# Patient Record
Sex: FEMALE | Race: OTHER | HISPANIC OR LATINO | Employment: FULL TIME | URBAN - METROPOLITAN AREA
[De-identification: names, ages, dates, MRNs, and addresses within clinical notes are randomized per-mention and may not be internally consistent; named-entity substitution may affect disease eponyms.]

---

## 2018-03-13 ENCOUNTER — APPOINTMENT (EMERGENCY)
Dept: CT IMAGING | Facility: HOSPITAL | Age: 38
End: 2018-03-13
Payer: COMMERCIAL

## 2018-03-13 ENCOUNTER — HOSPITAL ENCOUNTER (EMERGENCY)
Facility: HOSPITAL | Age: 38
Discharge: HOME | End: 2018-03-13
Attending: EMERGENCY MEDICINE | Admitting: EMERGENCY MEDICINE
Payer: COMMERCIAL

## 2018-03-13 VITALS
DIASTOLIC BLOOD PRESSURE: 82 MMHG | RESPIRATION RATE: 18 BRPM | HEART RATE: 79 BPM | TEMPERATURE: 97.7 F | WEIGHT: 238 LBS | BODY MASS INDEX: 37.84 KG/M2 | OXYGEN SATURATION: 100 % | SYSTOLIC BLOOD PRESSURE: 164 MMHG

## 2018-03-13 DIAGNOSIS — R10.9 LEFT FLANK PAIN: Primary | ICD-10-CM

## 2018-03-13 LAB
ALBUMIN SERPL BCP-MCNC: 4 G/DL (ref 3.5–5)
ALP SERPL-CCNC: 88 U/L (ref 46–116)
ALT SERPL W P-5'-P-CCNC: 26 U/L (ref 12–78)
ANION GAP SERPL CALCULATED.3IONS-SCNC: 9 MMOL/L (ref 4–13)
AST SERPL W P-5'-P-CCNC: 17 U/L (ref 5–45)
BACTERIA UR QL AUTO: ABNORMAL /HPF
BASOPHILS # BLD AUTO: 0.03 THOUSANDS/ΜL (ref 0–0.1)
BASOPHILS NFR BLD AUTO: 0 % (ref 0–1)
BILIRUB SERPL-MCNC: 0.21 MG/DL (ref 0.2–1)
BILIRUB UR QL STRIP: NEGATIVE
BUN SERPL-MCNC: 12 MG/DL (ref 5–25)
CALCIUM SERPL-MCNC: 9.4 MG/DL (ref 8.3–10.1)
CHLORIDE SERPL-SCNC: 100 MMOL/L (ref 100–108)
CLARITY UR: CLEAR
CO2 SERPL-SCNC: 29 MMOL/L (ref 21–32)
COLOR UR: YELLOW
CREAT SERPL-MCNC: 0.69 MG/DL (ref 0.6–1.3)
EOSINOPHIL # BLD AUTO: 0.11 THOUSAND/ΜL (ref 0–0.61)
EOSINOPHIL NFR BLD AUTO: 1 % (ref 0–6)
ERYTHROCYTE [DISTWIDTH] IN BLOOD BY AUTOMATED COUNT: 13.4 % (ref 11.6–15.1)
EXT PREG TEST URINE: NEGATIVE
GFR SERPL CREATININE-BSD FRML MDRD: 109 ML/MIN/1.73SQ M
GLUCOSE SERPL-MCNC: 98 MG/DL (ref 65–140)
GLUCOSE UR STRIP-MCNC: NEGATIVE MG/DL
HCT VFR BLD AUTO: 33 % (ref 34.8–46.1)
HGB BLD-MCNC: 10.8 G/DL (ref 11.5–15.4)
HGB UR QL STRIP.AUTO: NEGATIVE
KETONES UR STRIP-MCNC: NEGATIVE MG/DL
LEUKOCYTE ESTERASE UR QL STRIP: ABNORMAL
LYMPHOCYTES # BLD AUTO: 2.28 THOUSANDS/ΜL (ref 0.6–4.47)
LYMPHOCYTES NFR BLD AUTO: 28 % (ref 14–44)
MCH RBC QN AUTO: 27.9 PG (ref 26.8–34.3)
MCHC RBC AUTO-ENTMCNC: 32.7 G/DL (ref 31.4–37.4)
MCV RBC AUTO: 85 FL (ref 82–98)
MONOCYTES # BLD AUTO: 0.58 THOUSAND/ΜL (ref 0.17–1.22)
MONOCYTES NFR BLD AUTO: 7 % (ref 4–12)
NEUTROPHILS # BLD AUTO: 5.03 THOUSANDS/ΜL (ref 1.85–7.62)
NEUTS SEG NFR BLD AUTO: 64 % (ref 43–75)
NITRITE UR QL STRIP: NEGATIVE
NON-SQ EPI CELLS URNS QL MICRO: ABNORMAL /HPF
NRBC BLD AUTO-RTO: 0 /100 WBCS
PH UR STRIP.AUTO: 7 [PH] (ref 4.5–8)
PLATELET # BLD AUTO: 368 THOUSANDS/UL (ref 149–390)
PMV BLD AUTO: 9.3 FL (ref 8.9–12.7)
POTASSIUM SERPL-SCNC: 3.5 MMOL/L (ref 3.5–5.3)
PROT SERPL-MCNC: 8.3 G/DL (ref 6.4–8.2)
PROT UR STRIP-MCNC: NEGATIVE MG/DL
RBC # BLD AUTO: 3.87 MILLION/UL (ref 3.81–5.12)
RBC #/AREA URNS AUTO: ABNORMAL /HPF
SODIUM SERPL-SCNC: 138 MMOL/L (ref 136–145)
SP GR UR STRIP.AUTO: 1.01 (ref 1–1.03)
UROBILINOGEN UR QL STRIP.AUTO: 0.2 E.U./DL
WBC # BLD AUTO: 8.03 THOUSAND/UL (ref 4.31–10.16)
WBC #/AREA URNS AUTO: ABNORMAL /HPF

## 2018-03-13 PROCEDURE — 99284 EMERGENCY DEPT VISIT MOD MDM: CPT

## 2018-03-13 PROCEDURE — 36415 COLL VENOUS BLD VENIPUNCTURE: CPT | Performed by: EMERGENCY MEDICINE

## 2018-03-13 PROCEDURE — 96361 HYDRATE IV INFUSION ADD-ON: CPT

## 2018-03-13 PROCEDURE — 96374 THER/PROPH/DIAG INJ IV PUSH: CPT

## 2018-03-13 PROCEDURE — 85025 COMPLETE CBC W/AUTO DIFF WBC: CPT | Performed by: EMERGENCY MEDICINE

## 2018-03-13 PROCEDURE — 74176 CT ABD & PELVIS W/O CONTRAST: CPT

## 2018-03-13 PROCEDURE — 81002 URINALYSIS NONAUTO W/O SCOPE: CPT | Performed by: EMERGENCY MEDICINE

## 2018-03-13 PROCEDURE — 81025 URINE PREGNANCY TEST: CPT | Performed by: EMERGENCY MEDICINE

## 2018-03-13 PROCEDURE — 81001 URINALYSIS AUTO W/SCOPE: CPT

## 2018-03-13 PROCEDURE — 80053 COMPREHEN METABOLIC PANEL: CPT | Performed by: EMERGENCY MEDICINE

## 2018-03-13 RX ORDER — KETOROLAC TROMETHAMINE 30 MG/ML
30 INJECTION, SOLUTION INTRAMUSCULAR; INTRAVENOUS ONCE
Status: COMPLETED | OUTPATIENT
Start: 2018-03-13 | End: 2018-03-13

## 2018-03-13 RX ADMIN — KETOROLAC TROMETHAMINE 30 MG: 30 INJECTION, SOLUTION INTRAMUSCULAR at 19:04

## 2018-03-13 RX ADMIN — SODIUM CHLORIDE 1000 ML: 0.9 INJECTION, SOLUTION INTRAVENOUS at 19:04

## 2018-03-13 NOTE — ED PROVIDER NOTES
History  Chief Complaint   Patient presents with    Flank Pain     L flank  LLQ abdominal pain for 1 week, urinary frequency, nausea  Denies fever, vomiting, diarrhea, constipation  History provided by:  Patient   used: No    Flank Pain   Pain location:  L flank  Pain quality: aching    Pain radiates to:  LLQ  Pain severity:  Moderate  Onset quality:  Gradual  Duration:  1 week  Timing:  Intermittent  Progression:  Waxing and waning  Chronicity:  New  Context: not recent illness    Relieved by:  Nothing  Worsened by:  Nothing  Ineffective treatments:  None tried  Associated symptoms: no chest pain, no chills, no constipation, no cough, no diarrhea, no dysuria, no fever, no nausea, no shortness of breath, no sore throat and no vomiting    Risk factors: has not had multiple surgeries        None       Past Medical History:   Diagnosis Date    Fibromyalgia     Gastritis        Past Surgical History:   Procedure Laterality Date    APPENDECTOMY       SECTION      CHOLECYSTECTOMY      OVARIAN CYST REMOVAL Right        History reviewed  No pertinent family history  I have reviewed and agree with the history as documented  Social History   Substance Use Topics    Smoking status: Never Smoker    Smokeless tobacco: Never Used    Alcohol use No        Review of Systems   Constitutional: Negative for activity change, chills and fever  HENT: Negative for facial swelling, sore throat and trouble swallowing  Eyes: Negative for pain and visual disturbance  Respiratory: Negative for cough, chest tightness and shortness of breath  Cardiovascular: Negative for chest pain and leg swelling  Gastrointestinal: Negative for abdominal pain, blood in stool, constipation, diarrhea, nausea and vomiting  Genitourinary: Positive for flank pain  Negative for dysuria  Musculoskeletal: Negative for back pain, neck pain and neck stiffness  Skin: Negative for pallor and rash  Allergic/Immunologic: Negative for environmental allergies and immunocompromised state  Neurological: Negative for dizziness and headaches  Hematological: Negative for adenopathy  Does not bruise/bleed easily  Psychiatric/Behavioral: Negative for agitation and behavioral problems  All other systems reviewed and are negative  Physical Exam  ED Triage Vitals [03/13/18 1809]   Temperature Pulse Respirations Blood Pressure SpO2   97 7 °F (36 5 °C) 93 18 141/71 100 %      Temp Source Heart Rate Source Patient Position - Orthostatic VS BP Location FiO2 (%)   Temporal Monitor Sitting Right arm --      Pain Score       Worst Possible Pain           Orthostatic Vital Signs  Vitals:    03/13/18 1809 03/13/18 1903 03/13/18 2011   BP: 141/71 139/84 164/82   Pulse: 93 83 79   Patient Position - Orthostatic VS: Sitting Sitting Sitting       Physical Exam   Constitutional: She is oriented to person, place, and time  She appears well-developed and well-nourished  No distress  HENT:   Head: Normocephalic and atraumatic  Eyes: EOM are normal    Neck: Normal range of motion  Neck supple  Cardiovascular: Normal rate, regular rhythm, normal heart sounds and intact distal pulses  Pulmonary/Chest: Effort normal and breath sounds normal    Abdominal: Soft  Bowel sounds are normal  There is no tenderness  There is no rebound and no guarding  Musculoskeletal: Normal range of motion  Neurological: She is alert and oriented to person, place, and time  Skin: Skin is warm and dry  Psychiatric: She has a normal mood and affect  Nursing note and vitals reviewed        ED Medications  Medications   sodium chloride 0 9 % bolus 1,000 mL (0 mL Intravenous Stopped 3/13/18 2011)   ketorolac (TORADOL) injection 30 mg (30 mg Intravenous Given 3/13/18 1904)       Diagnostic Studies  Results Reviewed     Procedure Component Value Units Date/Time    Comprehensive metabolic panel [[id number]]  (Abnormal) Collected:  03/13/18 1903    Lab Status:  Final result Specimen:  Blood from Arm, Right Updated:  03/13/18 1929     Sodium 138 mmol/L      Potassium 3 5 mmol/L      Chloride 100 mmol/L      CO2 29 mmol/L      Anion Gap 9 mmol/L      BUN 12 mg/dL      Creatinine 0 69 mg/dL      Glucose 98 mg/dL      Calcium 9 4 mg/dL      AST 17 U/L      ALT 26 U/L      Alkaline Phosphatase 88 U/L      Total Protein 8 3 (H) g/dL      Albumin 4 0 g/dL      Total Bilirubin 0 21 mg/dL      eGFR 109 ml/min/1 73sq m     Narrative:         National Kidney Disease Education Program recommendations are as follows:  GFR calculation is accurate only with a steady state creatinine  Chronic Kidney disease less than 60 ml/min/1 73 sq  meters  Kidney failure less than 15 ml/min/1 73 sq  meters      CBC and differential [[id number]]  (Abnormal) Collected:  03/13/18 1903    Lab Status:  Final result Specimen:  Blood from Arm, Right Updated:  03/13/18 1915     WBC 8 03 Thousand/uL      RBC 3 87 Million/uL      Hemoglobin 10 8 (L) g/dL      Hematocrit 33 0 (L) %      MCV 85 fL      MCH 27 9 pg      MCHC 32 7 g/dL      RDW 13 4 %      MPV 9 3 fL      Platelets 330 Thousands/uL      nRBC 0 /100 WBCs      Neutrophils Relative 64 %      Lymphocytes Relative 28 %      Monocytes Relative 7 %      Eosinophils Relative 1 %      Basophils Relative 0 %      Neutrophils Absolute 5 03 Thousands/µL      Lymphocytes Absolute 2 28 Thousands/µL      Monocytes Absolute 0 58 Thousand/µL      Eosinophils Absolute 0 11 Thousand/µL      Basophils Absolute 0 03 Thousands/µL     Urine Microscopic [[id number]]  (Abnormal) Collected:  03/13/18 1838    Lab Status:  Final result Specimen:  Urine from Urine, Clean Catch Updated:  03/13/18 1901     RBC, UA 0-1 (A) /hpf      WBC, UA 2-4 (A) /hpf      Epithelial Cells Moderate (A) /hpf      Bacteria, UA Occasional /hpf     POCT pregnancy, urine [[id number]]  (Normal) Resulted:  03/13/18 1846    Lab Status:  Final result Updated:  03/13/18 1846     EXT PREG TEST UR (Ref: Negative) negative    POCT urinalysis dipstick [[id number]]  (Abnormal) Resulted:  03/13/18 1846    Lab Status:  Final result Specimen:  Urine from Urine, Other Updated:  03/13/18 1846    ED Urine Macroscopic [[id number]]  (Abnormal) Collected:  03/13/18 1838    Lab Status:  Final result Specimen:  Urine Updated:  03/13/18 1840     Color, UA Yellow     Clarity, UA Clear     pH, UA 7 0     Leukocytes, UA Trace (A)     Nitrite, UA Negative     Protein, UA Negative mg/dl      Glucose, UA Negative mg/dl      Ketones, UA Negative mg/dl      Urobilinogen, UA 0 2 E U /dl      Bilirubin, UA Negative     Blood, UA Negative     Specific Gravity, UA 1 015    Narrative:       [facility] RESULT                 CT renal stone study abdomen pelvis without contrast   Final Result by Hazel Ramires DO (03/13 2024)      No nephrolithiasis or hydronephrosis  Incidentally noted right adrenal mass consistent with adenoma  Workstation performed: [id number]                    Procedures  Procedures       Phone Contacts  ED Phone Contact    ED Course  ED Course as of Mar 13 2352   Tue Mar 13, 2018   2102 Labs, urine, CT results reviewed, no acute abnormality; patient does report at this point that she has 2 herniated discs, possible acute on chronic back pain  MDM  Number of Diagnoses or Management Options  Left flank pain: new and requires workup  Diagnosis management comments: D/D: Left renal colic, Pyelonephritis, UTI, Back pain  We will check  Labs, Urine,CT A/P stone study         Amount and/or Complexity of Data Reviewed  Clinical lab tests: reviewed and ordered  Tests in the radiology section of CPT®: ordered and reviewed  Tests in the medicine section of CPT®: ordered and reviewed  Independent visualization of images, tracings, or specimens: yes      CritCare Time    Disposition  Final diagnoses:   Left flank pain     Time reflects when diagnosis was documented in both MDM as applicable and the Disposition within this note     Time User Action Codes Description Comment    3/13/2018  9:14 PM Laverlai December Add [R10 9] Left flank pain       ED Disposition     ED Disposition Condition Comment    Discharge  [facility] discharge to home/self care  Condition at discharge: Stable        Follow-up Information     Follow up With Specialties Details Why [address]    [address]  [address]          There are no discharge medications for this patient  No discharge procedures on file      ED Provider  Electronically Signed by           Jennifer Chinchilla MD  03/13/18 0406

## 2018-03-14 NOTE — DISCHARGE INSTRUCTIONS
Flank Pain   WHAT YOU NEED TO KNOW:   Flank pain is felt in the area below your ribcage and above your hip bones, often in the lower back  Your pain may be dull or so severe that you cannot get comfortable  The pain may stay in one area or radiate to another area  It may worsen and lighten in waves  Flank pain is often a sign of problems with your urinary tract, such as a kidney stone or infection  DISCHARGE INSTRUCTIONS:   Return to the emergency department if:   · You have a fever  · Your heart is fluttering or jumping  · You see blood in your urine  · Your pain radiates into your lower abdomen and genital area  · You have intense pain in your low back next to your spine  · You are much more tired than usual and have no desire to eat  · You have a headache and your muscles jerk  Contact your healthcare provider if:   · You have an upset stomach and are vomiting  · You have to urinate more often, and with urgency  · Your pain worsens or does not improve, and you cannot get comfortable  · You pass a stone when you urinate  · You have questions or concerns about your condition or care  Medicines: The following medicines may be ordered for you:  · Pain medicine  may help decrease or relieve your pain  Do not wait until the pain is severe before you take your medicine  · Antibiotics  may help treat a urinary tract infection caused by bacteria  · Take your medicine as directed  Contact your healthcare provider if you think your medicine is not helping or if you have side effects  Tell him of her if you are allergic to any medicine  Keep a list of the medicines, vitamins, and herbs you take  Include the amounts, and when and why you take them  Bring the list or the pill bottles to follow-up visits  Carry your medicine list with you in case of an emergency    Follow up with your healthcare provider in 1 to 2 days or as directed:  Write down your questions so you remember to ask them during your visits  © 2017 [address] Information is for End User's use only and may not be sold, redistributed or otherwise used for commercial purposes  All illustrations and images included in CareNotes® are the copyrighted property of A D A M [employer] Inc  or [employer]  The above information is an  only  It is not intended as medical advice for individual conditions or treatments  Talk to your doctor, nurse or pharmacist before following any medical regimen to see if it is safe and effective for you

## 2018-04-19 ENCOUNTER — ANNUAL EXAM (OUTPATIENT)
Dept: OBGYN CLINIC | Facility: CLINIC | Age: 38
End: 2018-04-19
Payer: COMMERCIAL

## 2018-04-19 VITALS
WEIGHT: 243 LBS | BODY MASS INDEX: 39.05 KG/M2 | DIASTOLIC BLOOD PRESSURE: 72 MMHG | OXYGEN SATURATION: 98 % | SYSTOLIC BLOOD PRESSURE: 128 MMHG | HEIGHT: 66 IN | HEART RATE: 103 BPM

## 2018-04-19 DIAGNOSIS — E58 DIETARY CALCIUM DEFICIENCY: ICD-10-CM

## 2018-04-19 DIAGNOSIS — Z01.419 ENCOUNTER FOR ANNUAL ROUTINE GYNECOLOGICAL EXAMINATION: Primary | ICD-10-CM

## 2018-04-19 DIAGNOSIS — Z12.31 VISIT FOR SCREENING MAMMOGRAM: ICD-10-CM

## 2018-04-19 DIAGNOSIS — Z72.3 INADEQUATE EXERCISE: ICD-10-CM

## 2018-04-19 PROCEDURE — S0610 ANNUAL GYNECOLOGICAL EXAMINA: HCPCS | Performed by: OBSTETRICS & GYNECOLOGY

## 2018-04-19 RX ORDER — VALACYCLOVIR HYDROCHLORIDE 1 G/1
TABLET, FILM COATED ORAL
Refills: 0 | COMMUNITY
Start: 2018-03-29 | End: 2019-12-09 | Stop reason: SDUPTHER

## 2018-04-19 RX ORDER — MILNACIPRAN HYDROCHLORIDE 50 MG/1
TABLET, FILM COATED ORAL 2 TIMES DAILY
Refills: 2 | COMMUNITY
Start: 2018-03-26 | End: 2019-02-01 | Stop reason: SDUPTHER

## 2018-04-19 RX ORDER — DEXLANSOPRAZOLE 60 MG/1
CAPSULE, DELAYED RELEASE ORAL EVERY 12 HOURS
COMMUNITY
Start: 2015-07-21 | End: 2018-09-24 | Stop reason: SDUPTHER

## 2018-04-19 NOTE — PROGRESS NOTES
Pt is a 34 y o  [id number] with Patient's last menstrual period was 2018 (exact date)  using VL for [facility] presents for preventive care  She notes the same partner since her last STI evaluation  In her lifetime she has been involved with 1 partner   Safe sexual practices (monogomy, condoms) are followed consistently  · She does  feel safe in the relationship  She does feel safe in her home  · Her calcium intake encompasses milk (cow, goat, almond, cashew, soy, etc), cheese and yogurt for a total of 1-2 servings daily on average  She does not take additional Vitamin D (MVI or supplement)  · She exercises minimal secondary to fibromyalgia times per week  · Her menses occur every 22  Days, last 6-7 days and require maxipad every 3 pads day 1-3, then 1-2 pads/day hours  Menstrual History:  OB History      Para Term  AB Living    4 4 3 1   4    SAB TAB Ectopic Multiple Live Births                     Obstetric Comments    1  FT   2  FT C/S--face presentation  3  PT   4  FT     Menarche: 4         Menarche age: 4  Patient's last menstrual period was 2018 (exact date)  ·      · She has never recieved an HPV vaccine    · tobacco use : does not use tobacco              · Colonoscopy: --wnl, repeat 5 years  · Last pap 2017 per patient--will obtain records  · Advise screening mammogram    Past Medical History:   Diagnosis Date    Duodenitis without bleeding     Dysphagia     Fibromyalgia     Gastritis     Hiatal hernia     Hx of colonoscopy     Insomnia     Memory loss     Oral herpes        Past Surgical History:   Procedure Laterality Date    APPENDECTOMY       SECTION      CHOLECYSTECTOMY      COLONOSCOPY      2012    DILATION AND CURETTAGE OF UTERUS      Resolved:2009    ESOPHAGOGASTRODUODENOSCOPY      Diagnostic ; 2012    HYSTEROSCOPY      Resolved:2009    OVARIAN CYST REMOVAL Right     WISDOM TOOTH EXTRACTION OB History    Para Term  AB Living   4 4 3 1   4   SAB TAB Ectopic Multiple Live Births                  # Outcome Date GA Lbr Joe/2nd Weight Sex Delivery Anes PTL Lv   4             3 Term            2 Term            1 Term               Obstetric Comments   1  FT    2  FT C/S--face presentation   3  PT    4   FT       Menarche: 4       Gyn HX:  menorrhagia       Current Outpatient Prescriptions:     dexlansoprazole (DEXILANT) 60 MG capsule, every 12 (twelve) hours, Disp: , Rfl:     SAVELLA 50 MG TABS, Take by mouth 2 (two) times a day, Disp: , Rfl: 2    valACYclovir (VALTREX) 1,000 mg tablet, TAKE 2 TABLET BY ORAL ROUTE EVERY 12 HOURS, Disp: , Rfl: 0    Allergies   Allergen Reactions    Morphine Chest Pain    Percocet [Oxycodone-Acetaminophen] Hives       Social History     Social History    Marital status: /Civil Union     Spouse name: N/A    Number of children: 4    Years of education: high school     Occupational History    cash operatorr      Social History Main Topics    Smoking status: Never Smoker    Smokeless tobacco: Never Used    Alcohol use No    Drug use: No    Sexual activity: Yes     Partners: Male     Birth control/ protection: Male Sterilization      Comment: life time partners:1     Other Topics Concern    None     Social History Narrative    Taoism Voodoo    Accepts blood products       Family History   Problem Relation Age of Onset    Other Mother      uterine leiomyoma    Colon cancer Father 54     stage IV    Diabetes Father     Hypertension Father     Anxiety disorder Brother     Depression Brother     Multiple sclerosis Sister     Hypertension Maternal Grandmother     Diabetes Maternal Grandmother     Other Maternal Grandmother      vulvar cancer    Schizophrenia Maternal Grandfather     Hypertension Maternal Grandfather     Thyroid cancer Paternal Grandmother 80    Diabetes Paternal Grandfather 43      due to complications of DM       Blood pressure 128/72, pulse 103, height 5' 6 14" (1 68 m), weight 110 kg (243 lb), last menstrual period 04/01/2018, SpO2 98 %, not currently breastfeeding  and Body mass index is 39 05 kg/m²  Physical Exam   Constitutional: She is oriented to person, place, and time  She appears well-developed and well-nourished  HENT:   Head: Normocephalic and atraumatic  Eyes: Conjunctivae and EOM are normal    Neck: Normal range of motion  Neck supple  No tracheal deviation present  No thyromegaly present  Cardiovascular: Regular rhythm and normal heart sounds  Pulmonary/Chest: Effort normal and breath sounds normal  No stridor  No respiratory distress  She has no wheezes  She has no rales  Abdominal: Soft  Bowel sounds are normal  She exhibits no distension and no mass  There is no tenderness  There is no guarding  Musculoskeletal: Normal range of motion  She exhibits no edema or tenderness  Lymphadenopathy:     She has no cervical adenopathy  Neurological: She is alert and oriented to person, place, and time  Skin: Skin is warm  No rash noted  No erythema  Psychiatric: She has a normal mood and affect  Her behavior is normal  Judgment and thought content normal      Breasts: breasts appear normal, no suspicious masses, no skin or nipple changes or axillary nodes, right breast normal without mass, skin or nipple changes or axillary nodes, left breast normal without mass, skin or nipple changes or axillary nodes         vulva: normal external genitalia for age and no lesions, masses, epithelial changes, or exudate  vagina: color pink, rugae  well formed rugae, discharge  white and bleeding  without any bleeding  cervix: parous and no lesions   uterus: NSSC, AF, NT, mobile  adnexa: no masses or tenderness      A/P:  Pt is a 34 y o  [id number] with      Diagnoses and all orders for this visit:    Encounter for annual routine gynecological examination    Visit for screening mammogram  -     Mammo screening bilateral w cad; Future    Dietary calcium deficiency    Inadequate exercise    BMI 39 0-39 9,adult    Patient advised recommendation of daily dietary calcium of  1000 mg calcium  Patient advised recommendation of exercise 5 times per week for 30 minutes  Patient advised recommendation of BMI to be between 19-25

## 2018-04-25 ENCOUNTER — HOSPITAL ENCOUNTER (OUTPATIENT)
Dept: MAMMOGRAPHY | Facility: HOSPITAL | Age: 38
Discharge: HOME | End: 2018-04-25
Attending: OBSTETRICS & GYNECOLOGY
Payer: COMMERCIAL

## 2018-04-25 DIAGNOSIS — Z12.31 VISIT FOR SCREENING MAMMOGRAM: ICD-10-CM

## 2018-04-25 PROCEDURE — 77067 SCR MAMMO BI INCL CAD: CPT

## 2018-05-14 ENCOUNTER — TELEPHONE (OUTPATIENT)
Dept: OBGYN CLINIC | Facility: CLINIC | Age: 38
End: 2018-05-14

## 2018-05-14 NOTE — TELEPHONE ENCOUNTER
Pt called back stating breast health services did reach out to her last week and she is scheduled for a right breast follow up this coming Wednesday     watch file for results

## 2018-05-16 ENCOUNTER — HOSPITAL ENCOUNTER (OUTPATIENT)
Dept: ULTRASOUND IMAGING | Facility: CLINIC | Age: 38
Discharge: HOME | End: 2018-05-16
Payer: COMMERCIAL

## 2018-05-16 ENCOUNTER — HOSPITAL ENCOUNTER (OUTPATIENT)
Dept: MAMMOGRAPHY | Facility: CLINIC | Age: 38
Discharge: HOME | End: 2018-05-16
Payer: COMMERCIAL

## 2018-05-16 DIAGNOSIS — R92.8 ABNORMAL MAMMOGRAM: ICD-10-CM

## 2018-05-16 PROCEDURE — G0279 TOMOSYNTHESIS, MAMMO: HCPCS

## 2018-05-16 PROCEDURE — 77065 DX MAMMO INCL CAD UNI: CPT

## 2018-05-29 ENCOUNTER — TRANSCRIBE ORDERS (OUTPATIENT)
Dept: ADMINISTRATIVE | Facility: HOSPITAL | Age: 38
End: 2018-05-29

## 2018-05-29 DIAGNOSIS — R22.1 NECK MASS: Primary | ICD-10-CM

## 2018-05-30 ENCOUNTER — HOSPITAL ENCOUNTER (OUTPATIENT)
Dept: ULTRASOUND IMAGING | Facility: HOSPITAL | Age: 38
Discharge: HOME | End: 2018-05-30
Payer: COMMERCIAL

## 2018-05-30 DIAGNOSIS — R22.1 NECK MASS: ICD-10-CM

## 2018-05-30 PROCEDURE — 76536 US EXAM OF HEAD AND NECK: CPT

## 2018-06-02 ENCOUNTER — APPOINTMENT (OUTPATIENT)
Dept: LAB | Facility: HOSPITAL | Age: 38
End: 2018-06-02
Payer: COMMERCIAL

## 2018-06-02 ENCOUNTER — TRANSCRIBE ORDERS (OUTPATIENT)
Dept: ADMINISTRATIVE | Facility: HOSPITAL | Age: 38
End: 2018-06-02

## 2018-06-02 DIAGNOSIS — E55.9 AVITAMINOSIS D: ICD-10-CM

## 2018-06-02 DIAGNOSIS — E66.01 MORBID OBESITY (HCC): ICD-10-CM

## 2018-06-02 DIAGNOSIS — E78.49 FAMILIAL COMBINED HYPERLIPIDEMIA: ICD-10-CM

## 2018-06-02 DIAGNOSIS — D35.00 BENIGN NEOPLASM OF ADRENAL GLAND, UNSPECIFIED LATERALITY: ICD-10-CM

## 2018-06-02 DIAGNOSIS — H81.13 BENIGN PAROXYSMAL VERTIGO OF BOTH EARS: ICD-10-CM

## 2018-06-02 DIAGNOSIS — D50.8 IRON DEFICIENCY ANEMIA SECONDARY TO INADEQUATE DIETARY IRON INTAKE: ICD-10-CM

## 2018-06-02 DIAGNOSIS — K21.9 GASTROESOPHAGEAL REFLUX DISEASE WITHOUT ESOPHAGITIS: ICD-10-CM

## 2018-06-02 DIAGNOSIS — B00.9 DISEASE DUE TO ALPHAHERPESVIRINAE: Primary | ICD-10-CM

## 2018-06-02 DIAGNOSIS — B00.9 DISEASE DUE TO ALPHAHERPESVIRINAE: ICD-10-CM

## 2018-06-02 LAB
ALBUMIN SERPL BCP-MCNC: 3.5 G/DL (ref 3.5–5)
ALP SERPL-CCNC: 75 U/L (ref 46–116)
ALT SERPL W P-5'-P-CCNC: 23 U/L (ref 12–78)
ANION GAP SERPL CALCULATED.3IONS-SCNC: 6 MMOL/L (ref 4–13)
AST SERPL W P-5'-P-CCNC: 15 U/L (ref 5–45)
BASOPHILS # BLD AUTO: 0.03 THOUSANDS/ΜL (ref 0–0.1)
BASOPHILS NFR BLD AUTO: 1 % (ref 0–1)
BILIRUB SERPL-MCNC: 0.31 MG/DL (ref 0.2–1)
BUN SERPL-MCNC: 11 MG/DL (ref 5–25)
CALCIUM SERPL-MCNC: 9.1 MG/DL (ref 8.3–10.1)
CHLORIDE SERPL-SCNC: 104 MMOL/L (ref 100–108)
CHOLEST SERPL-MCNC: 211 MG/DL (ref 50–200)
CO2 SERPL-SCNC: 28 MMOL/L (ref 21–32)
CREAT SERPL-MCNC: 0.7 MG/DL (ref 0.6–1.3)
EOSINOPHIL # BLD AUTO: 0.17 THOUSAND/ΜL (ref 0–0.61)
EOSINOPHIL NFR BLD AUTO: 3 % (ref 0–6)
ERYTHROCYTE [DISTWIDTH] IN BLOOD BY AUTOMATED COUNT: 14.2 % (ref 11.6–15.1)
GFR SERPL CREATININE-BSD FRML MDRD: 109 ML/MIN/1.73SQ M
GLUCOSE P FAST SERPL-MCNC: 100 MG/DL (ref 65–99)
HCT VFR BLD AUTO: 32 % (ref 34.8–46.1)
HDLC SERPL-MCNC: 48 MG/DL (ref 40–60)
HGB BLD-MCNC: 10.1 G/DL (ref 11.5–15.4)
LDLC SERPL CALC-MCNC: 138 MG/DL (ref 0–100)
LYMPHOCYTES # BLD AUTO: 1.99 THOUSANDS/ΜL (ref 0.6–4.47)
LYMPHOCYTES NFR BLD AUTO: 33 % (ref 14–44)
MCH RBC QN AUTO: 26.6 PG (ref 26.8–34.3)
MCHC RBC AUTO-ENTMCNC: 31.6 G/DL (ref 31.4–37.4)
MCV RBC AUTO: 84 FL (ref 82–98)
MONOCYTES # BLD AUTO: 0.46 THOUSAND/ΜL (ref 0.17–1.22)
MONOCYTES NFR BLD AUTO: 8 % (ref 4–12)
NEUTROPHILS # BLD AUTO: 3.38 THOUSANDS/ΜL (ref 1.85–7.62)
NEUTS SEG NFR BLD AUTO: 56 % (ref 43–75)
NONHDLC SERPL-MCNC: 163 MG/DL
NRBC BLD AUTO-RTO: 0 /100 WBCS
PLATELET # BLD AUTO: 392 THOUSANDS/UL (ref 149–390)
PMV BLD AUTO: 9.2 FL (ref 8.9–12.7)
POTASSIUM SERPL-SCNC: 4.1 MMOL/L (ref 3.5–5.3)
PROT SERPL-MCNC: 8 G/DL (ref 6.4–8.2)
RBC # BLD AUTO: 3.79 MILLION/UL (ref 3.81–5.12)
SODIUM SERPL-SCNC: 138 MMOL/L (ref 136–145)
TRIGL SERPL-MCNC: 123 MG/DL
TSH SERPL DL<=0.05 MIU/L-ACNC: 1.58 UIU/ML (ref 0.36–3.74)
WBC # BLD AUTO: 6.03 THOUSAND/UL (ref 4.31–10.16)

## 2018-06-02 PROCEDURE — 80053 COMPREHEN METABOLIC PANEL: CPT

## 2018-06-02 PROCEDURE — 80061 LIPID PANEL: CPT

## 2018-06-02 PROCEDURE — 85025 COMPLETE CBC W/AUTO DIFF WBC: CPT

## 2018-06-02 PROCEDURE — 36415 COLL VENOUS BLD VENIPUNCTURE: CPT

## 2018-06-02 PROCEDURE — 84443 ASSAY THYROID STIM HORMONE: CPT

## 2018-06-25 ENCOUNTER — TELEPHONE (OUTPATIENT)
Dept: FAMILY MEDICINE CLINIC | Facility: CLINIC | Age: 38
End: 2018-06-25

## 2018-06-25 DIAGNOSIS — D64.9 ANEMIA, UNSPECIFIED TYPE: ICD-10-CM

## 2018-07-03 ENCOUNTER — APPOINTMENT (OUTPATIENT)
Dept: LAB | Facility: HOSPITAL | Age: 38
End: 2018-07-03
Payer: COMMERCIAL

## 2018-07-03 DIAGNOSIS — D64.9 ANEMIA, UNSPECIFIED TYPE: ICD-10-CM

## 2018-07-03 LAB
BASOPHILS # BLD AUTO: 0.02 THOUSANDS/ΜL (ref 0–0.1)
BASOPHILS NFR BLD AUTO: 0 % (ref 0–1)
EOSINOPHIL # BLD AUTO: 0.2 THOUSAND/ΜL (ref 0–0.61)
EOSINOPHIL NFR BLD AUTO: 3 % (ref 0–6)
ERYTHROCYTE [DISTWIDTH] IN BLOOD BY AUTOMATED COUNT: 14.4 % (ref 11.6–15.1)
FERRITIN SERPL-MCNC: 5 NG/ML (ref 8–388)
FOLATE SERPL-MCNC: 13.8 NG/ML (ref 3.1–17.5)
HCT VFR BLD AUTO: 31.8 % (ref 34.8–46.1)
HGB BLD-MCNC: 10.2 G/DL (ref 11.5–15.4)
IRON SERPL-MCNC: 42 UG/DL (ref 50–170)
LYMPHOCYTES # BLD AUTO: 2.23 THOUSANDS/ΜL (ref 0.6–4.47)
LYMPHOCYTES NFR BLD AUTO: 28 % (ref 14–44)
MCH RBC QN AUTO: 26.9 PG (ref 26.8–34.3)
MCHC RBC AUTO-ENTMCNC: 32.1 G/DL (ref 31.4–37.4)
MCV RBC AUTO: 84 FL (ref 82–98)
MONOCYTES # BLD AUTO: 0.54 THOUSAND/ΜL (ref 0.17–1.22)
MONOCYTES NFR BLD AUTO: 7 % (ref 4–12)
NEUTROPHILS # BLD AUTO: 5.03 THOUSANDS/ΜL (ref 1.85–7.62)
NEUTS SEG NFR BLD AUTO: 63 % (ref 43–75)
NRBC BLD AUTO-RTO: 0 /100 WBCS
PLATELET # BLD AUTO: 425 THOUSANDS/UL (ref 149–390)
PMV BLD AUTO: 9.9 FL (ref 8.9–12.7)
RBC # BLD AUTO: 3.79 MILLION/UL (ref 3.81–5.12)
RETICS # AUTO: NORMAL 10*3/UL (ref 14097–95744)
RETICS # CALC: 1.14 % (ref 0.37–1.87)
TIBC SERPL-MCNC: 444 UG/DL (ref 250–450)
VIT B12 SERPL-MCNC: 351 PG/ML (ref 100–900)
WBC # BLD AUTO: 8.02 THOUSAND/UL (ref 4.31–10.16)

## 2018-07-03 PROCEDURE — 82728 ASSAY OF FERRITIN: CPT

## 2018-07-03 PROCEDURE — 85025 COMPLETE CBC W/AUTO DIFF WBC: CPT

## 2018-07-03 PROCEDURE — 36415 COLL VENOUS BLD VENIPUNCTURE: CPT

## 2018-07-03 PROCEDURE — 82607 VITAMIN B-12: CPT

## 2018-07-03 PROCEDURE — 83540 ASSAY OF IRON: CPT

## 2018-07-03 PROCEDURE — 83550 IRON BINDING TEST: CPT

## 2018-07-03 PROCEDURE — 85045 AUTOMATED RETICULOCYTE COUNT: CPT

## 2018-07-03 PROCEDURE — 82746 ASSAY OF FOLIC ACID SERUM: CPT

## 2018-07-11 PROBLEM — H81.10 BENIGN PAROXYSMAL POSITIONAL VERTIGO: Status: ACTIVE | Noted: 2017-03-08

## 2018-07-16 ENCOUNTER — OFFICE VISIT (OUTPATIENT)
Dept: FAMILY MEDICINE CLINIC | Facility: CLINIC | Age: 38
End: 2018-07-16
Payer: COMMERCIAL

## 2018-07-16 VITALS
RESPIRATION RATE: 16 BRPM | HEIGHT: 65 IN | DIASTOLIC BLOOD PRESSURE: 74 MMHG | WEIGHT: 245 LBS | HEART RATE: 87 BPM | OXYGEN SATURATION: 98 % | BODY MASS INDEX: 40.82 KG/M2 | SYSTOLIC BLOOD PRESSURE: 128 MMHG

## 2018-07-16 DIAGNOSIS — E78.2 MIXED HYPERLIPIDEMIA: ICD-10-CM

## 2018-07-16 DIAGNOSIS — R10.10 PAIN LOCALIZED TO UPPER ABDOMEN: Primary | ICD-10-CM

## 2018-07-16 DIAGNOSIS — K21.9 GASTROESOPHAGEAL REFLUX DISEASE WITHOUT ESOPHAGITIS: ICD-10-CM

## 2018-07-16 DIAGNOSIS — D50.8 OTHER IRON DEFICIENCY ANEMIA: ICD-10-CM

## 2018-07-16 DIAGNOSIS — K59.09 OTHER CONSTIPATION: ICD-10-CM

## 2018-07-16 DIAGNOSIS — M79.7 PRIMARY FIBROMYALGIA SYNDROME: ICD-10-CM

## 2018-07-16 DIAGNOSIS — R73.01 IMPAIRED FASTING GLUCOSE: ICD-10-CM

## 2018-07-16 PROCEDURE — 99214 OFFICE O/P EST MOD 30 MIN: CPT | Performed by: FAMILY MEDICINE

## 2018-07-16 RX ORDER — CLINDAMYCIN PHOSPHATE 10 MG/G
GEL TOPICAL EVERY 12 HOURS
COMMUNITY
Start: 2016-08-16 | End: 2019-01-09 | Stop reason: SDUPTHER

## 2018-07-16 NOTE — PROGRESS NOTES
Assessment/Plan:    Hyperlipidemia  Chronic ,fair control on current medication  Advised to maintain a low-fat low-cholesterol diet consult regarding potential comorbidity including cardiovascular disease consult regarding important weight loss         Diagnoses and all orders for this visit:    Pain localized to upper abdomen  Comments:  Symptomatic patient did have history of adhesion more than 5 years ago,plan to refer to Surgeon,in case worse to go to ER  Orders:  -     Ambulatory referral to General Surgery; Future    Other iron deficiency anemia  Comments:  Symptomatic iron is low patient she had history of intolerance to iron and cause constipation will refer for iron infusion  Orders:  -     Ambulatory referral to Hematology / Oncology; Future    Gastroesophageal reflux disease without esophagitis    Impaired fasting glucose    Primary fibromyalgia syndrome    Other constipation    Mixed hyperlipidemia    Other orders  -     clindamycin (CLINDAGEL) 1 % gel; Every 12 hours          Subjective:   Chief Complaint   Patient presents with    Follow-up     chronic conditions         Patient ID: Shemar Marcelino is a 34 y o  female  Abdomen pain in LUQ ,5/10 ,radiates to back ,no constipation ,no diarrhea ,no weight lose no fever,no nausea or vomiting ,pt did have Ctscan of abdomen insignificant ,pt had HX of adhesion more than 5 y ago when she has similar kind of pain,no urinary problem,no increase frequency no blood in urine ,pt did have colonoscopy x2 y ago was neg   _ pt blood work review with pt iron dif anemia ,,pt has intolerance to Ferrous sulfate cause constipation         The following portions of the patient's history were reviewed and updated as appropriate: allergies, current medications, past family history, past medical history, past social history, past surgical history and problem list     Review of Systems   Constitutional: Negative for fatigue and fever     HENT: Negative for ear pain, sinus pain, sinus pressure and sore throat  Eyes: Negative for pain and redness  Respiratory: Negative for cough, chest tightness and shortness of breath  Cardiovascular: Negative for chest pain, palpitations and leg swelling  Gastrointestinal: Positive for abdominal pain  Negative for blood in stool, constipation, diarrhea and nausea  Genitourinary: Negative for flank pain, frequency and hematuria  Musculoskeletal: Negative for back pain and joint swelling  Skin: Negative for rash  Neurological: Negative for dizziness, numbness and headaches  Hematological: Does not bruise/bleed easily  Objective:  Vitals:    07/16/18 1352   BP: 128/74   BP Location: Left arm   Patient Position: Sitting   Cuff Size: Large   Pulse: 87   Resp: 16   SpO2: 98%   Weight: 111 kg (245 lb)   Height: 5' 5" (1 651 m)      Physical Exam   Constitutional: She is oriented to person, place, and time  She appears well-developed and well-nourished  HENT:   Head: Normocephalic  Right Ear: External ear normal    Left Ear: External ear normal    Eyes: Right eye exhibits no discharge  Left eye exhibits no discharge  Neck: No JVD present  Cardiovascular: Normal rate, regular rhythm and normal heart sounds  Exam reveals no gallop  No murmur heard  Pulmonary/Chest: Effort normal  No respiratory distress  She has no wheezes  She has no rales  She exhibits no tenderness  Abdominal: She exhibits no mass  There is tenderness  There is no rebound  Tenderness in LUQ    Musculoskeletal: She exhibits no edema or tenderness  Neurological: She is alert and oriented to person, place, and time

## 2018-07-19 ENCOUNTER — OFFICE VISIT (OUTPATIENT)
Dept: HEMATOLOGY ONCOLOGY | Facility: CLINIC | Age: 38
End: 2018-07-19
Payer: COMMERCIAL

## 2018-07-19 VITALS
HEIGHT: 66 IN | RESPIRATION RATE: 18 BRPM | SYSTOLIC BLOOD PRESSURE: 118 MMHG | TEMPERATURE: 99.4 F | WEIGHT: 244.8 LBS | OXYGEN SATURATION: 99 % | BODY MASS INDEX: 39.34 KG/M2 | DIASTOLIC BLOOD PRESSURE: 86 MMHG | HEART RATE: 77 BPM

## 2018-07-19 DIAGNOSIS — D50.9 IRON DEFICIENCY ANEMIA, UNSPECIFIED IRON DEFICIENCY ANEMIA TYPE: Primary | ICD-10-CM

## 2018-07-19 PROCEDURE — 99214 OFFICE O/P EST MOD 30 MIN: CPT | Performed by: INTERNAL MEDICINE

## 2018-07-19 NOTE — PROGRESS NOTES
Hematology/Oncology Outpatient Follow-up  Neto Garcia 34 y o  female 1980 [id number]    Date:  7/19/2018      Assessment and Plan:  1  Iron deficiency anemia, unspecified iron deficiency anemia type  The patient will be treated with 2 doses of injected for 1 week apart she also received 2 vitamin B12 injections at the same time  I did advise her to take vitamin B12 sublingual on a daily basis  We will see her again about 3 months from now for close follow-up  - CBC and differential; Future  - Comprehensive metabolic panel; Future  - Iron Panel; Future  - Vitamin B12; Future      HPI:  The patient came in after 2 years of her previous office visit with the same medical problem  She has history of chronic normocytic anemia history of iron deficiency, history of gastritis and gastric paresis  She was treated with injectafer x2 doses around April 2016 which corrected her iron deficiency in her anemia  Extensive workup was done at that time to rule out other causes of her anemia  She did have a borderline low vitamin B12 level without any hint of monoclonal gammopathy or hemolysis  Most recent blood work continues to show iron deficiency with ferritin of 5 and borderline low vitamin B12 level  She continues to have heavy menstrual cycle and gastric paresis which is the most likely etiology of her iron deficiency and vitamin B12 deficiency  No history exists  Interval history:    ROS: Review of Systems   Constitutional: Positive for fatigue  Negative for activity change, appetite change, chills, diaphoresis, fever and unexpected weight change  HENT: Positive for trouble swallowing  Negative for congestion, dental problem, ear discharge, ear pain, facial swelling, hearing loss, mouth sores, nosebleeds, postnasal drip, sore throat and tinnitus  Eyes: Negative for discharge, redness, itching and visual disturbance  Respiratory: Positive for shortness of breath   Negative for cough, chest tightness and wheezing  Cardiovascular: Negative for chest pain, palpitations and leg swelling  Gastrointestinal: Positive for nausea  Negative for abdominal distention, abdominal pain, anal bleeding, blood in stool, constipation, diarrhea and vomiting  Genitourinary: Negative for difficulty urinating, dysuria, flank pain, frequency, hematuria and urgency  Musculoskeletal: Negative for arthralgias, back pain, gait problem, joint swelling, myalgias and neck pain  Skin: Negative for color change, pallor, rash and wound  Neurological: Positive for dizziness and numbness  Negative for syncope, speech difficulty, weakness, light-headedness and headaches  Hematological: Negative for adenopathy  Does not bruise/bleed easily  Psychiatric/Behavioral: Positive for sleep disturbance  Negative for agitation, behavioral problems and confusion         Past Medical History:   Diagnosis Date    Anemia     Duodenitis without bleeding     Dysphagia     Fibromyalgia     Gastritis     Hiatal hernia     Hx of colonoscopy     Insomnia     Memory loss     Oral herpes        Past Surgical History:   Procedure Laterality Date    APPENDECTOMY      BREAST SURGERY      lump removed from right breast      SECTION      CHOLECYSTECTOMY      COLONOSCOPY      2012    DILATION AND CURETTAGE OF UTERUS      Resolved:2009    ESOPHAGOGASTRODUODENOSCOPY      Diagnostic ; 2012    HYSTEROSCOPY      Resolved:2009    OVARIAN CYST REMOVAL Right     WISDOM TOOTH EXTRACTION         Social History     Social History    Marital status: /Civil Union     Spouse name: N/A    Number of children: 4    Years of education: high school     Occupational History    cash operatorr      Social History Main Topics    Smoking status: Never Smoker    Smokeless tobacco: Never Used    Alcohol use No    Drug use: No    Sexual activity: Yes     Partners: Male     Birth control/ protection: Male Sterilization      Comment: life time partners:1     Other Topics Concern    None     Social History Narrative    [employer]    Accepts blood products       Family History   Problem Relation Age of Onset    Other Mother         uterine leiomyoma    Colon cancer Father 54        stage IV    Diabetes Father     Hypertension Father     Anxiety disorder Brother     Depression Brother     Multiple sclerosis Sister     Hypertension Maternal Grandmother     Diabetes Maternal Grandmother     Other Maternal Grandmother         vulvar cancer    Schizophrenia Maternal Grandfather     Hypertension Maternal Grandfather     Thyroid cancer Paternal Grandmother 80    Diabetes Paternal Grandfather 43         due to complications of DM       Allergies   Allergen Reactions    Morphine Chest Pain    Percocet [Oxycodone-Acetaminophen] Hives    Domperidone          Current Outpatient Prescriptions:     clindamycin (CLINDAGEL) 1 % gel, Every 12 hours, Disp: , Rfl:     dexlansoprazole (DEXILANT) 60 MG capsule, every 12 (twelve) hours, Disp: , Rfl:     meclizine (ANTIVERT) 32 MG tablet, Take 32 mg by mouth 3 (three) times a day as needed for dizziness, Disp: , Rfl:     SAVELLA 50 MG TABS, Take by mouth 2 (two) times a day, Disp: , Rfl: 2    valACYclovir (VALTREX) 1,000 mg tablet, TAKE 2 TABLET BY ORAL ROUTE EVERY 12 HOURS, Disp: , Rfl: 0      Physical Exam:  /86 (BP Location: Left arm, Patient Position: Sitting, Cuff Size: Large)   Pulse 77   Temp 99 4 °F (37 4 °C) (Tympanic)   Resp 18   Ht 5' 6" (1 676 m)   Wt 111 kg (244 lb 12 8 oz)   SpO2 99%   BMI 39 51 kg/m²     Physical Exam   Constitutional: She is oriented to person, place, and time  She appears well-developed and well-nourished  No distress  HENT:   Head: Normocephalic and atraumatic     Nose: Nose normal    Mouth/Throat: Oropharynx is clear and moist    Eyes: Conjunctivae and EOM are normal  Pupils are equal, round, and reactive to light  Right eye exhibits no discharge  Left eye exhibits no discharge  No scleral icterus  Neck: Normal range of motion  Neck supple  No JVD present  No tracheal deviation present  No thyromegaly present  Cardiovascular: Normal rate, regular rhythm and normal heart sounds  Exam reveals no friction rub  No murmur heard  Pulmonary/Chest: Effort normal and breath sounds normal  No stridor  No respiratory distress  She has no wheezes  She has no rales  She exhibits no tenderness  Abdominal: Soft  Bowel sounds are normal  She exhibits no distension and no mass  There is no hepatosplenomegaly, splenomegaly or hepatomegaly  There is no tenderness  There is no rebound and no guarding  Musculoskeletal: Normal range of motion  She exhibits no edema, tenderness or deformity  Lymphadenopathy:     She has no cervical adenopathy  Neurological: She is alert and oriented to person, place, and time  She has normal reflexes  No cranial nerve deficit  Coordination normal    Skin: Skin is warm and dry  No rash noted  She is not diaphoretic  No erythema  No pallor  Psychiatric: She has a normal mood and affect   Her behavior is normal  Judgment and thought content normal          Labs:  Lab Results   Component Value Date    WBC 8 02 07/03/2018    HGB 10 2 (L) 07/03/2018    HCT 31 8 (L) 07/03/2018    MCV 84 07/03/2018     (H) 07/03/2018     Lab Results   Component Value Date     06/02/2018    K 4 1 06/02/2018     06/02/2018    CO2 28 06/02/2018    ANIONGAP 6 06/02/2018    BUN 11 06/02/2018    CREATININE 0 70 06/02/2018    GLUCOSE 98 03/13/2018    GLUF 100 (H) 06/02/2018    CALCIUM 9 1 06/02/2018    AST 15 06/02/2018    ALT 23 06/02/2018    ALKPHOS 75 06/02/2018    PROT 8 0 06/02/2018    BILITOT 0 31 06/02/2018    EGFR 109 06/02/2018     Lab Results   Component Value Date    IRON 42 (L) 07/03/2018    TIBC 444 07/03/2018    FERRITIN 5 (L) 07/03/2018       Patient voiced understanding and agreement in the above discussion  Aware to contact our office with questions/symptoms in the interim

## 2018-07-25 RX ORDER — SODIUM CHLORIDE 9 MG/ML
20 INJECTION, SOLUTION INTRAVENOUS ONCE
Status: COMPLETED | OUTPATIENT
Start: 2018-07-26 | End: 2018-07-26

## 2018-07-25 RX ORDER — CYANOCOBALAMIN 1000 UG/ML
1000 INJECTION INTRAMUSCULAR; SUBCUTANEOUS ONCE
Status: COMPLETED | OUTPATIENT
Start: 2018-07-26 | End: 2018-07-26

## 2018-07-26 ENCOUNTER — HOSPITAL ENCOUNTER (OUTPATIENT)
Dept: INFUSION CENTER | Facility: HOSPITAL | Age: 38
Discharge: HOME | End: 2018-07-26
Payer: COMMERCIAL

## 2018-07-26 VITALS
DIASTOLIC BLOOD PRESSURE: 65 MMHG | TEMPERATURE: 97.3 F | RESPIRATION RATE: 16 BRPM | HEART RATE: 76 BPM | SYSTOLIC BLOOD PRESSURE: 113 MMHG

## 2018-07-26 PROCEDURE — 96372 THER/PROPH/DIAG INJ SC/IM: CPT

## 2018-07-26 PROCEDURE — 96365 THER/PROPH/DIAG IV INF INIT: CPT

## 2018-07-26 RX ORDER — CYANOCOBALAMIN 1000 UG/ML
INJECTION INTRAMUSCULAR; SUBCUTANEOUS
Status: COMPLETED
Start: 2018-07-26 | End: 2018-07-26

## 2018-07-26 RX ADMIN — CYANOCOBALAMIN 1000 MCG: 1000 INJECTION INTRAMUSCULAR; SUBCUTANEOUS at 14:11

## 2018-07-26 RX ADMIN — FERRIC CARBOXYMALTOSE INJECTION 750 MG: 50 INJECTION, SOLUTION INTRAVENOUS at 14:27

## 2018-07-26 RX ADMIN — SODIUM CHLORIDE 20 ML/HR: 0.9 INJECTION, SOLUTION INTRAVENOUS at 14:09

## 2018-07-26 NOTE — PROGRESS NOTES
Pt admitted to infusion center for vitamin b 12 injection and injectofer  Has had meds in the past without adverse reaction  Reports that her energy level has been extremely low lately and she "can fall asleep in the drop of a hat"  July labs reviewed  IV inserted without any difficulty   present

## 2018-08-01 RX ORDER — CYANOCOBALAMIN 1000 UG/ML
1000 INJECTION INTRAMUSCULAR; SUBCUTANEOUS ONCE
Status: COMPLETED | OUTPATIENT
Start: 2018-08-02 | End: 2018-08-02

## 2018-08-01 RX ORDER — SODIUM CHLORIDE 9 MG/ML
20 INJECTION, SOLUTION INTRAVENOUS ONCE
Status: COMPLETED | OUTPATIENT
Start: 2018-08-02 | End: 2018-08-02

## 2018-08-02 ENCOUNTER — HOSPITAL ENCOUNTER (OUTPATIENT)
Dept: INFUSION CENTER | Facility: HOSPITAL | Age: 38
Discharge: HOME | End: 2018-08-02
Payer: COMMERCIAL

## 2018-08-02 VITALS
TEMPERATURE: 97.3 F | SYSTOLIC BLOOD PRESSURE: 130 MMHG | RESPIRATION RATE: 16 BRPM | DIASTOLIC BLOOD PRESSURE: 80 MMHG | HEART RATE: 66 BPM | OXYGEN SATURATION: 99 %

## 2018-08-02 PROCEDURE — 96372 THER/PROPH/DIAG INJ SC/IM: CPT

## 2018-08-02 PROCEDURE — 96365 THER/PROPH/DIAG IV INF INIT: CPT

## 2018-08-02 RX ORDER — CYANOCOBALAMIN 1000 UG/ML
INJECTION INTRAMUSCULAR; SUBCUTANEOUS
Status: COMPLETED
Start: 2018-08-02 | End: 2018-08-02

## 2018-08-02 RX ADMIN — FERRIC CARBOXYMALTOSE INJECTION 750 MG: 50 INJECTION, SOLUTION INTRAVENOUS at 14:27

## 2018-08-02 RX ADMIN — SODIUM CHLORIDE 20 ML/HR: 0.9 INJECTION, SOLUTION INTRAVENOUS at 14:05

## 2018-08-02 RX ADMIN — CYANOCOBALAMIN 1000 MCG: 1000 INJECTION INTRAMUSCULAR; SUBCUTANEOUS at 14:12

## 2018-08-23 ENCOUNTER — OFFICE VISIT (OUTPATIENT)
Dept: FAMILY MEDICINE CLINIC | Facility: CLINIC | Age: 38
End: 2018-08-23
Payer: COMMERCIAL

## 2018-08-23 VITALS
DIASTOLIC BLOOD PRESSURE: 80 MMHG | OXYGEN SATURATION: 99 % | TEMPERATURE: 97.8 F | HEART RATE: 73 BPM | WEIGHT: 243 LBS | HEIGHT: 66 IN | BODY MASS INDEX: 39.05 KG/M2 | SYSTOLIC BLOOD PRESSURE: 110 MMHG

## 2018-08-23 DIAGNOSIS — N92.0 MENORRHAGIA WITH REGULAR CYCLE: Primary | ICD-10-CM

## 2018-08-23 DIAGNOSIS — K21.9 GASTROESOPHAGEAL REFLUX DISEASE WITHOUT ESOPHAGITIS: ICD-10-CM

## 2018-08-23 PROCEDURE — 99214 OFFICE O/P EST MOD 30 MIN: CPT | Performed by: FAMILY MEDICINE

## 2018-08-25 NOTE — ASSESSMENT & PLAN NOTE
Chronic , the uncontrolled asymptomatic patient on Dexilant the will continue will refer the patient to see GI   Discuss with pt important lose weight   Multiple small meal ,avoid eat and lying down ,avoid spicy food and avoid provoked food

## 2018-08-25 NOTE — PROGRESS NOTES
Assessment/Plan:    Gastroesophageal reflux disease without esophagitis  Chronic , the uncontrolled asymptomatic patient on Dexilant the will continue will refer the patient to see GI   Discuss with pt important lose weight   Multiple small meal ,avoid eat and lying down ,avoid spicy food and avoid provoked food        Menorrhagia with regular cycle  Asymptomatic will order a transvaginal ultrasound to rule out the ovarian cyst 0 or endometrial hyperplasia of versus fibroid the also we did order blood work including a TSH checked SH and H check prolactin level and serum pregnancy test       Diagnoses and all orders for this visit:    Menorrhagia with regular cycle  -     TSH, 3rd generation with Free T4 reflex; Future  -     CBC and differential; Future  -     Luteinizing hormone; Future  -     Follicle stimulating hormone; Future  -     Prolactin; Future  -     hCG, quantitative; Future  -     US TRANSVAGINAL NON OB COMPLETE (DOES NOT INC PELVIS); Future    Gastroesophageal reflux disease without esophagitis  -     Ambulatory referral to Gastroenterology; Future          Subjective:   Chief Complaint   Patient presents with    Follow-up    Other     discuss her anemia        Patient ID: Gutierrez Mena is a 34 y o  female  Patient and office with her  and she concerned about the and heavy menstrual per yet it has been going on for the last couple months and per patient she is bleeding heavily and the    Lost for a longer time 7 days sometimes 10 days and she noticed some clot , and she get a a get abdomen pain and cramp on the time of her per period the patient did have history of for ovarian cyst,   Deny any vaginal discharge and no headache no blurred vision no milk discharge from the nipple no excessive hair growth no skin discoloration no rash patient does fatigue all the time and she does has history of iron-deficiency anemia for what been seen the by Hematology and she had iron infusion  Also patient was history of for GERD on the Dexilant but patient still symptomatic she had epigastric pain she had a heart burn and the metal test on her tongue and no weight change in no nausea no vomiting no diarrhea the but per patient she does watch her for her diet no spicy food and she eat multiple small meal does not eat and lie down        The following portions of the patient's history were reviewed and updated as appropriate: allergies, current medications, past family history, past medical history, past social history, past surgical history and problem list     Review of Systems   Constitutional: Negative for fatigue and fever  HENT: Negative for ear pain, sinus pain, sinus pressure and sore throat  Eyes: Negative for pain and redness  Respiratory: Negative for cough, chest tightness and shortness of breath  Cardiovascular: Negative for chest pain, palpitations and leg swelling  Gastrointestinal: Negative for abdominal pain, blood in stool, constipation, diarrhea and nausea  Genitourinary: Positive for menstrual problem  Negative for flank pain, frequency and hematuria  Musculoskeletal: Negative for back pain and joint swelling  Skin: Negative for rash  Neurological: Negative for dizziness, numbness and headaches  Hematological: Does not bruise/bleed easily  Objective:  Vitals:    08/23/18 1449   BP: 110/80   Pulse: 73   Temp: 97 8 °F (36 6 °C)   TempSrc: Oral   SpO2: 99%   Weight: 110 kg (243 lb)   Height: 5' 5 5" (1 664 m)      Physical Exam   Constitutional: She is oriented to person, place, and time  She appears well-developed and well-nourished  HENT:   Head: Normocephalic  Right Ear: External ear normal    Left Ear: External ear normal    Eyes: Conjunctivae and EOM are normal  Right eye exhibits no discharge  Left eye exhibits no discharge  Neck: No JVD present  Cardiovascular: Normal rate, regular rhythm and normal heart sounds  Exam reveals no gallop      No murmur heard   Pulmonary/Chest: Effort normal  No respiratory distress  She has no wheezes  She has no rales  She exhibits no tenderness  Abdominal: She exhibits no mass  There is no tenderness  There is no rebound  Musculoskeletal: She exhibits no edema or tenderness  Neurological: She is alert and oriented to person, place, and time  Skin: No rash noted  No erythema

## 2018-08-25 NOTE — ASSESSMENT & PLAN NOTE
Asymptomatic will order a transvaginal ultrasound to rule out the ovarian cyst 0 or endometrial hyperplasia of versus fibroid the also we did order blood work including a TSH checked SH and H check prolactin level and serum pregnancy test

## 2018-08-27 ENCOUNTER — APPOINTMENT (OUTPATIENT)
Dept: LAB | Facility: HOSPITAL | Age: 38
End: 2018-08-27
Payer: COMMERCIAL

## 2018-08-27 ENCOUNTER — HOSPITAL ENCOUNTER (OUTPATIENT)
Dept: ULTRASOUND IMAGING | Facility: HOSPITAL | Age: 38
Discharge: HOME | End: 2018-08-27
Payer: COMMERCIAL

## 2018-08-27 DIAGNOSIS — N92.0 MENORRHAGIA WITH REGULAR CYCLE: ICD-10-CM

## 2018-08-27 LAB
B-HCG SERPL-ACNC: <2 MIU/ML
BASOPHILS # BLD AUTO: 0.05 THOUSANDS/ΜL (ref 0–0.1)
BASOPHILS NFR BLD AUTO: 1 % (ref 0–1)
EOSINOPHIL # BLD AUTO: 0.13 THOUSAND/ΜL (ref 0–0.61)
EOSINOPHIL NFR BLD AUTO: 2 % (ref 0–6)
ERYTHROCYTE [DISTWIDTH] IN BLOOD BY AUTOMATED COUNT: 18.7 % (ref 11.6–15.1)
FSH SERPL-ACNC: 3.2 MIU/ML
HCT VFR BLD AUTO: 36.8 % (ref 34.8–46.1)
HGB BLD-MCNC: 11.7 G/DL (ref 11.5–15.4)
LH SERPL-ACNC: 7.3 MIU/ML
LYMPHOCYTES # BLD AUTO: 2.14 THOUSANDS/ΜL (ref 0.6–4.47)
LYMPHOCYTES NFR BLD AUTO: 32 % (ref 14–44)
MCH RBC QN AUTO: 28.3 PG (ref 26.8–34.3)
MCHC RBC AUTO-ENTMCNC: 31.8 G/DL (ref 31.4–37.4)
MCV RBC AUTO: 89 FL (ref 82–98)
MONOCYTES # BLD AUTO: 0.53 THOUSAND/ΜL (ref 0.17–1.22)
MONOCYTES NFR BLD AUTO: 8 % (ref 4–12)
NEUTROPHILS # BLD AUTO: 3.83 THOUSANDS/ΜL (ref 1.85–7.62)
NEUTS SEG NFR BLD AUTO: 58 % (ref 43–75)
NRBC BLD AUTO-RTO: 0 /100 WBCS
PLATELET # BLD AUTO: 320 THOUSANDS/UL (ref 149–390)
PMV BLD AUTO: 10.1 FL (ref 8.9–12.7)
PROLACTIN SERPL-MCNC: 23 NG/ML
RBC # BLD AUTO: 4.13 MILLION/UL (ref 3.81–5.12)
TSH SERPL DL<=0.05 MIU/L-ACNC: 1.79 UIU/ML (ref 0.36–3.74)
WBC # BLD AUTO: 6.68 THOUSAND/UL (ref 4.31–10.16)

## 2018-08-27 PROCEDURE — 85025 COMPLETE CBC W/AUTO DIFF WBC: CPT

## 2018-08-27 PROCEDURE — 84702 CHORIONIC GONADOTROPIN TEST: CPT

## 2018-08-27 PROCEDURE — 83002 ASSAY OF GONADOTROPIN (LH): CPT

## 2018-08-27 PROCEDURE — 76856 US EXAM PELVIC COMPLETE: CPT

## 2018-08-27 PROCEDURE — 83001 ASSAY OF GONADOTROPIN (FSH): CPT

## 2018-08-27 PROCEDURE — 84443 ASSAY THYROID STIM HORMONE: CPT

## 2018-08-27 PROCEDURE — 76830 TRANSVAGINAL US NON-OB: CPT

## 2018-08-27 PROCEDURE — 84146 ASSAY OF PROLACTIN: CPT

## 2018-08-27 PROCEDURE — 36415 COLL VENOUS BLD VENIPUNCTURE: CPT

## 2018-08-29 ENCOUNTER — TELEPHONE (OUTPATIENT)
Dept: FAMILY MEDICINE CLINIC | Facility: CLINIC | Age: 38
End: 2018-08-29

## 2018-08-29 NOTE — TELEPHONE ENCOUNTER
Patient called had to leave work was feeling very fatigue unable to stay awake wanted to know her lab results please advise

## 2018-08-30 ENCOUNTER — OFFICE VISIT (OUTPATIENT)
Dept: FAMILY MEDICINE CLINIC | Facility: CLINIC | Age: 38
End: 2018-08-30
Payer: COMMERCIAL

## 2018-08-30 VITALS
DIASTOLIC BLOOD PRESSURE: 80 MMHG | TEMPERATURE: 97.8 F | HEIGHT: 66 IN | HEART RATE: 76 BPM | BODY MASS INDEX: 39.21 KG/M2 | WEIGHT: 244 LBS | OXYGEN SATURATION: 98 % | SYSTOLIC BLOOD PRESSURE: 120 MMHG

## 2018-08-30 DIAGNOSIS — N83.201 CYST OF RIGHT OVARY: ICD-10-CM

## 2018-08-30 DIAGNOSIS — M54.5 LOW BACK PAIN, UNSPECIFIED BACK PAIN LATERALITY, UNSPECIFIED CHRONICITY, WITH SCIATICA PRESENCE UNSPECIFIED: ICD-10-CM

## 2018-08-30 DIAGNOSIS — N89.8 VAGINAL DISCHARGE: Primary | ICD-10-CM

## 2018-08-30 DIAGNOSIS — D25.9 UTERINE LEIOMYOMA, UNSPECIFIED LOCATION: ICD-10-CM

## 2018-08-30 LAB
SL AMB  POCT GLUCOSE, UA: NORMAL
SL AMB LEUKOCYTE ESTERASE,UA: NORMAL
SL AMB POCT BILIRUBIN,UA: NORMAL
SL AMB POCT BLOOD,UA: NORMAL
SL AMB POCT CLARITY,UA: CLEAR
SL AMB POCT COLOR,UA: YELLOW
SL AMB POCT KETONES,UA: NORMAL
SL AMB POCT NITRITE,UA: NORMAL
SL AMB POCT PH,UA: 6
SL AMB POCT SPECIFIC GRAVITY,UA: 1.01
SL AMB POCT URINE PROTEIN: NORMAL
SL AMB POCT UROBILINOGEN: 0.2

## 2018-08-30 PROCEDURE — 87591 N.GONORRHOEAE DNA AMP PROB: CPT | Performed by: FAMILY MEDICINE

## 2018-08-30 PROCEDURE — 81002 URINALYSIS NONAUTO W/O SCOPE: CPT | Performed by: FAMILY MEDICINE

## 2018-08-30 PROCEDURE — 99214 OFFICE O/P EST MOD 30 MIN: CPT | Performed by: FAMILY MEDICINE

## 2018-08-30 PROCEDURE — 87491 CHLMYD TRACH DNA AMP PROBE: CPT | Performed by: FAMILY MEDICINE

## 2018-08-30 NOTE — TELEPHONE ENCOUNTER
Some of blood work come back her HGB is better ,pt need to see her Rheumatology for her Fibromylagia and chronic fatigue syndrome

## 2018-08-30 NOTE — LETTER
August 30, 2018     Patient: Laina Cárdenas   YOB: 1980   Date of Visit: 8/30/2018       To Whom it May Concern:    Laina Cárdenas is under my professional care  She was seen in my office on 8/30/2018  She may return to work on 08/31/2018  If you have any questions or concerns, please don't hesitate to call           Sincerely,          Nhan Meredith MD        CC: No Recipients

## 2018-08-30 NOTE — LETTER
August 30, 2018     Patient: Derek Moseley   YOB: 1980   Date of Visit: 8/30/2018       To Whom it May Concern:    Derek Moseley is under my professional care  She was seen in my office on 8/30/2018  Please excuse Derek Moseley for 08/29/2018 and 08/30/2018  She may return to work on 08/31/2018  If you have any questions or concerns, please don't hesitate to call           Sincerely,          Long Panchal MD        CC: No Recipients

## 2018-08-31 LAB
CHLAMYDIA DNA CVX QL NAA+PROBE: NORMAL
N GONORRHOEA DNA GENITAL QL NAA+PROBE: NORMAL

## 2018-09-01 PROBLEM — N89.8 VAGINAL DISCHARGE: Status: ACTIVE | Noted: 2018-09-01

## 2018-09-01 LAB — SL AMB POCT WET MOUNT: NORMAL

## 2018-09-01 PROCEDURE — 87210 SMEAR WET MOUNT SALINE/INK: CPT | Performed by: FAMILY MEDICINE

## 2018-09-01 NOTE — ASSESSMENT & PLAN NOTE
With mild is negative and GC chlamydia has been ordered most probably normal vaginal discharge discussed with the patient

## 2018-09-01 NOTE — PROGRESS NOTES
Assessment/Plan:    Fibroid uterus  Small fibroid on transvaginal ultrasound 2018 will refer the patient to gyn    Ovarian cyst  Small cyst dermoid cyst in the right ovary refer patient to gyn    Vaginal discharge  With mild is negative and GC chlamydia has been ordered most probably normal vaginal discharge discussed with the patient       Diagnoses and all orders for this visit:    Vaginal discharge  -     Chlamydia/GC amplified DNA by PCR  -     POCT wet mount    Cyst of right ovary  -     Ambulatory referral to Gynecology; Future    Uterine leiomyoma, unspecified location  -     Ambulatory referral to Gynecology; Future    Low back pain, unspecified back pain laterality, unspecified chronicity, with sciatica presence unspecified  -     POCT urine dip          Subjective:   Chief Complaint   Patient presents with    Other     vaginal discharge        Patient ID: Cuong Osborn is a 34 y o  female  Vaginal Discharge   The patient's primary symptoms include vaginal discharge  This is a new problem  The current episode started yesterday  The problem has been unchanged  The patient is experiencing no pain  She is not pregnant  Pertinent negatives include no abdominal pain, constipation, diarrhea, fever, flank pain, frequency, headaches, hematuria, nausea, rash or sore throat  The vaginal discharge was clear  There has been no bleeding  She has tried nothing for the symptoms  No, her partner does not have an STD  She uses nothing for contraception  Her menstrual history has been regular  Her past medical history is significant for an abdominal surgery, metrorrhagia and ovarian cysts  There is no history of a  section, an ectopic pregnancy or endometriosis     transvaginal US disucss with pt     The following portions of the patient's history were reviewed and updated as appropriate: allergies, current medications, past family history, past medical history, past social history, past surgical history and problem list     Review of Systems   Constitutional: Negative for fatigue and fever  HENT: Negative for ear pain, sinus pain, sinus pressure and sore throat  Eyes: Negative for pain and redness  Respiratory: Negative for cough, chest tightness and shortness of breath  Cardiovascular: Negative for chest pain, palpitations and leg swelling  Gastrointestinal: Negative for abdominal pain, blood in stool, constipation, diarrhea and nausea  Genitourinary: Positive for vaginal discharge  Negative for flank pain, frequency and hematuria  Skin: Negative for rash  Neurological: Negative for dizziness, numbness and headaches  Hematological: Does not bruise/bleed easily  Objective:  Vitals:    08/30/18 0934   BP: 120/80   Pulse: 76   Temp: 97 8 °F (36 6 °C)   TempSrc: Oral   SpO2: 98%   Weight: 111 kg (244 lb)   Height: 5' 6" (1 676 m)      Physical Exam   Constitutional: She is oriented to person, place, and time  She appears well-developed and well-nourished  HENT:   Head: Normocephalic  Right Ear: External ear normal    Left Ear: External ear normal    Eyes: Conjunctivae and EOM are normal  Right eye exhibits no discharge  Left eye exhibits no discharge  Neck: No JVD present  Cardiovascular: Normal rate, regular rhythm and normal heart sounds  Exam reveals no gallop  No murmur heard  Pulmonary/Chest: Effort normal  No respiratory distress  She has no wheezes  She has no rales  She exhibits no tenderness  Abdominal: She exhibits no mass  There is no tenderness  There is no rebound  Genitourinary: No tenderness or bleeding in the vagina  Vaginal discharge found  Musculoskeletal: She exhibits no edema or tenderness  Neurological: She is alert and oriented to person, place, and time  Skin: No rash noted  No erythema

## 2018-09-17 ENCOUNTER — OFFICE VISIT (OUTPATIENT)
Dept: OBGYN CLINIC | Facility: CLINIC | Age: 38
End: 2018-09-17
Payer: COMMERCIAL

## 2018-09-17 VITALS
DIASTOLIC BLOOD PRESSURE: 64 MMHG | WEIGHT: 241 LBS | BODY MASS INDEX: 38.73 KG/M2 | HEIGHT: 66 IN | SYSTOLIC BLOOD PRESSURE: 112 MMHG

## 2018-09-17 DIAGNOSIS — N92.0 MENORRHAGIA WITH REGULAR CYCLE: Primary | ICD-10-CM

## 2018-09-17 DIAGNOSIS — N83.201 CYST OF RIGHT OVARY: ICD-10-CM

## 2018-09-17 DIAGNOSIS — D25.1 INTRAMURAL LEIOMYOMA OF UTERUS: ICD-10-CM

## 2018-09-17 PROCEDURE — 99214 OFFICE O/P EST MOD 30 MIN: CPT | Performed by: OBSTETRICS & GYNECOLOGY

## 2018-09-17 NOTE — PROGRESS NOTES
Patient is a 34 y o  [id number] with Patient's last menstrual period was 09/13/2018 (exact date)  who presents requesting evaluation of menorrhagia and to follow up u/s from PCP regarding the same  We reviewed her most recent u/s shows a very small fibroid ~1 3 cm  We also reviewed that she has a small dermoid cyst on her right ovary  Pt is asymptomatic and would like to observe at this time  Pt reports she has had a cyst removed on that ovary before and thinks it is was for the same thing  Pt reports her menses used to be moderate and 7 days long  She reports that for the last 2 months they have been lasting 8-10 days and they are significantly heavier  She reports she now uses a heavy maxi pad and she saturates them 3 hours and she was leaking out of them  She reports she was diagnosed with anemia in June as a result of her heavy menses and she has been getting IV iron  Her hgb was 10 1 in June and is 11 7 at her most recent check  She reports she has never used hormonal contraception and her  has a vasectomy  Pt had a TSH, FSH, LH and PRL checked on 8/27/18 and they were all normal    We reviewed options for menorrhagia including OCPs, Mirena, Depo Provera, Lysteda and Endometrial ablation  Pt reports she is most interested in [facility]  We reviewed the risks including irregular bleeding, cramping, infection, uterine perforation, failure, ectopic pregnancy for Mirena  We reviewed the most common side effect with Lysteda is headache  Pt would like to review pamphlets and discuss with  and will call with her decision       Past Medical History:   Diagnosis Date    Anemia     Duodenitis without bleeding     Dysphagia     Fibromyalgia     Gastritis     GERD (gastroesophageal reflux disease)     Hiatal hernia     Hx of colonoscopy     Insomnia     Memory loss     Obesity     Oral herpes        Past Surgical History:   Procedure Laterality Date    APPENDECTOMY      BREAST SURGERY Right 2015    lump removed from breast    [address] [place] [address] [address]      COLONOSCOPY      2012    DILATION AND CURETTAGE OF UTERUS      Resolved:2009    ESOPHAGOGASTRODUODENOSCOPY      Diagnostic ; 2012    HYSTEROSCOPY      Resolved:2009    OVARIAN CYST REMOVAL Right 2005    WISDOM TOOTH EXTRACTION         OB History    Para Term  AB Living   4 4 3 1   4   SAB TAB Ectopic Multiple Live Births                  # Outcome Date GA Lbr Joe/2nd Weight Sex Delivery Anes PTL Lv   4             3 Term            2 Term            1 Term               Obstetric Comments   1  FT    2  FT C/S--face presentation   3  PT    4   FT       Menarche: 6           Current Outpatient Prescriptions:     clindamycin (CLINDAGEL) 1 % gel, Every 12 hours, Disp: , Rfl:     dexlansoprazole (DEXILANT) 60 MG capsule, every 12 (twelve) hours, Disp: , Rfl:     meclizine (ANTIVERT) 32 MG tablet, Take 32 mg by mouth 3 (three) times a day as needed for dizziness, Disp: , Rfl:     SAVELLA 50 MG TABS, Take by mouth 2 (two) times a day, Disp: , Rfl: 2    valACYclovir (VALTREX) 1,000 mg tablet, TAKE 2 TABLET BY ORAL ROUTE EVERY 12 HOURS, Disp: , Rfl: 0    Allergies   Allergen Reactions    Morphine Chest Pain    Percocet [Oxycodone-Acetaminophen] Hives    Domperidone        Social History     Social History    Marital status: /Civil Union     Spouse name: N/A    Number of children: 4    Years of education: high school     Occupational History    cash operatorr      Social History Main Topics    Smoking status: Never Smoker    Smokeless tobacco: Never Used      Comment: No passive smoke exposure    Alcohol use No    Drug use: No    Sexual activity: Yes     Partners: Male     Birth control/ protection: Male Sterilization      Comment: life time partners:1     Other Topics Concern    None     Social History Narrative    Mor[employer] Accepts blood products       Family History   Problem Relation Age of Onset    Other Mother         uterine leiomyoma    Diabetes type II Mother         Mellitus    Hypothyroidism Mother     Colon cancer Father 54        Stage IV    Diabetes Father         Type II Mellitus    Hypertension Father     Anxiety disorder Brother     Depression Brother     Diabetes Brother         Mellitus    Asthma Brother     Hypertension Brother     Multiple sclerosis Sister     Asthma Sister     Hypothyroidism Sister     Hypertension Maternal Grandmother     Diabetes Maternal Grandmother     Other Maternal Grandmother         Vulvar cancer    Schizophrenia Maternal Grandfather     Hypertension Maternal Grandfather     Thyroid cancer Paternal Grandmother 80    Diabetes Paternal Grandfather 43         due to complications of DM       Review of Systems   Constitutional: Positive for fatigue  Negative for chills, fever and unexpected weight change  HENT: Negative for congestion, mouth sores and sore throat  Respiratory: Negative for cough, chest tightness, shortness of breath and wheezing  Cardiovascular: Negative for chest pain and palpitations  Gastrointestinal: Positive for abdominal distention (bloating during menses)  Negative for abdominal pain, constipation, diarrhea, nausea and vomiting  Endocrine: Negative for cold intolerance and heat intolerance  Genitourinary: Positive for menstrual problem  Negative for dyspareunia, dysuria, genital sores, pelvic pain, vaginal bleeding, vaginal discharge and vaginal pain  Musculoskeletal: Negative for arthralgias  Skin: Negative for color change and rash  Neurological: Negative for dizziness, light-headedness and headaches  Hematological: Negative for adenopathy  Blood pressure 112/64, height 5' 5 75" (1 67 m), weight 109 kg (241 lb), last menstrual period 2018, not currently breastfeeding     and Body mass index is 39 2 kg/m²  Physical Exam   Constitutional: She is oriented to person, place, and time  She appears well-developed and well-nourished  HENT:   Head: Normocephalic and atraumatic  Eyes: Conjunctivae and EOM are normal    Neck: Normal range of motion  No tracheal deviation present  No thyromegaly present  Pulmonary/Chest: Effort normal    Musculoskeletal: Normal range of motion  She exhibits no edema or tenderness  Neurological: She is alert and oriented to person, place, and time  Skin: Skin is warm  No rash noted  No erythema  Psychiatric: She has a normal mood and affect  Her behavior is normal  Judgment and thought content normal        Pt declines pelvic examination today      A/P:  Pt is a 34 y o  [id number] with      Diagnoses and all orders for this visit:    Menorrhagia with regular cycle    Intramural leiomyoma of uterus    Cyst of right ovary      Will observe dermoid cyst at this time  Fibroid is small and unlikely the source of her menorrhagia    Pt given pamphlets on Mirena and Lysteda  Pt will call back with final decision

## 2018-09-24 DIAGNOSIS — K21.9 GASTROESOPHAGEAL REFLUX DISEASE WITHOUT ESOPHAGITIS: Primary | ICD-10-CM

## 2018-09-24 RX ORDER — DEXLANSOPRAZOLE 60 MG/1
60 CAPSULE, DELAYED RELEASE ORAL EVERY 12 HOURS
Qty: 180 CAPSULE | Refills: 2 | Status: SHIPPED | OUTPATIENT
Start: 2018-09-24 | End: 2019-07-08 | Stop reason: SDUPTHER

## 2018-10-16 ENCOUNTER — APPOINTMENT (OUTPATIENT)
Dept: LAB | Facility: HOSPITAL | Age: 38
End: 2018-10-16
Payer: COMMERCIAL

## 2018-10-16 DIAGNOSIS — D50.9 IRON DEFICIENCY ANEMIA, UNSPECIFIED IRON DEFICIENCY ANEMIA TYPE: ICD-10-CM

## 2018-10-16 LAB
ALBUMIN SERPL BCP-MCNC: 3.8 G/DL (ref 3.5–5)
ALP SERPL-CCNC: 90 U/L (ref 46–116)
ALT SERPL W P-5'-P-CCNC: 31 U/L (ref 12–78)
ANION GAP SERPL CALCULATED.3IONS-SCNC: 4 MMOL/L (ref 4–13)
AST SERPL W P-5'-P-CCNC: 18 U/L (ref 5–45)
BASOPHILS # BLD AUTO: 0.04 THOUSANDS/ΜL (ref 0–0.1)
BASOPHILS NFR BLD AUTO: 1 % (ref 0–1)
BILIRUB SERPL-MCNC: 0.1 MG/DL (ref 0.2–1)
BUN SERPL-MCNC: 7 MG/DL (ref 5–25)
CALCIUM SERPL-MCNC: 9.1 MG/DL (ref 8.3–10.1)
CHLORIDE SERPL-SCNC: 104 MMOL/L (ref 100–108)
CO2 SERPL-SCNC: 31 MMOL/L (ref 21–32)
CREAT SERPL-MCNC: 0.77 MG/DL (ref 0.6–1.3)
EOSINOPHIL # BLD AUTO: 0.31 THOUSAND/ΜL (ref 0–0.61)
EOSINOPHIL NFR BLD AUTO: 4 % (ref 0–6)
ERYTHROCYTE [DISTWIDTH] IN BLOOD BY AUTOMATED COUNT: 14.8 % (ref 11.6–15.1)
FERRITIN SERPL-MCNC: 98 NG/ML (ref 8–388)
GFR SERPL CREATININE-BSD FRML MDRD: 97 ML/MIN/1.73SQ M
GLUCOSE SERPL-MCNC: 97 MG/DL (ref 65–140)
HCT VFR BLD AUTO: 35.4 % (ref 34.8–46.1)
HGB BLD-MCNC: 11.5 G/DL (ref 11.5–15.4)
IMM GRANULOCYTES # BLD AUTO: 0.04 THOUSAND/UL (ref 0–0.2)
IMM GRANULOCYTES NFR BLD AUTO: 1 % (ref 0–2)
IRON SATN MFR SERPL: 20 %
IRON SERPL-MCNC: 66 UG/DL (ref 50–170)
LYMPHOCYTES # BLD AUTO: 2.29 THOUSANDS/ΜL (ref 0.6–4.47)
LYMPHOCYTES NFR BLD AUTO: 31 % (ref 14–44)
MCH RBC QN AUTO: 29.6 PG (ref 26.8–34.3)
MCHC RBC AUTO-ENTMCNC: 32.5 G/DL (ref 31.4–37.4)
MCV RBC AUTO: 91 FL (ref 82–98)
MONOCYTES # BLD AUTO: 0.7 THOUSAND/ΜL (ref 0.17–1.22)
MONOCYTES NFR BLD AUTO: 9 % (ref 4–12)
NEUTROPHILS # BLD AUTO: 4.03 THOUSANDS/ΜL (ref 1.85–7.62)
NEUTS SEG NFR BLD AUTO: 54 % (ref 43–75)
NRBC BLD AUTO-RTO: 0 /100 WBCS
PLATELET # BLD AUTO: 348 THOUSANDS/UL (ref 149–390)
PMV BLD AUTO: 9.3 FL (ref 8.9–12.7)
POTASSIUM SERPL-SCNC: 3.8 MMOL/L (ref 3.5–5.3)
PROT SERPL-MCNC: 7.9 G/DL (ref 6.4–8.2)
RBC # BLD AUTO: 3.88 MILLION/UL (ref 3.81–5.12)
SODIUM SERPL-SCNC: 139 MMOL/L (ref 136–145)
TIBC SERPL-MCNC: 327 UG/DL (ref 250–450)
VIT B12 SERPL-MCNC: 580 PG/ML (ref 100–900)
WBC # BLD AUTO: 7.41 THOUSAND/UL (ref 4.31–10.16)

## 2018-10-16 PROCEDURE — 83550 IRON BINDING TEST: CPT

## 2018-10-16 PROCEDURE — 80053 COMPREHEN METABOLIC PANEL: CPT

## 2018-10-16 PROCEDURE — 36415 COLL VENOUS BLD VENIPUNCTURE: CPT

## 2018-10-16 PROCEDURE — 83540 ASSAY OF IRON: CPT

## 2018-10-16 PROCEDURE — 82607 VITAMIN B-12: CPT

## 2018-10-16 PROCEDURE — 85025 COMPLETE CBC W/AUTO DIFF WBC: CPT

## 2018-10-16 PROCEDURE — 82728 ASSAY OF FERRITIN: CPT

## 2018-10-19 ENCOUNTER — OFFICE VISIT (OUTPATIENT)
Dept: HEMATOLOGY ONCOLOGY | Facility: CLINIC | Age: 38
End: 2018-10-19
Payer: COMMERCIAL

## 2018-10-19 VITALS
HEART RATE: 74 BPM | HEIGHT: 68 IN | WEIGHT: 241 LBS | BODY MASS INDEX: 36.53 KG/M2 | OXYGEN SATURATION: 98 % | DIASTOLIC BLOOD PRESSURE: 70 MMHG | RESPIRATION RATE: 16 BRPM | TEMPERATURE: 98.8 F | SYSTOLIC BLOOD PRESSURE: 110 MMHG

## 2018-10-19 DIAGNOSIS — D50.0 IRON DEFICIENCY ANEMIA DUE TO CHRONIC BLOOD LOSS: Primary | ICD-10-CM

## 2018-10-19 PROCEDURE — 99213 OFFICE O/P EST LOW 20 MIN: CPT | Performed by: INTERNAL MEDICINE

## 2018-10-19 RX ORDER — MULTIVITAMIN
1 TABLET ORAL DAILY
COMMUNITY
End: 2020-08-06

## 2018-10-19 NOTE — PROGRESS NOTES
Hematology/Oncology Outpatient Follow-up  Nghia Alfredo 34 y o  female 1980 [id number]    Date:  10/19/2018    Assessment and Plan:  1  Iron deficiency anemia due to chronic blood loss  She is not iron deficient anymore at least at this point in time  Her hemoglobin level is in the low-normal range  We will continue to monitor her twice a year since she continues to have menorrhagia  - CBC and differential; Future  - Comprehensive metabolic panel; Future  - Iron Panel; Future  - Vitamin B12; Future  - Sedimentation rate, automated; Future  - C-reactive protein; Future      HPI:  Patient came in today for a follow-up visit  She has a history of gastritis with possible gastric paresis, chronic anemia due to iron deficiency which was treated with iron IV at least on 2 different occasions in April of 2016 and again in July of 2018  The patient stated that her menorrhagia seems to be better  Her most recent blood work showed a correction of her iron deficiency with ferritin level up from 5 to 98  Her hemoglobin level is in the low-normal range of 11 5 with normal white cells and platelets      Interval history:    ROS: Review of Systems    Past Medical History:   Diagnosis Date    Anemia     Duodenitis without bleeding     Dysphagia     Fibromyalgia     Gastritis     GERD (gastroesophageal reflux disease)     Hiatal hernia     Hx of colonoscopy     Insomnia     Memory loss     Obesity     Oral herpes        Past Surgical History:   Procedure Laterality Date    APPENDECTOMY      BREAST SURGERY Right 01/08/2015    lump removed from breast    [address] 61 COLONOSCOPY      9/26/2012    DILATION AND CURETTAGE OF UTERUS      Resolved:1/29/2009    ESOPHAGOGASTRODUODENOSCOPY      Diagnostic ; 9/26/2012    HYSTEROSCOPY      Resolved:1/30/2009    OVARIAN CYST REMOVAL Right 2005    WISDOM TOOTH EXTRACTION         Social History     Social History    Marital status: /Civil [employer]     Spouse name: N/A    Number of children: 4    Years of education: high school     Occupational History    cash operatorr      Social History Main Topics    Smoking status: Never Smoker    Smokeless tobacco: Never Used      Comment: No passive smoke exposure    Alcohol use No    Drug use: No    Sexual activity: Yes     Partners: Male     Birth control/ protection: Male Sterilization      Comment: life time partners:1     Other Topics Concern    None     Social History Narrative    Gnosticism Oriental orthodox    Accepts blood products       Family History   Problem Relation Age of Onset    Other Mother         uterine leiomyoma    Diabetes type II Mother         Mellitus    Hypothyroidism Mother     Colon cancer Father 54        Stage IV    Diabetes Father         Type II Mellitus    Hypertension Father     Anxiety disorder Brother     Depression Brother     Diabetes Brother         Mellitus    Asthma Brother     Hypertension Brother     Multiple sclerosis Sister     Asthma Sister     Hypothyroidism Sister     Hypertension Maternal Grandmother     Diabetes Maternal Grandmother     Other Maternal Grandmother         Vulvar cancer    Schizophrenia Maternal Grandfather     Hypertension Maternal Grandfather     Thyroid cancer Paternal Grandmother 80    Diabetes Paternal Grandfather 43         due to complications of DM       Allergies   Allergen Reactions    Morphine Chest Pain    Percocet [Oxycodone-Acetaminophen] Hives    Domperidone          Current Outpatient Prescriptions:     clindamycin (CLINDAGEL) 1 % gel, Every 12 hours, Disp: , Rfl:     dexlansoprazole (DEXILANT) 60 MG capsule, Take 1 capsule (60 mg total) by mouth every 12 (twelve) hours, Disp: 180 capsule, Rfl: 2    meclizine (ANTIVERT) 32 MG tablet, Take 32 mg by mouth 3 (three) times a day as needed for dizziness, Disp: , Rfl:     Multiple Vitamin (MULTIVITAMIN) tablet, Take 1 tablet by mouth daily, Disp: , Rfl:     SAVELLA 50 MG TABS, Take by mouth 2 (two) times a day, Disp: , Rfl: 2    valACYclovir (VALTREX) 1,000 mg tablet, TAKE 2 TABLET BY ORAL ROUTE EVERY 12 HOURS, Disp: , Rfl: 0      Physical Exam:  /70 (BP Location: Left arm, Patient Position: Sitting, Cuff Size: Large)   Pulse 74   Temp 98 8 °F (37 1 °C) (Tympanic)   Resp 16   Ht 5' 7 5" (1 715 m)   Wt 109 kg (241 lb)   SpO2 98%   BMI 37 19 kg/m²     Physical Exam      Labs:  Lab Results   Component Value Date    WBC 7 41 10/16/2018    HGB 11 5 10/16/2018    HCT 35 4 10/16/2018    MCV 91 10/16/2018     10/16/2018     Lab Results   Component Value Date     10/16/2018    K 3 8 10/16/2018     10/16/2018    CO2 31 10/16/2018    BUN 7 10/16/2018    CREATININE 0 77 10/16/2018    GLUF 100 (H) 06/02/2018    CALCIUM 9 1 10/16/2018    AST 18 10/16/2018    ALT 31 10/16/2018    ALKPHOS 90 10/16/2018    EGFR 97 10/16/2018       Patient voiced understanding and agreement in the above discussion  Aware to contact our office with questions/symptoms in the interim

## 2018-12-10 ENCOUNTER — TELEPHONE (OUTPATIENT)
Dept: FAMILY MEDICINE CLINIC | Facility: CLINIC | Age: 38
End: 2018-12-10

## 2018-12-12 ENCOUNTER — OFFICE VISIT (OUTPATIENT)
Dept: FAMILY MEDICINE CLINIC | Facility: CLINIC | Age: 38
End: 2018-12-12
Payer: COMMERCIAL

## 2018-12-12 VITALS
BODY MASS INDEX: 36.68 KG/M2 | DIASTOLIC BLOOD PRESSURE: 70 MMHG | HEART RATE: 90 BPM | OXYGEN SATURATION: 98 % | HEIGHT: 68 IN | SYSTOLIC BLOOD PRESSURE: 126 MMHG | WEIGHT: 242 LBS | RESPIRATION RATE: 16 BRPM

## 2018-12-12 DIAGNOSIS — K21.9 GASTROESOPHAGEAL REFLUX DISEASE WITHOUT ESOPHAGITIS: ICD-10-CM

## 2018-12-12 DIAGNOSIS — Z23 NEED FOR INFLUENZA VACCINATION: ICD-10-CM

## 2018-12-12 DIAGNOSIS — R07.89 OTHER CHEST PAIN: Primary | ICD-10-CM

## 2018-12-12 DIAGNOSIS — E78.2 MIXED HYPERLIPIDEMIA: ICD-10-CM

## 2018-12-12 DIAGNOSIS — M25.552 PAIN OF LEFT HIP JOINT: ICD-10-CM

## 2018-12-12 DIAGNOSIS — M79.7 PRIMARY FIBROMYALGIA SYNDROME: ICD-10-CM

## 2018-12-12 DIAGNOSIS — E55.9 VITAMIN D DEFICIENCY: ICD-10-CM

## 2018-12-12 DIAGNOSIS — D50.0 IRON DEFICIENCY ANEMIA DUE TO CHRONIC BLOOD LOSS: ICD-10-CM

## 2018-12-12 PROCEDURE — 93000 ELECTROCARDIOGRAM COMPLETE: CPT | Performed by: FAMILY MEDICINE

## 2018-12-12 PROCEDURE — 99214 OFFICE O/P EST MOD 30 MIN: CPT | Performed by: FAMILY MEDICINE

## 2018-12-12 PROCEDURE — 3008F BODY MASS INDEX DOCD: CPT | Performed by: FAMILY MEDICINE

## 2018-12-12 PROCEDURE — 1036F TOBACCO NON-USER: CPT | Performed by: FAMILY MEDICINE

## 2018-12-12 NOTE — ASSESSMENT & PLAN NOTE
Symptomatic, the Tylenol for the pain we discussed with the patient important lose weight we discussed the patient refer to physical therapy patient will put it on hold for now

## 2018-12-12 NOTE — PROGRESS NOTES
Subjective:   Chief Complaint   Patient presents with    Hip Pain     x3 months     Chest Pain     x1 month         Patient ID: Diann Norman is a 34 y o  female  Patient and office had a concerned about chest pain it has been going on for 1 months patient feel the pain shooting to her neck and to her back it can happen at rest and activity she describing like somebody cry been on her neck and not associated with any short of breath no cough no wheezing and no edema and no hematosis no fever no chills no upper respiratory infection no history of traveling and not related to anxiety or stress the by reviewing her risk factor patient does have family history of coronary artery disease and does have obesity negative for hyperlipidemia negative for hypertension and no smoking  Also patient concerned about the hip pain on left side she graded at 5/10 achy worse when she stand on it 1st thing in the morning no fall no trauma no swelling no redness no limited range of the motion         The following portions of the patient's history were reviewed and updated as appropriate: allergies, current medications, past family history, past medical history, past social history, past surgical history and problem list     Review of Systems   Constitutional: Negative for fatigue and fever  HENT: Negative for ear pain, sinus pain, sinus pressure and sore throat  Eyes: Negative for pain and redness  Respiratory: Negative for cough, chest tightness and shortness of breath  Cardiovascular: Positive for chest pain  Negative for palpitations and leg swelling  Gastrointestinal: Negative for abdominal pain, blood in stool, constipation, diarrhea and nausea  Genitourinary: Negative for flank pain, frequency and hematuria  Musculoskeletal: Positive for arthralgias  Negative for back pain and joint swelling  Skin: Negative for rash  Neurological: Negative for dizziness, numbness and headaches     Hematological: Does not bruise/bleed easily  Objective:  Vitals:    12/12/18 1500   BP: 126/70   BP Location: Left arm   Patient Position: Sitting   Cuff Size: Large   Pulse: 90   Resp: 16   SpO2: 98%   Weight: 110 kg (242 lb)   Height: 5' 7 5" (1 715 m)      Physical Exam   Constitutional: She is oriented to person, place, and time  She appears well-developed and well-nourished  HENT:   Head: Normocephalic  Right Ear: External ear normal    Left Ear: External ear normal    Eyes: Conjunctivae and EOM are normal  Right eye exhibits no discharge  Left eye exhibits no discharge  Neck: No JVD present  Cardiovascular: Normal rate, regular rhythm and normal heart sounds  Exam reveals no gallop  No murmur heard  Pulmonary/Chest: Effort normal  No respiratory distress  She has no wheezes  She has no rales  She exhibits no tenderness  Abdominal: She exhibits no mass  There is no tenderness  There is no rebound  Musculoskeletal: She exhibits tenderness  She exhibits no edema  Neurological: She is alert and oriented to person, place, and time  Skin: No rash noted  No erythema           Assessment/Plan:    Pain of left hip joint  Symptomatic, the Tylenol for the pain we discussed with the patient important lose weight we discussed the patient refer to physical therapy patient will put it on hold for now    Gastroesophageal reflux disease without esophagitis  Chronic , symptomatic patient on Dexilant , patient did have the endoscopy previously was in significant sent does her symptom fluctuated on sometime not related to diet plan to refer the patient to see GI  Discuss with pt important lose weight   Multiple small meal ,avoid eat and lying down ,avoid spicy food and avoid provoked food        Other chest pain  Asymptomatic EKG is negative if the we discussed with the patient is a truck cardiac versus secondary to her GERD versus musculoskeletal secondary to fibroma nausea the patient had a history of for obesity and no smoking and no hyperlipidemia no hypertension and she had a positive family history of coronary artery disease her risk factor coronary artery disease is and slim the but we plan to do exercise stress test to rule out the atherosclerosis , discussed with the patient       Diagnoses and all orders for this visit:    Other chest pain  -     Echo stress test w contrast if indicated; Future  -     POCT ECG    Gastroesophageal reflux disease without esophagitis  -     Ambulatory referral to Gastroenterology; Future  -     CBC and differential; Future  -     Comprehensive metabolic panel; Future  -     Lipid panel; Future  -     TSH, 3rd generation with Free T4 reflex; Future    Pain of left hip joint    Need for influenza vaccination  -     PREFERRED: influenza vaccine, 7377-1756, quadrivalent, recombinant, PF, 0 5 mL (FLUBLOK)    Mixed hyperlipidemia  -     CBC and differential; Future  -     Comprehensive metabolic panel; Future  -     Lipid panel; Future  -     TSH, 3rd generation with Free T4 reflex; Future    Primary fibromyalgia syndrome  -     CBC and differential; Future  -     Comprehensive metabolic panel; Future  -     Lipid panel; Future  -     TSH, 3rd generation with Free T4 reflex; Future    Iron deficiency anemia due to chronic blood loss  -     CBC and differential; Future  -     Comprehensive metabolic panel; Future  -     Lipid panel; Future  -     TSH, 3rd generation with Free T4 reflex; Future    Vitamin D deficiency  -     CBC and differential; Future  -     Comprehensive metabolic panel; Future  -     Lipid panel; Future  -     TSH, 3rd generation with Free T4 reflex;  Future

## 2018-12-13 NOTE — ASSESSMENT & PLAN NOTE
Asymptomatic EKG is negative if the we discussed with the patient is a truck cardiac versus secondary to her GERD versus musculoskeletal secondary to fibroma nausea the patient had a history of for obesity and no smoking and no hyperlipidemia no hypertension and she had a positive family history of coronary artery disease her risk factor coronary artery disease is and slim the but we plan to do exercise stress test to rule out the atherosclerosis , discussed with the patient

## 2018-12-13 NOTE — ASSESSMENT & PLAN NOTE
Chronic , symptomatic patient on Dexilant , patient did have the endoscopy previously was in significant sent does her symptom fluctuated on sometime not related to diet plan to refer the patient to see GI  Discuss with pt important lose weight   Multiple small meal ,avoid eat and lying down ,avoid spicy food and avoid provoked food

## 2018-12-18 ENCOUNTER — APPOINTMENT (OUTPATIENT)
Dept: LAB | Facility: HOSPITAL | Age: 38
End: 2018-12-18
Payer: COMMERCIAL

## 2018-12-18 DIAGNOSIS — M79.7 PRIMARY FIBROMYALGIA SYNDROME: ICD-10-CM

## 2018-12-18 DIAGNOSIS — K21.9 GASTROESOPHAGEAL REFLUX DISEASE WITHOUT ESOPHAGITIS: ICD-10-CM

## 2018-12-18 DIAGNOSIS — D50.0 IRON DEFICIENCY ANEMIA DUE TO CHRONIC BLOOD LOSS: ICD-10-CM

## 2018-12-18 DIAGNOSIS — E78.2 MIXED HYPERLIPIDEMIA: ICD-10-CM

## 2018-12-18 DIAGNOSIS — E55.9 VITAMIN D DEFICIENCY: ICD-10-CM

## 2018-12-18 LAB
ALBUMIN SERPL BCP-MCNC: 3.7 G/DL (ref 3.5–5)
ALP SERPL-CCNC: 75 U/L (ref 46–116)
ALT SERPL W P-5'-P-CCNC: 28 U/L (ref 12–78)
ANION GAP SERPL CALCULATED.3IONS-SCNC: 10 MMOL/L (ref 4–13)
AST SERPL W P-5'-P-CCNC: 15 U/L (ref 5–45)
BASOPHILS # BLD AUTO: 0.04 THOUSANDS/ΜL (ref 0–0.1)
BASOPHILS NFR BLD AUTO: 1 % (ref 0–1)
BILIRUB SERPL-MCNC: 0.33 MG/DL (ref 0.2–1)
BUN SERPL-MCNC: 14 MG/DL (ref 5–25)
CALCIUM SERPL-MCNC: 9.2 MG/DL (ref 8.3–10.1)
CHLORIDE SERPL-SCNC: 103 MMOL/L (ref 100–108)
CHOLEST SERPL-MCNC: 202 MG/DL (ref 50–200)
CO2 SERPL-SCNC: 26 MMOL/L (ref 21–32)
CREAT SERPL-MCNC: 0.73 MG/DL (ref 0.6–1.3)
EOSINOPHIL # BLD AUTO: 0.24 THOUSAND/ΜL (ref 0–0.61)
EOSINOPHIL NFR BLD AUTO: 3 % (ref 0–6)
ERYTHROCYTE [DISTWIDTH] IN BLOOD BY AUTOMATED COUNT: 12.5 % (ref 11.6–15.1)
GFR SERPL CREATININE-BSD FRML MDRD: 103 ML/MIN/1.73SQ M
GLUCOSE P FAST SERPL-MCNC: 98 MG/DL (ref 65–99)
HCT VFR BLD AUTO: 36.5 % (ref 34.8–46.1)
HDLC SERPL-MCNC: 48 MG/DL (ref 40–60)
HGB BLD-MCNC: 11.8 G/DL (ref 11.5–15.4)
IMM GRANULOCYTES # BLD AUTO: 0.04 THOUSAND/UL (ref 0–0.2)
IMM GRANULOCYTES NFR BLD AUTO: 1 % (ref 0–2)
LDLC SERPL CALC-MCNC: 131 MG/DL (ref 0–100)
LYMPHOCYTES # BLD AUTO: 2.25 THOUSANDS/ΜL (ref 0.6–4.47)
LYMPHOCYTES NFR BLD AUTO: 27 % (ref 14–44)
MCH RBC QN AUTO: 30.5 PG (ref 26.8–34.3)
MCHC RBC AUTO-ENTMCNC: 32.3 G/DL (ref 31.4–37.4)
MCV RBC AUTO: 94 FL (ref 82–98)
MONOCYTES # BLD AUTO: 0.82 THOUSAND/ΜL (ref 0.17–1.22)
MONOCYTES NFR BLD AUTO: 10 % (ref 4–12)
NEUTROPHILS # BLD AUTO: 4.83 THOUSANDS/ΜL (ref 1.85–7.62)
NEUTS SEG NFR BLD AUTO: 58 % (ref 43–75)
NONHDLC SERPL-MCNC: 154 MG/DL
NRBC BLD AUTO-RTO: 0 /100 WBCS
PLATELET # BLD AUTO: 376 THOUSANDS/UL (ref 149–390)
PMV BLD AUTO: 9.6 FL (ref 8.9–12.7)
POTASSIUM SERPL-SCNC: 4.7 MMOL/L (ref 3.5–5.3)
PROT SERPL-MCNC: 7.8 G/DL (ref 6.4–8.2)
RBC # BLD AUTO: 3.87 MILLION/UL (ref 3.81–5.12)
SODIUM SERPL-SCNC: 139 MMOL/L (ref 136–145)
TRIGL SERPL-MCNC: 114 MG/DL
TSH SERPL DL<=0.05 MIU/L-ACNC: 3.07 UIU/ML (ref 0.36–3.74)
WBC # BLD AUTO: 8.22 THOUSAND/UL (ref 4.31–10.16)

## 2018-12-18 PROCEDURE — 84443 ASSAY THYROID STIM HORMONE: CPT

## 2018-12-18 PROCEDURE — 36415 COLL VENOUS BLD VENIPUNCTURE: CPT

## 2018-12-18 PROCEDURE — 85025 COMPLETE CBC W/AUTO DIFF WBC: CPT

## 2018-12-18 PROCEDURE — 80061 LIPID PANEL: CPT

## 2018-12-18 PROCEDURE — 80053 COMPREHEN METABOLIC PANEL: CPT

## 2018-12-19 RX ORDER — MILNACIPRAN HYDROCHLORIDE 50 MG/1
50 TABLET, FILM COATED ORAL 2 TIMES DAILY
Qty: 60 TABLET | Refills: 3 | OUTPATIENT
Start: 2018-12-19

## 2019-01-09 DIAGNOSIS — L70.9 ACNE, UNSPECIFIED ACNE TYPE: Primary | ICD-10-CM

## 2019-01-09 RX ORDER — CLINDAMYCIN PHOSPHATE 10 MG/G
GEL TOPICAL 2 TIMES DAILY
Qty: 30 G | Refills: 2 | Status: SHIPPED | OUTPATIENT
Start: 2019-01-09 | End: 2020-06-04 | Stop reason: SDUPTHER

## 2019-02-01 DIAGNOSIS — M79.7 PRIMARY FIBROMYALGIA SYNDROME: Primary | ICD-10-CM

## 2019-02-01 RX ORDER — MILNACIPRAN HYDROCHLORIDE 50 MG/1
50 TABLET, FILM COATED ORAL 2 TIMES DAILY
Qty: 180 TABLET | Refills: 1 | Status: SHIPPED | OUTPATIENT
Start: 2019-02-01 | End: 2019-07-08 | Stop reason: SDUPTHER

## 2019-02-05 ENCOUNTER — TRANSCRIBE ORDERS (OUTPATIENT)
Dept: ADMINISTRATIVE | Facility: HOSPITAL | Age: 39
End: 2019-02-05

## 2019-02-05 DIAGNOSIS — R09.89 ABNORMAL CHEST SOUNDS: Primary | ICD-10-CM

## 2019-02-12 ENCOUNTER — HOSPITAL ENCOUNTER (OUTPATIENT)
Dept: ULTRASOUND IMAGING | Facility: HOSPITAL | Age: 39
Discharge: HOME | End: 2019-02-12
Payer: COMMERCIAL

## 2019-02-12 DIAGNOSIS — R09.89 ABNORMAL CHEST SOUNDS: ICD-10-CM

## 2019-02-12 PROCEDURE — 76536 US EXAM OF HEAD AND NECK: CPT

## 2019-03-19 ENCOUNTER — HOSPITAL ENCOUNTER (OUTPATIENT)
Dept: RADIOLOGY | Facility: HOSPITAL | Age: 39
Discharge: HOME | End: 2019-03-19
Payer: COMMERCIAL

## 2019-03-19 ENCOUNTER — OFFICE VISIT (OUTPATIENT)
Dept: FAMILY MEDICINE CLINIC | Facility: CLINIC | Age: 39
End: 2019-03-19
Payer: COMMERCIAL

## 2019-03-19 VITALS
HEIGHT: 68 IN | BODY MASS INDEX: 36.22 KG/M2 | DIASTOLIC BLOOD PRESSURE: 80 MMHG | HEART RATE: 80 BPM | OXYGEN SATURATION: 99 % | RESPIRATION RATE: 16 BRPM | WEIGHT: 239 LBS | SYSTOLIC BLOOD PRESSURE: 116 MMHG

## 2019-03-19 DIAGNOSIS — M54.2 NECK PAIN: Primary | ICD-10-CM

## 2019-03-19 DIAGNOSIS — M54.50 CHRONIC MIDLINE LOW BACK PAIN WITHOUT SCIATICA: ICD-10-CM

## 2019-03-19 DIAGNOSIS — M54.2 NECK PAIN: ICD-10-CM

## 2019-03-19 DIAGNOSIS — M54.6 CHRONIC MIDLINE THORACIC BACK PAIN: ICD-10-CM

## 2019-03-19 DIAGNOSIS — G89.29 CHRONIC MIDLINE LOW BACK PAIN WITHOUT SCIATICA: ICD-10-CM

## 2019-03-19 DIAGNOSIS — E66.9 OBESITY (BMI 35.0-39.9 WITHOUT COMORBIDITY): ICD-10-CM

## 2019-03-19 DIAGNOSIS — G89.29 CHRONIC MIDLINE THORACIC BACK PAIN: ICD-10-CM

## 2019-03-19 DIAGNOSIS — L81.9 DISCOLORATION OF SKIN OF HAND: ICD-10-CM

## 2019-03-19 DIAGNOSIS — E78.2 MIXED HYPERLIPIDEMIA: ICD-10-CM

## 2019-03-19 PROCEDURE — 72110 X-RAY EXAM L-2 SPINE 4/>VWS: CPT

## 2019-03-19 PROCEDURE — 3008F BODY MASS INDEX DOCD: CPT | Performed by: FAMILY MEDICINE

## 2019-03-19 PROCEDURE — 72050 X-RAY EXAM NECK SPINE 4/5VWS: CPT

## 2019-03-19 PROCEDURE — 72072 X-RAY EXAM THORAC SPINE 3VWS: CPT

## 2019-03-19 PROCEDURE — 99214 OFFICE O/P EST MOD 30 MIN: CPT | Performed by: FAMILY MEDICINE

## 2019-03-19 NOTE — PROGRESS NOTES
Subjective:   Chief Complaint   Patient presents with    Back Pain     chronic         Patient ID: Raul Crawford is a 37 y o  female  Patient who has history of fibromylagia in office with  with skin he pain in her neck and the pain in her thoracic spine pain no lumbar spine patient describes her pain as achy 5 6/10 localized in the area and was radiated to the upper extremity with radiation to her lower extremity near she feel or her body ache fatigue she does the not the complain any numbness or will muscle weakness and no rash the turning her head and symptom direction aggravate her neck pain also [place] in certain position aggravate her back pain no recent fall no recent trauma  The patient is taking Savella by the Rheumatology per patient has been son left the home recently and he is and living with somebody and the not agree on and that aggravated the mom a make her upset and the affect her mood she is crying most of the time and not sleeping well on deny any suicidal attempt or idea  Also patient concerned about the discoloration on her hand the she say she notice couple times will her hand turned blue and no numbness no muscle weakness and it and happen any time at rest or with activity no cold or hot the warm at home no stiffness  Recent blood work discussed with the patient show abnormal lipid profile patient deny any chest pain short of breath no palpitation no headache no TIA symptoms      The following portions of the patient's history were reviewed and updated as appropriate: allergies, current medications, past family history, past medical history, past social history, past surgical history and problem list     Review of Systems   Constitutional: Negative for fatigue and fever  HENT: Negative for ear pain, sinus pressure, sinus pain and sore throat  Eyes: Negative for pain and redness  Respiratory: Negative for cough, chest tightness and shortness of breath      Cardiovascular: Negative for chest pain, palpitations and leg swelling  Gastrointestinal: Negative for abdominal pain, blood in stool, constipation, diarrhea and nausea  Genitourinary: Negative for flank pain, frequency and hematuria  Musculoskeletal: Positive for back pain and neck pain  Negative for joint swelling  Skin: Negative for rash  Neurological: Negative for dizziness, numbness and headaches  Hematological: Does not bruise/bleed easily  Psychiatric/Behavioral: Positive for decreased concentration  Negative for self-injury and suicidal ideas  Depress mood         Objective:  Vitals:    03/19/19 1449   BP: 116/80   BP Location: Left arm   Patient Position: Sitting   Cuff Size: Large   Pulse: 80   Resp: 16   SpO2: 99%   Weight: 108 kg (239 lb)   Height: 5' 7 5" (1 715 m)      Physical Exam   Constitutional: She is oriented to person, place, and time  She appears well-developed and well-nourished  HENT:   Head: Normocephalic  Right Ear: External ear normal    Left Ear: External ear normal    Eyes: Conjunctivae and EOM are normal  Right eye exhibits no discharge  Left eye exhibits no discharge  Neck: No JVD present  Cardiovascular: Normal rate, regular rhythm and normal heart sounds  Exam reveals no gallop  No murmur heard  Pulmonary/Chest: Effort normal  No respiratory distress  She has no wheezes  She has no rales  She exhibits no tenderness  Abdominal: She exhibits no mass  There is no tenderness  There is no rebound  Musculoskeletal: She exhibits no edema or tenderness  Neurological: She is alert and oriented to person, place, and time  Skin: No rash noted  No erythema           Assessment/Plan:    Chronic midline low back pain without sciatica  Chronic symptomatic recommend Tylenol for the pain plan for x-ray of the lumbar spine we discussed with the patient important lose weight    Neck pain  Symptomatic room were Tylenol for the pain discussed with the patient proper postural    Hyperlipidemia  Chronic asymptomatic ACVD not the and required to be on statin we discussed the patient important to low-fat diet important lose weight    Obesity (BMI 35 0-39 9 without comorbidity)  The BMI is above average  BMI counseling and education was provided to the patient  Nutrition recommendations include reducing portion sizes, decreasing overall calorie intake, 3-5 servings of fruits/vegetables daily, reducing fast food intake, consuming healthier snacks, decreasing soda and/or juice intake, moderation in carbohydrate intake and reducing intake of saturated fat and trans fat  Exercise recommendations include moderate aerobic physical activity for 150 minutes/week, exercising 3-5 times per week and joining a gym  Discoloration of skin of hand  The per patient discoloration of the by Hand a pulse is normal bilateral plan for arterial Doppler to rule out any the no vascular stenosis discussed with the patient and her     Chronic midline thoracic back pain  Chronic symptomatic Tylenol for the pain and patient proper postural discussed with the patient also discussed the patient important lose weight       Diagnoses and all orders for this visit:    Neck pain  -     XR spine cervical complete 4 or 5 vw non injury; Future    Chronic midline thoracic back pain  -     XR spine thoracic 3 vw; Future    Chronic midline low back pain without sciatica  -     XR spine lumbar minimum 4 views non injury; Future    Discoloration of skin of hand  -     Cancel: VAS upper limb venous duplex scan, complete, bilateral; Future  -     VAS upper limb arterial duplex, complete bilateral, graft; Future    Obesity (BMI 35 0-39 9 without comorbidity)    Mixed hyperlipidemia    Other orders  -     Cancel: Ambulatory referral to Gynecology; Future  -     Cancel: Ambulatory referral to Psychiatry; Future          BMI Counseling: Body mass index is 36 88 kg/m²  Discussed the patient's BMI with her   The BMI is above average  BMI counseling and education was provided to the patient  Nutrition recommendations include reducing portion sizes, decreasing overall calorie intake, 3-5 servings of fruits/vegetables daily, reducing fast food intake, consuming healthier snacks, decreasing soda and/or juice intake, moderation in carbohydrate intake and reducing intake of cholesterol  Exercise recommendations include moderate aerobic physical activity for 150 minutes/week

## 2019-03-19 NOTE — LETTER
March 19, 2019     Patient: Linda Rodas   YOB: 1980   Date of Visit: 3/19/2019       To Whom it May Concern:    Linda Rodas is under my professional care  She was seen in my office on 3/19/2019  She may return to work on 3/25/2019  Please excuse her 3/18/19-3/22/19    If you have any questions or concerns, please don't hesitate to call           Sincerely,          Sheree Rebollar MD        CC: No Recipients

## 2019-03-20 PROBLEM — L81.9 DISCOLORATION OF SKIN OF HAND: Status: ACTIVE | Noted: 2019-03-20

## 2019-03-20 PROBLEM — E66.9 OBESITY (BMI 35.0-39.9 WITHOUT COMORBIDITY): Status: ACTIVE | Noted: 2018-04-19

## 2019-03-20 PROBLEM — M54.6 CHRONIC MIDLINE THORACIC BACK PAIN: Status: ACTIVE | Noted: 2019-03-20

## 2019-03-20 PROBLEM — G89.29 CHRONIC MIDLINE LOW BACK PAIN WITHOUT SCIATICA: Status: ACTIVE | Noted: 2019-03-20

## 2019-03-20 PROBLEM — M54.2 NECK PAIN: Status: ACTIVE | Noted: 2019-03-20

## 2019-03-20 PROBLEM — G89.29 CHRONIC MIDLINE THORACIC BACK PAIN: Status: ACTIVE | Noted: 2019-03-20

## 2019-03-20 PROBLEM — M54.50 CHRONIC MIDLINE LOW BACK PAIN WITHOUT SCIATICA: Status: ACTIVE | Noted: 2019-03-20

## 2019-03-20 NOTE — ASSESSMENT & PLAN NOTE
The per patient discoloration of the by Hand a pulse is normal bilateral plan for arterial Doppler to rule out any the no vascular stenosis discussed with the patient and her

## 2019-03-20 NOTE — ASSESSMENT & PLAN NOTE
Chronic symptomatic recommend Tylenol for the pain plan for x-ray of the lumbar spine we discussed with the patient important lose weight

## 2019-03-20 NOTE — ASSESSMENT & PLAN NOTE
Chronic asymptomatic ACVD not the and required to be on statin we discussed the patient important to low-fat diet important lose weight

## 2019-03-20 NOTE — ASSESSMENT & PLAN NOTE
Chronic symptomatic Tylenol for the pain and patient proper postural discussed with the patient also discussed the patient important lose weight

## 2019-03-20 NOTE — PATIENT INSTRUCTIONS

## 2019-03-22 ENCOUNTER — TELEPHONE (OUTPATIENT)
Dept: FAMILY MEDICINE CLINIC | Facility: CLINIC | Age: 39
End: 2019-03-22

## 2019-03-22 ENCOUNTER — HOSPITAL ENCOUNTER (OUTPATIENT)
Dept: NON INVASIVE DIAGNOSTICS | Facility: CLINIC | Age: 39
Discharge: HOME | End: 2019-03-22
Payer: COMMERCIAL

## 2019-03-22 DIAGNOSIS — L81.9 DISCOLORATION OF SKIN OF HAND: ICD-10-CM

## 2019-03-22 PROCEDURE — 93930 UPPER EXTREMITY STUDY: CPT | Performed by: SURGERY

## 2019-03-22 PROCEDURE — 93930 UPPER EXTREMITY STUDY: CPT

## 2019-03-22 NOTE — TELEPHONE ENCOUNTER
----- Message from Rene Stein MD sent at 3/22/2019  1:06 PM EDT -----  Negative arterial of B/L upper extremity

## 2019-03-26 ENCOUNTER — TELEPHONE (OUTPATIENT)
Dept: FAMILY MEDICINE CLINIC | Facility: CLINIC | Age: 39
End: 2019-03-26

## 2019-03-26 DIAGNOSIS — M54.2 NECK PAIN: Primary | ICD-10-CM

## 2019-03-26 DIAGNOSIS — G89.29 CHRONIC MIDLINE THORACIC BACK PAIN: ICD-10-CM

## 2019-03-26 DIAGNOSIS — M54.6 CHRONIC MIDLINE THORACIC BACK PAIN: ICD-10-CM

## 2019-03-26 NOTE — TELEPHONE ENCOUNTER
----- Message from Jaime Coffey MD sent at 3/25/2019  3:21 PM EDT -----  Mild degenerative changes in thoracic spine   recommend MRI of thoracic spine and physical therapy for thoracic and cervical spine

## 2019-03-27 ENCOUNTER — TELEPHONE (OUTPATIENT)
Dept: FAMILY MEDICINE CLINIC | Facility: CLINIC | Age: 39
End: 2019-03-27

## 2019-03-27 NOTE — TELEPHONE ENCOUNTER
Mri of thoracic and cervical spine denied left message for patient order for PT is in the system she can call to set that up

## 2019-04-04 ENCOUNTER — EVALUATION (OUTPATIENT)
Dept: PHYSICAL THERAPY | Facility: MEDICAL CENTER | Age: 39
End: 2019-04-04
Payer: COMMERCIAL

## 2019-04-04 DIAGNOSIS — M54.2 NECK PAIN: ICD-10-CM

## 2019-04-04 DIAGNOSIS — G89.29 CHRONIC MIDLINE THORACIC BACK PAIN: Primary | ICD-10-CM

## 2019-04-04 DIAGNOSIS — M54.6 CHRONIC MIDLINE THORACIC BACK PAIN: Primary | ICD-10-CM

## 2019-04-04 PROCEDURE — 97161 PT EVAL LOW COMPLEX 20 MIN: CPT | Performed by: PHYSICAL THERAPIST

## 2019-04-04 PROCEDURE — 97110 THERAPEUTIC EXERCISES: CPT | Performed by: PHYSICAL THERAPIST

## 2019-04-09 ENCOUNTER — OFFICE VISIT (OUTPATIENT)
Dept: PHYSICAL THERAPY | Facility: MEDICAL CENTER | Age: 39
End: 2019-04-09
Payer: COMMERCIAL

## 2019-04-09 DIAGNOSIS — M54.6 CHRONIC MIDLINE THORACIC BACK PAIN: Primary | ICD-10-CM

## 2019-04-09 DIAGNOSIS — G89.29 CHRONIC MIDLINE THORACIC BACK PAIN: Primary | ICD-10-CM

## 2019-04-09 DIAGNOSIS — M54.2 NECK PAIN: ICD-10-CM

## 2019-04-09 PROCEDURE — 97112 NEUROMUSCULAR REEDUCATION: CPT | Performed by: PHYSICAL THERAPIST

## 2019-04-09 PROCEDURE — 97110 THERAPEUTIC EXERCISES: CPT | Performed by: PHYSICAL THERAPIST

## 2019-04-11 ENCOUNTER — OFFICE VISIT (OUTPATIENT)
Dept: PHYSICAL THERAPY | Facility: MEDICAL CENTER | Age: 39
End: 2019-04-11
Payer: COMMERCIAL

## 2019-04-11 DIAGNOSIS — M54.6 CHRONIC MIDLINE THORACIC BACK PAIN: Primary | ICD-10-CM

## 2019-04-11 DIAGNOSIS — G89.29 CHRONIC MIDLINE THORACIC BACK PAIN: Primary | ICD-10-CM

## 2019-04-11 DIAGNOSIS — M54.2 NECK PAIN: ICD-10-CM

## 2019-04-11 PROCEDURE — 97110 THERAPEUTIC EXERCISES: CPT | Performed by: PHYSICAL THERAPIST

## 2019-04-11 PROCEDURE — 97112 NEUROMUSCULAR REEDUCATION: CPT | Performed by: PHYSICAL THERAPIST

## 2019-04-12 ENCOUNTER — TRANSCRIBE ORDERS (OUTPATIENT)
Dept: ADMINISTRATIVE | Facility: HOSPITAL | Age: 39
End: 2019-04-12

## 2019-04-12 ENCOUNTER — APPOINTMENT (OUTPATIENT)
Dept: LAB | Facility: HOSPITAL | Age: 39
End: 2019-04-12
Attending: INTERNAL MEDICINE
Payer: COMMERCIAL

## 2019-04-12 DIAGNOSIS — D50.0 IRON DEFICIENCY ANEMIA DUE TO CHRONIC BLOOD LOSS: ICD-10-CM

## 2019-04-12 LAB
ALBUMIN SERPL BCP-MCNC: 4.4 G/DL (ref 3–5.2)
ALP SERPL-CCNC: 70 U/L (ref 43–122)
ALT SERPL W P-5'-P-CCNC: 19 U/L (ref 9–52)
ANION GAP SERPL CALCULATED.3IONS-SCNC: 10 MMOL/L (ref 5–14)
AST SERPL W P-5'-P-CCNC: 22 U/L (ref 14–36)
BASOPHILS # BLD AUTO: 0 THOUSANDS/ΜL (ref 0–0.1)
BASOPHILS NFR BLD AUTO: 1 % (ref 0–1)
BILIRUB SERPL-MCNC: 0.5 MG/DL
BUN SERPL-MCNC: 7 MG/DL (ref 5–25)
CALCIUM SERPL-MCNC: 9.7 MG/DL (ref 8.4–10.2)
CHLORIDE SERPL-SCNC: 101 MMOL/L (ref 97–108)
CO2 SERPL-SCNC: 30 MMOL/L (ref 22–30)
CREAT SERPL-MCNC: 0.61 MG/DL (ref 0.6–1.2)
CRP SERPL QL: <3 MG/L
EOSINOPHIL # BLD AUTO: 0.1 THOUSAND/ΜL (ref 0–0.4)
EOSINOPHIL NFR BLD AUTO: 2 % (ref 0–6)
ERYTHROCYTE [DISTWIDTH] IN BLOOD BY AUTOMATED COUNT: 12.7 %
ERYTHROCYTE [SEDIMENTATION RATE] IN BLOOD: 16 MM/HOUR (ref 1–20)
FERRITIN SERPL-MCNC: 43 NG/ML (ref 8–388)
GFR SERPL CREATININE-BSD FRML MDRD: 113 ML/MIN/1.73SQ M
GLUCOSE P FAST SERPL-MCNC: 92 MG/DL (ref 70–99)
HCT VFR BLD AUTO: 34.5 % (ref 36–46)
HGB BLD-MCNC: 11.8 G/DL (ref 12–16)
IRON SATN MFR SERPL: 37 %
IRON SERPL-MCNC: 135 UG/DL (ref 50–170)
LYMPHOCYTES # BLD AUTO: 1.9 THOUSANDS/ΜL (ref 0.5–4)
LYMPHOCYTES NFR BLD AUTO: 31 % (ref 25–45)
MCH RBC QN AUTO: 30.9 PG (ref 26–34)
MCHC RBC AUTO-ENTMCNC: 34.1 G/DL (ref 31–36)
MCV RBC AUTO: 91 FL (ref 80–100)
MONOCYTES # BLD AUTO: 0.5 THOUSAND/ΜL (ref 0.2–0.9)
MONOCYTES NFR BLD AUTO: 9 % (ref 1–10)
NEUTROPHILS # BLD AUTO: 3.5 THOUSANDS/ΜL (ref 1.8–7.8)
NEUTS SEG NFR BLD AUTO: 57 % (ref 45–65)
PLATELET # BLD AUTO: 367 THOUSANDS/UL (ref 150–450)
PMV BLD AUTO: 8.2 FL (ref 8.9–12.7)
POTASSIUM SERPL-SCNC: 3.5 MMOL/L (ref 3.6–5)
PROT SERPL-MCNC: 7.7 G/DL (ref 5.9–8.4)
RBC # BLD AUTO: 3.81 MILLION/UL (ref 4–5.2)
SODIUM SERPL-SCNC: 141 MMOL/L (ref 137–147)
TIBC SERPL-MCNC: 363 UG/DL (ref 250–450)
VIT B12 SERPL-MCNC: 565 PG/ML (ref 100–900)
WBC # BLD AUTO: 6.1 THOUSAND/UL (ref 4.5–11)

## 2019-04-12 PROCEDURE — 85025 COMPLETE CBC W/AUTO DIFF WBC: CPT

## 2019-04-12 PROCEDURE — 36415 COLL VENOUS BLD VENIPUNCTURE: CPT

## 2019-04-12 PROCEDURE — 86140 C-REACTIVE PROTEIN: CPT

## 2019-04-12 PROCEDURE — 83550 IRON BINDING TEST: CPT

## 2019-04-12 PROCEDURE — 82728 ASSAY OF FERRITIN: CPT

## 2019-04-12 PROCEDURE — 80053 COMPREHEN METABOLIC PANEL: CPT

## 2019-04-12 PROCEDURE — 82607 VITAMIN B-12: CPT

## 2019-04-12 PROCEDURE — 83540 ASSAY OF IRON: CPT

## 2019-04-12 PROCEDURE — 85652 RBC SED RATE AUTOMATED: CPT

## 2019-04-16 ENCOUNTER — OFFICE VISIT (OUTPATIENT)
Dept: PHYSICAL THERAPY | Facility: MEDICAL CENTER | Age: 39
End: 2019-04-16
Payer: COMMERCIAL

## 2019-04-16 DIAGNOSIS — G89.29 CHRONIC MIDLINE THORACIC BACK PAIN: Primary | ICD-10-CM

## 2019-04-16 DIAGNOSIS — M54.6 CHRONIC MIDLINE THORACIC BACK PAIN: Primary | ICD-10-CM

## 2019-04-16 DIAGNOSIS — M54.2 NECK PAIN: ICD-10-CM

## 2019-04-16 PROCEDURE — 97112 NEUROMUSCULAR REEDUCATION: CPT | Performed by: PHYSICAL THERAPIST

## 2019-04-16 PROCEDURE — 97110 THERAPEUTIC EXERCISES: CPT | Performed by: PHYSICAL THERAPIST

## 2019-04-16 PROCEDURE — 97140 MANUAL THERAPY 1/> REGIONS: CPT | Performed by: PHYSICAL THERAPIST

## 2019-04-18 ENCOUNTER — OFFICE VISIT (OUTPATIENT)
Dept: PHYSICAL THERAPY | Facility: MEDICAL CENTER | Age: 39
End: 2019-04-18
Payer: COMMERCIAL

## 2019-04-18 DIAGNOSIS — M54.2 NECK PAIN: ICD-10-CM

## 2019-04-18 DIAGNOSIS — M54.6 CHRONIC MIDLINE THORACIC BACK PAIN: Primary | ICD-10-CM

## 2019-04-18 DIAGNOSIS — G89.29 CHRONIC MIDLINE THORACIC BACK PAIN: Primary | ICD-10-CM

## 2019-04-18 PROCEDURE — 97110 THERAPEUTIC EXERCISES: CPT | Performed by: PHYSICAL THERAPIST

## 2019-04-18 PROCEDURE — 97112 NEUROMUSCULAR REEDUCATION: CPT | Performed by: PHYSICAL THERAPIST

## 2019-04-19 ENCOUNTER — OFFICE VISIT (OUTPATIENT)
Dept: HEMATOLOGY ONCOLOGY | Facility: CLINIC | Age: 39
End: 2019-04-19
Payer: COMMERCIAL

## 2019-04-19 VITALS
HEIGHT: 68 IN | TEMPERATURE: 99.1 F | RESPIRATION RATE: 16 BRPM | DIASTOLIC BLOOD PRESSURE: 70 MMHG | OXYGEN SATURATION: 98 % | BODY MASS INDEX: 36.62 KG/M2 | SYSTOLIC BLOOD PRESSURE: 118 MMHG | WEIGHT: 241.6 LBS | HEART RATE: 71 BPM

## 2019-04-19 DIAGNOSIS — D50.0 IRON DEFICIENCY ANEMIA DUE TO CHRONIC BLOOD LOSS: Primary | ICD-10-CM

## 2019-04-19 PROCEDURE — 99213 OFFICE O/P EST LOW 20 MIN: CPT | Performed by: INTERNAL MEDICINE

## 2019-04-23 ENCOUNTER — APPOINTMENT (OUTPATIENT)
Dept: PHYSICAL THERAPY | Facility: MEDICAL CENTER | Age: 39
End: 2019-04-23
Payer: COMMERCIAL

## 2019-04-25 ENCOUNTER — OFFICE VISIT (OUTPATIENT)
Dept: FAMILY MEDICINE CLINIC | Facility: CLINIC | Age: 39
End: 2019-04-25
Payer: COMMERCIAL

## 2019-04-25 ENCOUNTER — OFFICE VISIT (OUTPATIENT)
Dept: PHYSICAL THERAPY | Facility: MEDICAL CENTER | Age: 39
End: 2019-04-25
Payer: COMMERCIAL

## 2019-04-25 VITALS
TEMPERATURE: 98.6 F | DIASTOLIC BLOOD PRESSURE: 80 MMHG | BODY MASS INDEX: 39.05 KG/M2 | HEART RATE: 76 BPM | SYSTOLIC BLOOD PRESSURE: 110 MMHG | HEIGHT: 66 IN | WEIGHT: 243 LBS | OXYGEN SATURATION: 99 %

## 2019-04-25 DIAGNOSIS — H91.93 DECREASED HEARING OF BOTH EARS: ICD-10-CM

## 2019-04-25 DIAGNOSIS — G89.29 CHRONIC MIDLINE THORACIC BACK PAIN: Primary | ICD-10-CM

## 2019-04-25 DIAGNOSIS — Z11.1 SCREENING-PULMONARY TB: ICD-10-CM

## 2019-04-25 DIAGNOSIS — B37.3 CANDIDA VAGINITIS: ICD-10-CM

## 2019-04-25 DIAGNOSIS — M54.2 NECK PAIN: ICD-10-CM

## 2019-04-25 DIAGNOSIS — Z00.01 ENCOUNTER FOR WELL ADULT EXAM WITH ABNORMAL FINDINGS: Primary | ICD-10-CM

## 2019-04-25 DIAGNOSIS — Z12.31 SCREENING MAMMOGRAM, ENCOUNTER FOR: ICD-10-CM

## 2019-04-25 DIAGNOSIS — M54.6 CHRONIC MIDLINE THORACIC BACK PAIN: Primary | ICD-10-CM

## 2019-04-25 PROBLEM — B37.31 CANDIDA VAGINITIS: Status: ACTIVE | Noted: 2019-04-25

## 2019-04-25 LAB — SL AMB POCT WET MOUNT: ABNORMAL

## 2019-04-25 PROCEDURE — 97112 NEUROMUSCULAR REEDUCATION: CPT | Performed by: PHYSICAL THERAPIST

## 2019-04-25 PROCEDURE — 99214 OFFICE O/P EST MOD 30 MIN: CPT | Performed by: FAMILY MEDICINE

## 2019-04-25 PROCEDURE — 97110 THERAPEUTIC EXERCISES: CPT | Performed by: PHYSICAL THERAPIST

## 2019-04-25 PROCEDURE — 87210 SMEAR WET MOUNT SALINE/INK: CPT | Performed by: FAMILY MEDICINE

## 2019-04-25 PROCEDURE — 99396 PREV VISIT EST AGE 40-64: CPT | Performed by: FAMILY MEDICINE

## 2019-04-25 RX ORDER — FLUCONAZOLE 150 MG/1
150 TABLET ORAL ONCE
Qty: 2 TABLET | Refills: 0 | Status: SHIPPED | OUTPATIENT
Start: 2019-04-25 | End: 2019-04-25

## 2019-04-30 ENCOUNTER — TELEPHONE (OUTPATIENT)
Dept: FAMILY MEDICINE CLINIC | Facility: CLINIC | Age: 39
End: 2019-04-30

## 2019-04-30 ENCOUNTER — APPOINTMENT (OUTPATIENT)
Dept: PHYSICAL THERAPY | Facility: MEDICAL CENTER | Age: 39
End: 2019-04-30
Payer: COMMERCIAL

## 2019-04-30 DIAGNOSIS — B37.3 CANDIDA VAGINITIS: Primary | ICD-10-CM

## 2019-05-02 ENCOUNTER — OFFICE VISIT (OUTPATIENT)
Dept: PHYSICAL THERAPY | Facility: MEDICAL CENTER | Age: 39
End: 2019-05-02
Payer: COMMERCIAL

## 2019-05-02 DIAGNOSIS — G89.29 CHRONIC MIDLINE THORACIC BACK PAIN: Primary | ICD-10-CM

## 2019-05-02 DIAGNOSIS — M54.2 NECK PAIN: ICD-10-CM

## 2019-05-02 DIAGNOSIS — M54.6 CHRONIC MIDLINE THORACIC BACK PAIN: Primary | ICD-10-CM

## 2019-05-02 PROCEDURE — 97110 THERAPEUTIC EXERCISES: CPT | Performed by: PHYSICAL THERAPIST

## 2019-05-02 PROCEDURE — 97112 NEUROMUSCULAR REEDUCATION: CPT | Performed by: PHYSICAL THERAPIST

## 2019-05-03 ENCOUNTER — OFFICE VISIT (OUTPATIENT)
Dept: PHYSICAL THERAPY | Facility: MEDICAL CENTER | Age: 39
End: 2019-05-03
Payer: COMMERCIAL

## 2019-05-03 DIAGNOSIS — M54.2 NECK PAIN: ICD-10-CM

## 2019-05-03 DIAGNOSIS — G89.29 CHRONIC MIDLINE THORACIC BACK PAIN: Primary | ICD-10-CM

## 2019-05-03 DIAGNOSIS — M54.6 CHRONIC MIDLINE THORACIC BACK PAIN: Primary | ICD-10-CM

## 2019-05-03 PROCEDURE — 97112 NEUROMUSCULAR REEDUCATION: CPT | Performed by: PHYSICAL THERAPIST

## 2019-05-03 PROCEDURE — 97110 THERAPEUTIC EXERCISES: CPT | Performed by: PHYSICAL THERAPIST

## 2019-05-07 ENCOUNTER — APPOINTMENT (OUTPATIENT)
Dept: PHYSICAL THERAPY | Facility: MEDICAL CENTER | Age: 39
End: 2019-05-07
Payer: COMMERCIAL

## 2019-05-09 ENCOUNTER — OFFICE VISIT (OUTPATIENT)
Dept: PHYSICAL THERAPY | Facility: MEDICAL CENTER | Age: 39
End: 2019-05-09
Payer: COMMERCIAL

## 2019-05-09 DIAGNOSIS — M54.2 NECK PAIN: ICD-10-CM

## 2019-05-09 DIAGNOSIS — M54.6 CHRONIC MIDLINE THORACIC BACK PAIN: Primary | ICD-10-CM

## 2019-05-09 DIAGNOSIS — G89.29 CHRONIC MIDLINE THORACIC BACK PAIN: Primary | ICD-10-CM

## 2019-05-09 PROCEDURE — 97112 NEUROMUSCULAR REEDUCATION: CPT | Performed by: PHYSICAL THERAPIST

## 2019-05-09 PROCEDURE — 97110 THERAPEUTIC EXERCISES: CPT | Performed by: PHYSICAL THERAPIST

## 2019-05-14 ENCOUNTER — OFFICE VISIT (OUTPATIENT)
Dept: PHYSICAL THERAPY | Facility: MEDICAL CENTER | Age: 39
End: 2019-05-14
Payer: COMMERCIAL

## 2019-05-14 DIAGNOSIS — M54.2 NECK PAIN: ICD-10-CM

## 2019-05-14 DIAGNOSIS — M54.6 CHRONIC MIDLINE THORACIC BACK PAIN: Primary | ICD-10-CM

## 2019-05-14 DIAGNOSIS — G89.29 CHRONIC MIDLINE THORACIC BACK PAIN: Primary | ICD-10-CM

## 2019-05-14 PROCEDURE — 97110 THERAPEUTIC EXERCISES: CPT | Performed by: PHYSICAL THERAPIST

## 2019-05-14 PROCEDURE — 97140 MANUAL THERAPY 1/> REGIONS: CPT | Performed by: PHYSICAL THERAPIST

## 2019-05-16 ENCOUNTER — APPOINTMENT (OUTPATIENT)
Dept: PHYSICAL THERAPY | Facility: MEDICAL CENTER | Age: 39
End: 2019-05-16
Payer: COMMERCIAL

## 2019-05-20 ENCOUNTER — TELEPHONE (OUTPATIENT)
Dept: FAMILY MEDICINE CLINIC | Facility: CLINIC | Age: 39
End: 2019-05-20

## 2019-05-20 DIAGNOSIS — M54.2 NECK PAIN: ICD-10-CM

## 2019-05-21 ENCOUNTER — HOSPITAL ENCOUNTER (OUTPATIENT)
Dept: MRI IMAGING | Facility: HOSPITAL | Age: 39
Discharge: HOME | End: 2019-05-21
Payer: COMMERCIAL

## 2019-05-21 ENCOUNTER — APPOINTMENT (OUTPATIENT)
Dept: PHYSICAL THERAPY | Facility: MEDICAL CENTER | Age: 39
End: 2019-05-21
Payer: COMMERCIAL

## 2019-05-21 PROCEDURE — 72141 MRI NECK SPINE W/O DYE: CPT

## 2019-05-24 ENCOUNTER — TELEPHONE (OUTPATIENT)
Dept: FAMILY MEDICINE CLINIC | Facility: CLINIC | Age: 39
End: 2019-05-24

## 2019-05-24 DIAGNOSIS — M47.892 OTHER OSTEOARTHRITIS OF SPINE, CERVICAL REGION: Primary | ICD-10-CM

## 2019-05-28 ENCOUNTER — APPOINTMENT (OUTPATIENT)
Dept: PHYSICAL THERAPY | Facility: MEDICAL CENTER | Age: 39
End: 2019-05-28
Payer: COMMERCIAL

## 2019-05-28 ENCOUNTER — TELEPHONE (OUTPATIENT)
Dept: PAIN MEDICINE | Facility: MEDICAL CENTER | Age: 39
End: 2019-05-28

## 2019-05-30 ENCOUNTER — APPOINTMENT (OUTPATIENT)
Dept: PHYSICAL THERAPY | Facility: MEDICAL CENTER | Age: 39
End: 2019-05-30
Payer: COMMERCIAL

## 2019-06-04 ENCOUNTER — CONSULT (OUTPATIENT)
Dept: PAIN MEDICINE | Facility: MEDICAL CENTER | Age: 39
End: 2019-06-04
Payer: COMMERCIAL

## 2019-06-04 VITALS
BODY MASS INDEX: 38.73 KG/M2 | DIASTOLIC BLOOD PRESSURE: 70 MMHG | HEIGHT: 66 IN | RESPIRATION RATE: 16 BRPM | WEIGHT: 241 LBS | HEART RATE: 76 BPM | SYSTOLIC BLOOD PRESSURE: 106 MMHG

## 2019-06-04 DIAGNOSIS — M47.814 SPONDYLOSIS OF THORACIC REGION WITHOUT MYELOPATHY OR RADICULOPATHY: ICD-10-CM

## 2019-06-04 PROCEDURE — 99244 OFF/OP CNSLTJ NEW/EST MOD 40: CPT | Performed by: PHYSICAL MEDICINE & REHABILITATION

## 2019-06-06 DIAGNOSIS — D50.0 IRON DEFICIENCY ANEMIA DUE TO CHRONIC BLOOD LOSS: ICD-10-CM

## 2019-06-06 DIAGNOSIS — K76.0 NONALCOHOLIC FATTY LIVER DISEASE: ICD-10-CM

## 2019-06-06 DIAGNOSIS — E78.2 MIXED HYPERLIPIDEMIA: Primary | ICD-10-CM

## 2019-06-19 ENCOUNTER — HOSPITAL ENCOUNTER (OUTPATIENT)
Dept: RADIOLOGY | Facility: MEDICAL CENTER | Age: 39
Discharge: HOME | End: 2019-06-19
Attending: PHYSICAL MEDICINE & REHABILITATION | Admitting: PHYSICAL MEDICINE & REHABILITATION
Payer: COMMERCIAL

## 2019-06-19 VITALS
TEMPERATURE: 98.4 F | DIASTOLIC BLOOD PRESSURE: 79 MMHG | OXYGEN SATURATION: 100 % | SYSTOLIC BLOOD PRESSURE: 127 MMHG | RESPIRATION RATE: 18 BRPM | HEART RATE: 67 BPM

## 2019-06-19 DIAGNOSIS — M47.814 SPONDYLOSIS OF THORACIC REGION WITHOUT MYELOPATHY OR RADICULOPATHY: ICD-10-CM

## 2019-06-19 PROCEDURE — 64491 INJ PARAVERT F JNT C/T 2 LEV: CPT | Performed by: PHYSICAL MEDICINE & REHABILITATION

## 2019-06-19 PROCEDURE — 64490 INJ PARAVERT F JNT C/T 1 LEV: CPT | Performed by: PHYSICAL MEDICINE & REHABILITATION

## 2019-06-19 RX ADMIN — Medication 1.5 ML: at 11:09

## 2019-07-01 ENCOUNTER — TELEPHONE (OUTPATIENT)
Dept: RADIOLOGY | Facility: MEDICAL CENTER | Age: 39
End: 2019-07-01

## 2019-07-01 ENCOUNTER — OFFICE VISIT (OUTPATIENT)
Dept: FAMILY MEDICINE CLINIC | Facility: CLINIC | Age: 39
End: 2019-07-01
Payer: COMMERCIAL

## 2019-07-01 ENCOUNTER — APPOINTMENT (OUTPATIENT)
Dept: LAB | Facility: HOSPITAL | Age: 39
End: 2019-07-01
Payer: COMMERCIAL

## 2019-07-01 VITALS
HEIGHT: 66 IN | TEMPERATURE: 97.9 F | DIASTOLIC BLOOD PRESSURE: 84 MMHG | OXYGEN SATURATION: 100 % | HEART RATE: 82 BPM | SYSTOLIC BLOOD PRESSURE: 128 MMHG | RESPIRATION RATE: 16 BRPM | BODY MASS INDEX: 39.05 KG/M2 | WEIGHT: 243 LBS

## 2019-07-01 DIAGNOSIS — R10.12 LUQ ABDOMINAL PAIN: ICD-10-CM

## 2019-07-01 DIAGNOSIS — K76.0 NONALCOHOLIC FATTY LIVER DISEASE: ICD-10-CM

## 2019-07-01 DIAGNOSIS — R10.12 LUQ ABDOMINAL PAIN: Primary | ICD-10-CM

## 2019-07-01 LAB
ALP SERPL-CCNC: 81 U/L (ref 46–116)
ALT SERPL W P-5'-P-CCNC: 26 U/L (ref 12–78)
AMYLASE SERPL-CCNC: 42 IU/L (ref 25–115)
AST SERPL W P-5'-P-CCNC: 18 U/L (ref 5–45)
BACTERIA UR QL AUTO: ABNORMAL /HPF
BILIRUB UR QL STRIP: NEGATIVE
CLARITY UR: CLEAR
COLOR UR: YELLOW
GLUCOSE UR STRIP-MCNC: NEGATIVE MG/DL
HGB UR QL STRIP.AUTO: ABNORMAL
KETONES UR STRIP-MCNC: NEGATIVE MG/DL
LEUKOCYTE ESTERASE UR QL STRIP: NEGATIVE
LIPASE SERPL-CCNC: 143 U/L (ref 73–393)
NITRITE UR QL STRIP: NEGATIVE
NON-SQ EPI CELLS URNS QL MICRO: ABNORMAL /HPF
PH UR STRIP.AUTO: 6 [PH]
PROT UR STRIP-MCNC: NEGATIVE MG/DL
RBC #/AREA URNS AUTO: ABNORMAL /HPF
SP GR UR STRIP.AUTO: 1.01 (ref 1–1.03)
UROBILINOGEN UR QL STRIP.AUTO: 0.2 E.U./DL
WBC #/AREA URNS AUTO: ABNORMAL /HPF

## 2019-07-01 PROCEDURE — 83690 ASSAY OF LIPASE: CPT

## 2019-07-01 PROCEDURE — 99214 OFFICE O/P EST MOD 30 MIN: CPT | Performed by: FAMILY MEDICINE

## 2019-07-01 PROCEDURE — 84460 ALANINE AMINO (ALT) (SGPT): CPT

## 2019-07-01 PROCEDURE — 84075 ASSAY ALKALINE PHOSPHATASE: CPT

## 2019-07-01 PROCEDURE — 1036F TOBACCO NON-USER: CPT | Performed by: FAMILY MEDICINE

## 2019-07-01 PROCEDURE — 3008F BODY MASS INDEX DOCD: CPT | Performed by: FAMILY MEDICINE

## 2019-07-01 PROCEDURE — 87147 CULTURE TYPE IMMUNOLOGIC: CPT | Performed by: FAMILY MEDICINE

## 2019-07-01 PROCEDURE — 87086 URINE CULTURE/COLONY COUNT: CPT | Performed by: FAMILY MEDICINE

## 2019-07-01 PROCEDURE — 36415 COLL VENOUS BLD VENIPUNCTURE: CPT

## 2019-07-01 PROCEDURE — 84450 TRANSFERASE (AST) (SGOT): CPT

## 2019-07-01 PROCEDURE — 82150 ASSAY OF AMYLASE: CPT

## 2019-07-01 PROCEDURE — 81001 URINALYSIS AUTO W/SCOPE: CPT | Performed by: FAMILY MEDICINE

## 2019-07-01 RX ORDER — FAMOTIDINE 20 MG/1
20 TABLET, FILM COATED ORAL 2 TIMES DAILY
Qty: 60 TABLET | Refills: 0 | Status: SHIPPED | OUTPATIENT
Start: 2019-07-01 | End: 2019-07-08 | Stop reason: SDUPTHER

## 2019-07-01 NOTE — TELEPHONE ENCOUNTER
Pain diary reviewed by Dr Elysia Chairez; poor response to left thoracic MBB, T9-T11 levels  Schedule f/u with NP to review further       Appt line: review thoracic MBB response

## 2019-07-02 PROBLEM — R10.12 LUQ ABDOMINAL PAIN: Status: ACTIVE | Noted: 2019-07-02

## 2019-07-02 LAB — BACTERIA UR CULT: ABNORMAL

## 2019-07-02 NOTE — PROGRESS NOTES
Subjective:   Chief Complaint   Patient presents with    Other     Left Upper Quadrant pain ongoing all day         Patient ID: Eleni Valenzuela is a 37 y o  female  Patient here concerned about abdomen pain in her left upper quadrant her it has been going on for 1 week and is getting worse and she graded as 7/10 localized in the area no radiation not related to any diet not related to postural and not associated with any vomiting no nausea no diarrhea no constipation no weight loss no abdomen distension no fever no recent travel no sick contact and no new medication the patient does have history of for GERD the patient does have history of gastroparesis and history of fatty liver      The following portions of the patient's history were reviewed and updated as appropriate: allergies, current medications, past family history, past medical history, past social history, past surgical history and problem list     Review of Systems   Constitutional: Negative for fatigue and fever  HENT: Negative for ear pain, sinus pressure, sinus pain and sore throat  Eyes: Negative for pain and redness  Respiratory: Negative for cough, chest tightness and shortness of breath  Cardiovascular: Negative for chest pain, palpitations and leg swelling  Gastrointestinal: Positive for abdominal pain  Negative for blood in stool, constipation, diarrhea and nausea  Genitourinary: Negative for flank pain, frequency and hematuria  Musculoskeletal: Negative for back pain and joint swelling  Skin: Negative for rash  Neurological: Negative for dizziness, numbness and headaches  Hematological: Does not bruise/bleed easily           Objective:  Vitals:    07/01/19 1501   BP: 128/84   BP Location: Left arm   Patient Position: Sitting   Cuff Size: Large   Pulse: 82   Resp: 16   Temp: 97 9 °F (36 6 °C)   TempSrc: Tympanic   SpO2: 100%   Weight: 110 kg (243 lb)   Height: 5' 6" (1 676 m)      Physical Exam   Constitutional: She is oriented to person, place, and time  She appears well-developed and well-nourished  HENT:   Head: Normocephalic  Right Ear: External ear normal    Left Ear: External ear normal    Eyes: Conjunctivae and EOM are normal  Right eye exhibits no discharge  Left eye exhibits no discharge  Neck: No JVD present  Cardiovascular: Normal rate, regular rhythm and normal heart sounds  Exam reveals no gallop  No murmur heard  Pulmonary/Chest: Effort normal  No respiratory distress  She has no wheezes  She has no rales  She exhibits no tenderness  Abdominal: She exhibits no mass  There is tenderness  There is no rebound  Positive tenderness in the left upper quadrant her no rebound no rigidity   Musculoskeletal: She exhibits no edema or tenderness  Neurological: She is alert and oriented to person, place, and time  Skin: No rash noted  No erythema  Assessment/Plan:    LUQ abdominal pain  Acute sometimes symptomatic due diagnosis the patient does have history of GERD patient does have history of the fatty liver and plan to do any days lipase and CT scan of the abdomen pelvic without contrast will continue with the Dexilant we will add the famotidine 20 mg 1 tablet twice a day proper use of medication possible side effect discussed with the patient       Diagnoses and all orders for this visit:    LUQ abdominal pain  -     Urinalysis with microscopic  -     Urine culture  -     famotidine (PEPCID) 20 mg tablet; Take 1 tablet (20 mg total) by mouth 2 (two) times a day  -     Amylase; Future  -     Lipase; Future  -     Alkaline phosphatase; Future  -     ALT; Future  -     AST; Future  -     CT abdomen pelvis w wo contrast; Future    Nonalcoholic fatty liver disease  -     Alkaline phosphatase; Future  -     ALT; Future  -     AST;  Future

## 2019-07-02 NOTE — ASSESSMENT & PLAN NOTE
Acute sometimes symptomatic due diagnosis the patient does have history of GERD patient does have history of the fatty liver and plan to do any days lipase and CT scan of the abdomen pelvic without contrast will continue with the Dexilant we will add the famotidine 20 mg 1 tablet twice a day proper use of medication possible side effect discussed with the patient

## 2019-07-05 ENCOUNTER — HOSPITAL ENCOUNTER (OUTPATIENT)
Dept: CT IMAGING | Facility: HOSPITAL | Age: 39
Discharge: HOME | End: 2019-07-05
Payer: COMMERCIAL

## 2019-07-05 DIAGNOSIS — R10.12 LUQ ABDOMINAL PAIN: ICD-10-CM

## 2019-07-05 PROCEDURE — 74177 CT ABD & PELVIS W/CONTRAST: CPT

## 2019-07-05 RX ADMIN — IOHEXOL 100 ML: 350 INJECTION, SOLUTION INTRAVENOUS at 14:03

## 2019-07-08 DIAGNOSIS — M79.7 PRIMARY FIBROMYALGIA SYNDROME: ICD-10-CM

## 2019-07-08 DIAGNOSIS — K21.9 GASTROESOPHAGEAL REFLUX DISEASE WITHOUT ESOPHAGITIS: ICD-10-CM

## 2019-07-08 DIAGNOSIS — R10.12 LUQ ABDOMINAL PAIN: ICD-10-CM

## 2019-07-08 RX ORDER — MILNACIPRAN HYDROCHLORIDE 50 MG/1
50 TABLET, FILM COATED ORAL 2 TIMES DAILY
Qty: 180 TABLET | Refills: 1 | Status: SHIPPED | OUTPATIENT
Start: 2019-07-08 | End: 2019-09-13 | Stop reason: SDUPTHER

## 2019-07-08 RX ORDER — DEXLANSOPRAZOLE 60 MG/1
60 CAPSULE, DELAYED RELEASE ORAL EVERY 12 HOURS
Qty: 180 CAPSULE | Refills: 2 | Status: SHIPPED | OUTPATIENT
Start: 2019-07-08 | End: 2020-03-27 | Stop reason: SDUPTHER

## 2019-07-08 RX ORDER — FAMOTIDINE 20 MG/1
20 TABLET, FILM COATED ORAL 2 TIMES DAILY
Qty: 60 TABLET | Refills: 0 | Status: SHIPPED | OUTPATIENT
Start: 2019-07-08 | End: 2020-01-27 | Stop reason: ALTCHOICE

## 2019-07-09 ENCOUNTER — TELEPHONE (OUTPATIENT)
Dept: FAMILY MEDICINE CLINIC | Facility: CLINIC | Age: 39
End: 2019-07-09

## 2019-07-09 DIAGNOSIS — K63.9 COLONIC THICKENING: ICD-10-CM

## 2019-07-09 DIAGNOSIS — D35.01 ADRENAL ADENOMA, RIGHT: ICD-10-CM

## 2019-07-09 DIAGNOSIS — K63.9 MURAL THICKENING OF COLON: Primary | ICD-10-CM

## 2019-07-09 DIAGNOSIS — R93.89 ABNORMAL CAT SCAN: ICD-10-CM

## 2019-07-09 NOTE — TELEPHONE ENCOUNTER
----- Message from Viky Carreon MD sent at 7/9/2019 12:07 PM EDT -----  At site of pain thickening of colon  I recommend to see GI ,to refer her to [address]   Patient does have adrenal adenoma on right side stable no change in size ,but recommend to be seen by Endocrine for it

## 2019-07-10 ENCOUNTER — OFFICE VISIT (OUTPATIENT)
Dept: PAIN MEDICINE | Facility: MEDICAL CENTER | Age: 39
End: 2019-07-10
Payer: COMMERCIAL

## 2019-07-10 VITALS
RESPIRATION RATE: 14 BRPM | WEIGHT: 240 LBS | BODY MASS INDEX: 38.57 KG/M2 | HEIGHT: 66 IN | SYSTOLIC BLOOD PRESSURE: 146 MMHG | HEART RATE: 76 BPM | DIASTOLIC BLOOD PRESSURE: 88 MMHG

## 2019-07-10 DIAGNOSIS — M54.41 CHRONIC BILATERAL LOW BACK PAIN WITH RIGHT-SIDED SCIATICA: Primary | ICD-10-CM

## 2019-07-10 DIAGNOSIS — G89.29 CHRONIC BILATERAL LOW BACK PAIN WITH RIGHT-SIDED SCIATICA: Primary | ICD-10-CM

## 2019-07-10 DIAGNOSIS — M54.41 ACUTE BILATERAL LOW BACK PAIN WITH RIGHT-SIDED SCIATICA: ICD-10-CM

## 2019-07-10 PROCEDURE — 99213 OFFICE O/P EST LOW 20 MIN: CPT | Performed by: NURSE PRACTITIONER

## 2019-07-10 RX ORDER — NORTRIPTYLINE HYDROCHLORIDE 25 MG/1
25 CAPSULE ORAL
Qty: 30 CAPSULE | Refills: 0 | Status: SHIPPED | OUTPATIENT
Start: 2019-07-10 | End: 2019-08-23 | Stop reason: SINTOL

## 2019-07-15 ENCOUNTER — HOSPITAL ENCOUNTER (OUTPATIENT)
Dept: MRI IMAGING | Facility: HOSPITAL | Age: 39
Discharge: HOME | End: 2019-07-15
Payer: COMMERCIAL

## 2019-07-15 DIAGNOSIS — M54.41 ACUTE BILATERAL LOW BACK PAIN WITH RIGHT-SIDED SCIATICA: ICD-10-CM

## 2019-07-15 PROCEDURE — 72148 MRI LUMBAR SPINE W/O DYE: CPT

## 2019-07-18 ENCOUNTER — TELEPHONE (OUTPATIENT)
Dept: RADIOLOGY | Facility: MEDICAL CENTER | Age: 39
End: 2019-07-18

## 2019-07-18 NOTE — TELEPHONE ENCOUNTER
Attempted to reach pt and her  answered and stated the pt was at work, asked him to have her C/B, C/B # provided

## 2019-07-18 NOTE — TELEPHONE ENCOUNTER
----- Message from Tesha Espino, [address] sent at 7/17/2019 11:54 AM EDT -----  Impression    Moderate degenerative disc disease, no stenosis T11-12    Multilevel degenerative lumbar facet arthrosis    No evidence of disc herniation, central canal or foraminal stenosis    Trace pelvic free fluid, likely physiologic    Please review with patient and offer B/L L3-5 MBB

## 2019-07-18 NOTE — TELEPHONE ENCOUNTER
S/W pt  Advised pt of the same  Pt stated on 6/9/19 she had left T 9-11 MBB #1 and that didn't work  Pt asking it she would have the same response with this MBB then? Please advise  Pt aware AO will return to the office on Monday  Pt verbalized understanding  Please advise

## 2019-07-22 NOTE — TELEPHONE ENCOUNTER
Hopefully not  When I saw her in follow up the lumbar pain was most bothersome in comparison to the thoracic back pain  We won't know her results until we do the MBB   Please explain that this is just a test we don't expect long term relief until the RFA

## 2019-07-23 ENCOUNTER — OFFICE VISIT (OUTPATIENT)
Dept: GASTROENTEROLOGY | Facility: MEDICAL CENTER | Age: 39
End: 2019-07-23
Payer: COMMERCIAL

## 2019-07-23 VITALS
SYSTOLIC BLOOD PRESSURE: 130 MMHG | BODY MASS INDEX: 38.57 KG/M2 | TEMPERATURE: 98.5 F | DIASTOLIC BLOOD PRESSURE: 70 MMHG | HEIGHT: 66 IN | HEART RATE: 74 BPM | WEIGHT: 240 LBS

## 2019-07-23 DIAGNOSIS — K31.84 GASTROPARESIS: ICD-10-CM

## 2019-07-23 DIAGNOSIS — K76.0 FATTY LIVER: ICD-10-CM

## 2019-07-23 DIAGNOSIS — K59.01 SLOW TRANSIT CONSTIPATION: Primary | ICD-10-CM

## 2019-07-23 DIAGNOSIS — K21.9 GASTROESOPHAGEAL REFLUX DISEASE WITHOUT ESOPHAGITIS: ICD-10-CM

## 2019-07-23 DIAGNOSIS — K63.9 COLONIC THICKENING: ICD-10-CM

## 2019-07-23 DIAGNOSIS — R93.89 ABNORMAL CAT SCAN: ICD-10-CM

## 2019-07-23 DIAGNOSIS — R10.12 LUQ ABDOMINAL PAIN: ICD-10-CM

## 2019-07-23 DIAGNOSIS — Z80.0 FAMILY HISTORY OF COLON CANCER: ICD-10-CM

## 2019-07-23 PROCEDURE — 99244 OFF/OP CNSLTJ NEW/EST MOD 40: CPT | Performed by: INTERNAL MEDICINE

## 2019-07-23 NOTE — LETTER
[facility] [address] [id number]  Dept: [phone]    July 23, 2019     Patient: Derek Moseley   YOB: 1980   Date of Visit: 7/23/2019       To Whom it May Concern:    Derek Moseley is under my professional care  She was seen in the hospital from 7/23/2019   to 07/23/19  If you have any questions or concerns, please don't hesitate to call  Sincerely,          Dr Elma Rodriguez [facility], [place]

## 2019-07-23 NOTE — PATIENT INSTRUCTIONS
Colonoscopy and EGD scheduled on 8/20/2019 with Dr [facility] [address] [facility] instructions given to patient

## 2019-07-23 NOTE — PROGRESS NOTES
[facility] - Outpatient Consultation  [facility] 37 y o  female MRN: [id number]  Encounter: [id number]          ASSESSMENT AND PLAN:    They are planning on traveling to  [facility] trip in   Abnormal CAT scan  - Colonoscopy; Future    2  Colonic thickening  - Colonoscopy; Future  - Na Sulfate-K Sulfate-Mg Sulf (SUPREP BOWEL PREP KIT) 17 5-3 13-1 6 GM/177ML SOLN; Take 177 mL by mouth once for 1 dose  Dispense: 2 Bottle; Refill: 0    3  Slow transit constipation  - linaCLOtide 145 MCG CAPS; Take 1 capsule (145 mcg total) by mouth daily for 30 days  Dispense: 30 capsule; Refill: 5    4  Fatty liver  Recommend weight loss on a yearly basis of at least 8-10%  Avoidance of fatty foods and adopting healthy lifestyle  Recent LFTs within normal limits    5  Gastroesophageal reflux disease without esophagitis  - EGD; Future    6  Gastroparesis  - NM gastric emptying; Future    She presents here for diffuse abdominal pain which is worse with position and lying down  I suspect this is secondary to referred back pain  She did have abnormal CT scan concerning for segmental/focal colitis of the left side of the colon  Due to this I will plan for colonoscopy evaluation  Of note her last colonoscopy was in 2017  I suspect colitis seen on CT scan was likely an over read versus acute colitis from infectious etiology  She does have history of constipation this is due to multiple abdominal surgeries and likely scar tissue secondary to this  She has had previous lysis of adhesions  Abdominal surgeries have included , cholecystectomy,  related surgery, and appendix rupture surgery  She has previously responded well to 145 mcg of Linzess  We discuss restarting that  She is worried about dependency or long-term complications associated Linzess  We discuss no significant side effects have been seen since using clinical practice over the last 6-7 years      She has had previous colonoscopy endoscopy evaluation  She does have family history of colorectal cancer the father being diagnosed with stage IV colon cancer  As a result I will plan for colonoscopy evaluation especially since setting of colitis  I suspect to be low yield since her last colonoscopy was in 2017  She does have history of reflux disease has been on PPI therapy for the last 5 years or more  Previously had history of hiatal hernia and mild gastritis  She denies any NSAID use  Due to ongoing epigastric pain will plan for evaluation to rule out esophagitis  We discussed possible association of PPI is with dimension chronic kidney disease and tried to use the least amount of medication possible  She will also try weight loss as this has helped to improve GERD in the past     She does have history of fatty liver disease weight loss beneficial for her  No concerns for [place] at this time  We discuss FODMAP diet for dyspepsia and starting probiotics  CT scan reviewed  ______________________________________________________________________    HPI:      She is a 78-year-old female presents with multiple complaints currently major complaints regarding abdominal pain which is diffuse but at times more focal to the left side of the colon, chronic constipation, history of adhesions and multiple abdominal surgeries, history of gastroparesis, history of fatty liver disease, and history of abnormal CT scan  She has abdominal pain which is sharp at times focal to the left side of the colon but can be diffuse  She also has pain which is in the epigastric region  No clear triggers such as bowel movements or oral intake of food or identified to be associated with either 1 of the pains  She has had LFTs, lipase, and CT scan of the abdomen for evaluation  LFTs and lipase were within normal limits but LFT showed concerns for possible focal colitis on the left side of the colon    Her last colonoscopy as listed above was in 2017  Her pain is debilitating preventing her from sleeping  Pain seems to be back pain radiating to the abdomen but due to severity of discomfort and abnormal CT scan she was referred here for colonoscopy evaluation  She also has constipation likely secondary to previous history of episiotomy and multiple abdominal surgeries  She has previously tried Linzess 145 mcg but stop using in concerns for being dependent  She does have history of chronic reflux on chronic PPI therapy  She was previously diagnosed with gastroparesis over 10 years ago has not had recent imaging study to confirm this  She does have family history of colorectal cancer with her father being diagnosed with stage IV cancer  Fortunately he is in remission doing well  REVIEW OF SYSTEMS:    CONSTITUTIONAL: Denies any fever, chills, rigors, and weight loss  HEENT: No earache or tinnitus  Denies hearing loss or visual disturbances  CARDIOVASCULAR: No chest pain or palpitations  RESPIRATORY: Denies any cough, hemoptysis, shortness of breath or dyspnea on exertion  GASTROINTESTINAL: As noted in the History of Present Illness  GENITOURINARY: No problems with urination  Denies any hematuria or dysuria  NEUROLOGIC: No dizziness or vertigo, denies headaches  MUSCULOSKELETAL: Denies any muscle or joint pain  SKIN: Denies skin rashes or itching  ENDOCRINE: Denies excessive thirst  Denies intolerance to heat or cold  PSYCHOSOCIAL: Denies depression or anxiety  Denies any recent memory loss         Historical Information   Past Medical History:   Diagnosis Date    Anemia     Duodenitis without bleeding     Dysphagia     Fibromyalgia     Gastritis     GERD (gastroesophageal reflux disease)     Hiatal hernia     Hx of colonoscopy     Insomnia     Memory loss     Obesity     Oral herpes     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 myelopathy or radiculopathy      Past Surgical History:   Procedure Laterality Date    APPENDECTOMY      BREAST SURGERY Right 2015    lump removed from breast    [address] [place] [address] [address]      COLONOSCOPY      2012    DILATION AND CURETTAGE OF UTERUS      Resolved:2009    ESOPHAGOGASTRODUODENOSCOPY      Diagnostic ; 2012    HYSTEROSCOPY      Resolved:2009    OVARIAN CYST REMOVAL Right 2005    WISDOM TOOTH EXTRACTION       Social History   Social History     Substance and Sexual Activity   Alcohol Use No     Social History     Substance and Sexual Activity   Drug Use No     Social History     Tobacco Use   Smoking Status Never Smoker   Smokeless Tobacco Never Used   Tobacco Comment    No passive smoke exposure     Family History   Problem Relation Age of Onset    Other Mother         uterine leiomyoma    Diabetes type II Mother         Mellitus    Hypothyroidism Mother     Colon cancer Father 54        Stage IV    Diabetes Father         Type II Mellitus    Hypertension Father     Anxiety disorder Brother     Depression Brother     Diabetes Brother         Mellitus    Asthma Brother     Hypertension Brother     Multiple sclerosis Sister     Asthma Sister     Hypothyroidism Sister     Hypertension Maternal Grandmother     Diabetes Maternal Grandmother     Other Maternal Grandmother         Vulvar cancer    Schizophrenia Maternal Grandfather     Hypertension Maternal Grandfather     Thyroid cancer Paternal Grandmother 80    Diabetes Paternal Grandfather 43         due to complications of DM       Meds/Allergies       Current Outpatient Medications:     clindamycin (CLINDAGEL) 1 % gel    dexlansoprazole (DEXILANT) 60 MG capsule    famotidine (PEPCID) 20 mg tablet    linaCLOtide 145 MCG CAPS    meclizine (ANTIVERT) 32 MG tablet    Multiple Vitamin (MULTIVITAMIN) tablet    Na Sulfate-K Sulfate-Mg Sulf (SUPREP BOWEL PREP KIT) 17 5-3 13-1 6 GM/177ML SOLN    nortriptyline (PAMELOR) 25 mg capsule    SAVELLA 50 MG TABS    valACYclovir (VALTREX) 1,000 mg tablet    Allergies   Allergen Reactions    Morphine Chest Pain    Percocet [Oxycodone-Acetaminophen] Hives    Domperidone            Objective     Blood pressure 130/70, pulse 74, temperature 98 5 °F (36 9 °C), temperature source Tympanic, height 5' 6" (1 676 m), weight 109 kg (240 lb), last menstrual period 06/26/2019, not currently breastfeeding  Body mass index is 38 74 kg/m²  PHYSICAL EXAM:      General Appearance:   Alert, cooperative, no distress   HEENT:   Normocephalic, atraumatic, anicteric      Neck:  Supple, symmetrical, trachea midline   Lungs:   Clear to auscultation bilaterally; no rales, rhonchi or wheezing; respirations unlabored    Heart[de-identified]   Regular rate and rhythm; no murmur, rub, or gallop  Abdomen:   Soft, non-tender, non-distended; normal bowel sounds; no masses, no organomegaly    Genitalia:   Deferred    Rectal:   Deferred    Extremities:  No cyanosis, clubbing or edema    Pulses:  2+ and symmetric    Skin:  No jaundice, rashes, or lesions    Lymph nodes:  No palpable cervical lymphadenopathy        Lab Results:   No visits with results within 1 Day(s) from this visit  Latest known visit with results is:   Appointment on 07/01/2019   Component Date Value    Amylase 07/01/2019 42     Lipase 07/01/2019 143     Alkaline Phosphatase 07/01/2019 81     ALT 07/01/2019 26     AST 07/01/2019 18          Radiology Results:   Mri Lumbar Spine Without Contrast    Result Date: 7/16/2019  Narrative: MRI LUMBAR SPINE WITHOUT CONTRAST INDICATION: :  66-year-old female, right-sided back pain COMPARISON:  3/19/2019 x-rays TECHNIQUE:  Sagittal T1, sagittal T2, sagittal inversion recovery, axial T1 and axial T2, coronal T2   IMAGE QUALITY:  Diagnostic FINDINGS: VERTEBRAL BODIES:  Normal alignment of the lumbar spine  No spondylolysis or spondylolisthesis  No scoliosis  No compression fracture      Normal marrow signal is identified within the visualized bony structures  No discrete marrow lesion  SACRUM:  Normal signal within the sacrum  No evidence of insufficiency or stress fracture  DISTAL CORD AND CONUS:  Normal size and signal within the distal cord and conus  PARASPINAL SOFT TISSUES:  Trace free fluid in the pelvic cul-de-sac, likely physiologic  LOWER THORACIC DISC SPACES: Moderate degenerative disc disease T11-12, no stenosis  LUMBAR DISC SPACES: L1-L2:  Normal  L2-L3:  Normal  L3-L4:  Normal disc, mild degenerative facet arthrosis, no stenosis L4-L5:  Normal disc, mild degenerative facet arthrosis, no stenosis L5-S1:  Normal disc, mild degenerative facet arthrosis, no stenosis     Impression: Moderate degenerative disc disease, no stenosis T11-12 Multilevel degenerative lumbar facet arthrosis No evidence of disc herniation, central canal or foraminal stenosis Trace pelvic free fluid, likely physiologic Workstation performed: [id number]     Ct Abdomen Pelvis W Contrast    Result Date: 7/9/2019  Narrative: CT ABDOMEN AND PELVIS WITH IV CONTRAST INDICATION:   R10 12: Left upper quadrant pain  COMPARISON:  CT abdomen and pelvis 3/30/2018 and pelvic sonogram 8/27/2018 TECHNIQUE:  CT examination of the abdomen and pelvis was performed  Axial, sagittal, and coronal 2D reformatted images were created from the source data and submitted for interpretation  Radiation dose length product (DLP) for this visit:  1216 mGy-cm   This examination, like all CT scans performed in the [facility], was performed utilizing techniques to minimize radiation dose exposure, including the use of iterative reconstruction and automated exposure control  IV Contrast:  100 mL of iohexol (OMNIPAQUE)  350 Enteric Contrast:  Enteric contrast was administered  FINDINGS: ABDOMEN LOWER CHEST:  No clinically significant abnormality identified in the visualized lower chest  LIVER/BILIARY TREE:  Unremarkable  GALLBLADDER:  Gallbladder is surgically absent   SPLEEN: Unremarkable  PANCREAS:  Unremarkable  ADRENAL GLANDS:  Stable 3 5 cm right adrenal adenoma  Unremarkable left adrenal gland  KIDNEYS/URETERS:  Unremarkable  No hydronephrosis  STOMACH AND BOWEL:  Thickening of the left upper quadrant splenic flexure through the rectum may simply represent nondistention  No adjacent fat stranding  Correlate with clinical findings to exclude sequela of chronic segmental colitis  Otherwise unremarkable  APPENDIX:  Appendectomy per history in Epic  ABDOMINOPELVIC CAVITY:  No ascites or free intraperitoneal air  No lymphadenopathy  VESSELS:  Unremarkable for patient's age  PELVIS REPRODUCTIVE ORGANS:  1 x 0 5 cm fat density nodule in the right ovary compatible with known dermoid  No significant interval change  Small node uterine fibroid not clearly visualized  URINARY BLADDER:  Unremarkable  ABDOMINAL WALL/INGUINAL REGIONS:  Subcentimeter ventral umbilical abdominal wall diastases containing fat  No bowel herniation  No inguinal hernia or mass  OSSEOUS STRUCTURES:  No acute fracture or osseous destructive lesion identified  Degenerative changes of the spine, pubic symphysis, and multiple joints  Impression: No acute intra-abdominal or intrapelvic process  Thickening of the splenic flexure of the colon through the rectum is most likely related to under distention  Correlate with clinical findings to exclude sequela of chronic segmental colitis  Stable 3 5 cm right adrenal adenoma  Stable 1 cm right ovarian dermoid   Workstation performed: [id number]   Answers for HPI/ROS submitted by the patient on 7/23/2019   Abdominal pain  Chronicity: chronic  Onset: more than 1 year ago  Onset quality: undetermined  Frequency: constantly  Episode duration: 24 hours  Progression since onset: rapidly worsening  Pain location: LLQ, LUQ, RLQ, RUQ, epigastric region, generalized abdominal region, periumbilical region, suprapubic region  Pain - numeric: 10/10  Pain quality: aching, burning, cramping, dull, a sensation of fullness  Radiates to: LLQ, LUQ, RLQ, RUQ, epigastric region, periumbilical region, chest, back, left flank, right flank, pelvis  anorexia: Yes  arthralgias: Yes  belching: No  constipation: Yes  diarrhea: Yes  dysuria: No  fever: No  flatus: No  frequency: No  headaches: Yes  hematochezia: No  hematuria: No  melena: No  myalgias: No  nausea: Yes  weight loss: Yes  vomiting: No  Aggravated by: being still, bowel movement, certain positions, coughing, deep breathing, movement  Relieved by: nothing  Diagnostic workup: CT scan

## 2019-07-25 NOTE — TELEPHONE ENCOUNTER
S/w pt and advised of AO's notation  RN explained that MBB is a diagnostic study/test to see if this is the area where her pain is coming from, long-term relief is not expected  Pt verbalized understanding and would like to proceed w/ scheduling B/L L3-5 MBB  Pt was advised that RN will send this information to our scheduling team and someone will be in touch w/ her to schedule   Pt appreciative     -Please assist pt w/ scheduling B/L L3-5 MBB -

## 2019-08-01 DIAGNOSIS — G89.29 CHRONIC BILATERAL LOW BACK PAIN WITH RIGHT-SIDED SCIATICA: ICD-10-CM

## 2019-08-01 DIAGNOSIS — M54.41 CHRONIC BILATERAL LOW BACK PAIN WITH RIGHT-SIDED SCIATICA: ICD-10-CM

## 2019-08-05 RX ORDER — NORTRIPTYLINE HYDROCHLORIDE 25 MG/1
25 CAPSULE ORAL
Qty: 30 CAPSULE | Refills: 0 | OUTPATIENT
Start: 2019-08-05

## 2019-08-10 ENCOUNTER — APPOINTMENT (OUTPATIENT)
Dept: LAB | Facility: HOSPITAL | Age: 39
End: 2019-08-10
Payer: COMMERCIAL

## 2019-08-10 DIAGNOSIS — E78.2 MIXED HYPERLIPIDEMIA: ICD-10-CM

## 2019-08-10 DIAGNOSIS — Z11.1 SCREENING-PULMONARY TB: ICD-10-CM

## 2019-08-10 DIAGNOSIS — D50.0 IRON DEFICIENCY ANEMIA DUE TO CHRONIC BLOOD LOSS: ICD-10-CM

## 2019-08-10 DIAGNOSIS — K76.0 NONALCOHOLIC FATTY LIVER DISEASE: ICD-10-CM

## 2019-08-10 LAB
ALBUMIN SERPL BCP-MCNC: 3.8 G/DL (ref 3.5–5)
ALP SERPL-CCNC: 86 U/L (ref 46–116)
ALT SERPL W P-5'-P-CCNC: 24 U/L (ref 12–78)
ANION GAP SERPL CALCULATED.3IONS-SCNC: 7 MMOL/L (ref 4–13)
AST SERPL W P-5'-P-CCNC: 15 U/L (ref 5–45)
BASOPHILS # BLD AUTO: 0.05 THOUSANDS/ΜL (ref 0–0.1)
BASOPHILS NFR BLD AUTO: 1 % (ref 0–1)
BILIRUB SERPL-MCNC: 0.3 MG/DL (ref 0.2–1)
BUN SERPL-MCNC: 9 MG/DL (ref 5–25)
CALCIUM SERPL-MCNC: 9.7 MG/DL (ref 8.3–10.1)
CHLORIDE SERPL-SCNC: 102 MMOL/L (ref 100–108)
CHOLEST SERPL-MCNC: 208 MG/DL (ref 50–200)
CO2 SERPL-SCNC: 28 MMOL/L (ref 21–32)
CREAT SERPL-MCNC: 0.74 MG/DL (ref 0.6–1.3)
EOSINOPHIL # BLD AUTO: 0.2 THOUSAND/ΜL (ref 0–0.61)
EOSINOPHIL NFR BLD AUTO: 3 % (ref 0–6)
ERYTHROCYTE [DISTWIDTH] IN BLOOD BY AUTOMATED COUNT: 12.3 % (ref 11.6–15.1)
GFR SERPL CREATININE-BSD FRML MDRD: 101 ML/MIN/1.73SQ M
GLUCOSE P FAST SERPL-MCNC: 98 MG/DL (ref 65–99)
HCT VFR BLD AUTO: 37.8 % (ref 34.8–46.1)
HDLC SERPL-MCNC: 58 MG/DL (ref 40–60)
HGB BLD-MCNC: 12 G/DL (ref 11.5–15.4)
IMM GRANULOCYTES # BLD AUTO: 0.04 THOUSAND/UL (ref 0–0.2)
IMM GRANULOCYTES NFR BLD AUTO: 1 % (ref 0–2)
LDLC SERPL CALC-MCNC: 138 MG/DL (ref 0–100)
LYMPHOCYTES # BLD AUTO: 1.95 THOUSANDS/ΜL (ref 0.6–4.47)
LYMPHOCYTES NFR BLD AUTO: 26 % (ref 14–44)
MCH RBC QN AUTO: 28.9 PG (ref 26.8–34.3)
MCHC RBC AUTO-ENTMCNC: 31.7 G/DL (ref 31.4–37.4)
MCV RBC AUTO: 91 FL (ref 82–98)
MONOCYTES # BLD AUTO: 0.67 THOUSAND/ΜL (ref 0.17–1.22)
MONOCYTES NFR BLD AUTO: 9 % (ref 4–12)
NEUTROPHILS # BLD AUTO: 4.61 THOUSANDS/ΜL (ref 1.85–7.62)
NEUTS SEG NFR BLD AUTO: 60 % (ref 43–75)
NONHDLC SERPL-MCNC: 150 MG/DL
NRBC BLD AUTO-RTO: 0 /100 WBCS
PLATELET # BLD AUTO: 371 THOUSANDS/UL (ref 149–390)
PMV BLD AUTO: 9.6 FL (ref 8.9–12.7)
POTASSIUM SERPL-SCNC: 4.7 MMOL/L (ref 3.5–5.3)
PROT SERPL-MCNC: 8.3 G/DL (ref 6.4–8.2)
RBC # BLD AUTO: 4.15 MILLION/UL (ref 3.81–5.12)
SODIUM SERPL-SCNC: 137 MMOL/L (ref 136–145)
TRIGL SERPL-MCNC: 61 MG/DL
TSH SERPL DL<=0.05 MIU/L-ACNC: 1.51 UIU/ML (ref 0.36–3.74)
WBC # BLD AUTO: 7.52 THOUSAND/UL (ref 4.31–10.16)

## 2019-08-10 PROCEDURE — 84443 ASSAY THYROID STIM HORMONE: CPT

## 2019-08-10 PROCEDURE — 85025 COMPLETE CBC W/AUTO DIFF WBC: CPT

## 2019-08-10 PROCEDURE — 86480 TB TEST CELL IMMUN MEASURE: CPT

## 2019-08-10 PROCEDURE — 80053 COMPREHEN METABOLIC PANEL: CPT

## 2019-08-10 PROCEDURE — 36415 COLL VENOUS BLD VENIPUNCTURE: CPT

## 2019-08-10 PROCEDURE — 80061 LIPID PANEL: CPT

## 2019-08-13 LAB
GAMMA INTERFERON BACKGROUND BLD IA-ACNC: 0.06 IU/ML
M TB IFN-G BLD-IMP: NEGATIVE
M TB IFN-G CD4+ BCKGRND COR BLD-ACNC: -0.01 IU/ML
M TB IFN-G CD4+ BCKGRND COR BLD-ACNC: -0.01 IU/ML
MITOGEN IGNF BCKGRD COR BLD-ACNC: 5.36 IU/ML

## 2019-08-19 ENCOUNTER — ANESTHESIA EVENT (OUTPATIENT)
Dept: GASTROENTEROLOGY | Facility: MEDICAL CENTER | Age: 39
End: 2019-08-19

## 2019-08-20 ENCOUNTER — HOSPITAL ENCOUNTER (OUTPATIENT)
Dept: GASTROENTEROLOGY | Facility: MEDICAL CENTER | Age: 39
Setting detail: OUTPATIENT SURGERY
Discharge: HOME | End: 2019-08-20
Attending: INTERNAL MEDICINE | Admitting: INTERNAL MEDICINE
Payer: COMMERCIAL

## 2019-08-20 ENCOUNTER — ANESTHESIA (OUTPATIENT)
Dept: GASTROENTEROLOGY | Facility: MEDICAL CENTER | Age: 39
End: 2019-08-20

## 2019-08-20 VITALS
SYSTOLIC BLOOD PRESSURE: 128 MMHG | HEART RATE: 69 BPM | DIASTOLIC BLOOD PRESSURE: 70 MMHG | HEIGHT: 66 IN | BODY MASS INDEX: 38.57 KG/M2 | OXYGEN SATURATION: 100 % | RESPIRATION RATE: 16 BRPM | WEIGHT: 240 LBS | TEMPERATURE: 98.5 F

## 2019-08-20 DIAGNOSIS — K21.9 GASTROESOPHAGEAL REFLUX DISEASE WITHOUT ESOPHAGITIS: ICD-10-CM

## 2019-08-20 DIAGNOSIS — R93.89 ABNORMAL CAT SCAN: ICD-10-CM

## 2019-08-20 DIAGNOSIS — K63.9 COLONIC THICKENING: ICD-10-CM

## 2019-08-20 LAB
EXT PREGNANCY TEST URINE: NEGATIVE
EXT. CONTROL: NORMAL

## 2019-08-20 PROCEDURE — 88305 TISSUE EXAM BY PATHOLOGIST: CPT | Performed by: PATHOLOGY

## 2019-08-20 PROCEDURE — 45385 COLONOSCOPY W/LESION REMOVAL: CPT | Performed by: INTERNAL MEDICINE

## 2019-08-20 PROCEDURE — 43239 EGD BIOPSY SINGLE/MULTIPLE: CPT | Performed by: INTERNAL MEDICINE

## 2019-08-20 PROCEDURE — 81025 URINE PREGNANCY TEST: CPT | Performed by: ANESTHESIOLOGY

## 2019-08-20 PROCEDURE — NC001 PR NO CHARGE: Performed by: INTERNAL MEDICINE

## 2019-08-20 RX ORDER — SODIUM CHLORIDE 9 MG/ML
125 INJECTION, SOLUTION INTRAVENOUS CONTINUOUS
Status: DISCONTINUED | OUTPATIENT
Start: 2019-08-20 | End: 2019-08-24 | Stop reason: HOSPADM

## 2019-08-20 RX ORDER — PROPOFOL 10 MG/ML
INJECTION, EMULSION INTRAVENOUS AS NEEDED
Status: DISCONTINUED | OUTPATIENT
Start: 2019-08-20 | End: 2019-08-20 | Stop reason: SURG

## 2019-08-20 RX ADMIN — PROPOFOL 50 MG: 10 INJECTION, EMULSION INTRAVENOUS at 12:27

## 2019-08-20 RX ADMIN — PROPOFOL 50 MG: 10 INJECTION, EMULSION INTRAVENOUS at 12:32

## 2019-08-20 RX ADMIN — PROPOFOL 200 MG: 10 INJECTION, EMULSION INTRAVENOUS at 12:16

## 2019-08-20 RX ADMIN — PROPOFOL 50 MG: 10 INJECTION, EMULSION INTRAVENOUS at 12:35

## 2019-08-20 RX ADMIN — PROPOFOL 50 MG: 10 INJECTION, EMULSION INTRAVENOUS at 12:24

## 2019-08-20 RX ADMIN — SODIUM CHLORIDE 125 ML/HR: 0.9 INJECTION, SOLUTION INTRAVENOUS at 12:12

## 2019-08-20 NOTE — TELEPHONE ENCOUNTER
Rec'd message from patient calling me back to explain why she hasn't called me back since my initial call on 7/26  Left message for patient to call me back

## 2019-08-20 NOTE — ANESTHESIA PREPROCEDURE EVALUATION
Review of Systems/Medical History          Cardiovascular  Hyperlipidemia,    Pulmonary  Negative pulmonary ROS        GI/Hepatic    GERD ,  Hiatal hernia,             Endo/Other    Obesity    GYN       Hematology  Anemia ,     Musculoskeletal    Arthritis     Neurology  Negative neurology ROS      Psychology   Negative psychology ROS              Physical Exam    Airway    Mallampati score: I  TM Distance: >3 FB  Neck ROM: full     Dental   No notable dental hx     Cardiovascular  Rhythm: regular, Rate: normal, Cardiovascular exam normal    Pulmonary  Pulmonary exam normal     Other Findings        Anesthesia Plan  ASA Score- 2     Anesthesia Type- IV sedation with anesthesia with ASA Monitors  Additional Monitors:   Airway Plan:         Plan Factors-    Induction- intravenous  Postoperative Plan-     Informed Consent- Anesthetic plan and risks discussed with patient

## 2019-08-20 NOTE — DISCHARGE INSTRUCTIONS
Upper Endoscopy   WHAT YOU NEED TO KNOW:   An upper endoscopy is also called an upper gastrointestinal (GI) endoscopy, or an esophagogastroduodenoscopy (EGD)  You may feel bloated, gassy, or have some abdominal discomfort after your procedure  Your throat may be sore for 24 to 36 hours  You may burp or pass gas from air that is still inside your body  DISCHARGE INSTRUCTIONS:   Call 911 if:   · You have sudden chest pain or trouble breathing  Seek care immediately if:   · You feel dizzy or faint  · You have trouble swallowing  · You have severe throat pain  · Your bowel movements are very dark or black  · Your abdomen is hard and firm and you have severe pain  · You vomit blood  Contact your healthcare provider if:   · You feel full or bloated and cannot burp or pass gas  · You have not had a bowel movement for 3 days after your procedure  · You have neck pain  · You have a fever or chills  · You have nausea or are vomiting  · You have a rash or hives  · You have questions or concerns about your endoscopy  Relieve a sore throat:  Suck on throat lozenges or crushed ice  Gargle with a small amount of warm salt water  Mix 1 teaspoon of salt and 1 cup of warm water to make salt water  Relieve gas and discomfort from bloating:  Lie on your right side with a heating pad on your abdomen  Take short walks to help pass gas  Eat small meals until bloating is relieved  Rest after your procedure:  Do not drive or make important decisions until the day after your procedure  Return to your normal activity as directed  You can usually return to work the day after your procedure  Follow up with your healthcare provider as directed:  Write down your questions so you remember to ask them during your visits  © 2017 [employer]  Information is for End User's use only and may not be sold, redistributed or otherwise used for commercial purposes   All illustrations and images included in [facility] are the copyrighted property of A D A M , Inc  or Tony Martell  The above information is an  only  It is not intended as medical advice for individual conditions or treatments  Talk to your doctor, nurse or pharmacist before following any medical regimen to see if it is safe and effective for you  Colonoscopy   WHAT YOU NEED TO KNOW:   A colonoscopy is a procedure to examine the inside of your colon (intestine) with a scope  Polyps or tissue growths may have been removed during your colonoscopy  It is normal to feel bloated and to have some abdominal discomfort  You should be passing gas  If you have hemorrhoids or you had polyps removed, you may have a small amount of bleeding  DISCHARGE INSTRUCTIONS:   Seek care immediately if:   · You have a large amount of bright red blood in your bowel movements  · Your abdomen is hard and firm and you have severe pain  · You have sudden trouble breathing  Contact your healthcare provider if:   · You develop a rash or hives  · You have a fever within 24 hours of your procedure  · You have not had a bowel movement for 3 days after your procedure  · You have questions or concerns about your condition or care  Activity:   · Do not lift, strain, or run  for 3 days after your procedure  · Rest after your procedure  You have been given medicine to relax you  Do not  drive or make important decisions until the day after your procedure  Return to your normal activity as directed  · Relieve gas and discomfort from bloating  by lying on your right side with a heating pad on your abdomen  You may need to take short walks to help the gas move out  Eat small meals until bloating is relieved  If you had polyps removed: For 7 days after your procedure:  · Do not  take aspirin  · Do not  go on long car rides  Help prevent constipation:   · Eat a variety of healthy foods    Healthy foods include fruit, vegetables, whole-grain breads, low-fat dairy products, beans, lean meat, and fish  Ask if you need to be on a special diet  Your healthcare provider may recommend that you eat high-fiber foods such as cooked beans  Fiber helps you have regular bowel movements  · Drink liquids as directed  Adults should drink between 9 and 13 eight-ounce cups of liquid every day  Ask what amount is best for you  For most people, good liquids to drink are water, juice, and milk  · Exercise as directed  Talk to your healthcare provider about the best exercise plan for you  Exercise can help prevent constipation, decrease your blood pressure and improve your health  Follow up with your healthcare provider as directed:  Write down your questions so you remember to ask them during your visits  © 2017 [facility] Information is for End User's use only and may not be sold, redistributed or otherwise used for commercial purposes  All illustrations and images included in CareNotes® are the copyrighted property of A D A M , Inc  or Tony Martell  The above information is an  only  It is not intended as medical advice for individual conditions or treatments  Talk to your doctor, nurse or pharmacist before following any medical regimen to see if it is safe and effective for you  Colorectal Polyps   WHAT YOU NEED TO KNOW:   Colorectal polyps are small growths of tissue in the lining of the colon and rectum  Most polyps are hyperplastic polyps and are usually benign (noncancerous)  Certain types of polyps, called adenomatous polyps, may turn into cancer  DISCHARGE INSTRUCTIONS:   Follow up with your healthcare provider or gastroenterologist as directed: You may need to return for more tests, such as another colonoscopy  Write down your questions so you remember to ask them during your visits    Reduce your risk for colorectal polyps:   · Eat a variety of healthy foods: Healthy foods include fruit, vegetables, whole-grain breads, low-fat dairy products, beans, lean meat, and fish  Ask if you need to be on a special diet  · Maintain a healthy weight:  Ask your healthcare provider if you need to lose weight and how much you need to lose  Ask for help with a weight loss program     · Exercise:  Begin to exercise slowly and do more as you get stronger  Talk with your healthcare provider before you start an exercise program      · Limit alcohol:  Your risk for polyps increases the more you drink  · Do not smoke: If you smoke, it is never too late to quit  Ask for information about how to stop  For support and more information:   · Oswaldo Chacon ([facility])  [address], [place] [address]  Phone: 1- 611 - [phone]  Web Address: www digestive  niddk nih gov  Contact your healthcare provider or gastroenterologist if:   · You have a fever  · You have chills, a cough, or feel weak and achy  · You have abdominal pain that does not go away or gets worse after you take medicine  · Your abdomen is swollen  · You are losing weight without trying  · You have questions or concerns about your condition or care  Seek care immediately or call 911 if:   · You have sudden shortness of breath  · You have a fast heart rate, fast breathing, or are too dizzy to stand up  · You have severe abdominal pain  · You see blood in your bowel movement  © 2017 [address] [employer] Information is for End User's use only and may not be sold, redistributed or otherwise used for commercial purposes  All illustrations and images included in CareNotes® are the copyrighted property of [employer]  or Tony Martell  The above information is an  only  It is not intended as medical advice for individual conditions or treatments   Talk to your doctor, nurse or pharmacist before following any medical regimen to see if it is safe and effective for you  Hemorrhoids   WHAT YOU NEED TO KNOW:   What are hemorrhoids? Hemorrhoids are swollen blood vessels inside your rectum (internal hemorrhoids) or on your anus (external hemorrhoids)  Sometimes a hemorrhoid may prolapse  This means it extends out of your anus  What increases my risk for hemorrhoids? · Pregnancy or obesity    · Straining or sitting for a long time during bowel movements    · Liver disease    · Weak muscles around the anus caused by older age, rectal surgery, or anal intercourse    · A lack of physical activity    · Chronic diarrhea or constipation    · A low-fiber diet  What are the signs and symptoms of hemorrhoids? · Pain or itching around your anus or inside your rectum    · Swelling or bumps around your anus    · Bright red blood in your bowel movement, on the toilet paper, or in the toilet bowl    · Tissue bulging out of your anus (prolapsed hemorrhoids)    · Incontinence (poor control over urine or bowel movements)  How are hemorrhoids diagnosed? Your healthcare provider will ask about your symptoms, the foods you eat, and your bowel movements  He will examine your anus for external hemorrhoids  You may need the following:  · A digital rectal exam  is a test to check for hemorrhoids  Your healthcare provider will put a gloved finger inside your anus to feel for the hemorrhoids  · An anoscopy  is a test that uses a scope (small tube with a light and camera on the end) to look at your hemorrhoids  How are hemorrhoids treated? Treatment will depend on your symptoms  You may need any of the following:  · Medicines  can help decrease pain and swelling, and soften your bowel movement  The medicine may be a pill, pad, cream, or ointment  · Procedures  may be used to shrink or remove your hemorrhoid  Examples include rubber-band ligation, sclerotherapy, and photocoagulation   These procedures may be done in your healthcare provider's office  Ask your healthcare provider for more information about these procedures  · Surgery  may be needed to shrink or remove your hemorrhoids  How can I manage my symptoms? · Apply ice on your anus for 15 to 20 minutes every hour or as directed  Use an ice pack, or put crushed ice in a plastic bag  Cover it with a towel before you apply it to your anus  Ice helps prevent tissue damage and decreases swelling and pain  · Take a sitz bath  Fill a bathtub with 4 to 6 inches of warm water  You may also use a sitz bath pan that fits inside a toilet bowl  Sit in the sitz bath for 15 minutes  Do this 3 times a day, and after each bowel movement  The warm water can help decrease pain and swelling  · Keep your anal area clean  Gently wash the area with warm water daily  Soap may irritate the area  After a bowel movement, wipe with moist towelettes or wet toilet paper  Dry toilet paper can irritate the area  How can I help prevent hemorrhoids? · Do not strain to have a bowel movement  Do not sit on the toilet too long  These actions can increase pressure on the tissues in your rectum and anus  · Drink plenty of liquids  Liquids can help prevent constipation  Ask how much liquid to drink each day and which liquids are best for you  · Eat a variety of high-fiber foods  Examples include fruits, vegetables, and whole grains  Ask your healthcare provider how much fiber you need each day  You may need to take a fiber supplement  · Exercise as directed  Exercise, such as walking, may make it easier to have a bowel movement  Ask your healthcare provider to help you create an exercise plan  · Do not have anal sex  Anal sex can weaken the skin around your rectum and anus  · Avoid heavy lifting  This can cause straining and increase your risk for another hemorrhoid  When should I seek immediate care? · You have severe pain in your rectum or around your anus      · You have severe pain in your abdomen and you are vomiting  · You have bleeding from your anus that soaks through your underwear  When should I contact my healthcare provider? · You have frequent and painful bowel movements  · Your hemorrhoid looks or feels more swollen than usual      · You do not have a bowel movement for 2 days or more  · You see or feel tissue coming through your anus  · You have questions or concerns about your condition or care  CARE AGREEMENT:   You have the right to help plan your care  Learn about your health condition and how it may be treated  Discuss treatment options with your caregivers to decide what care you want to receive  You always have the right to refuse treatment  The above information is an  only  It is not intended as medical advice for individual conditions or treatments  Talk to your doctor, nurse or pharmacist before following any medical regimen to see if it is safe and effective for you  © 2017 [address] [employer] Information is for End User's use only and may not be sold, redistributed or otherwise used for commercial purposes  All illustrations and images included in CareNotes® are the copyrighted property of A D A M , Inc  or Tony Martell

## 2019-08-20 NOTE — TELEPHONE ENCOUNTER
Patient called me back  She has a few other more immediately needed procedures lately and wanted to scheduled and have these first   Understandable  Scheduled:     Bilateral L3-5 MBB #1 on 9/9    Reviewed instructions: , NPO 1 hour prior, loose-fitting/comfortable clothes, if ill/fever/infx/abx to call and reschedule  Also pain level at leat 5/10 and refrain from PRN, as-needed pain meds 6h prior  Patient stated verbal understanding

## 2019-08-20 NOTE — H&P
History and Physical - [facility]  Jesus Camp 37 y o  female MRN: [id number]                  HPI: Jesus Camp is a 37y o  year old female who presents for CT scan with colitis of the left-sided of the colon, change in bowel habits, and history of reflux disease  REVIEW OF SYSTEMS: Per the HPI, and otherwise unremarkable      Historical Information   Past Medical History:   Diagnosis Date    Anemia     Duodenitis without bleeding     Dysphagia     Fibromyalgia     Gastritis     GERD (gastroesophageal reflux disease)     Hiatal hernia     Hx of colonoscopy     Insomnia     Memory loss     Obesity     Oral herpes     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 myelopathy or radiculopathy      Past Surgical History:   Procedure Laterality Date    APPENDECTOMY      BREAST SURGERY Right 2015    lump removed from breast      SECTION  1998    CHOLECYSTECTOMY      COLONOSCOPY      2012    DILATION AND CURETTAGE OF UTERUS      Resolved:2009    ESOPHAGOGASTRODUODENOSCOPY      Diagnostic ; 2012    HYSTEROSCOPY      Resolved:2009    OVARIAN CYST REMOVAL Right 2005    WISDOM TOOTH EXTRACTION       Social History   Social History     Substance and Sexual Activity   Alcohol Use No     Social History     Substance and Sexual Activity   Drug Use No     Social History     Tobacco Use   Smoking Status Never Smoker   Smokeless Tobacco Never Used   Tobacco Comment    No passive smoke exposure     Family History   Problem Relation Age of Onset    Other Mother         uterine leiomyoma    Diabetes type II Mother         Mellitus    Hypothyroidism Mother     Colon cancer Father 54        Stage IV    Diabetes Father         Type II Mellitus    Hypertension Father     Anxiety disorder Brother     Depression Brother     Diabetes Brother         Mellitus    Asthma Brother     Hypertension Brother     Multiple sclerosis Sister     Asthma Sister    [employer] Hypothyroidism Sister     Hypertension Maternal Grandmother     Diabetes Maternal Grandmother     Other Maternal Grandmother         Vulvar cancer    Schizophrenia Maternal Grandfather     Hypertension Maternal Grandfather     Thyroid cancer Paternal Grandmother 80    Diabetes Paternal Grandfather 43         due to complications of DM       Meds/Allergies       (Not in a hospital admission)    Allergies   Allergen Reactions    Morphine Chest Pain    Percocet [Oxycodone-Acetaminophen] Hives    Domperidone        Objective     There were no vitals taken for this visit  PHYSICAL EXAM    Gen: NAD  CV: RRR  CHEST: Clear  ABD: soft, NT/ND  EXT: no edema      ASSESSMENT/PLAN:  This is a 37y o  year old female here for EGD and colonoscopy and she is stable and optimized for her procedure

## 2019-08-23 ENCOUNTER — OFFICE VISIT (OUTPATIENT)
Dept: FAMILY MEDICINE CLINIC | Facility: CLINIC | Age: 39
End: 2019-08-23
Payer: COMMERCIAL

## 2019-08-23 VITALS
BODY MASS INDEX: 38.25 KG/M2 | DIASTOLIC BLOOD PRESSURE: 80 MMHG | HEIGHT: 66 IN | SYSTOLIC BLOOD PRESSURE: 110 MMHG | HEART RATE: 74 BPM | OXYGEN SATURATION: 99 % | WEIGHT: 238 LBS | TEMPERATURE: 97.4 F

## 2019-08-23 DIAGNOSIS — Z13.31 POSITIVE DEPRESSION SCREENING: ICD-10-CM

## 2019-08-23 DIAGNOSIS — E55.9 VITAMIN D DEFICIENCY: ICD-10-CM

## 2019-08-23 DIAGNOSIS — G44.89 OTHER HEADACHE SYNDROME: Primary | ICD-10-CM

## 2019-08-23 DIAGNOSIS — R73.01 IMPAIRED FASTING GLUCOSE: ICD-10-CM

## 2019-08-23 DIAGNOSIS — Z82.0 FAMILY HISTORY OF MULTIPLE SCLEROSIS: ICD-10-CM

## 2019-08-23 PROCEDURE — 3008F BODY MASS INDEX DOCD: CPT | Performed by: FAMILY MEDICINE

## 2019-08-23 PROCEDURE — 99214 OFFICE O/P EST MOD 30 MIN: CPT | Performed by: FAMILY MEDICINE

## 2019-08-23 PROCEDURE — 1036F TOBACCO NON-USER: CPT | Performed by: FAMILY MEDICINE

## 2019-08-23 NOTE — PROGRESS NOTES
Subjective:   Chief Complaint   Patient presents with    Headache        Patient ID: Wyman Cranker is a 37 y o  female  Headache    This is a chronic problem  The current episode started more than 1 month ago  The problem occurs intermittently  The problem has been unchanged  Pain location: all over  The pain quality is similar to prior headaches  The quality of the pain is described as dull  The pain is at a severity of 6/10  The pain is moderate  Associated symptoms include blurred vision, dizziness, neck pain, tinnitus and a visual change  Pertinent negatives include no abdominal pain, abnormal behavior, back pain, coughing, drainage, ear pain, eye pain, eye redness, eye watering, fever, hearing loss, loss of balance, nausea, numbness, phonophobia, photophobia, seizures, sinus pressure, sore throat, swollen glands, tingling or vomiting  Nothing aggravates the symptoms  She has tried acetaminophen and NSAIDs for the symptoms  The treatment provided no relief  Her past medical history is significant for obesity  There is no history of cancer, hypertension, immunosuppression, recent head traumas or TMJ  (Positive family history of multiple sclerosis)    the also patient had history of fibroma nausea and she had a multiple complain including back pain joint pain and headache and patient had multiple risk for depression depression screening was done and it was positive    The following portions of the patient's history were reviewed and updated as appropriate: allergies, current medications, past family history, past medical history, past social history, past surgical history and problem list     Review of Systems   Constitutional: Negative for fatigue and fever  HENT: Positive for tinnitus  Negative for ear pain, hearing loss, sinus pressure, sinus pain and sore throat  Eyes: Positive for blurred vision  Negative for photophobia, pain and redness     Respiratory: Negative for cough, chest tightness and shortness of breath  Cardiovascular: Negative for chest pain, palpitations and leg swelling  Gastrointestinal: Negative for abdominal pain, blood in stool, constipation, diarrhea, nausea and vomiting  Genitourinary: Negative for flank pain, frequency and hematuria  Musculoskeletal: Positive for neck pain  Negative for back pain and joint swelling  Skin: Negative for rash  Neurological: Positive for dizziness and headaches  Negative for tingling, tremors, seizures, syncope, numbness and loss of balance  Hematological: Does not bruise/bleed easily  Psychiatric/Behavioral: Negative for agitation  Objective:  Vitals:    08/23/19 1456   BP: 110/80   Pulse: 74   Temp: (!) 97 4 °F (36 3 °C)   TempSrc: Tympanic   SpO2: 99%   Weight: 108 kg (238 lb)   Height: 5' 6" (1 676 m)      Physical Exam   Constitutional: She is oriented to person, place, and time  She appears well-developed and well-nourished  HENT:   Head: Normocephalic  Right Ear: External ear normal    Left Ear: External ear normal    Eyes: Conjunctivae and EOM are normal  Right eye exhibits no discharge  Left eye exhibits no discharge  Neck: No JVD present  Cardiovascular: Normal rate, regular rhythm and normal heart sounds  Exam reveals no gallop  No murmur heard  Pulmonary/Chest: Effort normal  No respiratory distress  She has no wheezes  She has no rales  She exhibits no tenderness  Abdominal: She exhibits no mass  There is no tenderness  There is no rebound  Musculoskeletal: She exhibits no edema or tenderness  Neurological: She is alert and oriented to person, place, and time  She displays normal reflexes  No cranial nerve deficit or sensory deficit  She exhibits normal muscle tone  Coordination normal    Skin: No rash noted  No erythema           Assessment/Plan:    Other headache syndrome  A new diagnosis symptomatic physical exam is normal the patient does have family history of multiple sclerosis discussed the patient a questionable if it is secondary to her depression or part of the fibroma nausea the plan to do MRI of the brain InCase get worse to go to the emergency room    Positive depression screening  A new diagnosis positive depression screening will refer the patient to see psychiatric       Diagnoses and all orders for this visit:    Other headache syndrome  -     MRI brain w wo contrast; Future    Family history of multiple sclerosis    Positive depression screening  -     Ambulatory referral to Psychiatry;  Future    Impaired fasting glucose    Vitamin D deficiency

## 2019-08-24 PROBLEM — G44.89 OTHER HEADACHE SYNDROME: Status: ACTIVE | Noted: 2019-08-23

## 2019-08-24 PROBLEM — Z82.0 FAMILY HISTORY OF MULTIPLE SCLEROSIS: Status: ACTIVE | Noted: 2019-08-24

## 2019-08-24 PROBLEM — G44.89 OTHER HEADACHE SYNDROME: Status: ACTIVE | Noted: 2019-08-24

## 2019-08-24 PROBLEM — Z13.31 POSITIVE DEPRESSION SCREENING: Status: ACTIVE | Noted: 2019-08-24

## 2019-08-24 PROBLEM — Z13.31 POSITIVE DEPRESSION SCREENING: Status: ACTIVE | Noted: 2019-08-23

## 2019-08-24 NOTE — ASSESSMENT & PLAN NOTE
A new diagnosis symptomatic physical exam is normal the patient does have family history of multiple sclerosis discussed the patient a questionable if it is secondary to her depression or part of the fibroma nausea the plan to do MRI of the brain InCase get worse to go to the emergency room

## 2019-08-26 ENCOUNTER — TELEPHONE (OUTPATIENT)
Dept: GASTROENTEROLOGY | Facility: MEDICAL CENTER | Age: 39
End: 2019-08-26

## 2019-08-26 NOTE — RESULT ENCOUNTER NOTE
Colonic polyp that was removed was adenoma repeat colonoscopy in 5 years  Biopsies Stomach and duodenum were normal

## 2019-08-26 NOTE — TELEPHONE ENCOUNTER
----- Message from Ruddy Quezada MD sent at 8/26/2019 11:42 AM EDT -----  Colonic polyp that was removed was adenoma repeat colonoscopy in 5 years  Biopsies Stomach and duodenum were normal

## 2019-09-07 ENCOUNTER — HOSPITAL ENCOUNTER (OUTPATIENT)
Dept: MRI IMAGING | Facility: HOSPITAL | Age: 39
Discharge: HOME | End: 2019-09-07
Payer: COMMERCIAL

## 2019-09-07 DIAGNOSIS — G44.89 OTHER HEADACHE SYNDROME: ICD-10-CM

## 2019-09-07 PROCEDURE — 70553 MRI BRAIN STEM W/O & W/DYE: CPT

## 2019-09-07 PROCEDURE — A9585 GADOBUTROL INJECTION: HCPCS | Performed by: FAMILY MEDICINE

## 2019-09-07 RX ADMIN — GADOBUTROL 10 ML: 604.72 INJECTION INTRAVENOUS at 09:54

## 2019-09-09 ENCOUNTER — HOSPITAL ENCOUNTER (OUTPATIENT)
Dept: RADIOLOGY | Facility: MEDICAL CENTER | Age: 39
Discharge: HOME | End: 2019-09-09
Attending: PHYSICAL MEDICINE & REHABILITATION | Admitting: PHYSICAL MEDICINE & REHABILITATION
Payer: COMMERCIAL

## 2019-09-09 VITALS
HEART RATE: 70 BPM | OXYGEN SATURATION: 100 % | RESPIRATION RATE: 20 BRPM | DIASTOLIC BLOOD PRESSURE: 87 MMHG | TEMPERATURE: 98.6 F | SYSTOLIC BLOOD PRESSURE: 116 MMHG

## 2019-09-09 DIAGNOSIS — M47.816 FACET DEGENERATION OF LUMBAR REGION: ICD-10-CM

## 2019-09-09 DIAGNOSIS — M54.50 LOW BACK PAIN: ICD-10-CM

## 2019-09-09 PROCEDURE — 64493 INJ PARAVERT F JNT L/S 1 LEV: CPT | Performed by: PHYSICAL MEDICINE & REHABILITATION

## 2019-09-09 PROCEDURE — 64494 INJ PARAVERT F JNT L/S 2 LEV: CPT | Performed by: PHYSICAL MEDICINE & REHABILITATION

## 2019-09-09 RX ADMIN — Medication 3 ML: at 13:44

## 2019-09-09 NOTE — H&P
History of Present Illness:  The patient is a 37 y o  female who presents with complaints of bilateral lower back pain    Patient Active Problem List   Diagnosis    Allergic rhinitis    Diaphragmatic hernia    Gastroesophageal reflux disease without esophagitis    Menorrhagia with regular cycle    Primary fibromyalgia syndrome    Gastroparesis    Encounter for annual routine gynecological examination    Encounter for well adult exam with abnormal findings    Dietary calcium deficiency    Inadequate exercise    Obesity (BMI 35 0-39 9 without comorbidity)    Acne    Acquired hallux valgus    Adrenal adenoma    Benign paroxysmal positional vertigo    Breast lump    Constipation    Fibroid uterus    Hemorrhoids    Impaired fasting glucose    Hyperlipidemia    Iron deficiency anemia    Abnormal mammogram    Morbid obesity (HCC)    Nonalcoholic fatty liver disease    Ovarian cyst    Premenstrual tension syndrome    Vitamin D deficiency    Vaginal discharge    Other chest pain    Pain of left hip joint    Neck pain    Chronic midline thoracic back pain    Chronic midline low back pain without sciatica    Discoloration of skin of hand    Candida vaginitis    Decreased hearing of both ears    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 myelopathy or radiculopathy    LUQ abdominal pain    Abnormal CAT scan    Colonic thickening    Fatty liver    Family history of colon cancer    Other headache syndrome    Family history of multiple sclerosis    Positive depression screening       Past Medical History:   Diagnosis Date    Anemia     Colon polyp     Duodenitis without bleeding     Dysphagia     Fibromyalgia     Gastritis     GERD (gastroesophageal reflux disease)     Hiatal hernia     Hx of colonoscopy     Insomnia     Memory loss     Obesity     Oral herpes     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 myelopathy or radiculopathy        Past Surgical History:   Procedure Laterality Date    APPENDECTOMY      BREAST SURGERY Right 01/08/2015    lump removed from breast    [address]    CHOLECYSTECTOMY      COLONOSCOPY      9/26/2012    DILATION AND CURETTAGE OF UTERUS      Resolved:1/29/2009    ESOPHAGOGASTRODUODENOSCOPY      Diagnostic ; 9/26/2012    HYSTEROSCOPY      Resolved:1/30/2009    OVARIAN CYST REMOVAL Right 2005    WISDOM TOOTH EXTRACTION           Current Outpatient Medications:     clindamycin (CLINDAGEL) 1 % gel, Apply topically 2 (two) times a day, Disp: 30 g, Rfl: 2    dexlansoprazole (DEXILANT) 60 MG capsule, Take 1 capsule (60 mg total) by mouth every 12 (twelve) hours, Disp: 180 capsule, Rfl: 2    famotidine (PEPCID) 20 mg tablet, Take 1 tablet (20 mg total) by mouth 2 (two) times a day, Disp: 60 tablet, Rfl: 0    meclizine (ANTIVERT) 32 MG tablet, Take 32 mg by mouth 3 (three) times a day as needed for dizziness, Disp: , Rfl:     Multiple Vitamin (MULTIVITAMIN) tablet, Take 1 tablet by mouth daily, Disp: , Rfl:     SAVELLA 50 MG TABS, Take 1 tablet by mouth 2 (two) times a day, Disp: 180 tablet, Rfl: 1    valACYclovir (VALTREX) 1,000 mg tablet, TAKE 2 TABLET BY ORAL ROUTE EVERY 12 HOURS PRN, Disp: , Rfl: 0    linaCLOtide 145 MCG CAPS, Take 1 capsule (145 mcg total) by mouth daily for 30 days, Disp: 30 capsule, Rfl: 5    Current Facility-Administered Medications:     lidocaine (PF) (XYLOCAINE-MPF) 2 % injection 4 mL, 4 mL, Perineural, Once, Nico Rodas,     Allergies   Allergen Reactions    Morphine Chest Pain    Percocet [Oxycodone-Acetaminophen] Hives    Domperidone        Physical Exam:   Vitals:    09/09/19 1320   BP: 111/74   Pulse: 74   Resp: 18   Temp: 98 6 °F (37 °C)   SpO2: 100%     General: Awake, Alert, Oriented x 3, Mood and affect appropriate  Respiratory: Respirations even and unlabored  Cardiovascular: Peripheral pulses intact; no edema  Musculoskeletal Exam:  Bilateral lower back pain    ASA Score:  2  Patient/Chart Verification  Patient ID Verified: Verbal  Consents Confirmed: Procedural, To be obtained in the Pre-Procedure area  H&P( within 30 days) Verified: To be obtained in the Pre-Procedure area  Allergies Reviewed: Yes  Anticoag/NSAID held?: NA  Currently on antibiotics?: No  Pregnancy denied?: Yes    Assessment:   1  Facet degeneration of lumbar region    2   Low back pain        Plan: COSME L3-5 MBB #1

## 2019-09-09 NOTE — DISCHARGE INSTRUCTIONS

## 2019-09-10 ENCOUNTER — TELEPHONE (OUTPATIENT)
Dept: FAMILY MEDICINE CLINIC | Facility: CLINIC | Age: 39
End: 2019-09-10

## 2019-09-12 ENCOUNTER — TELEPHONE (OUTPATIENT)
Dept: FAMILY MEDICINE CLINIC | Facility: CLINIC | Age: 39
End: 2019-09-12

## 2019-09-12 NOTE — TELEPHONE ENCOUNTER
pc from pt requesting some anxiety medication until she is able to see the psychologist states she spoke with them on Friday and they advised her they have a new patient calling list and she will be called when there is a opening

## 2019-09-13 DIAGNOSIS — F32.1 CURRENT MODERATE EPISODE OF MAJOR DEPRESSIVE DISORDER WITHOUT PRIOR EPISODE (HCC): Primary | ICD-10-CM

## 2019-09-13 RX ORDER — SERTRALINE HYDROCHLORIDE 25 MG/1
25 TABLET, FILM COATED ORAL DAILY
Qty: 30 TABLET | Refills: 1 | Status: SHIPPED | OUTPATIENT
Start: 2019-09-13 | End: 2019-10-11 | Stop reason: SDUPTHER

## 2019-09-17 ENCOUNTER — TELEPHONE (OUTPATIENT)
Dept: RADIOLOGY | Facility: MEDICAL CENTER | Age: 39
End: 2019-09-17

## 2019-09-17 NOTE — TELEPHONE ENCOUNTER
Pain diary reviewed by Dr Castro Feeling; poor response to bilateral L3-5 MBB #1  Schedule f/u with NP to review further       Appt line: review lumbar MBB response

## 2019-09-24 ENCOUNTER — TELEPHONE (OUTPATIENT)
Dept: PSYCHIATRY | Facility: CLINIC | Age: 39
End: 2019-09-24

## 2019-09-24 ENCOUNTER — HOSPITAL ENCOUNTER (OUTPATIENT)
Dept: NUCLEAR MEDICINE | Facility: HOSPITAL | Age: 39
Discharge: HOME | End: 2019-09-24
Attending: INTERNAL MEDICINE
Payer: COMMERCIAL

## 2019-09-24 DIAGNOSIS — K31.84 GASTROPARESIS: ICD-10-CM

## 2019-09-24 PROCEDURE — A9541 TC99M SULFUR COLLOID: HCPCS

## 2019-09-24 PROCEDURE — 78264 GASTRIC EMPTYING IMG STUDY: CPT

## 2019-09-24 NOTE — TELEPHONE ENCOUNTER
Behavorial Health Outpatient Intake Questions    Referred by:PCP    Please advised interviewee that they need to answer all questions truthfully to allow for best care and any misrepresentations of information may affect their ability to be seen at this clinic   => Was this discussed? Yes     Behavorial Health Outpatient Intake History -     Presenting Problem (in patient's words): "PCP thinks I'm depressed", anixety    Has the patient ever seen or is currently seeing a psychiatrist? No   If yes who/when? If seen as outpatient, have they been seen here (and by whom)? If not seen here, which provider(s) did the patient see and for how long? Has the patient ever seen or currently see a therapist? Yes If yes who/when?   10+ years ago, unsure where  Has a member of the patient's family been in therapy here? Yes  If yes, with whom? , unsure who    Has the patient been hospitalized for mental health? No   If yes, how long ago was last hospitalization and where was it? Substance Abuse:No concerns of substance abuse are reported  Does the patient have ICM or CTT? No    Is the patient taking injectable psychiatric medications? No    => If yes, patient cannot be seen here  Communications  Are there any developmental disabilities? No    Does the patient have hearing impairment? No       History-    Has the patient served in the [place] [address]? No    If yes, have you had combat services? No    Was the patient activated into federal active duty as a member of the [employer] or reserve? No    Legal History-     Does the patient have any history of arrests, prison/alf time, or DUIs? No  If Yes-  1) What types of charges? 2) When were they last incarcerated? 3) Are they currently on parole or probation? Minor Child-    Who has custody of the child? N/A    Is there a custody agreement?  N/A    If there is a custody agreement remind parent that they must bring a copy to the first appt or they will not be seen  Intake Team, please check with provider before scheduling if flags come up such as:  - complex case  - legal history (other than DUI)  - communication barrier concerns are present  - if, in your judgment, this needs further review    ACCEPTED as a patient Yes  => Appointment Date: Wednesday, 10/16/19 @ 230pm w/Gisela; Thursday, 11/7/19 @ 1pm w/ Dr Hao Montes? No    Name of Insurance Co: [employer] ID# [id number]  Insurance Phone # [phone]  If ins is primary or secondary  If patient is a minor, parents information such as Name, D  O B of guarantor

## 2019-09-26 ENCOUNTER — TELEPHONE (OUTPATIENT)
Dept: GASTROENTEROLOGY | Facility: MEDICAL CENTER | Age: 39
End: 2019-09-26

## 2019-09-26 NOTE — TELEPHONE ENCOUNTER
----- Message from Ramesh Narayanan MD sent at 9/26/2019 10:43 AM EDT -----  Gastric emptying studies abnormal   This may be a cause for her nausea  I would like her to eat small meals several times a day which will help overall  She can eat up to 5 small meals per day  Avoid fatty foods and foods with high fiber

## 2019-10-05 ENCOUNTER — APPOINTMENT (OUTPATIENT)
Dept: LAB | Facility: HOSPITAL | Age: 39
End: 2019-10-05
Attending: INTERNAL MEDICINE
Payer: COMMERCIAL

## 2019-10-05 DIAGNOSIS — D50.0 IRON DEFICIENCY ANEMIA DUE TO CHRONIC BLOOD LOSS: ICD-10-CM

## 2019-10-05 LAB
ALBUMIN SERPL BCP-MCNC: 3.8 G/DL (ref 3.5–5)
ALP SERPL-CCNC: 87 U/L (ref 46–116)
ALT SERPL W P-5'-P-CCNC: 21 U/L (ref 12–78)
ANION GAP SERPL CALCULATED.3IONS-SCNC: 7 MMOL/L (ref 4–13)
AST SERPL W P-5'-P-CCNC: 25 U/L (ref 5–45)
BASOPHILS # BLD AUTO: 0.05 THOUSANDS/ΜL (ref 0–0.1)
BASOPHILS NFR BLD AUTO: 1 % (ref 0–1)
BILIRUB SERPL-MCNC: 0.32 MG/DL (ref 0.2–1)
BUN SERPL-MCNC: 10 MG/DL (ref 5–25)
CALCIUM SERPL-MCNC: 9.5 MG/DL (ref 8.3–10.1)
CHLORIDE SERPL-SCNC: 103 MMOL/L (ref 100–108)
CO2 SERPL-SCNC: 27 MMOL/L (ref 21–32)
CREAT SERPL-MCNC: 0.72 MG/DL (ref 0.6–1.3)
CRP SERPL QL: 5.6 MG/L
EOSINOPHIL # BLD AUTO: 0.17 THOUSAND/ΜL (ref 0–0.61)
EOSINOPHIL NFR BLD AUTO: 2 % (ref 0–6)
ERYTHROCYTE [DISTWIDTH] IN BLOOD BY AUTOMATED COUNT: 12.8 % (ref 11.6–15.1)
ERYTHROCYTE [SEDIMENTATION RATE] IN BLOOD: 26 MM/HOUR (ref 0–20)
FERRITIN SERPL-MCNC: 16 NG/ML (ref 8–388)
GFR SERPL CREATININE-BSD FRML MDRD: 104 ML/MIN/1.73SQ M
GLUCOSE P FAST SERPL-MCNC: 88 MG/DL (ref 65–99)
HCT VFR BLD AUTO: 37.4 % (ref 34.8–46.1)
HGB BLD-MCNC: 11.9 G/DL (ref 11.5–15.4)
IMM GRANULOCYTES # BLD AUTO: 0.05 THOUSAND/UL (ref 0–0.2)
IMM GRANULOCYTES NFR BLD AUTO: 1 % (ref 0–2)
IRON SATN MFR SERPL: 14 %
IRON SERPL-MCNC: 60 UG/DL (ref 50–170)
LYMPHOCYTES # BLD AUTO: 1.89 THOUSANDS/ΜL (ref 0.6–4.47)
LYMPHOCYTES NFR BLD AUTO: 26 % (ref 14–44)
MCH RBC QN AUTO: 29.1 PG (ref 26.8–34.3)
MCHC RBC AUTO-ENTMCNC: 31.8 G/DL (ref 31.4–37.4)
MCV RBC AUTO: 91 FL (ref 82–98)
MONOCYTES # BLD AUTO: 0.6 THOUSAND/ΜL (ref 0.17–1.22)
MONOCYTES NFR BLD AUTO: 8 % (ref 4–12)
NEUTROPHILS # BLD AUTO: 4.43 THOUSANDS/ΜL (ref 1.85–7.62)
NEUTS SEG NFR BLD AUTO: 62 % (ref 43–75)
NRBC BLD AUTO-RTO: 0 /100 WBCS
PLATELET # BLD AUTO: 415 THOUSANDS/UL (ref 149–390)
PMV BLD AUTO: 10.1 FL (ref 8.9–12.7)
POTASSIUM SERPL-SCNC: 4.1 MMOL/L (ref 3.5–5.3)
PROT SERPL-MCNC: 8.3 G/DL (ref 6.4–8.2)
RBC # BLD AUTO: 4.09 MILLION/UL (ref 3.81–5.12)
SODIUM SERPL-SCNC: 137 MMOL/L (ref 136–145)
TIBC SERPL-MCNC: 414 UG/DL (ref 250–450)
VIT B12 SERPL-MCNC: 462 PG/ML (ref 100–900)
WBC # BLD AUTO: 7.19 THOUSAND/UL (ref 4.31–10.16)

## 2019-10-05 PROCEDURE — 36415 COLL VENOUS BLD VENIPUNCTURE: CPT

## 2019-10-05 PROCEDURE — 80053 COMPREHEN METABOLIC PANEL: CPT

## 2019-10-05 PROCEDURE — 82728 ASSAY OF FERRITIN: CPT

## 2019-10-05 PROCEDURE — 83550 IRON BINDING TEST: CPT

## 2019-10-05 PROCEDURE — 83540 ASSAY OF IRON: CPT

## 2019-10-05 PROCEDURE — 82607 VITAMIN B-12: CPT

## 2019-10-05 PROCEDURE — 86140 C-REACTIVE PROTEIN: CPT

## 2019-10-05 PROCEDURE — 85652 RBC SED RATE AUTOMATED: CPT

## 2019-10-05 PROCEDURE — 85025 COMPLETE CBC W/AUTO DIFF WBC: CPT

## 2019-10-11 DIAGNOSIS — F32.1 CURRENT MODERATE EPISODE OF MAJOR DEPRESSIVE DISORDER WITHOUT PRIOR EPISODE (HCC): ICD-10-CM

## 2019-10-11 RX ORDER — SERTRALINE HYDROCHLORIDE 25 MG/1
25 TABLET, FILM COATED ORAL DAILY
Qty: 30 TABLET | Refills: 0 | Status: SHIPPED | OUTPATIENT
Start: 2019-10-11 | End: 2019-10-23 | Stop reason: DRUGHIGH

## 2019-10-16 ENCOUNTER — SOCIAL WORK (OUTPATIENT)
Dept: BEHAVIORAL/MENTAL HEALTH CLINIC | Facility: CLINIC | Age: 39
End: 2019-10-16
Payer: COMMERCIAL

## 2019-10-16 DIAGNOSIS — F41.9 ANXIETY: ICD-10-CM

## 2019-10-16 DIAGNOSIS — F33.1 MODERATE EPISODE OF RECURRENT MAJOR DEPRESSIVE DISORDER (HCC): Primary | ICD-10-CM

## 2019-10-16 PROCEDURE — 90791 PSYCH DIAGNOSTIC EVALUATION: CPT | Performed by: SOCIAL WORKER

## 2019-10-16 NOTE — PSYCH
Assessment/Plan:      There are no diagnoses linked to this encounter  Subjective:      Patient ID: Seema Romo is a 37 y o  female  HPI:     Pre-morbid level of function and History of Present Illness: Ruthy Taveras reports feeling depressed and anxious for about the past years; she cries frequently, sleep disturbance, decrease in concentration and focus, lots of worrying  Previous Psychiatric/psychological treatment/year: had outpatient counseling about 10 years ago due to marital issues  Current Psychiatrist/Therapist: none  Outpatient and/or Partial and Other Community Resources Used (CTT, ICM, VNA): Outpatient now starting outpatient with Sharon Will      Problem Assessment:     SOCIAL/VOCATION:  Family Constellation (include parents, relationship with each and pertinent Psych/Medical History):     Family History   Problem Relation Age of Onset    Other Mother         uterine leiomyoma    Diabetes type II Mother         Mellitus    Hypothyroidism Mother     Colon cancer Father 54        Stage IV    Diabetes Father         Type II Mellitus    Hypertension Father     Anxiety disorder Brother     Depression Brother     Diabetes Brother         Mellitus    Asthma Brother     Hypertension Brother     Multiple sclerosis Sister     Asthma Sister     Hypothyroidism Sister     Hypertension Maternal Grandmother     Diabetes Maternal Grandmother     Other Maternal Grandmother         Vulvar cancer    Schizophrenia Maternal Grandfather     Hypertension Maternal Grandfather     Thyroid cancer Paternal Grandmother 80    Diabetes Paternal Grandfather 43         due to complications of DM       Mother: Currently good relationship with mom, Jese Don      Spouse: Aydee Reynolds,  23 years, he has bipolar  Father: Jenni Negrete, no mental health issues (some anxiety, possibly age related - survived 15 years now with colon cancer)   Children: 4 sons, (Aydee Weirling - ), Sanchokarrie Barbara, three at home   Sibling: brother, Lael Pallas 52, experiences depression and anxiety   Sibling: sister, Mitchell Rashid, experiences depression and anxiety, also diagnosed with MS at 25years old   [de-identified]: 24year old son might be bipolar   Other: n/a    Rosie Richardson relates best to , [facility]  she lives with  and 3 sons  she does not live alone  Domestic Violence: No past history of domestic violence    Additional Comments related to family/relationships/peer support:  supportive  School or Work History (strengths/limitations/needs): Works for [email or web address]    Her highest grade level achieved was graduated high school, one year of college, currently [address] ([address]) for [employer]   history includes none,  was in [employer] status includes stable    LEISURE ASSESSMENT (Include past and present hobbies/interests and level of involvement (Ex: Group/Club Affiliations): baking, scrapbook, organizing things  her primary language is [place]  Preferred language is [place]  Ethnic considerations are parents are from [place]  Religions affiliations and level of involvement [employer]   Does spirituality help you cope? Yes     FUNCTIONAL STATUS: There has been a recent change in Rosie Richardson ability to do the following: none    Level of Assistance Needed/By Whom?: independent    Rosie Richardson learns best by  [employer]    SUBSTANCE ABUSE ASSESSMENT: no substance abuse    Substance/Route/Age/Amount/Frequency/Last Use: none    DETOX HISTORY: n/a    Previous detox/rehab treatment: n/a    HEALTH ASSESSMENT: no referral to PCP needed    LEGAL: No Mental Health Advance Directive or Power of  on file    Prenatal History: N/A    Delivery History: N/A    Developmental Milestones: N/A  Temperament as an infant was N/A  Temperament as a toddler was N/A  Temperament at school age was N/A  Temperament as a teenager was N/A      Risk Assessment:   The following ratings are based on my interview(s) with Maggy Slastephanie of Harm to Self:   Demographic risk factors include n/a  Historical Risk Factors include a relative or close friend who  by suicide  Recent Specific Risk Factors include diagnosis of depression   Additional Factors for a Child or Adolescent gender: female (more likely to attempt)    Risk of Harm to Others:   Demographic Risk Factors include n/a  Historical Risk Factors include n/a  Recent Specific Risk Factors include n/a    Access to Weapons:   Desean Mak has access to the following weapons: no  The following steps have been taken to ensure weapons are properly secured: n/a    Based on the above information, the client presents the following risk of harm to self or others:  low    The following interventions are recommended:   no intervention changes    Notes regarding this Risk Assessment: Desean Mak does not appear to be a risk to self or others at this time          Review Of Systems:     Mood Anxiety and Depression   Behavior Normal    Thought Content Normal   General Emotional Problems   Personality Normal   Other Psych Symptoms Normal   Constitutional As Noted in HPI   ENT As Noted in HPI   Cardiovascular As Noted in HPI   Respiratory As Noted in HPI   Gastrointestinal As Noted in HPI   Genitourinary As Noted in HPI   Musculoskeletal As Noted in HPI   Integumentary As Noted in HPI   Neurological As Noted in HPI   Endocrine Normal          Mental status:  Appearance calm and cooperative , adequate hygiene and grooming and good eye contact    Mood depressed and anxious   Affect affect was broad and affect appropriate    Speech a normal rate   Thought Processes coherent/organized and normal thought processes   Hallucinations no hallucinations present    Thought Content no delusions   Abnormal Thoughts no suicidal thoughts  and no homicidal thoughts    Orientation  oriented to person and place and time   Remote Memory short term memory intact and long term memory intact   Attention Span concentration intact   Intellect Appears to be of Average Intelligence   Fund of Knowledge displays adequate knowledge of current events   Insight Insight intact   Judgement judgment was intact   Muscle Strength Muscle strength and tone were normal   Language no difficulty naming common objects   Pain none   Pain Scale 0

## 2019-10-16 NOTE — BH TREATMENT PLAN
Mino Jacob  1980       Date of Initial Treatment Plan: 10/16/19   Date of Current Treatment Plan: 10/16/19    Treatment Plan Number 1     Strengths/Personal Resources for Self Care:  Her jose alberto,     Diagnosis:   1  Moderate episode of recurrent major depressive disorder ([facility] [address] )     2  Anxiety         Area of Needs:  Emily Kaufman needs to learn to cope with life stressors in healthy manner      Long Term Goal 1: Eloisa Boyce will experience decrease in symptoms of anxiety and depresion and learn healthy coping skills    Target Date:  Completion Date:          Short Term Objectives for Goal 1: A Emily Loubernarda will learn to identify emotional triggers and learn healthy ways to cope      GOAL 1: Modality: Individual 1-2x per month   Completion Date 1/20/2020    GOAL 2: Modality: The person(s) responsible for carrying out the plan is  Pecolia Berliner and [employer]: Diagnosis and Treatment Plan explained to Yue Amaro relates understanding diagnosis and is agreeable to Treatment Plan  Client Comments : Please share your thoughts, feelings, need and/or experiences regarding your treatment plan: Karely Mcclellan

## 2019-10-18 ENCOUNTER — OFFICE VISIT (OUTPATIENT)
Dept: HEMATOLOGY ONCOLOGY | Facility: CLINIC | Age: 39
End: 2019-10-18
Payer: COMMERCIAL

## 2019-10-18 VITALS
HEART RATE: 78 BPM | DIASTOLIC BLOOD PRESSURE: 88 MMHG | RESPIRATION RATE: 16 BRPM | HEIGHT: 66 IN | BODY MASS INDEX: 38.09 KG/M2 | WEIGHT: 237 LBS | TEMPERATURE: 98.5 F | SYSTOLIC BLOOD PRESSURE: 124 MMHG

## 2019-10-18 DIAGNOSIS — D50.9 IRON DEFICIENCY ANEMIA, UNSPECIFIED IRON DEFICIENCY ANEMIA TYPE: Primary | ICD-10-CM

## 2019-10-18 PROBLEM — D50.8 IRON DEFICIENCY ANEMIA SECONDARY TO INADEQUATE DIETARY IRON INTAKE: Status: ACTIVE | Noted: 2019-10-18

## 2019-10-18 PROCEDURE — 99214 OFFICE O/P EST MOD 30 MIN: CPT | Performed by: NURSE PRACTITIONER

## 2019-10-18 RX ORDER — SODIUM CHLORIDE 9 MG/ML
20 INJECTION, SOLUTION INTRAVENOUS ONCE
Status: CANCELLED | OUTPATIENT
Start: 2019-10-25

## 2019-10-18 NOTE — PROGRESS NOTES
Hematology/Oncology Outpatient Follow-up  Tyson Nassar 37 y o  female 1980 [id number]    Date:  10/18/2019      Assessment and Plan:  1  Iron deficiency anemia, unspecified iron deficiency anemia type  Patient's iron deficiency is likely multifactorial including malabsorption due to her GI issues as well as her ongoing heavy menstrual cycles  She was encouraged to continue to follow up with her GYN team and her GI specialist   She is not currently anemic but does have evidence of iron deficiency with a ferritin of 16 and appears to be symptomatic; therefore we will treat her with IV iron Injectafer x1 dose which will likely correct her iron deficiency  She will follow up again in about 4 months with repeat laboratory studies prior     - CBC and differential; Future  - Comprehensive metabolic panel; Future  - C-reactive protein; Future  - Sedimentation rate, automated; Future  - Iron Panel (Includes Ferritin, Iron Sat%, Iron, and TIBC); Future  - Ferritin; Future  - Vitamin B12; Future      HPI:  Patient came in today for a follow-up visit  Sy Raza has a history of gastritis with possible gastroparesis and chronic anemia due to iron deficiency which was treated with iron IV at least on 2 different occasions in April of 2016 and again in July of 2018      Interval history:  Patient presents today for a follow-up visit accompanied by her   She reports fatigue, headaches, dizziness and leg cramps at hour sleep  Admits to cravings for dairy  She denies any easy bruising or bleeding from any site other than her menstrual cycles  She states that her menses have been heavy for the past 2 years due to fibroids; typically lasting 3-12 days and heavy with clots for the 1st few days  She has been following with GI closely and had upper and lower endoscopy done in July 2019   The colonoscopy showed large external and internal hemorrhoids and 1 polyp that was removed otherwise normal upper endoscopy was normal   She had a gastric emptying study done recently on 09/24/2019 which confirmed that she does in fact have gastroparesis  Her most recent laboratory studies from 10/05/2019 showed WBC 7 19, she is not currently anemic H&H 11 9/37 4, MCV 91 platelet count is slightly elevated 415,000  CMP is essentially normal   Inflammatory markers are increased CRP 5 6 sed rate 26  B12 is appropriate 462  Her iron panel is trending downward transferrin iron saturation 14%, TIBC 414 serum iron 60 ferritin 16  ROS: Review of Systems   Constitutional: Positive for fatigue  Negative for activity change, appetite change, chills, fever and unexpected weight change  HENT: Negative for congestion, mouth sores, nosebleeds, sore throat and trouble swallowing  Eyes: Negative  Respiratory: Negative for cough, chest tightness and shortness of breath  Cardiovascular: Negative for chest pain, palpitations and leg swelling  Gastrointestinal: Positive for constipation  Negative for abdominal distention, abdominal pain, blood in stool, diarrhea, nausea and vomiting  Genitourinary: Positive for menstrual problem  Negative for difficulty urinating, dysuria, frequency, hematuria and urgency  Musculoskeletal: Negative for arthralgias, back pain, gait problem, joint swelling and myalgias  Skin: Negative for color change, pallor and rash  Neurological: Positive for dizziness, numbness (And tingling moderate) and headaches  Negative for weakness and light-headedness  Hematological: Negative for adenopathy  Does not bruise/bleed easily  Psychiatric/Behavioral: Positive for sleep disturbance ( occasional)  Negative for dysphoric mood  The patient is not nervous/anxious          Past Medical History:   Diagnosis Date    Anemia     Anxiety 10/16/2019    Colon polyp     Duodenitis without bleeding     Dysphagia     Fibromyalgia     Gastritis     GERD (gastroesophageal reflux disease)     Hiatal hernia     Hx of colonoscopy     Insomnia     Memory loss     Moderate episode of recurrent major depressive disorder ([facility] [address] ) 10/16/2019    Obesity     Oral herpes     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 myelopathy or radiculopathy        Past Surgical History:   Procedure Laterality Date    APPENDECTOMY      BREAST SURGERY Right 01/08/2015    lump removed from [address]      COLONOSCOPY      9/26/2012    DILATION AND CURETTAGE OF UTERUS      Resolved:1/29/2009    ESOPHAGOGASTRODUODENOSCOPY      Diagnostic ; 9/26/2012    HYSTEROSCOPY      Resolved:1/30/2009    OVARIAN CYST REMOVAL Right 2005    WISDOM TOOTH EXTRACTION         Social History     Socioeconomic History    Marital status: /Civil Union     Spouse name: None    Number of children: 4    Years of education: high school    Highest education level: None   Occupational History    Occupation: cash operatorr   Social Needs    Financial resource strain: Not very hard    Food insecurity:     Worry: Never true     Inability: Never true    Transportation needs:     Medical: No     Non-medical: No   Tobacco Use    Smoking status: Never Smoker    Smokeless tobacco: Never Used    Tobacco comment: No passive smoke exposure   Substance and Sexual Activity    Alcohol use: No    Drug use: No    Sexual activity: Yes     Partners: Male     Birth control/protection: Male Sterilization     Comment: life time partners:1   Lifestyle    Physical activity:     Days per week: 0 days     Minutes per session: 0 min    Stress:  To some extent   Relationships    Social connections:     Talks on phone: More than three times a week     Gets together: More than three times a week     Attends Gnosticism service: More than 4 times per year     Active member of club or organization: Yes     Attends meetings of clubs or organizations: More than 4 times per year     Relationship status:     Intimate partner violence:     Fear of current or ex partner: No     Emotionally abused: No     Physically abused: No     Forced sexual activity: No   Other Topics Concern    None   Social History Narrative    [employer]    Accepts blood products       Family History   Problem Relation Age of Onset    Other Mother         uterine leiomyoma    Diabetes type II Mother         Mellitus    Hypothyroidism Mother     Colon cancer Father 54        Stage IV    Diabetes Father         Type II Mellitus    Hypertension Father     Anxiety disorder Brother     Depression Brother     Diabetes Brother         Mellitus    Asthma Brother     Hypertension Brother     Multiple sclerosis Sister     Asthma Sister     Hypothyroidism Sister     Hypertension Maternal Grandmother     Diabetes Maternal Grandmother     Other Maternal Grandmother         Vulvar cancer    Schizophrenia Maternal Grandfather     Hypertension Maternal Grandfather     Thyroid cancer Paternal Grandmother 80    Diabetes Paternal Grandfather 43         due to complications of DM       Allergies   Allergen Reactions    Morphine Chest Pain    Percocet [Oxycodone-Acetaminophen] Hives    Domperidone          Current Outpatient Medications:     clindamycin (CLINDAGEL) 1 % gel, Apply topically 2 (two) times a day, Disp: 30 g, Rfl: 2    dexlansoprazole (DEXILANT) 60 MG capsule, Take 1 capsule (60 mg total) by mouth every 12 (twelve) hours, Disp: 180 capsule, Rfl: 2    famotidine (PEPCID) 20 mg tablet, Take 1 tablet (20 mg total) by mouth 2 (two) times a day, Disp: 60 tablet, Rfl: 0    meclizine (ANTIVERT) 32 MG tablet, Take 32 mg by mouth 3 (three) times a day as needed for dizziness, Disp: , Rfl:     Multiple Vitamin (MULTIVITAMIN) tablet, Take 1 tablet by mouth daily, Disp: , Rfl:     sertraline (ZOLOFT) 25 mg tablet, Take 1 tablet (25 mg total) by mouth daily, Disp: 30 tablet, Rfl: 0    valACYclovir (VALTREX) 1,000 mg tablet, TAKE 2 TABLET BY ORAL ROUTE EVERY 12 HOURS PRN, Disp: , Rfl: 0    linaCLOtide 145 MCG CAPS, Take 1 capsule (145 mcg total) by mouth daily for 30 days, Disp: 30 capsule, Rfl: 5      Physical Exam:  /88 (BP Location: Left arm)   Pulse 78   Temp 98 5 °F (36 9 °C) (Oral)   Resp 16   Ht 5' 6" (1 676 m)   Wt 108 kg (237 lb)   BMI 38 25 kg/m²     Physical Exam   Constitutional: She is oriented to person, place, and time  She appears well-developed and well-nourished  No distress  HENT:   Head: Normocephalic and atraumatic  Mouth/Throat: Oropharynx is clear and moist  No oropharyngeal exudate  Eyes: Pupils are equal, round, and reactive to light  Conjunctivae are normal  No scleral icterus  Neck: Normal range of motion  Neck supple  No thyromegaly present  Cardiovascular: Normal rate, regular rhythm, normal heart sounds and intact distal pulses  No murmur heard  Pulmonary/Chest: Effort normal and breath sounds normal  No respiratory distress  Abdominal: Soft  Bowel sounds are normal  She exhibits no distension  There is no hepatosplenomegaly  There is no tenderness  Musculoskeletal: Normal range of motion  She exhibits no edema  Lymphadenopathy:     She has no cervical adenopathy  She has no axillary adenopathy  Neurological: She is alert and oriented to person, place, and time  Skin: Skin is warm and dry  No rash noted  She is not diaphoretic  No erythema  There is pallor  Psychiatric: She has a normal mood and affect  Her behavior is normal  Judgment and thought content normal    Vitals reviewed          Labs:  Lab Results   Component Value Date    WBC 7 19 10/05/2019    HGB 11 9 10/05/2019    HCT 37 4 10/05/2019    MCV 91 10/05/2019     (H) 10/05/2019     Lab Results   Component Value Date    K 4 1 10/05/2019     10/05/2019    CO2 27 10/05/2019    BUN 10 10/05/2019    CREATININE 0 72 10/05/2019    GLUF 88 10/05/2019    CALCIUM 9 5 10/05/2019    AST 25 10/05/2019    ALT 21 10/05/2019    ALKPHOS 87 10/05/2019 EGFR 104 10/05/2019       Patient voiced understanding and agreement in the above discussion  Aware to contact our office with questions/symptoms in the interim  This note has been generated by voice recognition software system  Therefore, there may be spelling, grammar, and or syntax errors  Please contact if questions arise

## 2019-10-23 ENCOUNTER — OFFICE VISIT (OUTPATIENT)
Dept: FAMILY MEDICINE CLINIC | Facility: CLINIC | Age: 39
End: 2019-10-23
Payer: COMMERCIAL

## 2019-10-23 VITALS
OXYGEN SATURATION: 98 % | HEIGHT: 65 IN | HEART RATE: 83 BPM | WEIGHT: 238 LBS | BODY MASS INDEX: 39.65 KG/M2 | DIASTOLIC BLOOD PRESSURE: 80 MMHG | TEMPERATURE: 97.7 F | SYSTOLIC BLOOD PRESSURE: 120 MMHG

## 2019-10-23 DIAGNOSIS — F32.1 CURRENT MODERATE EPISODE OF MAJOR DEPRESSIVE DISORDER WITHOUT PRIOR EPISODE (HCC): Primary | ICD-10-CM

## 2019-10-23 DIAGNOSIS — E78.2 MIXED HYPERLIPIDEMIA: ICD-10-CM

## 2019-10-23 DIAGNOSIS — D50.0 IRON DEFICIENCY ANEMIA DUE TO CHRONIC BLOOD LOSS: ICD-10-CM

## 2019-10-23 DIAGNOSIS — K21.9 GASTROESOPHAGEAL REFLUX DISEASE WITHOUT ESOPHAGITIS: ICD-10-CM

## 2019-10-23 DIAGNOSIS — R73.01 IMPAIRED FASTING GLUCOSE: ICD-10-CM

## 2019-10-23 DIAGNOSIS — Z23 NEED FOR INFLUENZA VACCINATION: ICD-10-CM

## 2019-10-23 PROBLEM — R10.12 LUQ ABDOMINAL PAIN: Status: RESOLVED | Noted: 2019-07-02 | Resolved: 2019-10-23

## 2019-10-23 PROCEDURE — 90471 IMMUNIZATION ADMIN: CPT | Performed by: FAMILY MEDICINE

## 2019-10-23 PROCEDURE — 99214 OFFICE O/P EST MOD 30 MIN: CPT | Performed by: FAMILY MEDICINE

## 2019-10-23 PROCEDURE — 3008F BODY MASS INDEX DOCD: CPT | Performed by: FAMILY MEDICINE

## 2019-10-23 PROCEDURE — 90686 IIV4 VACC NO PRSV 0.5 ML IM: CPT | Performed by: FAMILY MEDICINE

## 2019-10-23 NOTE — PROGRESS NOTES
Subjective:   Chief Complaint   Patient presents with    Follow-up     chronic conditions        Patient ID: Sharon Klein is a 37 y o  female  Patient here follow-up with a chronic condition patient who did have a positive a depression screening and she was in declined for treatment before and she did call the office later on and she agree will start medication we start on no Zoloft 25 mg once a day and she feel improvement compared with before her mood is more stable eyes she does not have problem with sleeping and no problem with appetite and she tolerated medication well without any side effect and the deny any suicide attempt or ideation  Patient's history of iron deficiency anemia she been taking for sulfate and the and been seen by Hematology planning to give her iron infusion patient was history of for GERD tolerate her medication well without any side effect deny any abdomen pain no heartburn nausea vomiting diarrhea patient history of impaired fasting glucose try to controlled with the low carb diet deny any increased thirsty increased frequency urination no dizziness no headache and no abdomen pain  Recent blood work discussed with the patient      The following portions of the patient's history were reviewed and updated as appropriate: allergies, current medications, past family history, past medical history, past social history, past surgical history and problem list     Review of Systems   Constitutional: Negative for fatigue and fever  HENT: Negative for ear pain, sinus pressure, sinus pain and sore throat  Eyes: Negative for pain and redness  Respiratory: Negative for cough, chest tightness and shortness of breath  Cardiovascular: Negative for chest pain, palpitations and leg swelling  Gastrointestinal: Negative for abdominal pain, blood in stool, constipation, diarrhea and nausea  Genitourinary: Negative for flank pain, frequency and hematuria     Musculoskeletal: Negative for back pain and joint swelling  Skin: Negative for rash  Neurological: Negative for dizziness, numbness and headaches  Hematological: Does not bruise/bleed easily  Objective:  Vitals:    10/23/19 1457   BP: 120/80   Pulse: 83   Temp: 97 7 °F (36 5 °C)   TempSrc: Tympanic   SpO2: 98%   Weight: 108 kg (238 lb)   Height: 5' 5" (1 651 m)      Physical Exam   Constitutional: She is oriented to person, place, and time  She appears well-developed and well-nourished  HENT:   Head: Normocephalic  Right Ear: External ear normal    Left Ear: External ear normal    Eyes: Conjunctivae and EOM are normal  Right eye exhibits no discharge  Left eye exhibits no discharge  Neck: No JVD present  Cardiovascular: Normal rate, regular rhythm and normal heart sounds  Exam reveals no gallop  No murmur heard  Pulmonary/Chest: Effort normal  No respiratory distress  She has no wheezes  She has no rales  She exhibits no tenderness  Abdominal: She exhibits no mass  There is no tenderness  There is no rebound  Musculoskeletal: She exhibits no edema or tenderness  Neurological: She is alert and oriented to person, place, and time  Skin: No rash noted  No erythema           Assessment/Plan:    Current moderate episode of major depressive disorder without prior episode ([facility] [address] )  New diagnosis symptomatic Zoloft 50 mg once a day proper use of medication possible side effect discussed with the patient    Iron deficiency anemia  Chronic hemoglobin improved the patient does follow up with the Hematology     Impaired fasting glucose  Chronic asymptomatic fair control encouraged patient to continue with the low carb diet and important lose weight    Gastroesophageal reflux disease without esophagitis  A chronic asymptomatic fair control continue current management discussed important lose weight avoid provoke food and do not eat and lie down       Diagnoses and all orders for this visit:    Current moderate episode of major depressive disorder without prior episode (HCC)  -     sertraline (ZOLOFT) 50 mg tablet; Take 1 tablet (50 mg total) by mouth daily    Gastroesophageal reflux disease without esophagitis    Impaired fasting glucose    Iron deficiency anemia due to chronic blood loss    Need for influenza vaccination  -     FLUZONE: influenza vaccine, quadrivalent, 0 5 mL    Mixed hyperlipidemia  -     Lipid Panel with Direct LDL reflex;  Future

## 2019-10-23 NOTE — ASSESSMENT & PLAN NOTE
A chronic asymptomatic fair control continue current management discussed important lose weight avoid provoke food and do not eat and lie down

## 2019-10-23 NOTE — ASSESSMENT & PLAN NOTE
Chronic asymptomatic fair control encouraged patient to continue with the low carb diet and important lose weight

## 2019-10-23 NOTE — ASSESSMENT & PLAN NOTE
New diagnosis symptomatic Zoloft 50 mg once a day proper use of medication possible side effect discussed with the patient

## 2019-10-25 ENCOUNTER — HOSPITAL ENCOUNTER (OUTPATIENT)
Dept: INFUSION CENTER | Facility: HOSPITAL | Age: 39
Discharge: HOME | End: 2019-10-25
Attending: INTERNAL MEDICINE
Payer: COMMERCIAL

## 2019-10-25 VITALS
RESPIRATION RATE: 16 BRPM | HEART RATE: 87 BPM | SYSTOLIC BLOOD PRESSURE: 122 MMHG | DIASTOLIC BLOOD PRESSURE: 76 MMHG | TEMPERATURE: 97.6 F

## 2019-10-25 DIAGNOSIS — D50.8 IRON DEFICIENCY ANEMIA SECONDARY TO INADEQUATE DIETARY IRON INTAKE: Primary | ICD-10-CM

## 2019-10-25 PROCEDURE — 96365 THER/PROPH/DIAG IV INF INIT: CPT

## 2019-10-25 RX ORDER — SODIUM CHLORIDE 9 MG/ML
20 INJECTION, SOLUTION INTRAVENOUS ONCE
Status: COMPLETED | OUTPATIENT
Start: 2019-10-25 | End: 2019-10-25

## 2019-10-25 RX ORDER — SODIUM CHLORIDE 9 MG/ML
20 INJECTION, SOLUTION INTRAVENOUS ONCE
Status: CANCELLED | OUTPATIENT
Start: 2019-10-25

## 2019-10-25 RX ADMIN — FERRIC CARBOXYMALTOSE INJECTION 750 MG: 50 INJECTION, SOLUTION INTRAVENOUS at 15:26

## 2019-10-25 RX ADMIN — SODIUM CHLORIDE 20 ML/HR: 9 INJECTION, SOLUTION INTRAVENOUS at 15:25

## 2019-10-25 NOTE — PROGRESS NOTES
Pt tolerated iv injectofer without adverse reaction  Peripheral iv discontinued jelco intact  Discharged ambulatory to home with

## 2019-11-06 ENCOUNTER — SOCIAL WORK (OUTPATIENT)
Dept: BEHAVIORAL/MENTAL HEALTH CLINIC | Facility: CLINIC | Age: 39
End: 2019-11-06
Payer: COMMERCIAL

## 2019-11-06 DIAGNOSIS — F32.1 CURRENT MODERATE EPISODE OF MAJOR DEPRESSIVE DISORDER WITHOUT PRIOR EPISODE (HCC): Primary | ICD-10-CM

## 2019-11-06 PROCEDURE — 90834 PSYTX W PT 45 MINUTES: CPT | Performed by: SOCIAL WORKER

## 2019-11-06 NOTE — PSYCH
Psychotherapy Provided: Individual Psychotherapy 45 minutes     Length of time in session: 45 minutes, follow up in 2 weeks    Goals addressed in session: Goal 1     Pain:      none    0    Current suicide risk : Low     D: Ruthy Taveras spent session discussion financial stress and how same is impacting family relationships and, therefore, her depression and anxiety  Ruthy Taveras reports feeling supported emotionally by her   She continues to use her jose alberto as a strength and to cope with her symptoms  Ruthy Taveras discussed the benefits of therapy including being able to process her thoughts and feelings  She is upset with oldest son about financial situation (student loans), however, does not want money issues to drive her son away  Ruthy Taveras struggles with being supportive mother, balancing debt incurred by her son and keeping the family together  A: Ruthy Taveras presents with increased worry and depression  She appears to be coping with symptoms by utilizing therapy, doing mission work and prayer  P:  Continue individual therapy; next appointment 2 weeks  Behavioral Health Treatment Plan [facility]: Diagnosis and Treatment Plan explained to Stephane Current relates understanding diagnosis and is agreeable to Treatment Plan   Yes

## 2019-11-07 ENCOUNTER — OFFICE VISIT (OUTPATIENT)
Dept: PSYCHIATRY | Facility: CLINIC | Age: 39
End: 2019-11-07
Payer: COMMERCIAL

## 2019-11-07 VITALS
HEIGHT: 66 IN | HEART RATE: 81 BPM | BODY MASS INDEX: 38.42 KG/M2 | DIASTOLIC BLOOD PRESSURE: 85 MMHG | WEIGHT: 239.1 LBS | SYSTOLIC BLOOD PRESSURE: 128 MMHG

## 2019-11-07 DIAGNOSIS — F33.1 MDD (MAJOR DEPRESSIVE DISORDER), RECURRENT EPISODE, MODERATE (HCC): Primary | ICD-10-CM

## 2019-11-07 DIAGNOSIS — F41.1 GENERALIZED ANXIETY DISORDER: ICD-10-CM

## 2019-11-07 PROCEDURE — 90792 PSYCH DIAG EVAL W/MED SRVCS: CPT | Performed by: PSYCHIATRY & NEUROLOGY

## 2019-11-07 RX ORDER — TRAZODONE HYDROCHLORIDE 50 MG/1
25 TABLET ORAL
Qty: 15 TABLET | Refills: 0 | Status: SHIPPED | OUTPATIENT
Start: 2019-11-07 | End: 2019-12-02 | Stop reason: SDUPTHER

## 2019-11-07 NOTE — PSYCH
53 [address]    Name and Date of Birth:  Kendall Jose 37 y o  1980 MRN: [id number]    Date of Visit: November 7, 2019    Source of Information:  The patient herself who seems to be good historian  Her  also came along with her and provided collateral information  Her medical records in the [facility] was also reviewed  Chief Complaint:   "depression and anxiety    HPI:  This is a 22-year-old female, , has 4 grownup children, currently lives with her  and 3 younger children in [place], [place]  The patient was referred to me by her primary care physician  The patient reported that she has been struggling with depression on and off for many years  She describes her depression as feeling sad, occasionally crying, irritable, poor focus, inability to complete tasks, no motivation, poor appetite, low energy level  She denies feeling hopeless or any history of suicidal ideation or suicide attempt  She reported in the past it would be more common when she it would be time for her period  But nowadays it can be regardless of her periods  The patient reports it can last up to few weeks at a time  She currently rates her depression as 3 on a scale of 0-10 with 10 being the worst she experience  She reported she was started on Zoloft more than a month back by her family physician which she felt has been helpful  The patient also reports she struggles with poor sleep  She reports she is able to fall sleep very quickly but will wake up a few hours and it can happen 1-3 times during the night where she will be up for few minutes to half an hour  The patient denies any nightmares  She reports it happens almost every night  Her  confirms it  The patient also reported that she is anxious all the time    She described her anxiety as being worried about everything, racing mind, on daily basis with or without any stressors  Nowadays she reports she does have financial difficulties and worries about her 's health  Though she reported when she does not have any stressor she still feels very anxious and worried about the things which might not happen  She denies any history of panic attacks  Reports feeling uncomfortable in social situations  Denies any other concern  Denies any history of auditory or visual hallucination  Denies any history of manic or hypomanic episodes in the past   Denies any history of eating disorder or any obsessive-compulsive disorder symptoms  The patient also denies any history of any past trauma or abuse  Review Of Systems:    Constitutional negative   ENT negative   Cardiovascular negative   Respiratory negative   Gastrointestinal negative   Genitourinary negative   Musculoskeletal negative   Integumentary negative   Neurological negative   Endocrine negative   Other Symptoms none       Past Psychiatric History:  The patient reports she had some counseling when she was around 23-year-old due to the stressors at that time and then again few years back when she was on verge of divorce  Reports never seen a psychiatrist in the past   Reports never had any psychiatric inpatient admission in the past   Has been on Zoloft recently as mentioned above  Denies any other psychiatric medication trial in the past   Denies any history of suicide attempt or any history of violence  Traumatic History:     Abuse: no history of physical or sexual abuse  Other Traumatic Events: none       Substance Abuse History:  Denies any history of alcohol or substance use    Longest clean time: not applicable  History of Inpatient/Outpatient rehabilitation program: no  Smoking history: denies use  Use of caffeine: unable to obtain    Family Psychiatric/Substance Use History:     Psychiatric Illness:  Grandfather - schizophrenia  Substance Abuse:  Sister - alcohol abuse  Suicide Attempts: patient denies    Social History:  Born & Raised in :  [place] and raised in [address] [place] [address] Experiences:  Good, though the patient reported her mother were critical at times  Education: some college  Learning Disabilities: none  Marital History:   Children: 4 adult children  Living Arrangement: lives in home with  and sons  Occupational History: works as cash  in [employer]    Functioning Relationships: good support system  Legal History: none   History: None      Past Medical History:    Past Medical History:   Diagnosis Date    Anemia     Anxiety 10/16/2019    Colon polyp     Duodenitis without bleeding     Dysphagia     Fibromyalgia     Gastritis     GERD (gastroesophageal reflux disease)     Hiatal hernia     Hx of colonoscopy     Insomnia     Memory loss     Menorrhagia with regular cycle 11/20/2014    Moderate episode of recurrent major depressive disorder ([address] ) 10/16/2019    Obesity     Oral herpes     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 myelopathy or radiculopathy      Past Medical History Pertinent Negatives:   Diagnosis Date Noted    Abnormal Pap smear of cervix 04/19/2018    2017--per pt wnl    Allergic 08/23/2018    Asthma 08/23/2018    Cancer ([address] ) 08/23/2018    Chronic kidney disease 08/23/2018    Clotting disorder ([address] ) 08/23/2018    COPD (chronic obstructive pulmonary disease) ([address] ) 08/23/2018    Coronary artery disease 08/23/2018    Dementia ([address] ) 08/23/2018    Diabetes mellitus ([address] ) 08/23/2018    Disease of thyroid gland 08/23/2018    Diverticulitis of colon 08/23/2018    Eating disorder 08/23/2018    Heart murmur 08/23/2018    HL (hearing loss) 08/23/2018    Hypertension 08/23/2018    Infectious viral hepatitis 08/23/2018    Inflammatory bowel disease 08/23/2018    Kidney stone 08/23/2018    Myocardial infarction ([address] ) 08/23/2018    Osteoporosis 08/23/2018    Otitis media 08/23/2018    Pneumonia 08/23/2018    Scoliosis 08/23/2018    Seizures [facility] ) 08/23/2018    Shingles 08/23/2018    Stroke ([facility] ) 08/23/2018    Substance abuse ([place] [address] ) 08/23/2018    Urinary tract infection 08/23/2018    Visual impairment 08/23/2018     Past Surgical History:   Procedure Laterality Date    APPENDECTOMY      BREAST SURGERY Right 01/08/2015    lump removed from breast    [address] [place]    CHOLECYSTECTOMY      COLONOSCOPY      9/26/2012    DILATION AND CURETTAGE OF UTERUS      Resolved:1/29/2009    ESOPHAGOGASTRODUODENOSCOPY      Diagnostic ; 9/26/2012    HYSTEROSCOPY      Resolved:1/30/2009    OVARIAN CYST REMOVAL Right 2005    WISDOM TOOTH EXTRACTION       Allergies   Allergen Reactions    Morphine Chest Pain    Percocet [Oxycodone-Acetaminophen] Hives    Domperidone          Current Medications:      Current Outpatient Medications:     clindamycin (CLINDAGEL) 1 % gel, Apply topically 2 (two) times a day, Disp: 30 g, Rfl: 2    dexlansoprazole (DEXILANT) 60 MG capsule, Take 1 capsule (60 mg total) by mouth every 12 (twelve) hours (Patient taking differently: Take 60 mg by mouth daily ), Disp: 180 capsule, Rfl: 2    famotidine (PEPCID) 20 mg tablet, Take 1 tablet (20 mg total) by mouth 2 (two) times a day (Patient taking differently: Take 20 mg by mouth daily ), Disp: 60 tablet, Rfl: 0    linaCLOtide 145 MCG CAPS, Take 1 capsule (145 mcg total) by mouth daily for 30 days, Disp: 30 capsule, Rfl: 5    meclizine (ANTIVERT) 32 MG tablet, Take 32 mg by mouth 3 (three) times a day as needed for dizziness, Disp: , Rfl:     Milnacipran HCl (SAVELLA) 50 MG TABS, Take 50 mg by mouth 2 (two) times a day, Disp: , Rfl:     Multiple Vitamin (MULTIVITAMIN) tablet, Take 1 tablet by mouth daily, Disp: , Rfl:     sertraline (ZOLOFT) 50 mg tablet, Take 1 tablet (50 mg total) by mouth daily, Disp: 30 tablet, Rfl: 1    valACYclovir (VALTREX) 1,000 mg tablet, TAKE 2 TABLET BY ORAL ROUTE EVERY 12 HOURS PRN, Disp: , Rfl: 0    traZODone (DESYREL) 50 mg tablet, Take 0 5 tablets (25 mg total) by mouth daily at bedtime as needed for sleep, Disp: 15 tablet, Rfl: 0       OBJECTIVE:    Vital signs in last 24 hours:    Vitals:    11/07/19 1450   BP: 128/85   Pulse: 81   Weight: 108 kg (239 lb 1 6 oz)   Height: 5' 6" (1 676 m)       Mental Status Evaluation:    Appearance age appropriate, casually dressed   Behavior cooperative, calm   Speech normal rate, normal volume, normal pitch   Mood anxious   Affect brighter   Thought Processes organized, goal directed   Associations intact associations   Thought Content no overt delusions   Perceptual Disturbances: no auditory hallucinations, no visual hallucinations   Abnormal Thoughts  Risk Potential Suicidal ideation - None  Homicidal ideation - None  Potential for aggression - No   Orientation oriented to person, place, time/date and situation   Memory recent and remote memory grossly intact   Consciousness alert and awake   Attention Span Concentration Span attention span and concentration are age appropriate   Intellect appears to be of average intelligence   Insight intact   Judgement intact   Muscle Strength and  Gait normal gait and normal balance   Motor Activity no abnormal movements   Language no difficulty naming common objects   Fund of Knowledge adequate knowledge of current events  adequate fund of knowledge regarding past history  adequate fund of knowledge regarding vocabulary    Pain none   Pain Scale 0       Laboratory Results:   I have personally reviewed all pertinent laboratory/tests results    Most Recent Labs:   Lab Results   Component Value Date    WBC 7 19 10/05/2019    RBC 4 09 10/05/2019    HGB 11 9 10/05/2019    HCT 37 4 10/05/2019     (H) 10/05/2019    RDW 12 8 10/05/2019    NEUTROABS 4 43 10/05/2019    K 4 1 10/05/2019     10/05/2019    CO2 27 10/05/2019    BUN 10 10/05/2019    CREATININE 0 72 10/05/2019    CALCIUM 9 5 10/05/2019    AST 25 10/05/2019    ALT 21 10/05/2019 ALKPHOS 87 10/05/2019    HDL 58 08/10/2019    TRIG 61 08/10/2019    LDLCALC 138 (H) 08/10/2019    MGR2YKEZMUWC 1 508 08/10/2019    HCGQUANT <2 08/27/2018       Assessment/Plan:  From evaluation of the patient today and review of the past history, I he is believe patient has the diagnosis of major depressive disorder, moderate, recurrent without psychotic features  The patient has a history of depressive episodes on and off associated along with neurovegetative symptoms of depression lasting more than 2 weeks at a time  She also meets criteria for generalized anxiety disorder as she reports history of anxiety continue sleep for more than 2 years on almost daily basis  Does not endorse symptoms of any other mental health disorder  Plan was discussed with the patient both pharmacological and nonpharmacological   She feels the Zoloft has been helpful but at this time wants to give few more weeks before considering changing the dose  She knew the wanted medication for helping her with sleep and I will add trazodone 50 mg half a tablet at bedtime as needed for poor sleep  She was educated in detail about benefit, risk, dosage, frequency, indication and contraindication of her medications  She was also specially educated about the risk of serotonin syndrome and educated about the symptoms of it and to call us or visit nearby ER if she has any symptoms related to it  She also was advised to call us if there is any concern and call 911 or visit nearby ER if she has any thoughts to hurt herself other or any emergency  The patient agreed     Diagnoses and all orders for this visit:    MDD (major depressive disorder), recurrent episode, moderate (HCC)  -     traZODone (DESYREL) 50 mg tablet;  Take 0 5 tablets (25 mg total) by mouth daily at bedtime as needed for sleep    Generalized anxiety disorder    Other orders  -     Milnacipran HCl (SAVELLA) 50 MG TABS; Take 50 mg by mouth 2 (two) times a day Treatment Recommendations:    Check Assessment/Plan section for details  Risks/Benefits/Precautions:      Risks, Benefits And Possible Side Effects Of Medications:    Risks, benefits, and possible side effects of medications explained to [facility] and she verbalizes understanding and agreement for treatment      Controlled Medication Discussion:     Not applicable    Treatment Plan;    Completed and signed during the session: Yes - with Darrion Pradhan MD 11/07/19

## 2019-11-15 DIAGNOSIS — F32.1 CURRENT MODERATE EPISODE OF MAJOR DEPRESSIVE DISORDER WITHOUT PRIOR EPISODE (HCC): ICD-10-CM

## 2019-11-20 ENCOUNTER — SOCIAL WORK (OUTPATIENT)
Dept: BEHAVIORAL/MENTAL HEALTH CLINIC | Facility: CLINIC | Age: 39
End: 2019-11-20
Payer: COMMERCIAL

## 2019-11-20 DIAGNOSIS — F41.1 GENERALIZED ANXIETY DISORDER: ICD-10-CM

## 2019-11-20 DIAGNOSIS — F33.0 MILD EPISODE OF RECURRENT MAJOR DEPRESSIVE DISORDER (HCC): Primary | ICD-10-CM

## 2019-11-20 PROCEDURE — 90847 FAMILY PSYTX W/PT 50 MIN: CPT | Performed by: SOCIAL WORKER

## 2019-12-02 DIAGNOSIS — F33.1 MDD (MAJOR DEPRESSIVE DISORDER), RECURRENT EPISODE, MODERATE (HCC): ICD-10-CM

## 2019-12-02 RX ORDER — TRAZODONE HYDROCHLORIDE 50 MG/1
25 TABLET ORAL
Qty: 15 TABLET | Refills: 0 | Status: SHIPPED | OUTPATIENT
Start: 2019-12-02 | End: 2019-12-24 | Stop reason: SDUPTHER

## 2019-12-04 ENCOUNTER — SOCIAL WORK (OUTPATIENT)
Dept: BEHAVIORAL/MENTAL HEALTH CLINIC | Facility: CLINIC | Age: 39
End: 2019-12-04
Payer: COMMERCIAL

## 2019-12-04 DIAGNOSIS — F41.1 GENERALIZED ANXIETY DISORDER: ICD-10-CM

## 2019-12-04 DIAGNOSIS — F33.0 MILD EPISODE OF RECURRENT MAJOR DEPRESSIVE DISORDER (HCC): Primary | ICD-10-CM

## 2019-12-04 PROCEDURE — 90834 PSYTX W PT 45 MINUTES: CPT | Performed by: SOCIAL WORKER

## 2019-12-04 NOTE — PSYCH
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  Cristiano Rizzo questioned her chidlren's behavior with respect to how she raised them  Used CBT to explore with Cristiano Rizzo how to cope with same  A: Cristiano Rizzo presents with improved mood, brighter affect  She seems to be accepting life as it is at this point and relying on her jose alberto to get her through  Cristiano Rizzo seems motivated and insightful  P: Continue CBT; follow up 2 weeks  Behavioral Health Treatment Plan [facility]: Diagnosis and Treatment Plan explained to Alicia Lua relates understanding diagnosis and is agreeable to Treatment Plan   Yes

## 2019-12-09 DIAGNOSIS — B00.9 HERPES SIMPLEX INFECTION: Primary | ICD-10-CM

## 2019-12-09 DIAGNOSIS — B00.9 HERPES SIMPLEX INFECTION: ICD-10-CM

## 2019-12-09 RX ORDER — VALACYCLOVIR HYDROCHLORIDE 1 G/1
2000 TABLET, FILM COATED ORAL 2 TIMES DAILY
Qty: 4 TABLET | Refills: 0 | Status: SHIPPED | OUTPATIENT
Start: 2019-12-09 | End: 2020-08-06

## 2019-12-09 RX ORDER — VALACYCLOVIR HYDROCHLORIDE 1 G/1
1000 TABLET, FILM COATED ORAL 2 TIMES DAILY
Qty: 4 TABLET | Refills: 0 | Status: SHIPPED | OUTPATIENT
Start: 2019-12-09 | End: 2019-12-09 | Stop reason: SDUPTHER

## 2019-12-09 NOTE — TELEPHONE ENCOUNTER
INCOMING CALL FROM PT STATING SHE HAS A OUTBREAK WOULD LIKE A REFILL OF HER VALTREX CALL INTO THE PHARMACY

## 2019-12-17 ENCOUNTER — SOCIAL WORK (OUTPATIENT)
Dept: BEHAVIORAL/MENTAL HEALTH CLINIC | Facility: CLINIC | Age: 39
End: 2019-12-17
Payer: COMMERCIAL

## 2019-12-17 DIAGNOSIS — F41.1 GENERALIZED ANXIETY DISORDER: Primary | ICD-10-CM

## 2019-12-17 DIAGNOSIS — F33.0 MILD EPISODE OF RECURRENT MAJOR DEPRESSIVE DISORDER (HCC): ICD-10-CM

## 2019-12-17 PROCEDURE — 90834 PSYTX W PT 45 MINUTES: CPT | Performed by: SOCIAL WORKER

## 2019-12-17 NOTE — PSYCH
Psychotherapy Provided: Individual Psychotherapy 45 minutes     Length of time in session: 45 minutes, follow up in 3 weeks    Goals addressed in session: Goal 1     Pain:      none    0    Current suicide risk : Low     D: Desean Mak shared news that her son is having a daughter; she verbalized feeling very excited about this  She spoke a lot about her younger son who is experiencing mental health symptoms  She talked about being emotionally supportive to him and relieved that he scheduled an appointment for himself  Desean Mak discussed various family issues and how she rajiv with same  She continues to report her jose alberto as a strength  A: Desean Mak presented with improved mood, calmer affect  She seems to be more accepting that she can't control adult children's decisions  P:  Continue individual therapy; follow up 3 weeks  Behavioral Health Treatment Plan [facility]: Diagnosis and Treatment Plan explained to Hortencia Cat relates understanding diagnosis and is agreeable to Treatment Plan   Yes

## 2019-12-24 ENCOUNTER — OFFICE VISIT (OUTPATIENT)
Dept: PSYCHIATRY | Facility: CLINIC | Age: 39
End: 2019-12-24
Payer: COMMERCIAL

## 2019-12-24 VITALS — HEART RATE: 71 BPM | DIASTOLIC BLOOD PRESSURE: 81 MMHG | SYSTOLIC BLOOD PRESSURE: 128 MMHG

## 2019-12-24 DIAGNOSIS — F33.1 MDD (MAJOR DEPRESSIVE DISORDER), RECURRENT EPISODE, MODERATE (HCC): Primary | ICD-10-CM

## 2019-12-24 DIAGNOSIS — F32.1 CURRENT MODERATE EPISODE OF MAJOR DEPRESSIVE DISORDER WITHOUT PRIOR EPISODE (HCC): ICD-10-CM

## 2019-12-24 DIAGNOSIS — F33.1 MDD (MAJOR DEPRESSIVE DISORDER), RECURRENT EPISODE, MODERATE (HCC): ICD-10-CM

## 2019-12-24 PROCEDURE — 99213 OFFICE O/P EST LOW 20 MIN: CPT | Performed by: PSYCHIATRY & NEUROLOGY

## 2019-12-24 RX ORDER — TRAZODONE HYDROCHLORIDE 50 MG/1
25 TABLET ORAL
Qty: 15 TABLET | Refills: 0 | OUTPATIENT
Start: 2019-12-24 | End: 2020-01-23

## 2019-12-24 RX ORDER — TRAZODONE HYDROCHLORIDE 50 MG/1
25 TABLET ORAL
Qty: 15 TABLET | Refills: 0 | Status: SHIPPED | OUTPATIENT
Start: 2019-12-24 | End: 2020-01-15

## 2019-12-24 NOTE — PSYCH
MEDICATION MANAGEMENT NOTE        [address]      Name and Date of Birth:  Lucas Youngblood 37 y o  1980 MRN: [id number]    Date of Visit: December 24, 2019    Reason for Visit:  Follow-up for medication management  The patient came with her   Subjective: The patient reports she has been doing well  She reports her depression has significantly been better  Denies feeling sad or anxious nowadays  Reports sleep also has been good on trazodone  She did reported she takes quarter of 50 mg tablet as higher dose makes her very groggy in the morning  She denies any suicidal thoughts or any hopelessness  Her  also denies any concern except some memory issues  The patient though feels her memory has been getting better nowadays  Will continue to monitor  Reports appetite, energy has been going well  Reports her job is also going well  Denies any other side effects from the medication  Denies any symptoms of serotonin syndrome      Review Of Systems:      Constitutional negative   ENT negative   Cardiovascular negative   Respiratory negative   Gastrointestinal negative   Genitourinary negative   Musculoskeletal negative   Integumentary negative   Neurological negative   Endocrine negative   Other Symptoms none       Alcohol/Substance Abuse:  Denies        Past Medical History:    Past Medical History:   Diagnosis Date    Anemia     Anxiety 10/16/2019    Colon polyp     Duodenitis without bleeding     Dysphagia     Fibromyalgia     Gastritis     GERD (gastroesophageal reflux disease)     Hiatal hernia     Hx of colonoscopy     Insomnia     Memory loss     Menorrhagia with regular cycle 11/20/2014    Moderate episode of recurrent major depressive disorder ([address] ) 10/16/2019    Obesity     Oral herpes     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 myelopathy or radiculopathy      Past Medical History Pertinent Negatives:   Diagnosis Date Noted    Abnormal Pap smear of cervix 04/19/2018    2017--per pt wnl    Allergic 08/23/2018    Asthma 08/23/2018    [email or web address] ) 08/23/2018    Chronic kidney disease 08/23/2018    Clotting disorder ([email or web address] ) 08/23/2018    COPD (chronic obstructive pulmonary disease) ([email or web address] ) 08/23/2018    Coronary artery disease 08/23/2018    Dementia ([email or web address] ) 08/23/2018    Diabetes mellitus ([place] [address] ) 08/23/2018    Disease of thyroid gland 08/23/2018    Diverticulitis of colon 08/23/2018    Eating disorder 08/23/2018    Heart murmur 08/23/2018    HL (hearing loss) 08/23/2018    Hypertension 08/23/2018    Infectious viral hepatitis 08/23/2018    Inflammatory bowel disease 08/23/2018    Kidney stone 08/23/2018    Myocardial infarction ([email or web address] ) 08/23/2018    Osteoporosis 08/23/2018    Otitis media 08/23/2018    Pneumonia 08/23/2018    Scoliosis 08/23/2018    Seizures ([place] [address] ) 08/23/2018    Shingles 08/23/2018    Stroke ([place] [address] ) 08/23/2018    Substance abuse ([place] [address] ) 08/23/2018    Urinary tract infection 08/23/2018    Visual impairment 08/23/2018     Past Surgical History:   Procedure Laterality Date    APPENDECTOMY      BREAST SURGERY Right 01/08/2015    lump removed from breast    [address] [place]    CHOLECYSTECTOMY      COLONOSCOPY      9/26/2012    DILATION AND CURETTAGE OF UTERUS      Resolved:1/29/2009    ESOPHAGOGASTRODUODENOSCOPY      Diagnostic ; 9/26/2012    HYSTEROSCOPY      Resolved:1/30/2009    OVARIAN CYST REMOVAL Right 2005    WISDOM TOOTH EXTRACTION       Allergies   Allergen Reactions    Morphine Chest Pain    Percocet [Oxycodone-Acetaminophen] Hives    Domperidone        Current Medications:       Current Outpatient Medications:     clindamycin (CLINDAGEL) 1 % gel, Apply topically 2 (two) times a day, Disp: 30 g, Rfl: 2    dexlansoprazole (DEXILANT) 60 MG capsule, Take 1 capsule (60 mg total) by mouth every 12 (twelve) hours (Patient taking differently: Take 60 mg by mouth daily ), Disp: 180 capsule, Rfl: 2    famotidine (PEPCID) 20 mg tablet, Take 1 tablet (20 mg total) by mouth 2 (two) times a day (Patient taking differently: Take 20 mg by mouth daily ), Disp: 60 tablet, Rfl: 0    linaCLOtide 145 MCG CAPS, Take 1 capsule (145 mcg total) by mouth daily for 30 days, Disp: 30 capsule, Rfl: 5    meclizine (ANTIVERT) 32 MG tablet, Take 32 mg by mouth 3 (three) times a day as needed for dizziness, Disp: , Rfl:     Milnacipran HCl (SAVELLA) 50 MG TABS, Take 50 mg by mouth 2 (two) times a day, Disp: , Rfl:     Multiple Vitamin (MULTIVITAMIN) tablet, Take 1 tablet by mouth daily, Disp: , Rfl:     sertraline (ZOLOFT) 50 mg tablet, Take 1 tablet (50 mg total) by mouth daily, Disp: 30 tablet, Rfl: 2    traZODone (DESYREL) 50 mg tablet, Take 0 5 tablets (25 mg total) by mouth daily at bedtime as needed for sleep, Disp: 15 tablet, Rfl: 0    valACYclovir (VALTREX) 1,000 mg tablet, Take 2 tablets (2,000 mg total) by mouth 2 (two) times a day for 1 day, Disp: 4 tablet, Rfl: 0       History Review:  The following portions of the patient's history were reviewed and updated as appropriate: allergies, current medications, past family history, past medical history, past social history, past surgical history and problem list          OBJECTIVE:     Vital signs in last 24 hours:    Vitals:    12/24/19 1131   BP: 128/81   Pulse: 71       Mental Status Evaluation:    Appearance age appropriate, casually dressed   Behavior cooperative, calm   Speech normal rate, normal volume, normal pitch   Mood euthymic   Affect normal range and intensity, appropriate   Thought Processes organized, goal directed   Associations intact associations   Thought Content no overt delusions   Perceptual Disturbances: no auditory hallucinations, no visual hallucinations   Abnormal Thoughts  Risk Potential Suicidal ideation - None  Homicidal ideation - None  Potential for aggression - No   Orientation oriented to person, place, time/date and situation   Memory recent and remote memory grossly intact   Consciousness alert and awake   Attention Span Concentration Span attention span and concentration are age appropriate   Intellect appears to be of average intelligence   Insight intact   Judgement intact   Muscle Strength and  Gait normal gait and normal balance   Motor activity no abnormal movements   Language no difficulty naming common objects   Fund of Knowledge adequate knowledge of current events  adequate fund of knowledge regarding past history  adequate fund of knowledge regarding vocabulary    Pain none   Pain Scale 0       Laboratory Results:   I have personally reviewed all pertinent laboratory/tests results  Most Recent Labs:   Lab Results   Component Value Date    WBC 7 19 10/05/2019    RBC 4 09 10/05/2019    HGB 11 9 10/05/2019    HCT 37 4 10/05/2019     (H) 10/05/2019    RDW 12 8 10/05/2019    NEUTROABS 4 43 10/05/2019    K 4 1 10/05/2019     10/05/2019    CO2 27 10/05/2019    BUN 10 10/05/2019    CREATININE 0 72 10/05/2019    CALCIUM 9 5 10/05/2019    AST 25 10/05/2019    ALT 21 10/05/2019    ALKPHOS 87 10/05/2019    HDL 58 08/10/2019    TRIG 61 08/10/2019    LDLCALC 138 (H) 08/10/2019    [id number] 1 508 08/10/2019    HCGQUANT <2 08/27/2018       Assessment/Plan:  Depression has been under control  Sleep also improved on trazodone  some memory concern but at this time no clear cause noticeable  Will continue to monitor  Plan is to continue with her current medication which includes Zoloft and trazodone with no changes  The patient has been again educated about risk of serotonin syndrome as patient is also on savella and was advised to call us or visit nearby ER if she noticed any symptoms  She also was advised to call us if any other concern or visit nearby ER if she has any thoughts to hurt herself, others or any emergency        Diagnoses and all orders for this visit:    MDD (major depressive disorder), recurrent episode, moderate (HCC)  -     traZODone (DESYREL) 50 mg tablet; Take 0 5 tablets (25 mg total) by mouth daily at bedtime as needed for sleep  -     CBC and differential; Future    Current moderate episode of major depressive disorder without prior episode (HCC)  -     sertraline (ZOLOFT) 50 mg tablet; Take 1 tablet (50 mg total) by mouth daily          Treatment Recommendations/Precautions:      Aware of 24 hour and weekend coverage for urgent situations accessed by calling [facility] main practice number    Risks/Benefits      Risks, Benefits And Possible Side Effects Of Medications:    Risks, benefits, and possible side effects of medications explained to [facility]ORD[facility] and she verbalizes understanding and agreement for treatment      Controlled Medication Discussion:     Not applicable    Psychotherapy Provided:     Individual psychotherapy provided: No     Treatment Plan;    Completed and signed during the session: Not applicable - Treatment Plan not due at this session    Taya Anguiano MD 12/24/19

## 2020-01-15 ENCOUNTER — APPOINTMENT (OUTPATIENT)
Dept: LAB | Facility: HOSPITAL | Age: 40
End: 2020-01-15
Payer: COMMERCIAL

## 2020-01-15 DIAGNOSIS — F33.1 MDD (MAJOR DEPRESSIVE DISORDER), RECURRENT EPISODE, MODERATE (HCC): ICD-10-CM

## 2020-01-15 DIAGNOSIS — E78.2 MIXED HYPERLIPIDEMIA: ICD-10-CM

## 2020-01-15 LAB
BASOPHILS # BLD AUTO: 0.05 THOUSANDS/ΜL (ref 0–0.1)
BASOPHILS NFR BLD AUTO: 1 % (ref 0–1)
CHOLEST SERPL-MCNC: 208 MG/DL (ref 50–200)
EOSINOPHIL # BLD AUTO: 0.11 THOUSAND/ΜL (ref 0–0.61)
EOSINOPHIL NFR BLD AUTO: 2 % (ref 0–6)
ERYTHROCYTE [DISTWIDTH] IN BLOOD BY AUTOMATED COUNT: 13.1 % (ref 11.6–15.1)
HCT VFR BLD AUTO: 38.3 % (ref 34.8–46.1)
HDLC SERPL-MCNC: 61 MG/DL
HGB BLD-MCNC: 12.1 G/DL (ref 11.5–15.4)
IMM GRANULOCYTES # BLD AUTO: 0.04 THOUSAND/UL (ref 0–0.2)
IMM GRANULOCYTES NFR BLD AUTO: 1 % (ref 0–2)
LDLC SERPL CALC-MCNC: 137 MG/DL (ref 0–100)
LYMPHOCYTES # BLD AUTO: 2.16 THOUSANDS/ΜL (ref 0.6–4.47)
LYMPHOCYTES NFR BLD AUTO: 29 % (ref 14–44)
MCH RBC QN AUTO: 29.3 PG (ref 26.8–34.3)
MCHC RBC AUTO-ENTMCNC: 31.6 G/DL (ref 31.4–37.4)
MCV RBC AUTO: 93 FL (ref 82–98)
MONOCYTES # BLD AUTO: 0.72 THOUSAND/ΜL (ref 0.17–1.22)
MONOCYTES NFR BLD AUTO: 10 % (ref 4–12)
NEUTROPHILS # BLD AUTO: 4.38 THOUSANDS/ΜL (ref 1.85–7.62)
NEUTS SEG NFR BLD AUTO: 57 % (ref 43–75)
NRBC BLD AUTO-RTO: 0 /100 WBCS
PLATELET # BLD AUTO: 364 THOUSANDS/UL (ref 149–390)
PMV BLD AUTO: 9.3 FL (ref 8.9–12.7)
RBC # BLD AUTO: 4.13 MILLION/UL (ref 3.81–5.12)
TRIGL SERPL-MCNC: 48 MG/DL
WBC # BLD AUTO: 7.46 THOUSAND/UL (ref 4.31–10.16)

## 2020-01-15 PROCEDURE — 36415 COLL VENOUS BLD VENIPUNCTURE: CPT

## 2020-01-15 PROCEDURE — 80061 LIPID PANEL: CPT

## 2020-01-15 PROCEDURE — 85025 COMPLETE CBC W/AUTO DIFF WBC: CPT

## 2020-01-15 RX ORDER — TRAZODONE HYDROCHLORIDE 50 MG/1
25 TABLET ORAL
Qty: 15 TABLET | Refills: 0 | Status: SHIPPED | OUTPATIENT
Start: 2020-01-15 | End: 2020-02-07

## 2020-01-22 ENCOUNTER — APPOINTMENT (OUTPATIENT)
Dept: LAB | Facility: HOSPITAL | Age: 40
End: 2020-01-22
Payer: COMMERCIAL

## 2020-01-22 DIAGNOSIS — D50.9 IRON DEFICIENCY ANEMIA, UNSPECIFIED IRON DEFICIENCY ANEMIA TYPE: ICD-10-CM

## 2020-01-22 LAB
ALBUMIN SERPL BCP-MCNC: 3.8 G/DL (ref 3.5–5)
ALP SERPL-CCNC: 86 U/L (ref 46–116)
ALT SERPL W P-5'-P-CCNC: 29 U/L (ref 12–78)
ANION GAP SERPL CALCULATED.3IONS-SCNC: 8 MMOL/L (ref 4–13)
AST SERPL W P-5'-P-CCNC: 15 U/L (ref 5–45)
BILIRUB SERPL-MCNC: 0.51 MG/DL (ref 0.2–1)
BUN SERPL-MCNC: 10 MG/DL (ref 5–25)
CALCIUM SERPL-MCNC: 9.1 MG/DL (ref 8.3–10.1)
CHLORIDE SERPL-SCNC: 103 MMOL/L (ref 100–108)
CO2 SERPL-SCNC: 29 MMOL/L (ref 21–32)
CREAT SERPL-MCNC: 0.69 MG/DL (ref 0.6–1.3)
CRP SERPL QL: 4 MG/L
ERYTHROCYTE [SEDIMENTATION RATE] IN BLOOD: 24 MM/HOUR (ref 0–20)
FERRITIN SERPL-MCNC: 71 NG/ML (ref 8–388)
GFR SERPL CREATININE-BSD FRML MDRD: 108 ML/MIN/1.73SQ M
GLUCOSE P FAST SERPL-MCNC: 96 MG/DL (ref 65–99)
IRON SATN MFR SERPL: 30 %
IRON SERPL-MCNC: 111 UG/DL (ref 50–170)
POTASSIUM SERPL-SCNC: 3.9 MMOL/L (ref 3.5–5.3)
PROT SERPL-MCNC: 8.1 G/DL (ref 6.4–8.2)
SODIUM SERPL-SCNC: 140 MMOL/L (ref 136–145)
TIBC SERPL-MCNC: 368 UG/DL (ref 250–450)
VIT B12 SERPL-MCNC: 582 PG/ML (ref 100–900)

## 2020-01-22 PROCEDURE — 85652 RBC SED RATE AUTOMATED: CPT

## 2020-01-22 PROCEDURE — 80053 COMPREHEN METABOLIC PANEL: CPT

## 2020-01-22 PROCEDURE — 86140 C-REACTIVE PROTEIN: CPT

## 2020-01-22 PROCEDURE — 36415 COLL VENOUS BLD VENIPUNCTURE: CPT

## 2020-01-22 PROCEDURE — 83540 ASSAY OF IRON: CPT

## 2020-01-22 PROCEDURE — 82607 VITAMIN B-12: CPT

## 2020-01-22 PROCEDURE — 82728 ASSAY OF FERRITIN: CPT

## 2020-01-22 PROCEDURE — 83550 IRON BINDING TEST: CPT

## 2020-01-26 DIAGNOSIS — K59.01 SLOW TRANSIT CONSTIPATION: ICD-10-CM

## 2020-01-26 RX ORDER — LINACLOTIDE 145 UG/1
CAPSULE, GELATIN COATED ORAL
Qty: 30 CAPSULE | Refills: 9 | Status: SHIPPED | OUTPATIENT
Start: 2020-01-26 | End: 2020-10-19 | Stop reason: ALTCHOICE

## 2020-01-27 ENCOUNTER — OFFICE VISIT (OUTPATIENT)
Dept: FAMILY MEDICINE CLINIC | Facility: CLINIC | Age: 40
End: 2020-01-27
Payer: COMMERCIAL

## 2020-01-27 VITALS
TEMPERATURE: 98.4 F | WEIGHT: 236 LBS | DIASTOLIC BLOOD PRESSURE: 80 MMHG | SYSTOLIC BLOOD PRESSURE: 132 MMHG | HEIGHT: 65 IN | HEART RATE: 84 BPM | BODY MASS INDEX: 39.32 KG/M2 | OXYGEN SATURATION: 98 % | RESPIRATION RATE: 16 BRPM

## 2020-01-27 DIAGNOSIS — E78.2 MIXED HYPERLIPIDEMIA: ICD-10-CM

## 2020-01-27 DIAGNOSIS — R73.01 IMPAIRED FASTING GLUCOSE: ICD-10-CM

## 2020-01-27 DIAGNOSIS — E55.9 VITAMIN D DEFICIENCY: ICD-10-CM

## 2020-01-27 DIAGNOSIS — D50.8 IRON DEFICIENCY ANEMIA SECONDARY TO INADEQUATE DIETARY IRON INTAKE: ICD-10-CM

## 2020-01-27 DIAGNOSIS — Z12.31 ENCOUNTER FOR SCREENING MAMMOGRAM FOR BREAST CANCER: ICD-10-CM

## 2020-01-27 DIAGNOSIS — F33.0 MILD EPISODE OF RECURRENT MAJOR DEPRESSIVE DISORDER (HCC): ICD-10-CM

## 2020-01-27 DIAGNOSIS — J01.00 ACUTE NON-RECURRENT MAXILLARY SINUSITIS: Primary | ICD-10-CM

## 2020-01-27 PROCEDURE — 99214 OFFICE O/P EST MOD 30 MIN: CPT | Performed by: FAMILY MEDICINE

## 2020-01-27 PROCEDURE — 3008F BODY MASS INDEX DOCD: CPT | Performed by: FAMILY MEDICINE

## 2020-01-27 PROCEDURE — 1036F TOBACCO NON-USER: CPT | Performed by: FAMILY MEDICINE

## 2020-01-27 RX ORDER — METHYLPREDNISOLONE 4 MG/1
TABLET ORAL
Qty: 21 EACH | Refills: 0 | Status: SHIPPED | OUTPATIENT
Start: 2020-01-27 | End: 2020-03-27 | Stop reason: ALTCHOICE

## 2020-01-27 NOTE — PROGRESS NOTES
Subjective:   Chief Complaint   Patient presents with    Cough     ongoing 5 weeks         Patient ID: Quynh Townsend is a 41 y o  female  Patient office follow-up with a chronic condition and she is concerned about the cough for congestion postnasal drip runny nose it has been going on for 4 week no fever no chills no decrease in intake no bloody ache no sick contact the and no abdomen pain nausea vomiting no history of travel by airplane and no smoking patient take Zyrtec over-the-counter and take Mucinex and is not helping the patient history of hyperlipidemia try to controlled with the low-fat diet deny any chest pain short of breath no palpitation no TIA symptom patient history of impaired fasting glucose try to controlled with the low carb diet deny any increased thirsty increased frequency urination no dizziness no headache and no abdomen pain patient history of iron deficiency anemia she took iron In fusion and her hemoglobin is improved deny any fatigue palpitation the patient history of vitamin-D deficiency she is not taking any vitamin-D supplement deny any bone pain jointPain no fall recent blood work discussed with the patient      The following portions of the patient's history were reviewed and updated as appropriate: allergies, current medications, past family history, past medical history, past social history, past surgical history and problem list     Review of Systems   Constitutional: Negative for fatigue and fever  HENT: Negative for ear pain, sinus pressure, sinus pain and sore throat  Eyes: Negative for pain and redness  Respiratory: Positive for cough  Negative for chest tightness and shortness of breath  Cardiovascular: Negative for chest pain, palpitations and leg swelling  Gastrointestinal: Negative for abdominal pain, blood in stool, constipation, diarrhea and nausea  Genitourinary: Negative for flank pain, frequency and hematuria     Musculoskeletal: Negative for back pain and joint swelling  Skin: Negative for rash  Neurological: Negative for dizziness, numbness and headaches  Hematological: Does not bruise/bleed easily  Objective:  Vitals:    01/27/20 1414   BP: 132/80   BP Location: Left arm   Patient Position: Sitting   Cuff Size: Large   Pulse: 84   Resp: 16   Temp: 98 4 °F (36 9 °C)   TempSrc: Tympanic   SpO2: 98%   Weight: 107 kg (236 lb)   Height: 5' 5" (1 651 m)      Physical Exam   Constitutional: She is oriented to person, place, and time  She appears well-developed and well-nourished  HENT:   Head: Normocephalic  Right Ear: External ear normal    Left Ear: External ear normal    Eyes: Conjunctivae and EOM are normal  Right eye exhibits no discharge  Left eye exhibits no discharge  Neck: No JVD present  Cardiovascular: Normal rate, regular rhythm and normal heart sounds  Exam reveals no gallop  No murmur heard  Pulmonary/Chest: Effort normal  No respiratory distress  She has no wheezes  She has no rales  She exhibits no tenderness  Abdominal: She exhibits no mass  There is no tenderness  There is no rebound  Musculoskeletal: She exhibits no edema or tenderness  Neurological: She is alert and oriented to person, place, and time  Skin: No rash noted  No erythema           Assessment/Plan:    Acute non-recurrent maxillary sinusitis  Acute symptomatic not responding to the conservative treatment we will recommend the Medrol pack to take it as directed on the pack also we did recommend will hydration     Mild episode of recurrent major depressive disorder (HCC)  Chronic asymptomatic fair control continue with the Zoloft 50 mg and trazodone 50 mg tolerated well without any side effect no recent exacerbation no recent hospitalization    Iron deficiency anemia secondary to inadequate dietary iron intake  Chronic asymptomatic improve in the hemoglobin status post iron infusion    Vitamin D deficiency  A chronic asymptomatic recommend patient to take vitamin D rich diet plan to recheck vitamin-D next the blood work    Hyperlipidemia  Chronic asymptomatic uncontrolled patient is not candidate to statin treatment recommend low-fat diet and important lose weight    Impaired fasting glucose  Chronic asymptomatic fair control encouraged patient to continue with the low carb diet       Diagnoses and all orders for this visit:    Acute non-recurrent maxillary sinusitis  -     methylPREDNISolone 4 MG tablet therapy pack; Use as directed on package    Mild episode of recurrent major depressive disorder (HCC)    Iron deficiency anemia secondary to inadequate dietary iron intake    Mixed hyperlipidemia    Vitamin D deficiency    Encounter for screening mammogram for breast cancer  -     Mammo screening bilateral w 3d & cad; Future    Impaired fasting glucose

## 2020-01-27 NOTE — PATIENT INSTRUCTIONS
Low Fat Diet   WHAT YOU NEED TO KNOW:   A low-fat diet is an eating plan that is low in total fat, unhealthy fat, and cholesterol  You may need to follow a low-fat diet if you have trouble digesting or absorbing fat  You may also need to follow this diet if you have high cholesterol  You can also lower your cholesterol by increasing the amount of fiber in your diet  Soluble fiber is a type of fiber that helps to decrease cholesterol levels  DISCHARGE INSTRUCTIONS:   Different types of fat in food:   · Limit unhealthy fats  A diet that is high in cholesterol, saturated fat, and trans fat may cause unhealthy cholesterol levels  Unhealthy cholesterol levels increase your risk of heart disease  ¨ Cholesterol:  Limit intake of cholesterol to less than 200 mg per day  Cholesterol is found in meat, eggs, and dairy  ¨ Saturated fat:  Limit saturated fat to less than 7% of your total daily calories  Ask your dietitian how many calories you need each day  Saturated fat is found in butter, cheese, ice cream, whole milk, and palm oil  Saturated fat is also found in meat, such as beef, pork, chicken skin, and processed meats  Processed meats include sausage, hot dogs, and bologna  ¨ Trans fat:  Avoid trans fat as much as possible  Trans fat is used in fried and baked foods  Foods that say trans fat free on the label may still have up to 0 5 grams of trans fat per serving  · Include healthy fats  Replace foods that are high in saturated and trans fat with foods high in healthy fats  This may help to decrease high cholesterol levels  ¨ Monounsaturated fats: These are found in avocados, nuts, and vegetable oils, such as olive, canola, and sunflower oil  ¨ Polyunsaturated fats: These can be found in vegetable oils, such as soybean or corn oil  Omega-3 fats can help to decrease the risk of heart disease  Omega-3 fats are found in fish, such as salmon, herring, trout, and tuna   Omega-3 fats can also be found in plant foods, such as walnuts, flaxseed, soybeans, and canola oil    Foods to limit or avoid:   · Grains:      ¨ Snacks that are made with partially hydrogenated oils, such as chips, regular crackers, and butter-flavored popcorn    ¨ High-fat baked goods, such as biscuits, croissants, doughnuts, pies, cookies, and pastries    · Dairy:      ¨ Whole milk, 2% milk, and yogurt and ice cream made with whole milk    ¨ Half and half creamer, heavy cream, and whipping cream    ¨ Cheese, cream cheese, and sour cream    · Meats and proteins:      ¨ High-fat cuts of meat (T-bone steak, regular hamburger, and ribs)    ¨ Fried meat, poultry (turkey and chicken), and fish    ¨ Poultry (chicken and turkey) with skin    ¨ Cold cuts (salami or bologna), hot dogs, garcia, and sausage    ¨ Whole eggs and egg yolks    · Vegetables and fruits with added fat:      ¨ Fried vegetables or vegetables in butter or high-fat sauces, such as cream or cheese sauces    ¨ Fried fruit or fruit served with butter or cream    · Fats:      ¨ Butter, stick margarine, and shortening    ¨ Coconut, palm oil, and palm kernel oil  Foods to include:   · Grains:      ¨ Whole-grain breads, cereals, pasta, and brown rice    ¨ Low-fat crackers and pretzels    · Vegetables and fruits:      ¨ Fresh, frozen, or canned vegetables (no salt or low-sodium)    ¨ Fresh, frozen, dried, or canned fruit (canned in light syrup or fruit juice)    ¨ Avocado    · Low-fat dairy products:      ¨ Nonfat (skim) or 1% milk    ¨ Nonfat or low-fat cheese, yogurt, and cottage cheese    · Meats and proteins:      ¨ Chicken or turkey with no skin    ¨ Baked or broiled fish    ¨ Lean beef and pork (loin, round, extra lean hamburger)    ¨ Beans and peas, unsalted nuts, soy products    ¨ Egg whites and substitutes    ¨ Seeds and nuts    · Fats:      ¨ Unsaturated oil, such as canola, olive, peanut, soybean, or sunflower oil    ¨ Soft or liquid margarine and vegetable oil spread    ¨ Low-fat salad dressing  Other ways to decrease fat:   · Read food labels before you buy foods  Choose foods that have less than 30% of calories from fat  Choose low-fat or fat-free dairy products  Remember that fat free does not mean calorie free  These foods still contain calories, and too many calories can lead to weight gain  · Trim fat from meat and avoid fried food  Trim all visible fat from meat before you cook it  Remove the skin from poultry  Do not zarate meat, fish, or poultry  Bake, roast, boil, or broil these foods instead  Avoid fried foods  Eat a baked potato instead of [place] fries  Steam vegetables instead of sautéing them in butter  · Add less fat to foods  Use imitation garcia bits on salads and baked potatoes instead of regular garcia bits  Use fat-free or low-fat salad dressings instead of regular dressings  Use low-fat or nonfat butter-flavored topping instead of regular butter or margarine on popcorn and other foods  Ways to decrease fat in recipes:  Replace high-fat ingredients with low-fat or nonfat ones  This may cause baked goods to be drier than usual  You may need to use nonfat cooking spray on pans to prevent food from sticking  You also may need to change the amount of other ingredients, such as water, in the recipe  Try the following:  · Use low-fat or light margarine instead of regular margarine or shortening  · Use lean ground turkey breast or chicken, or lean ground beef (less than 5% fat) instead of hamburger  · Add 1 teaspoon of canola oil to 8 ounces of skim milk instead of using cream or half and half  · Use grated zucchini, carrots, or apples in breads instead of coconut  · Use blenderized, low-fat cottage cheese, plain tofu, or low-fat ricotta cheese instead of cream cheese  · Use 1 egg white and 1 teaspoon of canola oil, or use ¼ cup (2 ounces) of fat-free egg substitute instead of a whole egg       · Replace half of the oil that is called for in a recipe with applesauce when you bake  Use 3 tablespoons of cocoa powder and 1 tablespoon of canola oil instead of a square of baking chocolate  How to increase fiber:  Eat enough high-fiber foods to get 20 to 30 grams of fiber every day  Slowly increase your fiber intake to avoid stomach cramps, gas, and other problems  · Eat 3 ounces of whole-grain foods each day  An ounce is about 1 slice of bread  Eat whole-grain breads, such as whole-wheat bread  Whole wheat, whole-wheat flour, or other whole grains should be listed as the first ingredient on the food label  Replace white flour with whole-grain flour or use half of each in recipes  Whole-grain flour is heavier than white flour, so you may have to add more yeast or baking powder  · Eat a high-fiber cereal for breakfast   Oatmeal is a good source of soluble fiber  Look for cereals that have bran or fiber in the name  Choose whole-grain products, such as brown rice, barley, and whole-wheat pasta  · Eat more beans, peas, and lentils  For example, add beans to soups or salads  Eat at least 5 cups of fruits and vegetables each day  Eat fruits and vegetables with the peel because the peel is high in fiber  © 2017 [address]  Information is for End User's use only and may not be sold, redistributed or otherwise used for commercial purposes  All illustrations and images included in CareNotes® are the copyrighted property of A D A M [employer]  or [employer]  The above information is an  only  It is not intended as medical advice for individual conditions or treatments  Talk to your doctor, nurse or pharmacist before following any medical regimen to see if it is safe and effective for you  2019 09:14

## 2020-01-29 PROBLEM — J01.00 ACUTE NON-RECURRENT MAXILLARY SINUSITIS: Status: ACTIVE | Noted: 2020-01-27

## 2020-01-29 PROBLEM — J01.00 ACUTE NON-RECURRENT MAXILLARY SINUSITIS: Status: ACTIVE | Noted: 2020-01-29

## 2020-01-29 NOTE — ASSESSMENT & PLAN NOTE
Chronic asymptomatic uncontrolled patient is not candidate to statin treatment recommend low-fat diet and important lose weight

## 2020-01-29 NOTE — ASSESSMENT & PLAN NOTE
Acute symptomatic not responding to the conservative treatment we will recommend the Medrol pack to take it as directed on the pack also we did recommend will hydration

## 2020-01-29 NOTE — ASSESSMENT & PLAN NOTE
Chronic asymptomatic fair control continue with the Zoloft 50 mg and trazodone 50 mg tolerated well without any side effect no recent exacerbation no recent hospitalization

## 2020-01-29 NOTE — ASSESSMENT & PLAN NOTE
A chronic asymptomatic recommend patient to take vitamin D rich diet plan to recheck vitamin-D next the blood work

## 2020-02-04 DIAGNOSIS — F32.1 CURRENT MODERATE EPISODE OF MAJOR DEPRESSIVE DISORDER WITHOUT PRIOR EPISODE (HCC): ICD-10-CM

## 2020-02-07 DIAGNOSIS — F33.1 MDD (MAJOR DEPRESSIVE DISORDER), RECURRENT EPISODE, MODERATE (HCC): ICD-10-CM

## 2020-02-07 RX ORDER — TRAZODONE HYDROCHLORIDE 50 MG/1
25 TABLET ORAL
Qty: 15 TABLET | Refills: 0 | Status: SHIPPED | OUTPATIENT
Start: 2020-02-07 | End: 2020-03-03 | Stop reason: SDUPTHER

## 2020-03-02 ENCOUNTER — TELEPHONE (OUTPATIENT)
Dept: HEMATOLOGY ONCOLOGY | Facility: CLINIC | Age: 40
End: 2020-03-02

## 2020-03-02 NOTE — TELEPHONE ENCOUNTER
Patient missed 3:20 f/u apt today Mon 3/02 w/Dr Donovan Small  Called pt and left message to call the office to reschedule

## 2020-03-03 ENCOUNTER — OFFICE VISIT (OUTPATIENT)
Dept: PSYCHIATRY | Facility: CLINIC | Age: 40
End: 2020-03-03
Payer: COMMERCIAL

## 2020-03-03 DIAGNOSIS — F32.1 CURRENT MODERATE EPISODE OF MAJOR DEPRESSIVE DISORDER WITHOUT PRIOR EPISODE (HCC): ICD-10-CM

## 2020-03-03 DIAGNOSIS — F33.1 MDD (MAJOR DEPRESSIVE DISORDER), RECURRENT EPISODE, MODERATE (HCC): ICD-10-CM

## 2020-03-03 PROCEDURE — 1036F TOBACCO NON-USER: CPT | Performed by: PSYCHIATRY & NEUROLOGY

## 2020-03-03 PROCEDURE — 99213 OFFICE O/P EST LOW 20 MIN: CPT | Performed by: PSYCHIATRY & NEUROLOGY

## 2020-03-03 RX ORDER — TRAZODONE HYDROCHLORIDE 50 MG/1
25 TABLET ORAL
Qty: 15 TABLET | Refills: 0 | Status: SHIPPED | OUTPATIENT
Start: 2020-03-03 | End: 2020-06-04 | Stop reason: ALTCHOICE

## 2020-03-03 NOTE — PSYCH
MEDICATION MANAGEMENT NOTE        [facility]      Name and Date of Birth:  Nabil Woodward 41 y o  1980 MRN: [id number]    Date of Visit: March 3, 2020    Reason for Visit:  Follow-up for medication management  The patient came with her   Subjective: The patient reports doing well  She reports she is coming after spending 1 month in [facility] doing [address] work  She reports she has been feeling well  Denies feeling depressed or anxious lately  Reports appetite energy level has been good  Reports compliant with the medication and denies any side effect  Denies any hopelessness or any suicidal thoughts  Denies any mood swings or any anger outburst   Her  also confirmed reports patient has been doing very well  Review Of Systems:      Constitutional negative   ENT negative   Cardiovascular negative   Respiratory negative   Gastrointestinal negative   Genitourinary negative   Musculoskeletal negative   Integumentary negative   Neurological negative   Endocrine negative   Other Symptoms none       Alcohol/Substance Abuse:  Denies          Past Medical History:    Past Medical History:   Diagnosis Date    Anemia     Anxiety 10/16/2019    Colon polyp     Duodenitis without bleeding     Dysphagia     Fibromyalgia     Gastritis     GERD (gastroesophageal reflux disease)     Hiatal hernia     Hx of colonoscopy     Insomnia     Memory loss     Menorrhagia with regular cycle 11/20/2014    Moderate episode of recurrent major depressive disorder ([facility] [address] ) 10/16/2019    Obesity     Oral herpes     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 myelopathy or radiculopathy      Past Medical History Pertinent Negatives:   Diagnosis Date Noted    Abnormal Pap smear of cervix 04/19/2018    2017--per pt wnl    Allergic 08/23/2018    Asthma 08/23/2018    Cancer ([facility] ) 08/23/2018    Chronic kidney disease 08/23/2018    Clotting disorder ([facility] [address] ) 08/23/2018    COPD (chronic obstructive pulmonary disease) ([facility]) 08/23/2018    Coronary artery disease 08/23/2018    Dementia ([facility] ) 08/23/2018    Diabetes mellitus ([facility] ) 08/23/2018    Disease of thyroid gland 08/23/2018    Diverticulitis of colon 08/23/2018    Eating disorder 08/23/2018    Heart murmur 08/23/2018    HL (hearing loss) 08/23/2018    Hypertension 08/23/2018    Infectious viral hepatitis 08/23/2018    Inflammatory bowel disease 08/23/2018    Kidney stone 08/23/2018    Myocardial infarction ([facility] ) 08/23/2018    Osteoporosis 08/23/2018    Otitis media 08/23/2018    Pneumonia 08/23/2018    Scoliosis 08/23/2018    Seizures ([facility] ) 08/23/2018    Shingles 08/23/2018    Stroke ([facility] ) 08/23/2018    Substance abuse ([place] [id number] ) 08/23/2018    Urinary tract infection 08/23/2018    Visual impairment 08/23/2018     Past Surgical History:   Procedure Laterality Date    APPENDECTOMY      BREAST SURGERY Right 01/08/2015    lump removed from breast    [address] [place]    CHOLECYSTECTOMY      COLONOSCOPY      9/26/2012    DILATION AND CURETTAGE OF UTERUS      Resolved:1/29/2009    ESOPHAGOGASTRODUODENOSCOPY      Diagnostic ; 9/26/2012    HYSTEROSCOPY      Resolved:1/30/2009    OVARIAN CYST REMOVAL Right 2005    WISDOM TOOTH EXTRACTION       Allergies   Allergen Reactions    Morphine Chest Pain    Percocet [Oxycodone-Acetaminophen] Hives    Acetaminophen     Domperidone        Current Medications:       Current Outpatient Medications:     clindamycin (CLINDAGEL) 1 % gel, Apply topically 2 (two) times a day, Disp: 30 g, Rfl: 2    dexlansoprazole (DEXILANT) 60 MG capsule, Take 1 capsule (60 mg total) by mouth every 12 (twelve) hours (Patient taking differently: Take 60 mg by mouth daily ), Disp: 180 capsule, Rfl: 2    LINZESS 145 MCG CAPS, TAKE 1 CAPSULE (145 MCG TOTAL) BY MOUTH DAILY FOR 30 DAYS, Disp: 30 capsule, Rfl: 9    meclizine (ANTIVERT) 32 MG tablet, Take 32 mg by mouth 3 (three) times a day as needed for dizziness, Disp: , Rfl:     Milnacipran HCl (SAVELLA) 50 MG TABS, Take 50 mg by mouth 2 (two) times a day, Disp: , Rfl:     sertraline (ZOLOFT) 50 mg tablet, Take 1 tablet (50 mg total) by mouth daily, Disp: 30 tablet, Rfl: 2    traZODone (DESYREL) 50 mg tablet, Take 0 5 tablets (25 mg total) by mouth daily at bedtime as needed for sleep, Disp: 15 tablet, Rfl: 0    valACYclovir (VALTREX) 1,000 mg tablet, Take 2 tablets (2,000 mg total) by mouth 2 (two) times a day for 1 day, Disp: 4 tablet, Rfl: 0    methylPREDNISolone 4 MG tablet therapy pack, Use as directed on package (Patient not taking: Reported on 3/3/2020), Disp: 21 each, Rfl: 0    Multiple Vitamin (MULTIVITAMIN) tablet, Take 1 tablet by mouth daily, Disp: , Rfl:        History Review: The following portions of the patient's history were reviewed and updated as appropriate: allergies, current medications, past family history, past medical history, past social history, past surgical history and problem list          OBJECTIVE:     Vital signs in last 24 hours: There were no vitals filed for this visit      Mental Status Evaluation:    Appearance age appropriate, casually dressed   Behavior cooperative, calm   Speech normal rate, normal volume, normal pitch   Mood euthymic   Affect normal range and intensity, appropriate   Thought Processes organized, goal directed   Associations intact associations   Thought Content no overt delusions   Perceptual Disturbances: no auditory hallucinations, no visual hallucinations   Abnormal Thoughts  Risk Potential Suicidal ideation - None  Homicidal ideation - None  Potential for aggression - No   Orientation oriented to person, place, time/date and situation   Memory recent and remote memory grossly intact   Consciousness alert and awake   Attention Span Concentration Span attention span and concentration are age appropriate   Intellect appears to be of average intelligence   Insight intact   Judgement intact   Muscle Strength and  Gait normal gait and normal balance   Motor activity no abnormal movements   Language no difficulty naming common objects, no difficulty repeating a phrase, no difficulty writing a sentence   Fund of Knowledge adequate knowledge of current events  adequate fund of knowledge regarding past history  adequate fund of knowledge regarding vocabulary    Pain none   Pain Scale 0       Laboratory Results:   Most Recent Labs:   Lab Results   Component Value Date    WBC 7 46 01/15/2020    RBC 4 13 01/15/2020    HGB 12 1 01/15/2020    HCT 38 3 01/15/2020     01/15/2020    RDW 13 1 01/15/2020    NEUTROABS 4 38 01/15/2020    K 3 9 01/22/2020     01/22/2020    CO2 29 01/22/2020    BUN 10 01/22/2020    CREATININE 0 69 01/22/2020    CALCIUM 9 1 01/22/2020    AST 15 01/22/2020    ALT 29 01/22/2020    ALKPHOS 86 01/22/2020    HDL 61 01/15/2020    TRIG 48 01/15/2020    LDLCALC 137 (H) 01/15/2020    AJR5YGSNARCE 1 508 08/10/2019    HCGQUANT <2 08/27/2018     I have personally reviewed all pertinent laboratory/tests results  Assessment/Plan:  Depression symptoms under control  Plan is to continue with her current medication with no changes  The patient will follow-up with me in 3 months or sooner if needed  She was advised to call us if there is any concern and to call or visit ER if she has any thoughts to hurt herself, others or any emergency  The patient agreed  Diagnoses and all orders for this visit:    Current moderate episode of major depressive disorder without prior episode (HCC)  -     sertraline (ZOLOFT) 50 mg tablet; Take 1 tablet (50 mg total) by mouth daily    MDD (major depressive disorder), recurrent episode, moderate (HCC)  -     traZODone (DESYREL) 50 mg tablet;  Take 0 5 tablets (25 mg total) by mouth daily at bedtime as needed for sleep          Treatment Recommendations/Precautions:      Aware of 24 hour and weekend coverage for urgent situations accessed by calling [facility] main practice number    Risks/Benefits      Risks, Benefits And Possible Side Effects Of Medications:    Risks, benefits, and possible side effects of medications explained to [facility] and she verbalizes understanding and agreement for treatment      Controlled Medication Discussion:     Not applicable    Psychotherapy Provided:     Individual psychotherapy provided: No     Treatment Plan;    Completed and signed during the session: Not applicable - Treatment Plan not due at this session    Nicolas Gonzalez MD 03/03/20

## 2020-03-04 ENCOUNTER — SOCIAL WORK (OUTPATIENT)
Dept: BEHAVIORAL/MENTAL HEALTH CLINIC | Facility: CLINIC | Age: 40
End: 2020-03-04
Payer: COMMERCIAL

## 2020-03-04 DIAGNOSIS — F41.1 GENERALIZED ANXIETY DISORDER: Primary | ICD-10-CM

## 2020-03-04 DIAGNOSIS — F33.0 MILD EPISODE OF RECURRENT MAJOR DEPRESSIVE DISORDER (HCC): ICD-10-CM

## 2020-03-04 PROCEDURE — 90847 FAMILY PSYTX W/PT 50 MIN: CPT | Performed by: SOCIAL WORKER

## 2020-03-04 NOTE — PSYCH
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[facility] [place]  Cristiano Rizzo reports feeling improved mood, decreased depression and anxiety  Cristiano Rizzo continues to lean heavily on her jose alberto and her  for support  Cristiano Rizzo reports taking medication as prescribed  She discussed concerns about adult sons  At this time, Cristiano Rizzo would like to attend therapy monthly; this seems clinically appropriate  A: Cristiano Rizzo presented with improved mood, bright affect, decrease in symptoms  She seems to be coping well with life stressors  P: Continue individual therapy; follow up 1 month  Behavioral Health Treatment Plan [facility]: Diagnosis and Treatment Plan explained to Alicia Lua relates understanding diagnosis and is agreeable to Treatment Plan   Yes

## 2020-03-04 NOTE — BH TREATMENT PLAN
Amita Horde  1980       Date of Initial Treatment Plan: 10/16/2020  Date of Current Treatment Plan: 03/04/20    Treatment Plan Number 2    Strengths/Personal Resources for Self Care: jose alberto,     Diagnosis:   1  Generalized anxiety disorder     2  Mild episode of recurrent major depressive disorder ([facility] [address] )         Area of Needs: Rocío López needs to learn to cope with life stressors in healthy manner       Long Term Goal 1: A Rocío López will continue working on maintaining stable mood    Target Date: 8/4/2020  Completion Date:          Short Term Objectives for Goal 1: A Continue individual thereapy monthly, continue identifying ocping skills    GOAL 1: Modality: Individual 1x per month   Completion Date tbd    GOAL 1: Modality: The person(s) responsible for carrying out the plan is  Rocío López and Khadijah Keith 1153: Diagnosis and Treatment Plan explained to Beverly perez understanding diagnosis and is agreeable to Treatment Plan         Client Comments : Please share your thoughts, feelings, need and/or experiences regarding your treatment plan:

## 2020-03-16 DIAGNOSIS — M79.7 PRIMARY FIBROMYALGIA SYNDROME: Primary | ICD-10-CM

## 2020-03-16 NOTE — TELEPHONE ENCOUNTER
Incoming fax from pharmacy sent to dr Jomar Hicks  Not sure if you give this medication   Please advise

## 2020-03-20 ENCOUNTER — HOSPITAL ENCOUNTER (OUTPATIENT)
Dept: MAMMOGRAPHY | Facility: CLINIC | Age: 40
Discharge: HOME | End: 2020-03-20
Payer: COMMERCIAL

## 2020-03-20 VITALS — WEIGHT: 256 LBS | BODY MASS INDEX: 42.65 KG/M2 | HEIGHT: 65 IN

## 2020-03-20 DIAGNOSIS — Z12.31 ENCOUNTER FOR SCREENING MAMMOGRAM FOR BREAST CANCER: ICD-10-CM

## 2020-03-20 PROCEDURE — 77063 BREAST TOMOSYNTHESIS BI: CPT

## 2020-03-20 PROCEDURE — 77067 SCR MAMMO BI INCL CAD: CPT

## 2020-03-23 ENCOUNTER — TELEPHONE (OUTPATIENT)
Dept: FAMILY MEDICINE CLINIC | Facility: CLINIC | Age: 40
End: 2020-03-23

## 2020-03-23 DIAGNOSIS — R92.8 ABNORMAL MAMMOGRAM OF RIGHT BREAST: Primary | ICD-10-CM

## 2020-03-23 NOTE — TELEPHONE ENCOUNTER
----- Message from Matthias Salamanca MD sent at 3/20/2020  3:54 PM EDT -----  Abnormal mammogram of right breast   RECOMMENDATION:       - Diagnostic mammogram at the current time for the right breast        - Ultrasound at the current time for the right breast

## 2020-03-26 ENCOUNTER — HOSPITAL ENCOUNTER (OUTPATIENT)
Dept: MAMMOGRAPHY | Facility: CLINIC | Age: 40
Discharge: HOME | End: 2020-03-26
Payer: COMMERCIAL

## 2020-03-26 ENCOUNTER — HOSPITAL ENCOUNTER (OUTPATIENT)
Dept: ULTRASOUND IMAGING | Facility: CLINIC | Age: 40
Discharge: HOME | End: 2020-03-26
Payer: COMMERCIAL

## 2020-03-26 ENCOUNTER — TRANSCRIBE ORDERS (OUTPATIENT)
Dept: MAMMOGRAPHY | Facility: CLINIC | Age: 40
End: 2020-03-26

## 2020-03-26 VITALS — BODY MASS INDEX: 42.65 KG/M2 | WEIGHT: 256 LBS | HEIGHT: 65 IN

## 2020-03-26 DIAGNOSIS — R92.8 ABNORMAL MAMMOGRAM OF RIGHT BREAST: ICD-10-CM

## 2020-03-26 DIAGNOSIS — R92.8 ABNORMAL FINDINGS ON DIAGNOSTIC IMAGING OF BREAST: Primary | ICD-10-CM

## 2020-03-26 PROCEDURE — 76642 ULTRASOUND BREAST LIMITED: CPT

## 2020-03-26 PROCEDURE — G0279 TOMOSYNTHESIS, MAMMO: HCPCS

## 2020-03-26 PROCEDURE — 77065 DX MAMMO INCL CAD UNI: CPT

## 2020-03-27 ENCOUNTER — TELEPHONE (OUTPATIENT)
Dept: FAMILY MEDICINE CLINIC | Facility: CLINIC | Age: 40
End: 2020-03-27

## 2020-03-27 ENCOUNTER — TELEPHONE (OUTPATIENT)
Dept: HEMATOLOGY ONCOLOGY | Facility: CLINIC | Age: 40
End: 2020-03-27

## 2020-03-27 ENCOUNTER — APPOINTMENT (OUTPATIENT)
Dept: LAB | Facility: CLINIC | Age: 40
End: 2020-03-27
Payer: COMMERCIAL

## 2020-03-27 ENCOUNTER — OFFICE VISIT (OUTPATIENT)
Dept: FAMILY MEDICINE CLINIC | Facility: CLINIC | Age: 40
End: 2020-03-27
Payer: COMMERCIAL

## 2020-03-27 VITALS
HEART RATE: 76 BPM | DIASTOLIC BLOOD PRESSURE: 80 MMHG | TEMPERATURE: 98 F | BODY MASS INDEX: 39.65 KG/M2 | WEIGHT: 238 LBS | HEIGHT: 65 IN | SYSTOLIC BLOOD PRESSURE: 110 MMHG | OXYGEN SATURATION: 99 %

## 2020-03-27 DIAGNOSIS — R10.11 RUQ PAIN: ICD-10-CM

## 2020-03-27 DIAGNOSIS — D50.9 IRON DEFICIENCY ANEMIA, UNSPECIFIED IRON DEFICIENCY ANEMIA TYPE: ICD-10-CM

## 2020-03-27 DIAGNOSIS — K21.9 GASTROESOPHAGEAL REFLUX DISEASE WITHOUT ESOPHAGITIS: ICD-10-CM

## 2020-03-27 DIAGNOSIS — D50.9 IRON DEFICIENCY ANEMIA, UNSPECIFIED IRON DEFICIENCY ANEMIA TYPE: Primary | ICD-10-CM

## 2020-03-27 DIAGNOSIS — R10.11 RUQ PAIN: Primary | ICD-10-CM

## 2020-03-27 LAB
ALBUMIN SERPL BCP-MCNC: 3.7 G/DL (ref 3.5–5)
ALP SERPL-CCNC: 89 U/L (ref 46–116)
ALT SERPL W P-5'-P-CCNC: 34 U/L (ref 12–78)
AMYLASE SERPL-CCNC: 43 IU/L (ref 25–115)
ANION GAP SERPL CALCULATED.3IONS-SCNC: 2 MMOL/L (ref 4–13)
AST SERPL W P-5'-P-CCNC: 19 U/L (ref 5–45)
BASOPHILS # BLD AUTO: 0.06 THOUSANDS/ΜL (ref 0–0.1)
BASOPHILS NFR BLD AUTO: 1 % (ref 0–1)
BILIRUB SERPL-MCNC: 0.17 MG/DL (ref 0.2–1)
BUN SERPL-MCNC: 8 MG/DL (ref 5–25)
CALCIUM SERPL-MCNC: 9.1 MG/DL (ref 8.3–10.1)
CHLORIDE SERPL-SCNC: 106 MMOL/L (ref 100–108)
CO2 SERPL-SCNC: 29 MMOL/L (ref 21–32)
CREAT SERPL-MCNC: 0.63 MG/DL (ref 0.6–1.3)
CRP SERPL QL: <3 MG/L
EOSINOPHIL # BLD AUTO: 0.16 THOUSAND/ΜL (ref 0–0.61)
EOSINOPHIL NFR BLD AUTO: 2 % (ref 0–6)
ERYTHROCYTE [DISTWIDTH] IN BLOOD BY AUTOMATED COUNT: 12.7 % (ref 11.6–15.1)
ERYTHROCYTE [SEDIMENTATION RATE] IN BLOOD: 25 MM/HOUR (ref 0–20)
FERRITIN SERPL-MCNC: 35 NG/ML (ref 8–388)
GFR SERPL CREATININE-BSD FRML MDRD: 111 ML/MIN/1.73SQ M
GLUCOSE SERPL-MCNC: 94 MG/DL (ref 65–140)
HCT VFR BLD AUTO: 35 % (ref 34.8–46.1)
HGB BLD-MCNC: 11.3 G/DL (ref 11.5–15.4)
IMM GRANULOCYTES # BLD AUTO: 0.03 THOUSAND/UL (ref 0–0.2)
IMM GRANULOCYTES NFR BLD AUTO: 0 % (ref 0–2)
IRON SATN MFR SERPL: 17 %
IRON SERPL-MCNC: 62 UG/DL (ref 50–170)
LIPASE SERPL-CCNC: 108 U/L (ref 73–393)
LYMPHOCYTES # BLD AUTO: 2.21 THOUSANDS/ΜL (ref 0.6–4.47)
LYMPHOCYTES NFR BLD AUTO: 27 % (ref 14–44)
MCH RBC QN AUTO: 29.7 PG (ref 26.8–34.3)
MCHC RBC AUTO-ENTMCNC: 32.3 G/DL (ref 31.4–37.4)
MCV RBC AUTO: 92 FL (ref 82–98)
MONOCYTES # BLD AUTO: 0.58 THOUSAND/ΜL (ref 0.17–1.22)
MONOCYTES NFR BLD AUTO: 7 % (ref 4–12)
NEUTROPHILS # BLD AUTO: 5.23 THOUSANDS/ΜL (ref 1.85–7.62)
NEUTS SEG NFR BLD AUTO: 63 % (ref 43–75)
NRBC BLD AUTO-RTO: 0 /100 WBCS
PLATELET # BLD AUTO: 373 THOUSANDS/UL (ref 149–390)
PMV BLD AUTO: 10.5 FL (ref 8.9–12.7)
POTASSIUM SERPL-SCNC: 3.6 MMOL/L (ref 3.5–5.3)
PROT SERPL-MCNC: 7.7 G/DL (ref 6.4–8.2)
RBC # BLD AUTO: 3.81 MILLION/UL (ref 3.81–5.12)
SODIUM SERPL-SCNC: 137 MMOL/L (ref 136–145)
TIBC SERPL-MCNC: 364 UG/DL (ref 250–450)
VIT B12 SERPL-MCNC: 560 PG/ML (ref 100–900)
WBC # BLD AUTO: 8.27 THOUSAND/UL (ref 4.31–10.16)

## 2020-03-27 PROCEDURE — 3008F BODY MASS INDEX DOCD: CPT | Performed by: FAMILY MEDICINE

## 2020-03-27 PROCEDURE — 99214 OFFICE O/P EST MOD 30 MIN: CPT | Performed by: FAMILY MEDICINE

## 2020-03-27 PROCEDURE — 36415 COLL VENOUS BLD VENIPUNCTURE: CPT

## 2020-03-27 PROCEDURE — 83690 ASSAY OF LIPASE: CPT

## 2020-03-27 PROCEDURE — 82607 VITAMIN B-12: CPT

## 2020-03-27 PROCEDURE — 83540 ASSAY OF IRON: CPT

## 2020-03-27 PROCEDURE — 1036F TOBACCO NON-USER: CPT | Performed by: FAMILY MEDICINE

## 2020-03-27 PROCEDURE — 86140 C-REACTIVE PROTEIN: CPT

## 2020-03-27 PROCEDURE — 85025 COMPLETE CBC W/AUTO DIFF WBC: CPT

## 2020-03-27 PROCEDURE — 82728 ASSAY OF FERRITIN: CPT

## 2020-03-27 PROCEDURE — 80053 COMPREHEN METABOLIC PANEL: CPT

## 2020-03-27 PROCEDURE — 83550 IRON BINDING TEST: CPT

## 2020-03-27 PROCEDURE — 82150 ASSAY OF AMYLASE: CPT

## 2020-03-27 PROCEDURE — 85652 RBC SED RATE AUTOMATED: CPT

## 2020-03-27 RX ORDER — DEXLANSOPRAZOLE 60 MG/1
60 CAPSULE, DELAYED RELEASE ORAL EVERY 12 HOURS
Qty: 60 CAPSULE | Refills: 2 | Status: SHIPPED | OUTPATIENT
Start: 2020-03-27 | End: 2020-06-04 | Stop reason: SDUPTHER

## 2020-03-27 NOTE — PROGRESS NOTES
Subjective:   Chief Complaint   Patient presents with    Abdominal Pain     RUQ        Patient ID: Rafi Ny is a 41 y o  female  Patient concerned about pain in her right upper quadrant the started almost 1 months ago on it is on and off the it get worse when she take a deep raise and the radiate to her back and not related to postural is related to food after she eat she feel fall and bloated and patient did have history of for cholecystectomy and she deny any jaundice and no vomiting and no diarrhea no constipation no weight change no fever no chills no decrease in appetite      The following portions of the patient's history were reviewed and updated as appropriate: allergies, current medications, past family history, past medical history, past social history, past surgical history and problem list     Review of Systems   Constitutional: Negative for fatigue and fever  HENT: Negative for ear pain, sinus pressure, sinus pain and sore throat  Eyes: Negative for pain and redness  Respiratory: Negative for cough, chest tightness and shortness of breath  Cardiovascular: Negative for chest pain, palpitations and leg swelling  Gastrointestinal: Positive for abdominal pain  Negative for blood in stool, constipation, diarrhea and nausea  Genitourinary: Negative for flank pain, frequency and hematuria  Musculoskeletal: Negative for back pain and joint swelling  Skin: Negative for rash  Neurological: Negative for dizziness, numbness and headaches  Hematological: Does not bruise/bleed easily  Objective:  Vitals:    03/27/20 1358   BP: 110/80   Pulse: 76   Temp: 98 °F (36 7 °C)   TempSrc: Tympanic   SpO2: 99%   Weight: 108 kg (238 lb)   Height: 5' 5" (1 651 m)      Physical Exam   Constitutional: She is oriented to person, place, and time  She appears well-developed and well-nourished  HENT:   Head: Normocephalic     Right Ear: External ear normal    Left Ear: External ear normal    Eyes: Conjunctivae and EOM are normal  Right eye exhibits no discharge  Left eye exhibits no discharge  Neck: No JVD present  Cardiovascular: Normal rate, regular rhythm and normal heart sounds  Exam reveals no gallop  No murmur heard  Pulmonary/Chest: Effort normal  No respiratory distress  She has no wheezes  She has no rales  She exhibits no tenderness  Abdominal: She exhibits no mass  There is tenderness  There is no rebound  Musculoskeletal: She exhibits no edema or tenderness  Neurological: She is alert and oriented to person, place, and time  Skin: No rash noted  No erythema  Assessment/Plan:    RUQ pain  Acute symptomatic patient already had history of for cholecystectomy plan to do ultrasound of the right upper quadrant her and plan to do amylase lipase and the liver function test a discussed with the patient avoid the a eat the big meal do not eat and lie down and the to do food diary patient already on Dexilant also for acid reflex    BMI 39 0-39 9,adult  Chronic patient did lose weight compared with before encouraged patient to continue with the portion control increased physical activity       Diagnoses and all orders for this visit:    RUQ pain  -     Alkaline phosphatase; Future  -     ALT; Future  -     AST; Future  -     Amylase; Future  -     Lipase; Future  -     US right upper quadrant; Future    BMI 39 0-39 9,adult    Gastroesophageal reflux disease without esophagitis  -     dexlansoprazole (Dexilant) 60 MG capsule; Take 1 capsule (60 mg total) by mouth every 12 (twelve) hours          BMI Counseling: Body mass index is 39 61 kg/m²  The BMI is above normal  Nutrition recommendations include reducing portion sizes, decreasing overall calorie intake and reducing fast food intake  Exercise recommendations include exercising 3-5 times per week

## 2020-03-27 NOTE — TELEPHONE ENCOUNTER
----- Message from Clarisa Sanchez MD sent at 3/27/2020  7:46 AM EDT -----  Benign   RECOMMENDATION:       - Diagnostic mammogram in 6 months for the right breast        - Ultrasound in 6 months for the right breast

## 2020-03-27 NOTE — TELEPHONE ENCOUNTER
Patient called to reschedule f/u apt that she missed earlier this month  Pt states she will go for her labs asap  Patient scheduled f/u apt for 4/6/2020 at 3:20 w/Dr Padmini Luque  Patient has no symptoms, no fever, no cough, and no shortness of breath

## 2020-03-27 NOTE — PATIENT INSTRUCTIONS

## 2020-03-28 PROBLEM — R10.11 RUQ PAIN: Status: ACTIVE | Noted: 2020-03-28

## 2020-03-28 NOTE — ASSESSMENT & PLAN NOTE
Acute symptomatic patient already had history of for cholecystectomy plan to do ultrasound of the right upper quadrant her and plan to do amylase lipase and the liver function test a discussed with the patient avoid the a eat the big meal do not eat and lie down and the to do food diary patient already on Dexilant also for acid reflex

## 2020-03-28 NOTE — ASSESSMENT & PLAN NOTE
Chronic patient did lose weight compared with before encouraged patient to continue with the portion control increased physical activity

## 2020-03-31 ENCOUNTER — TELEPHONE (OUTPATIENT)
Dept: FAMILY MEDICINE CLINIC | Facility: CLINIC | Age: 40
End: 2020-03-31

## 2020-04-01 ENCOUNTER — TELEPHONE (OUTPATIENT)
Dept: HEMATOLOGY ONCOLOGY | Facility: CLINIC | Age: 40
End: 2020-04-01

## 2020-04-01 ENCOUNTER — TELEMEDICINE (OUTPATIENT)
Dept: BEHAVIORAL/MENTAL HEALTH CLINIC | Facility: CLINIC | Age: 40
End: 2020-04-01
Payer: COMMERCIAL

## 2020-04-01 DIAGNOSIS — F33.0 MILD EPISODE OF RECURRENT MAJOR DEPRESSIVE DISORDER (HCC): Primary | ICD-10-CM

## 2020-04-01 PROCEDURE — 90832 PSYTX W PT 30 MINUTES: CPT | Performed by: SOCIAL WORKER

## 2020-04-02 ENCOUNTER — OFFICE VISIT (OUTPATIENT)
Dept: HEMATOLOGY ONCOLOGY | Facility: CLINIC | Age: 40
End: 2020-04-02
Payer: COMMERCIAL

## 2020-04-02 ENCOUNTER — DOCUMENTATION (OUTPATIENT)
Dept: HEMATOLOGY ONCOLOGY | Facility: CLINIC | Age: 40
End: 2020-04-02

## 2020-04-02 VITALS
HEIGHT: 65 IN | BODY MASS INDEX: 39.69 KG/M2 | SYSTOLIC BLOOD PRESSURE: 144 MMHG | OXYGEN SATURATION: 99 % | DIASTOLIC BLOOD PRESSURE: 94 MMHG | TEMPERATURE: 98.3 F | RESPIRATION RATE: 18 BRPM | WEIGHT: 238.2 LBS | HEART RATE: 93 BPM

## 2020-04-02 DIAGNOSIS — R23.8 EASY BRUISING: ICD-10-CM

## 2020-04-02 DIAGNOSIS — D50.8 IRON DEFICIENCY ANEMIA SECONDARY TO INADEQUATE DIETARY IRON INTAKE: Primary | ICD-10-CM

## 2020-04-02 PROCEDURE — 99214 OFFICE O/P EST MOD 30 MIN: CPT | Performed by: NURSE PRACTITIONER

## 2020-04-02 PROCEDURE — 3008F BODY MASS INDEX DOCD: CPT | Performed by: NURSE PRACTITIONER

## 2020-04-02 PROCEDURE — 1036F TOBACCO NON-USER: CPT | Performed by: NURSE PRACTITIONER

## 2020-04-02 RX ORDER — SODIUM CHLORIDE 9 MG/ML
20 INJECTION, SOLUTION INTRAVENOUS ONCE
Status: CANCELLED | OUTPATIENT
Start: 2020-04-08

## 2020-04-08 ENCOUNTER — HOSPITAL ENCOUNTER (OUTPATIENT)
Dept: INFUSION CENTER | Facility: HOSPITAL | Age: 40
Discharge: HOME | End: 2020-04-08
Attending: INTERNAL MEDICINE
Payer: COMMERCIAL

## 2020-04-08 DIAGNOSIS — D50.8 IRON DEFICIENCY ANEMIA SECONDARY TO INADEQUATE DIETARY IRON INTAKE: Primary | ICD-10-CM

## 2020-04-08 PROCEDURE — 96365 THER/PROPH/DIAG IV INF INIT: CPT

## 2020-04-08 RX ORDER — SODIUM CHLORIDE 9 MG/ML
20 INJECTION, SOLUTION INTRAVENOUS ONCE
Status: CANCELLED | OUTPATIENT
Start: 2020-04-08

## 2020-04-08 RX ORDER — SODIUM CHLORIDE 9 MG/ML
20 INJECTION, SOLUTION INTRAVENOUS ONCE
Status: COMPLETED | OUTPATIENT
Start: 2020-04-08 | End: 2020-04-08

## 2020-04-08 RX ADMIN — FERRIC CARBOXYMALTOSE INJECTION 750 MG: 50 INJECTION, SOLUTION INTRAVENOUS at 14:57

## 2020-04-08 RX ADMIN — SODIUM CHLORIDE 20 ML/HR: 9 INJECTION, SOLUTION INTRAVENOUS at 14:44

## 2020-04-15 DIAGNOSIS — M79.7 PRIMARY FIBROMYALGIA SYNDROME: ICD-10-CM

## 2020-04-15 RX ORDER — MILNACIPRAN HYDROCHLORIDE 50 MG/1
TABLET, FILM COATED ORAL
Qty: 60 TABLET | Refills: 1 | Status: SHIPPED | OUTPATIENT
Start: 2020-04-15 | End: 2020-05-11

## 2020-04-20 ENCOUNTER — TELEPHONE (OUTPATIENT)
Dept: SURGICAL ONCOLOGY | Facility: CLINIC | Age: 40
End: 2020-04-20

## 2020-04-20 DIAGNOSIS — D50.0 IRON DEFICIENCY ANEMIA DUE TO CHRONIC BLOOD LOSS: Primary | ICD-10-CM

## 2020-04-23 ENCOUNTER — APPOINTMENT (OUTPATIENT)
Dept: LAB | Facility: CLINIC | Age: 40
End: 2020-04-23
Payer: COMMERCIAL

## 2020-04-23 DIAGNOSIS — D50.0 IRON DEFICIENCY ANEMIA DUE TO CHRONIC BLOOD LOSS: ICD-10-CM

## 2020-04-23 LAB
BASOPHILS # BLD AUTO: 0.06 THOUSANDS/ΜL (ref 0–0.1)
BASOPHILS NFR BLD AUTO: 1 % (ref 0–1)
EOSINOPHIL # BLD AUTO: 0.16 THOUSAND/ΜL (ref 0–0.61)
EOSINOPHIL NFR BLD AUTO: 2 % (ref 0–6)
ERYTHROCYTE [DISTWIDTH] IN BLOOD BY AUTOMATED COUNT: 13.4 % (ref 11.6–15.1)
HCT VFR BLD AUTO: 37.5 % (ref 34.8–46.1)
HGB BLD-MCNC: 12 G/DL (ref 11.5–15.4)
IMM GRANULOCYTES # BLD AUTO: 0.09 THOUSAND/UL (ref 0–0.2)
IMM GRANULOCYTES NFR BLD AUTO: 1 % (ref 0–2)
LYMPHOCYTES # BLD AUTO: 2.46 THOUSANDS/ΜL (ref 0.6–4.47)
LYMPHOCYTES NFR BLD AUTO: 26 % (ref 14–44)
MCH RBC QN AUTO: 29.2 PG (ref 26.8–34.3)
MCHC RBC AUTO-ENTMCNC: 32 G/DL (ref 31.4–37.4)
MCV RBC AUTO: 91 FL (ref 82–98)
MONOCYTES # BLD AUTO: 0.65 THOUSAND/ΜL (ref 0.17–1.22)
MONOCYTES NFR BLD AUTO: 7 % (ref 4–12)
NEUTROPHILS # BLD AUTO: 5.95 THOUSANDS/ΜL (ref 1.85–7.62)
NEUTS SEG NFR BLD AUTO: 63 % (ref 43–75)
NRBC BLD AUTO-RTO: 0 /100 WBCS
PHOSPHATE SERPL-MCNC: 1.5 MG/DL (ref 2.7–4.5)
PLATELET # BLD AUTO: 353 THOUSANDS/UL (ref 149–390)
PMV BLD AUTO: 9.7 FL (ref 8.9–12.7)
RBC # BLD AUTO: 4.11 MILLION/UL (ref 3.81–5.12)
WBC # BLD AUTO: 9.37 THOUSAND/UL (ref 4.31–10.16)

## 2020-04-23 PROCEDURE — 80048 BASIC METABOLIC PNL TOTAL CA: CPT

## 2020-04-23 PROCEDURE — 84100 ASSAY OF PHOSPHORUS: CPT

## 2020-04-23 PROCEDURE — 85025 COMPLETE CBC W/AUTO DIFF WBC: CPT

## 2020-04-23 PROCEDURE — 36415 COLL VENOUS BLD VENIPUNCTURE: CPT

## 2020-04-24 DIAGNOSIS — E83.39 HYPOPHOSPHATEMIA: Primary | ICD-10-CM

## 2020-04-24 DIAGNOSIS — D50.0 IRON DEFICIENCY ANEMIA DUE TO CHRONIC BLOOD LOSS: ICD-10-CM

## 2020-04-24 DIAGNOSIS — E83.39 HYPOPHOSPHATASIA: Primary | ICD-10-CM

## 2020-04-24 LAB
ANION GAP SERPL CALCULATED.3IONS-SCNC: 3 MMOL/L (ref 4–13)
BUN SERPL-MCNC: 11 MG/DL (ref 5–25)
CALCIUM SERPL-MCNC: 8.9 MG/DL (ref 8.3–10.1)
CHLORIDE SERPL-SCNC: 104 MMOL/L (ref 100–108)
CO2 SERPL-SCNC: 30 MMOL/L (ref 21–32)
CREAT SERPL-MCNC: 0.68 MG/DL (ref 0.6–1.3)
GFR SERPL CREATININE-BSD FRML MDRD: 108 ML/MIN/1.73SQ M
GLUCOSE SERPL-MCNC: 88 MG/DL (ref 65–140)
POTASSIUM SERPL-SCNC: 4.1 MMOL/L (ref 3.5–5.3)
SODIUM SERPL-SCNC: 137 MMOL/L (ref 136–145)

## 2020-05-04 ENCOUNTER — HOSPITAL ENCOUNTER (OUTPATIENT)
Dept: ULTRASOUND IMAGING | Facility: HOSPITAL | Age: 40
Discharge: HOME | End: 2020-05-04
Payer: COMMERCIAL

## 2020-05-04 DIAGNOSIS — R10.11 RUQ PAIN: ICD-10-CM

## 2020-05-04 PROCEDURE — 76705 ECHO EXAM OF ABDOMEN: CPT

## 2020-05-05 ENCOUNTER — TELEMEDICINE (OUTPATIENT)
Dept: BEHAVIORAL/MENTAL HEALTH CLINIC | Facility: CLINIC | Age: 40
End: 2020-05-05
Payer: COMMERCIAL

## 2020-05-05 ENCOUNTER — TELEPHONE (OUTPATIENT)
Dept: FAMILY MEDICINE CLINIC | Facility: CLINIC | Age: 40
End: 2020-05-05

## 2020-05-05 DIAGNOSIS — F33.0 MILD EPISODE OF RECURRENT MAJOR DEPRESSIVE DISORDER (HCC): ICD-10-CM

## 2020-05-05 DIAGNOSIS — F41.1 GENERALIZED ANXIETY DISORDER: Primary | ICD-10-CM

## 2020-05-05 DIAGNOSIS — R16.0 HEPATOMEGALY: Primary | ICD-10-CM

## 2020-05-05 PROCEDURE — 90832 PSYTX W PT 30 MINUTES: CPT | Performed by: SOCIAL WORKER

## 2020-05-09 DIAGNOSIS — M79.7 PRIMARY FIBROMYALGIA SYNDROME: ICD-10-CM

## 2020-05-11 ENCOUNTER — TELEPHONE (OUTPATIENT)
Dept: GASTROENTEROLOGY | Facility: CLINIC | Age: 40
End: 2020-05-11

## 2020-05-11 RX ORDER — MILNACIPRAN HYDROCHLORIDE 50 MG/1
TABLET, FILM COATED ORAL
Qty: 60 TABLET | Refills: 0 | Status: SHIPPED | OUTPATIENT
Start: 2020-05-11 | End: 2020-06-04 | Stop reason: SDUPTHER

## 2020-05-12 ENCOUNTER — TELEMEDICINE (OUTPATIENT)
Dept: GASTROENTEROLOGY | Facility: CLINIC | Age: 40
End: 2020-05-12
Payer: COMMERCIAL

## 2020-05-12 ENCOUNTER — TELEPHONE (OUTPATIENT)
Dept: GASTROENTEROLOGY | Facility: CLINIC | Age: 40
End: 2020-05-12

## 2020-05-12 DIAGNOSIS — R06.02 SHORTNESS OF BREATH: ICD-10-CM

## 2020-05-12 DIAGNOSIS — K76.0 FATTY LIVER: ICD-10-CM

## 2020-05-12 DIAGNOSIS — K31.84 GASTROPARESIS: ICD-10-CM

## 2020-05-12 DIAGNOSIS — K59.09 OTHER CONSTIPATION: ICD-10-CM

## 2020-05-12 DIAGNOSIS — R16.0 HEPATOMEGALY: Primary | ICD-10-CM

## 2020-05-12 DIAGNOSIS — R10.13 EPIGASTRIC PAIN: ICD-10-CM

## 2020-05-12 DIAGNOSIS — K21.9 GASTROESOPHAGEAL REFLUX DISEASE WITHOUT ESOPHAGITIS: ICD-10-CM

## 2020-05-12 PROCEDURE — 99214 OFFICE O/P EST MOD 30 MIN: CPT | Performed by: PHYSICIAN ASSISTANT

## 2020-05-12 PROCEDURE — 1036F TOBACCO NON-USER: CPT | Performed by: PHYSICIAN ASSISTANT

## 2020-05-12 RX ORDER — ONDANSETRON 4 MG/1
4 TABLET, FILM COATED ORAL EVERY 8 HOURS PRN
Qty: 30 TABLET | Refills: 0 | Status: SHIPPED | OUTPATIENT
Start: 2020-05-12 | End: 2020-11-03

## 2020-05-14 ENCOUNTER — APPOINTMENT (OUTPATIENT)
Dept: LAB | Facility: CLINIC | Age: 40
End: 2020-05-14
Payer: COMMERCIAL

## 2020-05-14 DIAGNOSIS — D50.0 IRON DEFICIENCY ANEMIA DUE TO CHRONIC BLOOD LOSS: ICD-10-CM

## 2020-05-14 DIAGNOSIS — E83.39 HYPOPHOSPHATASIA: ICD-10-CM

## 2020-05-14 LAB
ALBUMIN SERPL BCP-MCNC: 3.9 G/DL (ref 3.5–5)
ALP SERPL-CCNC: 83 U/L (ref 46–116)
ALT SERPL W P-5'-P-CCNC: 32 U/L (ref 12–78)
ANION GAP SERPL CALCULATED.3IONS-SCNC: 4 MMOL/L (ref 4–13)
AST SERPL W P-5'-P-CCNC: 13 U/L (ref 5–45)
BILIRUB SERPL-MCNC: 0.36 MG/DL (ref 0.2–1)
BUN SERPL-MCNC: 11 MG/DL (ref 5–25)
CALCIUM SERPL-MCNC: 9.4 MG/DL (ref 8.3–10.1)
CHLORIDE SERPL-SCNC: 105 MMOL/L (ref 100–108)
CO2 SERPL-SCNC: 28 MMOL/L (ref 21–32)
CREAT SERPL-MCNC: 0.63 MG/DL (ref 0.6–1.3)
GFR SERPL CREATININE-BSD FRML MDRD: 111 ML/MIN/1.73SQ M
GLUCOSE SERPL-MCNC: 88 MG/DL (ref 65–140)
PHOSPHATE SERPL-MCNC: 1.9 MG/DL (ref 2.7–4.5)
POTASSIUM SERPL-SCNC: 4 MMOL/L (ref 3.5–5.3)
PROT SERPL-MCNC: 7.8 G/DL (ref 6.4–8.2)
SODIUM SERPL-SCNC: 137 MMOL/L (ref 136–145)

## 2020-05-14 PROCEDURE — 36415 COLL VENOUS BLD VENIPUNCTURE: CPT | Performed by: PHYSICIAN ASSISTANT

## 2020-05-14 PROCEDURE — 80053 COMPREHEN METABOLIC PANEL: CPT | Performed by: PHYSICIAN ASSISTANT

## 2020-05-14 PROCEDURE — 84100 ASSAY OF PHOSPHORUS: CPT

## 2020-05-19 DIAGNOSIS — E83.39 HYPOPHOSPHATEMIA: ICD-10-CM

## 2020-05-19 RX ORDER — POTASSIUM PHOSPHATE, MONOBASIC 500 MG/1
TABLET, SOLUBLE ORAL
Qty: 120 TABLET | Refills: 1 | Status: SHIPPED | OUTPATIENT
Start: 2020-05-19 | End: 2020-06-15

## 2020-05-26 DIAGNOSIS — E83.39 HYPOPHOSPHATEMIA: Primary | ICD-10-CM

## 2020-05-29 ENCOUNTER — TELEPHONE (OUTPATIENT)
Dept: FAMILY MEDICINE CLINIC | Facility: CLINIC | Age: 40
End: 2020-05-29

## 2020-05-29 DIAGNOSIS — E78.2 MIXED HYPERLIPIDEMIA: Primary | ICD-10-CM

## 2020-05-30 ENCOUNTER — APPOINTMENT (OUTPATIENT)
Dept: LAB | Facility: HOSPITAL | Age: 40
End: 2020-05-30
Payer: COMMERCIAL

## 2020-05-30 ENCOUNTER — HOSPITAL ENCOUNTER (OUTPATIENT)
Dept: RADIOLOGY | Facility: HOSPITAL | Age: 40
Discharge: HOME | End: 2020-05-30
Payer: COMMERCIAL

## 2020-05-30 DIAGNOSIS — R06.02 SHORTNESS OF BREATH: ICD-10-CM

## 2020-05-30 DIAGNOSIS — E78.2 MIXED HYPERLIPIDEMIA: ICD-10-CM

## 2020-05-30 DIAGNOSIS — R10.13 EPIGASTRIC PAIN: ICD-10-CM

## 2020-05-30 DIAGNOSIS — E83.39 HYPOPHOSPHATEMIA: ICD-10-CM

## 2020-05-30 LAB
ANION GAP SERPL CALCULATED.3IONS-SCNC: 6 MMOL/L (ref 4–13)
BUN SERPL-MCNC: 15 MG/DL (ref 5–25)
CALCIUM SERPL-MCNC: 9.2 MG/DL (ref 8.3–10.1)
CHLORIDE SERPL-SCNC: 102 MMOL/L (ref 100–108)
CHOLEST SERPL-MCNC: 217 MG/DL (ref 50–200)
CO2 SERPL-SCNC: 29 MMOL/L (ref 21–32)
CREAT SERPL-MCNC: 0.78 MG/DL (ref 0.6–1.3)
GFR SERPL CREATININE-BSD FRML MDRD: 94 ML/MIN/1.73SQ M
GLUCOSE P FAST SERPL-MCNC: 98 MG/DL (ref 65–99)
HDLC SERPL-MCNC: 56 MG/DL
LDLC SERPL CALC-MCNC: 148 MG/DL (ref 0–100)
PHOSPHATE SERPL-MCNC: 2.8 MG/DL (ref 2.7–4.5)
POTASSIUM SERPL-SCNC: 4.5 MMOL/L (ref 3.5–5.3)
SODIUM SERPL-SCNC: 137 MMOL/L (ref 136–145)
TRIGL SERPL-MCNC: 65 MG/DL

## 2020-05-30 PROCEDURE — 80061 LIPID PANEL: CPT

## 2020-05-30 PROCEDURE — 87338 HPYLORI STOOL AG IA: CPT

## 2020-05-30 PROCEDURE — 84100 ASSAY OF PHOSPHORUS: CPT

## 2020-05-30 PROCEDURE — 36415 COLL VENOUS BLD VENIPUNCTURE: CPT

## 2020-05-30 PROCEDURE — 71046 X-RAY EXAM CHEST 2 VIEWS: CPT

## 2020-05-30 PROCEDURE — 80048 BASIC METABOLIC PNL TOTAL CA: CPT

## 2020-05-31 LAB — H PYLORI AG STL QL IA: NEGATIVE

## 2020-06-01 ENCOUNTER — TELEPHONE (OUTPATIENT)
Dept: GASTROENTEROLOGY | Facility: MEDICAL CENTER | Age: 40
End: 2020-06-01

## 2020-06-01 DIAGNOSIS — E83.39 HYPOPHOSPHATEMIA: Primary | ICD-10-CM

## 2020-06-04 ENCOUNTER — OFFICE VISIT (OUTPATIENT)
Dept: FAMILY MEDICINE CLINIC | Facility: CLINIC | Age: 40
End: 2020-06-04
Payer: COMMERCIAL

## 2020-06-04 VITALS
OXYGEN SATURATION: 97 % | WEIGHT: 247 LBS | BODY MASS INDEX: 41.15 KG/M2 | DIASTOLIC BLOOD PRESSURE: 74 MMHG | HEART RATE: 74 BPM | TEMPERATURE: 99.2 F | SYSTOLIC BLOOD PRESSURE: 122 MMHG | RESPIRATION RATE: 16 BRPM | HEIGHT: 65 IN

## 2020-06-04 DIAGNOSIS — E78.2 MIXED HYPERLIPIDEMIA: ICD-10-CM

## 2020-06-04 DIAGNOSIS — K21.9 GASTROESOPHAGEAL REFLUX DISEASE WITHOUT ESOPHAGITIS: ICD-10-CM

## 2020-06-04 DIAGNOSIS — L70.9 ACNE, UNSPECIFIED ACNE TYPE: ICD-10-CM

## 2020-06-04 DIAGNOSIS — D50.0 IRON DEFICIENCY ANEMIA DUE TO CHRONIC BLOOD LOSS: ICD-10-CM

## 2020-06-04 DIAGNOSIS — E66.01 MORBID OBESITY WITH BMI OF 40.0-44.9, ADULT (HCC): ICD-10-CM

## 2020-06-04 DIAGNOSIS — M79.7 PRIMARY FIBROMYALGIA SYNDROME: ICD-10-CM

## 2020-06-04 DIAGNOSIS — Z00.01 ENCOUNTER FOR WELL ADULT EXAM WITH ABNORMAL FINDINGS: Primary | ICD-10-CM

## 2020-06-04 PROCEDURE — 99396 PREV VISIT EST AGE 40-64: CPT | Performed by: FAMILY MEDICINE

## 2020-06-04 PROCEDURE — 3008F BODY MASS INDEX DOCD: CPT | Performed by: FAMILY MEDICINE

## 2020-06-04 PROCEDURE — 99214 OFFICE O/P EST MOD 30 MIN: CPT | Performed by: FAMILY MEDICINE

## 2020-06-04 PROCEDURE — 3008F BODY MASS INDEX DOCD: CPT | Performed by: PSYCHIATRY & NEUROLOGY

## 2020-06-04 PROCEDURE — 1036F TOBACCO NON-USER: CPT | Performed by: FAMILY MEDICINE

## 2020-06-04 RX ORDER — CLINDAMYCIN PHOSPHATE 10 MG/G
GEL TOPICAL 2 TIMES DAILY
Qty: 30 G | Refills: 2 | Status: SHIPPED | OUTPATIENT
Start: 2020-06-04

## 2020-06-04 RX ORDER — DEXLANSOPRAZOLE 60 MG/1
60 CAPSULE, DELAYED RELEASE ORAL EVERY 12 HOURS
Qty: 60 CAPSULE | Refills: 2 | Status: SHIPPED | OUTPATIENT
Start: 2020-06-04 | End: 2020-08-28

## 2020-06-05 PROBLEM — E66.01 MORBID OBESITY WITH BMI OF 40.0-44.9, ADULT (HCC): Status: ACTIVE | Noted: 2020-06-05

## 2020-06-12 ENCOUNTER — TELEPHONE (OUTPATIENT)
Dept: GASTROENTEROLOGY | Facility: CLINIC | Age: 40
End: 2020-06-12

## 2020-06-12 NOTE — TELEPHONE ENCOUNTER
Patients GI provider:  Dr Marian Martínez    Number to return call: [phone]     Reason for call: Pt sched virtual visit via Jaswant Berry email and phone number confirmed    Scheduled procedure/appointment date if applicable: Appt - 33/48/17

## 2020-06-14 DIAGNOSIS — E83.39 HYPOPHOSPHATEMIA: ICD-10-CM

## 2020-06-15 RX ORDER — POTASSIUM PHOSPHATE, MONOBASIC 500 MG/1
TABLET, SOLUBLE ORAL
Qty: 120 TABLET | Refills: 1 | Status: SHIPPED | OUTPATIENT
Start: 2020-06-15 | End: 2020-08-25

## 2020-06-18 ENCOUNTER — TRANSCRIBE ORDERS (OUTPATIENT)
Dept: LAB | Facility: CLINIC | Age: 40
End: 2020-06-18

## 2020-06-18 ENCOUNTER — APPOINTMENT (OUTPATIENT)
Dept: LAB | Facility: CLINIC | Age: 40
End: 2020-06-18
Payer: COMMERCIAL

## 2020-06-18 DIAGNOSIS — M79.7 FIBROMYALGIA: Primary | ICD-10-CM

## 2020-06-18 DIAGNOSIS — M79.7 FIBROMYALGIA: ICD-10-CM

## 2020-06-18 LAB
25(OH)D3 SERPL-MCNC: 20.2 NG/ML (ref 30–100)
ALBUMIN SERPL BCP-MCNC: 3.9 G/DL (ref 3.5–5)
ALP SERPL-CCNC: 80 U/L (ref 46–116)
ALT SERPL W P-5'-P-CCNC: 27 U/L (ref 12–78)
ANION GAP SERPL CALCULATED.3IONS-SCNC: 7 MMOL/L (ref 4–13)
AST SERPL W P-5'-P-CCNC: 16 U/L (ref 5–45)
BACTERIA UR QL AUTO: ABNORMAL /HPF
BASOPHILS # BLD AUTO: 0.04 THOUSANDS/ΜL (ref 0–0.1)
BASOPHILS NFR BLD AUTO: 1 % (ref 0–1)
BILIRUB SERPL-MCNC: 0.31 MG/DL (ref 0.2–1)
BILIRUB UR QL STRIP: NEGATIVE
BUN SERPL-MCNC: 10 MG/DL (ref 5–25)
CALCIUM SERPL-MCNC: 9 MG/DL (ref 8.3–10.1)
CHLORIDE SERPL-SCNC: 103 MMOL/L (ref 100–108)
CK SERPL-CCNC: 77 U/L (ref 26–192)
CLARITY UR: CLEAR
CO2 SERPL-SCNC: 26 MMOL/L (ref 21–32)
COLOR UR: YELLOW
CREAT SERPL-MCNC: 0.69 MG/DL (ref 0.6–1.3)
CRP SERPL QL: <3 MG/L
EOSINOPHIL # BLD AUTO: 0.14 THOUSAND/ΜL (ref 0–0.61)
EOSINOPHIL NFR BLD AUTO: 2 % (ref 0–6)
ERYTHROCYTE [DISTWIDTH] IN BLOOD BY AUTOMATED COUNT: 13.1 % (ref 11.6–15.1)
ERYTHROCYTE [SEDIMENTATION RATE] IN BLOOD: 16 MM/HOUR (ref 0–20)
GFR SERPL CREATININE-BSD FRML MDRD: 108 ML/MIN/1.73SQ M
GLUCOSE SERPL-MCNC: 105 MG/DL (ref 65–140)
GLUCOSE UR STRIP-MCNC: NEGATIVE MG/DL
HCT VFR BLD AUTO: 35 % (ref 34.8–46.1)
HGB BLD-MCNC: 11.4 G/DL (ref 11.5–15.4)
HGB UR QL STRIP.AUTO: NEGATIVE
HYALINE CASTS #/AREA URNS LPF: ABNORMAL /LPF
IMM GRANULOCYTES # BLD AUTO: 0.04 THOUSAND/UL (ref 0–0.2)
IMM GRANULOCYTES NFR BLD AUTO: 1 % (ref 0–2)
KETONES UR STRIP-MCNC: NEGATIVE MG/DL
LEUKOCYTE ESTERASE UR QL STRIP: ABNORMAL
LYMPHOCYTES # BLD AUTO: 2.32 THOUSANDS/ΜL (ref 0.6–4.47)
LYMPHOCYTES NFR BLD AUTO: 29 % (ref 14–44)
MCH RBC QN AUTO: 29.6 PG (ref 26.8–34.3)
MCHC RBC AUTO-ENTMCNC: 32.6 G/DL (ref 31.4–37.4)
MCV RBC AUTO: 91 FL (ref 82–98)
MONOCYTES # BLD AUTO: 0.65 THOUSAND/ΜL (ref 0.17–1.22)
MONOCYTES NFR BLD AUTO: 8 % (ref 4–12)
NEUTROPHILS # BLD AUTO: 4.82 THOUSANDS/ΜL (ref 1.85–7.62)
NEUTS SEG NFR BLD AUTO: 59 % (ref 43–75)
NITRITE UR QL STRIP: NEGATIVE
NON-SQ EPI CELLS URNS QL MICRO: ABNORMAL /HPF
NRBC BLD AUTO-RTO: 0 /100 WBCS
PH UR STRIP.AUTO: 6 [PH]
PLATELET # BLD AUTO: 340 THOUSANDS/UL (ref 149–390)
PMV BLD AUTO: 9.8 FL (ref 8.9–12.7)
POTASSIUM SERPL-SCNC: 3.8 MMOL/L (ref 3.5–5.3)
PROT SERPL-MCNC: 7.4 G/DL (ref 6.4–8.2)
PROT UR STRIP-MCNC: NEGATIVE MG/DL
RBC # BLD AUTO: 3.85 MILLION/UL (ref 3.81–5.12)
RBC #/AREA URNS AUTO: ABNORMAL /HPF
SODIUM SERPL-SCNC: 136 MMOL/L (ref 136–145)
SP GR UR STRIP.AUTO: 1.03 (ref 1–1.03)
UROBILINOGEN UR QL STRIP.AUTO: 0.2 E.U./DL
WBC # BLD AUTO: 8.01 THOUSAND/UL (ref 4.31–10.16)
WBC #/AREA URNS AUTO: ABNORMAL /HPF

## 2020-06-18 PROCEDURE — 82085 ASSAY OF ALDOLASE: CPT

## 2020-06-18 PROCEDURE — 36415 COLL VENOUS BLD VENIPUNCTURE: CPT

## 2020-06-18 PROCEDURE — 86618 LYME DISEASE ANTIBODY: CPT

## 2020-06-18 PROCEDURE — 86140 C-REACTIVE PROTEIN: CPT

## 2020-06-18 PROCEDURE — 86038 ANTINUCLEAR ANTIBODIES: CPT

## 2020-06-18 PROCEDURE — 85025 COMPLETE CBC W/AUTO DIFF WBC: CPT

## 2020-06-18 PROCEDURE — 86430 RHEUMATOID FACTOR TEST QUAL: CPT

## 2020-06-18 PROCEDURE — 82550 ASSAY OF CK (CPK): CPT

## 2020-06-18 PROCEDURE — 80053 COMPREHEN METABOLIC PANEL: CPT

## 2020-06-18 PROCEDURE — 85652 RBC SED RATE AUTOMATED: CPT

## 2020-06-18 PROCEDURE — 81001 URINALYSIS AUTO W/SCOPE: CPT

## 2020-06-18 PROCEDURE — 86200 CCP ANTIBODY: CPT

## 2020-06-18 PROCEDURE — 86235 NUCLEAR ANTIGEN ANTIBODY: CPT

## 2020-06-18 PROCEDURE — 82306 VITAMIN D 25 HYDROXY: CPT

## 2020-06-19 LAB
ALDOLASE SERPL-CCNC: 3.7 U/L (ref 3.3–10.3)
B BURGDOR IGG+IGM SER-ACNC: <0.91 ISR (ref 0–0.9)
ENA SS-A AB SER-ACNC: <0.2 AI (ref 0–0.9)
ENA SS-B AB SER-ACNC: <0.2 AI (ref 0–0.9)
RHEUMATOID FACT SER QL LA: NEGATIVE
RYE IGE QN: NEGATIVE

## 2020-06-20 LAB — CCP IGA+IGG SERPL IA-ACNC: 5 UNITS (ref 0–19)

## 2020-06-22 ENCOUNTER — TELEMEDICINE (OUTPATIENT)
Dept: OBGYN CLINIC | Facility: CLINIC | Age: 40
End: 2020-06-22
Payer: COMMERCIAL

## 2020-06-22 VITALS — BODY MASS INDEX: 40.1 KG/M2 | WEIGHT: 241 LBS

## 2020-06-22 DIAGNOSIS — R10.2 PELVIC PAIN: ICD-10-CM

## 2020-06-22 DIAGNOSIS — D27.0 DERMOID CYST OF RIGHT OVARY: ICD-10-CM

## 2020-06-22 DIAGNOSIS — N92.0 MENORRHAGIA WITH REGULAR CYCLE: Primary | ICD-10-CM

## 2020-06-22 DIAGNOSIS — D25.1 INTRAMURAL UTERINE FIBROID: ICD-10-CM

## 2020-06-22 PROCEDURE — 99213 OFFICE O/P EST LOW 20 MIN: CPT | Performed by: OBSTETRICS & GYNECOLOGY

## 2020-06-25 ENCOUNTER — HOSPITAL ENCOUNTER (OUTPATIENT)
Dept: ULTRASOUND IMAGING | Facility: HOSPITAL | Age: 40
Discharge: HOME | End: 2020-06-25
Attending: OBSTETRICS & GYNECOLOGY
Payer: COMMERCIAL

## 2020-06-25 DIAGNOSIS — R10.2 PELVIC PAIN: ICD-10-CM

## 2020-06-25 DIAGNOSIS — D27.0 DERMOID CYST OF RIGHT OVARY: ICD-10-CM

## 2020-06-25 DIAGNOSIS — N92.0 MENORRHAGIA WITH REGULAR CYCLE: ICD-10-CM

## 2020-06-25 DIAGNOSIS — D25.1 INTRAMURAL UTERINE FIBROID: ICD-10-CM

## 2020-06-25 PROCEDURE — 76830 TRANSVAGINAL US NON-OB: CPT

## 2020-06-25 PROCEDURE — 76856 US EXAM PELVIC COMPLETE: CPT

## 2020-06-26 ENCOUNTER — TELEMEDICINE (OUTPATIENT)
Dept: GASTROENTEROLOGY | Facility: MEDICAL CENTER | Age: 40
End: 2020-06-26
Payer: COMMERCIAL

## 2020-06-26 DIAGNOSIS — R11.0 NAUSEA: Primary | ICD-10-CM

## 2020-06-26 DIAGNOSIS — R10.11 RUQ PAIN: ICD-10-CM

## 2020-06-26 DIAGNOSIS — K59.04 CHRONIC IDIOPATHIC CONSTIPATION: ICD-10-CM

## 2020-06-26 PROCEDURE — 99213 OFFICE O/P EST LOW 20 MIN: CPT | Performed by: INTERNAL MEDICINE

## 2020-06-27 ENCOUNTER — APPOINTMENT (OUTPATIENT)
Dept: LAB | Facility: HOSPITAL | Age: 40
End: 2020-06-27
Attending: OBSTETRICS & GYNECOLOGY
Payer: COMMERCIAL

## 2020-06-27 DIAGNOSIS — R11.0 NAUSEA: ICD-10-CM

## 2020-06-27 DIAGNOSIS — R10.11 RUQ PAIN: ICD-10-CM

## 2020-06-27 DIAGNOSIS — N92.0 MENORRHAGIA WITH REGULAR CYCLE: ICD-10-CM

## 2020-06-27 LAB
ALBUMIN SERPL BCP-MCNC: 3.9 G/DL (ref 3.5–5)
ALP SERPL-CCNC: 76 U/L (ref 46–116)
ALT SERPL W P-5'-P-CCNC: 26 U/L (ref 12–78)
ANION GAP SERPL CALCULATED.3IONS-SCNC: 8 MMOL/L (ref 4–13)
AST SERPL W P-5'-P-CCNC: 24 U/L (ref 5–45)
BILIRUB SERPL-MCNC: 0.46 MG/DL (ref 0.2–1)
BUN SERPL-MCNC: 9 MG/DL (ref 5–25)
CALCIUM SERPL-MCNC: 9.1 MG/DL (ref 8.3–10.1)
CHLORIDE SERPL-SCNC: 103 MMOL/L (ref 100–108)
CO2 SERPL-SCNC: 26 MMOL/L (ref 21–32)
CREAT SERPL-MCNC: 0.74 MG/DL (ref 0.6–1.3)
GFR SERPL CREATININE-BSD FRML MDRD: 100 ML/MIN/1.73SQ M
GLUCOSE P FAST SERPL-MCNC: 106 MG/DL (ref 65–99)
LIPASE SERPL-CCNC: 132 U/L (ref 73–393)
POTASSIUM SERPL-SCNC: 4 MMOL/L (ref 3.5–5.3)
PROLACTIN SERPL-MCNC: 13 NG/ML
PROT SERPL-MCNC: 7.9 G/DL (ref 6.4–8.2)
SODIUM SERPL-SCNC: 137 MMOL/L (ref 136–145)
TSH SERPL DL<=0.05 MIU/L-ACNC: 1.97 UIU/ML (ref 0.36–3.74)

## 2020-06-27 PROCEDURE — 82784 ASSAY IGA/IGD/IGG/IGM EACH: CPT

## 2020-06-27 PROCEDURE — 84146 ASSAY OF PROLACTIN: CPT

## 2020-06-27 PROCEDURE — U0003 INFECTIOUS AGENT DETECTION BY NUCLEIC ACID (DNA OR RNA); SEVERE ACUTE RESPIRATORY SYNDROME CORONAVIRUS 2 (SARS-COV-2) (CORONAVIRUS DISEASE [COVID-19]), AMPLIFIED PROBE TECHNIQUE, MAKING USE OF HIGH THROUGHPUT TECHNOLOGIES AS DESCRIBED BY CMS-2020-01-R: HCPCS

## 2020-06-27 PROCEDURE — 36415 COLL VENOUS BLD VENIPUNCTURE: CPT

## 2020-06-27 PROCEDURE — 83516 IMMUNOASSAY NONANTIBODY: CPT

## 2020-06-27 PROCEDURE — 83690 ASSAY OF LIPASE: CPT

## 2020-06-27 PROCEDURE — 84443 ASSAY THYROID STIM HORMONE: CPT

## 2020-06-27 PROCEDURE — 86255 FLUORESCENT ANTIBODY SCREEN: CPT

## 2020-06-27 PROCEDURE — 80053 COMPREHEN METABOLIC PANEL: CPT

## 2020-06-29 ENCOUNTER — TELEPHONE (OUTPATIENT)
Dept: OBGYN CLINIC | Facility: CLINIC | Age: 40
End: 2020-06-29

## 2020-06-29 LAB
ENDOMYSIUM IGA SER QL: NEGATIVE
GLIADIN PEPTIDE IGA SER-ACNC: 8 UNITS (ref 0–19)
GLIADIN PEPTIDE IGG SER-ACNC: 11 UNITS (ref 0–19)
IGA SERPL-MCNC: 148 MG/DL (ref 87–352)
TTG IGA SER-ACNC: <2 U/ML (ref 0–3)
TTG IGG SER-ACNC: 6 U/ML (ref 0–5)

## 2020-07-01 LAB — SARS-COV-2 RNA SPEC QL NAA+PROBE: NOT DETECTED

## 2020-07-02 ENCOUNTER — TELEPHONE (OUTPATIENT)
Dept: GASTROENTEROLOGY | Facility: MEDICAL CENTER | Age: 40
End: 2020-07-02

## 2020-07-02 NOTE — TELEPHONE ENCOUNTER
----- Message from Halley Muniz MD sent at 6/29/2020  4:59 PM EDT -----  Celiac disease serologies are abnormal further evaluation with endoscopy and biopsy

## 2020-07-02 NOTE — TELEPHONE ENCOUNTER
Patient called back she is aware of results and she is scheduling her egd  Left voicemail for patient to call office

## 2020-07-07 ENCOUNTER — TELEMEDICINE (OUTPATIENT)
Dept: BEHAVIORAL/MENTAL HEALTH CLINIC | Facility: CLINIC | Age: 40
End: 2020-07-07
Payer: COMMERCIAL

## 2020-07-07 DIAGNOSIS — F41.1 GENERALIZED ANXIETY DISORDER: Primary | ICD-10-CM

## 2020-07-07 DIAGNOSIS — F33.0 MILD EPISODE OF RECURRENT MAJOR DEPRESSIVE DISORDER (HCC): ICD-10-CM

## 2020-07-07 PROCEDURE — 90832 PSYTX W PT 30 MINUTES: CPT | Performed by: SOCIAL WORKER

## 2020-07-07 NOTE — BH TREATMENT PLAN
Genaro Cruz  1980       Date of Initial Treatment Plan: 10/16/2019  Date of Current Treatment Plan: 07/07/20    Treatment Plan Number 3     Strengths/Personal Resources for Self Care: Her jose alberto, supportive     Diagnosis:   1  Generalized anxiety disorder     2  Mild episode of recurrent major depressive disorder ([facility] [address] )         Area of Needs: Cheri Dietrich wants to continue learning to cope with life stressors in healthy manner      Long Term Goal 1: Samantha Ba will experience decrease in symptoms of anxiety and depresion and learn healthy coping skills    Target Date: 1/7/2021  Completion Date:          Short Term Objectives for Goal 1: Samantha Ba will learn to identify emotional triggers and learn healthy ways to cope      GOAL 1: Modality: Individual 1-2x per month   Completion Date tbd    GOAL 1: Modality: The person(s) responsible for carrying out the plan is  Cheri Dietrich and Ade Alas, [address]: Diagnosis and Treatment Plan explained to Nancy Hogan relates understanding diagnosis and is agreeable to Treatment Plan  Client Comments : Please share your thoughts, feelings, need and/or experiences regarding your treatment plan: Reviewed with Cheri Dietrich who provided verbal consent

## 2020-07-07 NOTE — PSYCH
Virtual Brief Visit    Assessment/Plan:    Problem List Items Addressed This Visit        Other    Mild episode of recurrent major depressive disorder ([facility] [address] )    Generalized anxiety disorder - Primary                Reason for visit is No chief complaint on file  Encounter provider Fernanda Domingo    Provider located at [address] [facility] [address]  [address] [address] [facility] [address]    Recent Visits  No visits were found meeting these conditions  Showing recent visits within past 7 days and meeting all other requirements     Future Appointments  No visits were found meeting these conditions  Showing future appointments within next 150 days and meeting all other requirements        After connecting through telephone, the patient was identified by name and date of birth  Enma Magdaleno was informed that this is a telemedicine visit and that the visit is being conducted through telephone  My office door was closed  No one else was in the room  She acknowledged consent and understanding of privacy and security of the platform  The patient has agreed to participate and understands she can discontinue the visit at any time  Patient is aware this is a billable service  Subjective    Enma Magdaleno is a 41 y o  female   D:Lianne reviewed and verbally consented to treatment plan  She reports mood has been stable; she is coping with life stressors by spending time with granddaughter, her jose alberto and going to work  Manual Narrow discussed various health concerns and how she is taking care of same  She reports ongoing compliance with medication  Explored self care and other coping skills with Manual Narrow  A: Manual Narrow sounded calm, focused and stable  She continues to seem insightful and motivated for treatment  P: Continue individual therapy; follow up 4 weeks      HPI     Past Medical History:   Diagnosis Date    Anemia     Anxiety 10/16/2019    Colon polyp 2019    Duodenitis without bleeding     Dysphagia     Fibromyalgia     Gastritis     GERD (gastroesophageal reflux disease)     Hiatal hernia     Hx of colonoscopy     Insomnia     Memory loss     Menorrhagia with regular cycle 11/20/2014    Moderate episode of recurrent major depressive disorder ([facility] [address] ) 10/16/2019    Obesity     Oral herpes     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 myelopathy or radiculopathy        Past Surgical History:   Procedure Laterality Date    APPENDECTOMY      BREAST BIOPSY Right     pt unsure when or where- ? 6 yrs ago    BREAST SURGERY Right 01/08/2015    lump removed from breast    [address] COLONOSCOPY      9/26/2012; 2019-benign polyp, repeat 5 years    DILATION AND CURETTAGE OF UTERUS      Resolved:1/29/2009    ESOPHAGOGASTRODUODENOSCOPY      Diagnostic ; 9/26/2012    HYSTEROSCOPY      Resolved:1/30/2009    OVARIAN CYST REMOVAL Right 2005    WISDOM TOOTH EXTRACTION         Current Outpatient Medications   Medication Sig Dispense Refill    clindamycin (CLINDAGEL) 1 % gel Apply topically 2 (two) times a day 30 g 2    dexlansoprazole (Dexilant) 60 MG capsule Take 1 capsule (60 mg total) by mouth every 12 (twelve) hours 60 capsule 2    K-PHOS 500 MG tablet TAKE 2 TABLETS BY MOUTH TWICE A  tablet 1    LINZESS 145 MCG CAPS TAKE 1 CAPSULE (145 MCG TOTAL) BY MOUTH DAILY FOR 30 DAYS 30 capsule 9    meclizine (ANTIVERT) 32 MG tablet Take 32 mg by mouth 3 (three) times a day as needed for dizziness      Milnacipran HCl (Savella) 50 MG TABS Take 1 tablet by mouth 2 (two) times a day 60 tablet 2    Multiple Vitamin (MULTIVITAMIN) tablet Take 1 tablet by mouth daily      ondansetron (ZOFRAN) 4 mg tablet Take 1 tablet (4 mg total) by mouth every 8 (eight) hours as needed for nausea or vomiting 30 tablet 0    sertraline (ZOLOFT) 50 mg tablet Take 1 tablet (50 mg total) by mouth daily 30 tablet 2    valACYclovir (VALTREX) 1,000 mg tablet Take 2 tablets (2,000 mg total) by mouth 2 (two) times a day for 1 day 4 tablet 0     No current facility-administered medications for this visit  Allergies   Allergen Reactions    Morphine Chest Pain    Percocet [Oxycodone-Acetaminophen] Hives    Domperidone     Ibuprofen      Due to gastritis       Review of Systems    There were no vitals filed for this visit  As a result of this visit, I have not referred the patient for further respiratory evaluation  I spent 30 minutes directly with the patient during this visit    VIRTUAL VISIT DISCLAIMER    Svitlana De La Torre acknowledges that she has consented to an online visit or consultation  She understands that the online visit is based solely on information provided by her, and that, in the absence of a face-to-face physical evaluation by the physician, the diagnosis she receives is both limited and provisional in terms of accuracy and completeness  This is not intended to replace a full medical face-to-face evaluation by the physician  Svitlana De La Torre understands and accepts these terms

## 2020-07-13 DIAGNOSIS — E83.39 HYPOPHOSPHATEMIA: ICD-10-CM

## 2020-07-14 ENCOUNTER — TELEPHONE (OUTPATIENT)
Dept: HEMATOLOGY ONCOLOGY | Facility: CLINIC | Age: 40
End: 2020-07-14

## 2020-07-14 ENCOUNTER — APPOINTMENT (OUTPATIENT)
Dept: LAB | Facility: CLINIC | Age: 40
End: 2020-07-14
Payer: COMMERCIAL

## 2020-07-14 DIAGNOSIS — E83.39 HYPOPHOSPHATEMIA: ICD-10-CM

## 2020-07-14 LAB
ANION GAP SERPL CALCULATED.3IONS-SCNC: 5 MMOL/L (ref 4–13)
BUN SERPL-MCNC: 10 MG/DL (ref 5–25)
CALCIUM SERPL-MCNC: 9.3 MG/DL (ref 8.3–10.1)
CHLORIDE SERPL-SCNC: 104 MMOL/L (ref 100–108)
CO2 SERPL-SCNC: 28 MMOL/L (ref 21–32)
CREAT SERPL-MCNC: 0.64 MG/DL (ref 0.6–1.3)
GFR SERPL CREATININE-BSD FRML MDRD: 110 ML/MIN/1.73SQ M
GLUCOSE P FAST SERPL-MCNC: 84 MG/DL (ref 65–99)
PHOSPHATE SERPL-MCNC: 3.1 MG/DL (ref 2.7–4.5)
POTASSIUM SERPL-SCNC: 4.1 MMOL/L (ref 3.5–5.3)
SODIUM SERPL-SCNC: 137 MMOL/L (ref 136–145)

## 2020-07-14 PROCEDURE — 36415 COLL VENOUS BLD VENIPUNCTURE: CPT

## 2020-07-14 PROCEDURE — 84100 ASSAY OF PHOSPHORUS: CPT

## 2020-07-14 PROCEDURE — 80048 BASIC METABOLIC PNL TOTAL CA: CPT

## 2020-07-14 NOTE — TELEPHONE ENCOUNTER
Pt returned call to me, she is taking the K phos tabs and will go today to have her level rechecked

## 2020-07-14 NOTE — TELEPHONE ENCOUNTER
Phoned pt and had to leave a message asking if she was still taking the K phos tabs  I asked her to have her level rechecked before we refill med  I did leave my contact number here at [address] [facility] for pt to return my call

## 2020-07-16 ENCOUNTER — HOSPITAL ENCOUNTER (OUTPATIENT)
Dept: RADIOLOGY | Facility: HOSPITAL | Age: 40
Discharge: HOME | End: 2020-07-16
Attending: INTERNAL MEDICINE
Payer: COMMERCIAL

## 2020-07-16 DIAGNOSIS — E83.39 HYPOPHOSPHATEMIA: Primary | ICD-10-CM

## 2020-07-16 DIAGNOSIS — R11.0 NAUSEA: ICD-10-CM

## 2020-07-16 PROCEDURE — A9585 GADOBUTROL INJECTION: HCPCS | Performed by: INTERNAL MEDICINE

## 2020-07-16 PROCEDURE — 74183 MRI ABD W/O CNTR FLWD CNTR: CPT

## 2020-07-16 RX ADMIN — GADOBUTROL 11 ML: 604.72 INJECTION INTRAVENOUS at 20:27

## 2020-07-22 ENCOUNTER — PROCEDURE VISIT (OUTPATIENT)
Dept: OBGYN CLINIC | Facility: CLINIC | Age: 40
End: 2020-07-22
Payer: COMMERCIAL

## 2020-07-22 VITALS
TEMPERATURE: 97.7 F | BODY MASS INDEX: 40.27 KG/M2 | SYSTOLIC BLOOD PRESSURE: 122 MMHG | DIASTOLIC BLOOD PRESSURE: 84 MMHG | WEIGHT: 242 LBS | HEART RATE: 80 BPM

## 2020-07-22 DIAGNOSIS — N92.0 MENORRHAGIA WITH REGULAR CYCLE: Primary | ICD-10-CM

## 2020-07-22 DIAGNOSIS — F32.1 CURRENT MODERATE EPISODE OF MAJOR DEPRESSIVE DISORDER WITHOUT PRIOR EPISODE (HCC): ICD-10-CM

## 2020-07-22 PROCEDURE — 88305 TISSUE EXAM BY PATHOLOGIST: CPT | Performed by: PATHOLOGY

## 2020-07-22 PROCEDURE — 58100 BIOPSY OF UTERUS LINING: CPT | Performed by: OBSTETRICS & GYNECOLOGY

## 2020-07-22 RX ORDER — ERGOCALCIFEROL 1.25 MG/1
CAPSULE ORAL
COMMUNITY
Start: 2020-06-29

## 2020-07-22 RX ORDER — CHOLECALCIFEROL (VITAMIN D3) 1250 MCG
CAPSULE ORAL
COMMUNITY
Start: 2020-06-30 | End: 2020-08-06

## 2020-07-22 NOTE — PROGRESS NOTES
Endometrial biopsy  Date/Time: 7/22/2020 2:25 PM  Performed by: Shante Armstrong MD  Authorized by: Shante Armstrong MD     Consent:     Consent obtained:  Written    Consent given by:  Patient    Procedural risks discussed:  Bleeding, failure rate, infection, possible continued pain and repeat procedure    Patient questions answered: yes      Patient agrees, verbalizes understanding, and wants to proceed: yes      Educational handouts given: yes      Instructions and paperwork completed: yes    Indication:     Indications: Other disorder of menstruation and other abnormal bleeding from female genital tract    Pre-procedure:     Pre-procedure timeout performed: yes    Procedure:     Procedure: endometrial biopsy with Pipelle      A bivalve speculum was placed in the vagina: yes      Cervix cleaned and prepped: yes      The cervix was dilated: no      Uterus sounded: yes      Uterus sound depth (cm):  8    Specimen collected: specimen collected and sent to pathology      Patient tolerated procedure well with no complications: yes    Findings:     Uterus size:  Non-gravid    Cervix: normal      Adnexa: normal    Comments:      Pt notified of normal TSH, PRL  Pelvic u/s with stable dermoid cyst, <2 cm in size

## 2020-07-24 ENCOUNTER — TELEPHONE (OUTPATIENT)
Dept: GASTROENTEROLOGY | Facility: MEDICAL CENTER | Age: 40
End: 2020-07-24

## 2020-07-24 DIAGNOSIS — M79.7 PRIMARY FIBROMYALGIA SYNDROME: ICD-10-CM

## 2020-07-24 NOTE — TELEPHONE ENCOUNTER
Patient is having liver pain no fever diarrhea or nausea, just a sharp and dull pain  Can this be from scar tissue? and would like to know what can she do until her procedure scheduled in sept  Please advise she would like to speak to you

## 2020-07-24 NOTE — TELEPHONE ENCOUNTER
----- Message from Ludwig Harrison MD sent at 7/22/2020 10:32 AM EDT -----  No acute intra-abdominal pathology  Status post cholecystectomy  Hepatomegaly

## 2020-07-27 DIAGNOSIS — R10.11 RUQ PAIN: Primary | ICD-10-CM

## 2020-07-27 RX ORDER — DICYCLOMINE HYDROCHLORIDE 10 MG/1
10 CAPSULE ORAL 2 TIMES DAILY PRN
Qty: 60 CAPSULE | Refills: 4 | Status: SHIPPED | OUTPATIENT
Start: 2020-07-27 | End: 2020-11-03

## 2020-08-04 ENCOUNTER — TELEMEDICINE (OUTPATIENT)
Dept: BEHAVIORAL/MENTAL HEALTH CLINIC | Facility: CLINIC | Age: 40
End: 2020-08-04
Payer: COMMERCIAL

## 2020-08-04 DIAGNOSIS — F41.1 GENERALIZED ANXIETY DISORDER: ICD-10-CM

## 2020-08-04 DIAGNOSIS — F33.0 MILD EPISODE OF RECURRENT MAJOR DEPRESSIVE DISORDER (HCC): Primary | ICD-10-CM

## 2020-08-04 PROCEDURE — 98968 PH1 ASSMT&MGMT NQHP 21-30: CPT | Performed by: SOCIAL WORKER

## 2020-08-04 NOTE — PSYCH
Virtual Brief Visit    Assessment/Plan:    Problem List Items Addressed This Visit        Other    Mild episode of recurrent major depressive disorder ([facility] [address] ) - Primary    Generalized anxiety disorder                Reason for visit is No chief complaint on file  Encounter provider Mar Broussard    Provider located at [address] [facility]  [address]  [facility] [address]    Recent Visits  No visits were found meeting these conditions  Showing recent visits within past 7 days and meeting all other requirements     Future Appointments  No visits were found meeting these conditions  Showing future appointments within next 150 days and meeting all other requirements        After connecting through telephone, the patient was identified by name and date of birth  Samm Odell was informed that this is a telemedicine visit and that the visit is being conducted through telephone  My office door was closed  No one else was in the room  She acknowledged consent and understanding of privacy and security of the platform  The patient has agreed to participate and understands she can discontinue the visit at any time  Patient is aware this is a billable service  Subjective    Samm Odell is a 41 y o  female   D: Bhumi Monday discussed ongoing concerns for children (ages 23 and up)  She talked about various stressors and how she and  are considering asking the children to move out  Hope Monday discussed her and her 's physical conditions and various upcoming procedures and appointments  Hope Monday discussed various concerns related to COVID-19, [employer] and general day to day activities  Explored how Hope Monday is coping with same  She continues to rely heavily on her jose alberto to get through situations  Validated Lianne's feelings and offered support  Provided positive feedback to Hope Monday for managing stressors and maintaining stable mood    A: Bhumi Monday presented with low depression and anxiety; she continues to cope with stressors appropriately  Cindy Bean seems insightful and motivated  P: Continue individual therapy; follow up 4 weeks    HPI     Past Medical History:   Diagnosis Date    Anemia     Anxiety 10/16/2019    Colon polyp 2019    Duodenitis without bleeding     Dysphagia     Fibromyalgia     Gastritis     GERD (gastroesophageal reflux disease)     Hiatal hernia     Hx of colonoscopy     Insomnia     Memory loss     Menorrhagia with regular cycle 11/20/2014    Moderate episode of recurrent major depressive disorder ([address] ) 10/16/2019    Obesity     Oral herpes     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 myelopathy or radiculopathy        Past Surgical History:   Procedure Laterality Date    APPENDECTOMY      BREAST BIOPSY Right     pt unsure when or where- ? 6 yrs ago    BREAST SURGERY Right 01/08/2015    lump removed from breast    [address] COLONOSCOPY      9/26/2012; 2019-benign polyp, repeat 5 years    DILATION AND CURETTAGE OF UTERUS      Resolved:1/29/2009    ESOPHAGOGASTRODUODENOSCOPY      Diagnostic ; 9/26/2012    HYSTEROSCOPY      Resolved:1/30/2009    OVARIAN CYST REMOVAL Right 2005    WISDOM TOOTH EXTRACTION         Current Outpatient Medications   Medication Sig Dispense Refill    Cholecalciferol (VITAMIN D3) 1 25 MG (73269 UT) CAPS       clindamycin (CLINDAGEL) 1 % gel Apply topically 2 (two) times a day 30 g 2    dexlansoprazole (Dexilant) 60 MG capsule Take 1 capsule (60 mg total) by mouth every 12 (twelve) hours 60 capsule 2    dicyclomine (BENTYL) 10 mg capsule Take 1 capsule (10 mg total) by mouth 2 (two) times a day as needed (For abdominal pain) 60 capsule 4    ergocalciferol (VITAMIN D2) 50,000 units TAKE 1 CAPSULE BY MOUTH ONE TIME PER WEEK      K-PHOS 500 MG tablet TAKE 2 TABLETS BY MOUTH TWICE A  tablet 1    LINZESS 145 MCG CAPS TAKE 1 CAPSULE (145 MCG TOTAL) BY MOUTH DAILY FOR 30 DAYS 30 capsule 9    meclizine (ANTIVERT) 32 MG tablet Take 32 mg by mouth 3 (three) times a day as needed for dizziness      Milnacipran HCl (Savella) 50 MG TABS Take 1 tablet by mouth 2 (two) times a day 60 tablet 2    Multiple Vitamin (MULTIVITAMIN) tablet Take 1 tablet by mouth daily      ondansetron (ZOFRAN) 4 mg tablet Take 1 tablet (4 mg total) by mouth every 8 (eight) hours as needed for nausea or vomiting 30 tablet 0    sertraline (ZOLOFT) 50 mg tablet TAKE 1 TABLET BY MOUTH EVERY DAY 90 tablet 0    valACYclovir (VALTREX) 1,000 mg tablet Take 2 tablets (2,000 mg total) by mouth 2 (two) times a day for 1 day 4 tablet 0     No current facility-administered medications for this visit  Allergies   Allergen Reactions    Morphine Chest Pain    Percocet [Oxycodone-Acetaminophen] Hives    Domperidone     Ibuprofen      Due to gastritis       Review of Systems    There were no vitals filed for this visit  I spent 45 minutes directly with the patient during this visit    VIRTUAL VISIT DISCLAIMER    Linda Rodas acknowledges that she has consented to an online visit or consultation  She understands that the online visit is based solely on information provided by her, and that, in the absence of a face-to-face physical evaluation by the physician, the diagnosis she receives is both limited and provisional in terms of accuracy and completeness  This is not intended to replace a full medical face-to-face evaluation by the physician  Linda Rodas understands and accepts these terms

## 2020-08-06 ENCOUNTER — OFFICE VISIT (OUTPATIENT)
Dept: OBGYN CLINIC | Facility: CLINIC | Age: 40
End: 2020-08-06
Payer: COMMERCIAL

## 2020-08-06 VITALS
HEART RATE: 70 BPM | WEIGHT: 241 LBS | BODY MASS INDEX: 40.1 KG/M2 | OXYGEN SATURATION: 100 % | DIASTOLIC BLOOD PRESSURE: 80 MMHG | SYSTOLIC BLOOD PRESSURE: 122 MMHG | TEMPERATURE: 97.9 F

## 2020-08-06 DIAGNOSIS — N92.0 MENORRHAGIA WITH REGULAR CYCLE: Primary | ICD-10-CM

## 2020-08-06 DIAGNOSIS — D25.1 INTRAMURAL LEIOMYOMA OF UTERUS: ICD-10-CM

## 2020-08-06 PROCEDURE — 99214 OFFICE O/P EST MOD 30 MIN: CPT | Performed by: OBSTETRICS & GYNECOLOGY

## 2020-08-06 PROCEDURE — 1036F TOBACCO NON-USER: CPT | Performed by: OBSTETRICS & GYNECOLOGY

## 2020-08-06 RX ORDER — CHLORAL HYDRATE 500 MG
1000 CAPSULE ORAL DAILY
COMMUNITY

## 2020-08-06 RX ORDER — SODIUM CHLORIDE, SODIUM LACTATE, POTASSIUM CHLORIDE, CALCIUM CHLORIDE 600; 310; 30; 20 MG/100ML; MG/100ML; MG/100ML; MG/100ML
125 INJECTION, SOLUTION INTRAVENOUS CONTINUOUS
Status: CANCELLED | OUTPATIENT
Start: 2020-08-13

## 2020-08-06 RX ORDER — MAGNESIUM 30 MG
30 TABLET ORAL DAILY
COMMUNITY
End: 2021-11-18

## 2020-08-06 NOTE — H&P (VIEW-ONLY)
HPI:  Pt is a 41 y o  [id number] with Patient's last menstrual period was 07/27/2020 (exact date)  using VL for contraception presents c/o heavy, prolonged bleeding and uterine fibroids  The patient reports she would like to discuss surgical management further  She has undergone endometrial biopsy which was negative and has anemia due to her menses  Pt reports she has thought about Mirena IUD vs endometrial ablation and would like to proceed with surgical management  Reviewed that she will undergo D&C, hysteroscopy, novasure endometrial ablation  Technique of surgery and risks reviewed  Pt reports she would like to proceed  PMHx:   Past Medical History:   Diagnosis Date    Anemia     Anxiety 10/16/2019    Colon polyp 2019    Duodenitis without bleeding     Dysphagia     Fibromyalgia     Gastritis     GERD (gastroesophageal reflux disease)     Hiatal hernia     Hx of colonoscopy     Insomnia     Memory loss     Menorrhagia with regular cycle 11/20/2014    Moderate episode of recurrent major depressive disorder ([facility] [address] ) 10/16/2019    Obesity     Oral herpes     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 myelopathy or radiculopathy        PSHx:   Past Surgical History:   Procedure Laterality Date    APPENDECTOMY      BREAST BIOPSY Right     pt unsure when or where- ? 6 yrs ago    BREAST SURGERY Right 01/08/2015    lump removed from breast    [facility] [address] COLONOSCOPY      9/26/2012; 2019-benign polyp, repeat 5 years    DILATION AND CURETTAGE OF UTERUS      Resolved:1/29/2009    ESOPHAGOGASTRODUODENOSCOPY      Diagnostic ; 9/26/2012    HYSTEROSCOPY      Resolved:1/30/2009    OVARIAN CYST REMOVAL Right 2005    WISDOM TOOTH EXTRACTION         Meds: (Not in a hospital admission)      Allergies:    Allergies   Allergen Reactions    Morphine Chest Pain    Percocet [Oxycodone-Acetaminophen] Hives    Domperidone     Ibuprofen      Due to gastritis    Oxycodone Hives Social Hx:    Social History     Socioeconomic History    Marital status: /Civil Union     Spouse name: Yasmin Sellers Number of children: 4    Years of education: high school    Highest education level: None   Occupational History    Occupation: cash    Social Needs    Financial resource strain: Not very hard    Food insecurity     Worry: Never true     Inability: Never true    Transportation needs     Medical: No     Non-medical: No   Tobacco Use    Smoking status: Never Smoker    Smokeless tobacco: Never Used    Tobacco comment: No passive smoke exposure   Substance and Sexual Activity    Alcohol use: No     Frequency: Never     Binge frequency: Never    Drug use: No    Sexual activity: Yes     Partners: Male     Birth control/protection: Male Sterilization     Comment: life time partners:1   Lifestyle    Physical activity     Days per week: 3 days     Minutes per session: 50 min    Stress: To some extent   Relationships    Social connections     Talks on phone: More than three times a week     Gets together: More than three times a week     Attends Baptism service: More than 4 times per year     Active member of club or organization: Yes     Attends meetings of clubs or organizations: More than 4 times per year     Relationship status:     Intimate partner violence     Fear of current or ex partner: No     Emotionally abused: No     Physically abused: No     Forced sexual activity: No   Other Topics Concern    None   Social History Narrative    Christian [employer]ism    Accepts blood products       Ob Hx:   OB History    Para Term  AB Living   4 4 3 1   4   SAB TAB Ectopic Multiple Live Births                  # Outcome Date GA Lbr Joe/2nd Weight Sex Delivery Anes PTL Lv   4             3 Term            2 Term            1 Term               Obstetric Comments   1  FT    2  FT C/S--face presentation   3  PT    4   FT       Menarche: 6 Menses:          Fm Hx:   Family History   Problem Relation Age of Onset    Other Mother         uterine leiomyoma    Diabetes type II Mother         Mellitus    Hypothyroidism Mother     Colon cancer Father 54        Stage IV    Diabetes Father         Type II Mellitus    Hypertension Father     Anxiety disorder Brother     Depression Brother     Diabetes Brother         Mellitus    Asthma Brother     Hypertension Brother     Multiple sclerosis Sister     Asthma Sister     Hypothyroidism Sister     Hypertension Maternal Grandmother     Diabetes Maternal Grandmother     Other Maternal Grandmother         Vulvar cancer    Schizophrenia Maternal Grandfather     Hypertension Maternal Grandfather     Thyroid cancer Paternal Grandmother 80    Diabetes Paternal Grandfather 43         due to complications of DM    Breast cancer Cousin 46    Ovarian cancer Neg Hx        ROS:  Review of Systems   Constitutional: Negative for chills, fatigue, fever and unexpected weight change  HENT: Negative for congestion, mouth sores and sore throat  Respiratory: Negative for cough, chest tightness, shortness of breath and wheezing  Cardiovascular: Negative for chest pain and palpitations  Gastrointestinal: Negative for abdominal distention, abdominal pain, constipation, diarrhea, nausea and vomiting  Endocrine: Negative for cold intolerance and heat intolerance  Genitourinary: Positive for menstrual problem  Negative for dyspareunia, dysuria, genital sores, pelvic pain, vaginal bleeding, vaginal discharge and vaginal pain  Musculoskeletal: Negative for arthralgias  Skin: Negative for color change and rash  Neurological: Negative for dizziness, light-headedness and headaches  Hematological: Negative for adenopathy         VS:  /80 (BP Location: Right arm, Patient Position: Sitting, Cuff Size: Standard)   Pulse 70   Temp 97 9 °F (36 6 °C)   Wt 109 kg (241 lb)   LMP 2020 (Exact Date)   SpO2 100%   Breastfeeding No   BMI 40 10 kg/m²       Exam:    Physical Exam   Constitutional: She is oriented to person, place, and time  She does not appear ill  No distress  HENT:   Head: Normocephalic and atraumatic  Eyes: Conjunctivae are normal    Neck: Normal range of motion  Cardiovascular: Normal rate, regular rhythm and normal pulses  Pulmonary/Chest: Effort normal and breath sounds normal    Abdominal: Soft  Normal appearance and bowel sounds are normal  She exhibits no distension and no mass  There is no abdominal tenderness  There is no guarding  No hernia  Musculoskeletal: Normal range of motion  Neurological: She is alert and oriented to person, place, and time  Skin: Skin is warm and dry  Psychiatric: Her behavior is normal  Mood, judgment and thought content normal              vulva      normal external genitalia for age and no lesions, masses, epithelial changes, or exudate    vagina    color pink and rugae  well formed rugae    cervix     parous and no lesions     uterus     NSSC, AF, NT, mobile and 10 wks    adnexa   no masses or tenderness     RV        no masses or nodularity    Labs:  Lab Results   Component Value Date    WBC 8 01 06/18/2020    HGB 11 4 (L) 06/18/2020    HCT 35 0 06/18/2020     06/18/2020     Lab Results   Component Value Date    HCGQUANT <2 08/27/2018       Imaging:  UTERUS:  The uterus is anteverted in position, measuring 10 1 x 5 3 x 6 9 cm    1 1 x 0 8 x 1 2 cm anterior body superficial intramural fibroid previously 1 3 x 1 1 x 1 1 cm  Otherwise, contour and echotexture appear normal   The cervix shows no suspicious abnormality      ENDOMETRIUM:    Normal caliber of 11 mm  Homogeneous and normal in appearance      OVARIES/ADNEXA:  Right ovary:  3 x 1 6 x 1 9 cm  No suspicious right ovarian abnormality  1 5 x 1 x 0 9 cm echogenic nonvascular nodule unchanged when measured in the same fashion    Doppler flow within normal limits      Left ovary:  3 4 x 2 4 x 3 cm  No suspicious left ovarian abnormality  2 cm corpus luteum  Doppler flow within normal limits      No suspicious adnexal mass or loculated collections  There is no free fluid      IMPRESSION:     Stable 1 5 cm right ovarian dermoid unchanged when measured in the same fashion  Based on the ACR O-RADS system, this is a dermoid cyst, O-RADS category 2 (almost certain benign with <9% risk of malignancy ) The management recommendation is followup in 1   year  Pathology:  Final Diagnosis    A  Endometrium, biopsy:       - Early secretory endometrium        - No evidence of chronic endometritis  - No hyperplasia or malignancy is identified  A/P: 41 y o  [id number] with Patient's last menstrual period was 07/27/2020 (exact date)  with heavy, prolonged bleeding and uterine fibroids  for D&C c with hysteroscopy and Novasure endometrial ablation   The risks (infection, bleeding, transfusion, damage to adjacent organs requiring immediate and/or delayed repair, clots inside blood vessels, need to complete procedure using alternative approach, procedural failure, worsening of pre-exisiting medical condition, and death) and alternatives (see outpatient record) have been discussed and patient desires to proceed with D&C c with hysteroscopy and Novasure endometrial ablation at [facility] on 8/13/2020   Pt advised of visitation policy, universal masking and isolation prior to surgery

## 2020-08-06 NOTE — PRE-PROCEDURE INSTRUCTIONS
Pre-Surgery Instructions:   Medication Instructions    clindamycin (CLINDAGEL) 1 % gel Instructed patient per Anesthesia Guidelines   dexlansoprazole (Dexilant) 60 MG capsule Instructed patient per Anesthesia Guidelines   dicyclomine (BENTYL) 10 mg capsule Instructed patient per Anesthesia Guidelines   ergocalciferol (VITAMIN D2) 50,000 units Instructed patient per Anesthesia Guidelines   K-PHOS 500 MG tablet Instructed patient per Anesthesia Guidelines   magnesium 30 MG tablet Instructed patient per Anesthesia Guidelines   Milnacipran HCl (Savella) 50 MG TABS Instructed patient per Anesthesia Guidelines   Omega-3 Fatty Acids (fish oil) 1,000 mg Instructed patient per Anesthesia Guidelines   sertraline (ZOLOFT) 50 mg tablet Instructed patient per Anesthesia Guidelines  Instructed has no medications to be taken the morning of surgery  No aspirin, NSAIDs, vitamins or supplements 1 week before surgery  May continue vit D and potassium

## 2020-08-06 NOTE — PROGRESS NOTES
HPI:  Pt is a 41 y o  [id number] with Patient's last menstrual period was 07/27/2020 (exact date)  using VL for contraception presents c/o heavy, prolonged bleeding and uterine fibroids  The patient reports she would like to discuss surgical management further  She has undergone endometrial biopsy which was negative and has anemia due to her menses  Pt reports she has thought about Mirena IUD vs endometrial ablation and would like to proceed with surgical management  Reviewed that she will undergo D&C, hysteroscopy, novasure endometrial ablation  Technique of surgery and risks reviewed  Pt reports she would like to proceed  PMHx:   Past Medical History:   Diagnosis Date    Anemia     Anxiety 10/16/2019    Colon polyp 2019    Duodenitis without bleeding     Dysphagia     Fibromyalgia     Gastritis     GERD (gastroesophageal reflux disease)     Hiatal hernia     Hx of colonoscopy     Insomnia     Memory loss     Menorrhagia with regular cycle 11/20/2014    Moderate episode of recurrent major depressive disorder ([facility] [address] ) 10/16/2019    Obesity     Oral herpes     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 myelopathy or radiculopathy        PSHx:   Past Surgical History:   Procedure Laterality Date    APPENDECTOMY      BREAST BIOPSY Right     pt unsure when or where- ? 6 yrs ago    BREAST SURGERY Right 01/08/2015    lump removed from breast    [facility] [address] COLONOSCOPY      9/26/2012; 2019-benign polyp, repeat 5 years    DILATION AND CURETTAGE OF UTERUS      Resolved:1/29/2009    ESOPHAGOGASTRODUODENOSCOPY      Diagnostic ; 9/26/2012    HYSTEROSCOPY      Resolved:1/30/2009    OVARIAN CYST REMOVAL Right 2005    WISDOM TOOTH EXTRACTION         Meds: (Not in a hospital admission)      Allergies:    Allergies   Allergen Reactions    Morphine Chest Pain    Percocet [Oxycodone-Acetaminophen] Hives    Domperidone     Ibuprofen      Due to gastritis    Oxycodone Hives Social Hx:    Social History     Socioeconomic History    Marital status: /Civil Union     Spouse name: Daphne Bass Number of children: 4    Years of education: high school    Highest education level: None   Occupational History    Occupation: cash    Social Needs    Financial resource strain: Not very hard    Food insecurity     Worry: Never true     Inability: Never true    Transportation needs     Medical: No     Non-medical: No   Tobacco Use    Smoking status: Never Smoker    Smokeless tobacco: Never Used    Tobacco comment: No passive smoke exposure   Substance and Sexual Activity    Alcohol use: No     Frequency: Never     Binge frequency: Never    Drug use: No    Sexual activity: Yes     Partners: Male     Birth control/protection: Male Sterilization     Comment: life time partners:1   Lifestyle    Physical activity     Days per week: 3 days     Minutes per session: 50 min    Stress: To some extent   Relationships    Social connections     Talks on phone: More than three times a week     Gets together: More than three times a week     Attends [employer] service: More than 4 times per year     Active member of club or organization: Yes     Attends meetings of clubs or organizations: More than 4 times per year     Relationship status:     Intimate partner violence     Fear of current or ex partner: No     Emotionally abused: No     Physically abused: No     Forced sexual activity: No   Other Topics Concern    None   Social History Narrative    Sabianist Sabianist    Accepts blood products       Ob Hx:   OB History    Para Term  AB Living   4 4 3 1   4   SAB TAB Ectopic Multiple Live Births                  # Outcome Date GA Lbr Joe/2nd Weight Sex Delivery Anes PTL Lv   4             3 Term            2 Term            1 Term               Obstetric Comments   1  FT    2  FT C/S--face presentation   3  PT    4   FT       Menarche: 6 Menses:          Fm Hx:   Family History   Problem Relation Age of Onset    Other Mother         uterine leiomyoma    Diabetes type II Mother         Mellitus    Hypothyroidism Mother     Colon cancer Father 54        Stage IV    Diabetes Father         Type II Mellitus    Hypertension Father     Anxiety disorder Brother     Depression Brother     Diabetes Brother         Mellitus    Asthma Brother     Hypertension Brother     Multiple sclerosis Sister     Asthma Sister     Hypothyroidism Sister     Hypertension Maternal Grandmother     Diabetes Maternal Grandmother     Other Maternal Grandmother         Vulvar cancer    Schizophrenia Maternal Grandfather     Hypertension Maternal Grandfather     Thyroid cancer Paternal Grandmother 80    Diabetes Paternal Grandfather 43         due to complications of DM    Breast cancer Cousin 46    Ovarian cancer Neg Hx        ROS:  Review of Systems   Constitutional: Negative for chills, fatigue, fever and unexpected weight change  HENT: Negative for congestion, mouth sores and sore throat  Respiratory: Negative for cough, chest tightness, shortness of breath and wheezing  Cardiovascular: Negative for chest pain and palpitations  Gastrointestinal: Negative for abdominal distention, abdominal pain, constipation, diarrhea, nausea and vomiting  Endocrine: Negative for cold intolerance and heat intolerance  Genitourinary: Positive for menstrual problem  Negative for dyspareunia, dysuria, genital sores, pelvic pain, vaginal bleeding, vaginal discharge and vaginal pain  Musculoskeletal: Negative for arthralgias  Skin: Negative for color change and rash  Neurological: Negative for dizziness, light-headedness and headaches  Hematological: Negative for adenopathy         VS:  /80 (BP Location: Right arm, Patient Position: Sitting, Cuff Size: Standard)   Pulse 70   Temp 97 9 °F (36 6 °C)   Wt 109 kg (241 lb)   LMP 2020 (Exact Date)   SpO2 100%   Breastfeeding No   BMI 40 10 kg/m²       Exam:    Physical Exam   Constitutional: She is oriented to person, place, and time  She does not appear ill  No distress  HENT:   Head: Normocephalic and atraumatic  Eyes: Conjunctivae are normal    Neck: Normal range of motion  Cardiovascular: Normal rate, regular rhythm and normal pulses  Pulmonary/Chest: Effort normal and breath sounds normal    Abdominal: Soft  Normal appearance and bowel sounds are normal  She exhibits no distension and no mass  There is no abdominal tenderness  There is no guarding  No hernia  Musculoskeletal: Normal range of motion  Neurological: She is alert and oriented to person, place, and time  Skin: Skin is warm and dry  Psychiatric: Her behavior is normal  Mood, judgment and thought content normal              vulva      normal external genitalia for age and no lesions, masses, epithelial changes, or exudate    vagina    color pink and rugae  well formed rugae    cervix     parous and no lesions     uterus     NSSC, AF, NT, mobile and 10 wks    adnexa   no masses or tenderness     RV        no masses or nodularity    Labs:  Lab Results   Component Value Date    WBC 8 01 06/18/2020    HGB 11 4 (L) 06/18/2020    HCT 35 0 06/18/2020     06/18/2020     Lab Results   Component Value Date    HCGQUANT <2 08/27/2018       Imaging:  UTERUS:  The uterus is anteverted in position, measuring 10 1 x 5 3 x 6 9 cm    1 1 x 0 8 x 1 2 cm anterior body superficial intramural fibroid previously 1 3 x 1 1 x 1 1 cm  Otherwise, contour and echotexture appear normal   The cervix shows no suspicious abnormality      ENDOMETRIUM:    Normal caliber of 11 mm  Homogeneous and normal in appearance      OVARIES/ADNEXA:  Right ovary:  3 x 1 6 x 1 9 cm  No suspicious right ovarian abnormality  1 5 x 1 x 0 9 cm echogenic nonvascular nodule unchanged when measured in the same fashion    Doppler flow within normal limits      Left ovary:  3 4 x 2 4 x 3 cm  No suspicious left ovarian abnormality  2 cm corpus luteum  Doppler flow within normal limits      No suspicious adnexal mass or loculated collections  There is no free fluid      IMPRESSION:     Stable 1 5 cm right ovarian dermoid unchanged when measured in the same fashion  Based on the ACR O-RADS system, this is a dermoid cyst, O-RADS category 2 (almost certain benign with <7% risk of malignancy ) The management recommendation is followup in 1   year  Pathology:  Final Diagnosis    A  Endometrium, biopsy:       - Early secretory endometrium        - No evidence of chronic endometritis  - No hyperplasia or malignancy is identified  A/P: 41 y o  [id number] with Patient's last menstrual period was 07/27/2020 (exact date)  with heavy, prolonged bleeding and uterine fibroids  for D&C c with hysteroscopy and Novasure endometrial ablation   The risks (infection, bleeding, transfusion, damage to adjacent organs requiring immediate and/or delayed repair, clots inside blood vessels, need to complete procedure using alternative approach, procedural failure, worsening of pre-exisiting medical condition, and death) and alternatives (see outpatient record) have been discussed and patient desires to proceed with D&C c with hysteroscopy and Novasure endometrial ablation at [facility] on 8/13/2020   Pt advised of visitation policy, universal masking and isolation prior to surgery

## 2020-08-06 NOTE — PATIENT INSTRUCTIONS
Pre-Surgery Instructions:   Medication Instructions    clindamycin (CLINDAGEL) 1 % gel per anesthesia guidelines     dexlansoprazole (Dexilant) 60 MG capsule per anesthesia guidelines     dicyclomine (BENTYL) 10 mg capsule per anesthesia guidelines     ergocalciferol (VITAMIN D2) 50,000 units per anesthesia guidelines     K-PHOS 500 MG tablet per anesthesia guidelines     magnesium 30 MG tablet per anesthesia guidelines     meclizine (ANTIVERT) 32 MG tablet per anesthesia guidelines     Milnacipran HCl (Savella) 50 MG TABS per anesthesia guidelines     Omega-3 Fatty Acids (fish oil) 1,000 mg per anesthesia guidelines     sertraline (ZOLOFT) 50 mg tablet per anesthesia guidelines     valACYclovir (VALTREX) 1,000 mg tablet per anesthesia guidelines     [DISCONTINUED] Cholecalciferol (VITAMIN D3) 1 25 MG (36431 UT) CAPS per anesthesia guidelines

## 2020-08-07 ENCOUNTER — TELEPHONE (OUTPATIENT)
Dept: HEMATOLOGY ONCOLOGY | Facility: CLINIC | Age: 40
End: 2020-08-07

## 2020-08-07 NOTE — TELEPHONE ENCOUNTER
Patient has f/u apt on Wed 9/02 w/Dr Jaden Mahmood at [address] [place] [address]  Called pt to reschedule apt as Dr Jaden Mahmood will no longer be in the office on Wednesdays at [address] [place] [address]  Pt didn't answer  Left message that the apt has been rescheduled for Friday 09/04 at 3:00 p m  w/Dr Jaden Mahmood at [address] [place] [address]  Asked pt to call the office if apt doesn't work, [phone]

## 2020-08-12 ENCOUNTER — ANESTHESIA EVENT (OUTPATIENT)
Dept: PERIOP | Facility: HOSPITAL | Age: 40
End: 2020-08-12
Payer: COMMERCIAL

## 2020-08-13 ENCOUNTER — HOSPITAL ENCOUNTER (OUTPATIENT)
Facility: HOSPITAL | Age: 40
Setting detail: OUTPATIENT SURGERY
Discharge: HOME | End: 2020-08-13
Attending: OBSTETRICS & GYNECOLOGY | Admitting: OBSTETRICS & GYNECOLOGY
Payer: COMMERCIAL

## 2020-08-13 ENCOUNTER — ANESTHESIA (OUTPATIENT)
Dept: PERIOP | Facility: HOSPITAL | Age: 40
End: 2020-08-13
Payer: COMMERCIAL

## 2020-08-13 VITALS
WEIGHT: 241 LBS | BODY MASS INDEX: 40.15 KG/M2 | DIASTOLIC BLOOD PRESSURE: 81 MMHG | HEART RATE: 63 BPM | OXYGEN SATURATION: 100 % | HEIGHT: 65 IN | TEMPERATURE: 98 F | RESPIRATION RATE: 16 BRPM | SYSTOLIC BLOOD PRESSURE: 156 MMHG

## 2020-08-13 DIAGNOSIS — Z98.890 S/P ENDOMETRIAL ABLATION: Primary | ICD-10-CM

## 2020-08-13 DIAGNOSIS — D25.1 INTRAMURAL LEIOMYOMA OF UTERUS: ICD-10-CM

## 2020-08-13 DIAGNOSIS — N92.0 MENORRHAGIA WITH REGULAR CYCLE: ICD-10-CM

## 2020-08-13 LAB
ERYTHROCYTE [DISTWIDTH] IN BLOOD BY AUTOMATED COUNT: 12.6 % (ref 11.6–15.1)
EXT PREGNANCY TEST URINE: NEGATIVE
EXT. CONTROL: NORMAL
HCT VFR BLD AUTO: 35.1 % (ref 34.8–46.1)
HGB BLD-MCNC: 11.4 G/DL (ref 11.5–15.4)
MCH RBC QN AUTO: 30.1 PG (ref 26.8–34.3)
MCHC RBC AUTO-ENTMCNC: 32.5 G/DL (ref 31.4–37.4)
MCV RBC AUTO: 93 FL (ref 82–98)
PLATELET # BLD AUTO: 363 THOUSANDS/UL (ref 149–390)
PMV BLD AUTO: 9.7 FL (ref 8.9–12.7)
RBC # BLD AUTO: 3.79 MILLION/UL (ref 3.81–5.12)
WBC # BLD AUTO: 7.66 THOUSAND/UL (ref 4.31–10.16)

## 2020-08-13 PROCEDURE — 58563 HYSTEROSCOPY ABLATION: CPT | Performed by: OBSTETRICS & GYNECOLOGY

## 2020-08-13 PROCEDURE — 88305 TISSUE EXAM BY PATHOLOGIST: CPT | Performed by: PATHOLOGY

## 2020-08-13 PROCEDURE — 81025 URINE PREGNANCY TEST: CPT | Performed by: OBSTETRICS & GYNECOLOGY

## 2020-08-13 PROCEDURE — 85027 COMPLETE CBC AUTOMATED: CPT | Performed by: OBSTETRICS & GYNECOLOGY

## 2020-08-13 RX ORDER — SENNOSIDES 8.6 MG
650 CAPSULE ORAL EVERY 6 HOURS PRN
Qty: 30 TABLET | Refills: 0 | Status: SHIPPED | OUTPATIENT
Start: 2020-08-13 | End: 2020-08-25

## 2020-08-13 RX ORDER — SODIUM CHLORIDE, SODIUM LACTATE, POTASSIUM CHLORIDE, CALCIUM CHLORIDE 600; 310; 30; 20 MG/100ML; MG/100ML; MG/100ML; MG/100ML
125 INJECTION, SOLUTION INTRAVENOUS CONTINUOUS
Status: DISCONTINUED | OUTPATIENT
Start: 2020-08-13 | End: 2020-08-13 | Stop reason: HOSPADM

## 2020-08-13 RX ORDER — MIDAZOLAM HYDROCHLORIDE 2 MG/2ML
INJECTION, SOLUTION INTRAMUSCULAR; INTRAVENOUS AS NEEDED
Status: DISCONTINUED | OUTPATIENT
Start: 2020-08-13 | End: 2020-08-13

## 2020-08-13 RX ORDER — FENTANYL CITRATE/PF 50 MCG/ML
50 SYRINGE (ML) INJECTION
Status: DISCONTINUED | OUTPATIENT
Start: 2020-08-13 | End: 2020-08-13 | Stop reason: HOSPADM

## 2020-08-13 RX ORDER — SODIUM CHLORIDE 9 MG/ML
125 INJECTION, SOLUTION INTRAVENOUS CONTINUOUS
Status: DISCONTINUED | OUTPATIENT
Start: 2020-08-13 | End: 2020-08-13 | Stop reason: HOSPADM

## 2020-08-13 RX ORDER — KETOROLAC TROMETHAMINE 30 MG/ML
INJECTION, SOLUTION INTRAMUSCULAR; INTRAVENOUS AS NEEDED
Status: DISCONTINUED | OUTPATIENT
Start: 2020-08-13 | End: 2020-08-13

## 2020-08-13 RX ORDER — DEXAMETHASONE SODIUM PHOSPHATE 4 MG/ML
INJECTION, SOLUTION INTRA-ARTICULAR; INTRALESIONAL; INTRAMUSCULAR; INTRAVENOUS; SOFT TISSUE AS NEEDED
Status: DISCONTINUED | OUTPATIENT
Start: 2020-08-13 | End: 2020-08-13

## 2020-08-13 RX ORDER — GLYCOPYRROLATE 0.2 MG/ML
INJECTION INTRAMUSCULAR; INTRAVENOUS AS NEEDED
Status: DISCONTINUED | OUTPATIENT
Start: 2020-08-13 | End: 2020-08-13

## 2020-08-13 RX ORDER — PROPOFOL 10 MG/ML
INJECTION, EMULSION INTRAVENOUS AS NEEDED
Status: DISCONTINUED | OUTPATIENT
Start: 2020-08-13 | End: 2020-08-13

## 2020-08-13 RX ORDER — FENTANYL CITRATE 50 UG/ML
INJECTION, SOLUTION INTRAMUSCULAR; INTRAVENOUS AS NEEDED
Status: DISCONTINUED | OUTPATIENT
Start: 2020-08-13 | End: 2020-08-13

## 2020-08-13 RX ORDER — SODIUM CHLORIDE 9 MG/ML
INJECTION, SOLUTION INTRAVENOUS AS NEEDED
Status: DISCONTINUED | OUTPATIENT
Start: 2020-08-13 | End: 2020-08-13 | Stop reason: HOSPADM

## 2020-08-13 RX ORDER — ONDANSETRON 2 MG/ML
4 INJECTION INTRAMUSCULAR; INTRAVENOUS ONCE AS NEEDED
Status: DISCONTINUED | OUTPATIENT
Start: 2020-08-13 | End: 2020-08-13 | Stop reason: HOSPADM

## 2020-08-13 RX ORDER — ONDANSETRON 2 MG/ML
INJECTION INTRAMUSCULAR; INTRAVENOUS AS NEEDED
Status: DISCONTINUED | OUTPATIENT
Start: 2020-08-13 | End: 2020-08-13

## 2020-08-13 RX ORDER — MAGNESIUM HYDROXIDE 1200 MG/15ML
LIQUID ORAL AS NEEDED
Status: DISCONTINUED | OUTPATIENT
Start: 2020-08-13 | End: 2020-08-13 | Stop reason: HOSPADM

## 2020-08-13 RX ORDER — ACETAMINOPHEN 325 MG/1
650 TABLET ORAL EVERY 6 HOURS PRN
Status: DISCONTINUED | OUTPATIENT
Start: 2020-08-13 | End: 2020-08-13 | Stop reason: HOSPADM

## 2020-08-13 RX ADMIN — FENTANYL CITRATE 50 MCG: 50 INJECTION, SOLUTION INTRAMUSCULAR; INTRAVENOUS at 14:59

## 2020-08-13 RX ADMIN — DEXAMETHASONE SODIUM PHOSPHATE 4 MG: 4 INJECTION, SOLUTION INTRAMUSCULAR; INTRAVENOUS at 13:30

## 2020-08-13 RX ADMIN — FENTANYL CITRATE 25 MCG: 50 INJECTION, SOLUTION INTRAMUSCULAR; INTRAVENOUS at 13:33

## 2020-08-13 RX ADMIN — ACETAMINOPHEN 650 MG: 325 TABLET ORAL at 16:29

## 2020-08-13 RX ADMIN — FENTANYL CITRATE 50 MCG: 50 INJECTION, SOLUTION INTRAMUSCULAR; INTRAVENOUS at 15:38

## 2020-08-13 RX ADMIN — FENTANYL CITRATE 25 MCG: 50 INJECTION, SOLUTION INTRAMUSCULAR; INTRAVENOUS at 13:40

## 2020-08-13 RX ADMIN — SODIUM CHLORIDE 125 ML/HR: 0.9 INJECTION, SOLUTION INTRAVENOUS at 12:24

## 2020-08-13 RX ADMIN — SODIUM CHLORIDE 125 ML/HR: 0.9 INJECTION, SOLUTION INTRAVENOUS at 15:44

## 2020-08-13 RX ADMIN — ONDANSETRON 4 MG: 2 INJECTION INTRAMUSCULAR; INTRAVENOUS at 13:30

## 2020-08-13 RX ADMIN — FENTANYL CITRATE 50 MCG: 50 INJECTION, SOLUTION INTRAMUSCULAR; INTRAVENOUS at 14:44

## 2020-08-13 RX ADMIN — FENTANYL CITRATE 25 MCG: 50 INJECTION, SOLUTION INTRAMUSCULAR; INTRAVENOUS at 13:54

## 2020-08-13 RX ADMIN — LIDOCAINE HYDROCHLORIDE 100 MG: 20 INJECTION, SOLUTION INTRAVENOUS at 13:29

## 2020-08-13 RX ADMIN — PROPOFOL 200 MG: 10 INJECTION, EMULSION INTRAVENOUS at 13:29

## 2020-08-13 RX ADMIN — FENTANYL CITRATE 50 MCG: 50 INJECTION, SOLUTION INTRAMUSCULAR; INTRAVENOUS at 14:40

## 2020-08-13 RX ADMIN — FENTANYL CITRATE 25 MCG: 50 INJECTION, SOLUTION INTRAMUSCULAR; INTRAVENOUS at 14:08

## 2020-08-13 RX ADMIN — GLYCOPYRROLATE 0.1 MG: 0.2 INJECTION, SOLUTION INTRAMUSCULAR; INTRAVENOUS at 13:18

## 2020-08-13 RX ADMIN — MIDAZOLAM 2 MG: 1 INJECTION INTRAMUSCULAR; INTRAVENOUS at 13:18

## 2020-08-13 RX ADMIN — KETOROLAC TROMETHAMINE 15 MG: 30 INJECTION, SOLUTION INTRAMUSCULAR at 13:59

## 2020-08-13 NOTE — OP NOTE
OPERATIVE REPORT  PATIENT NAME: Kadeem Kaminski    :  1980  MRN: [id number]  Pt Location: [facility] 05    SURGERY DATE: 2020    Surgeon(s) and Role:     * Taz Davila MD - Primary     * Ophelia Garza MD - Assisting    Preop Diagnosis:  Menorrhagia with regular cycle [N92 0]  Intramural leiomyoma of uterus [D25 1]    Post-Op Diagnosis Codes:     * Menorrhagia with regular cycle [N92 0]     * Intramural leiomyoma of uterus [D25 1]    Procedure(s) (LRB):  DILATATION AND CURETTAGE (D&C) WITH HYSTEROSCOPY (N/A)  ABLATION ENDOMETRIAL NOVASURE (N/A)    Specimen(s):  ID Type Source Tests Collected by Time Destination   1 : Endometrial Curettings Tissue Endometrium TISSUE EXAM Taz Davila MD 2020 1348        Estimated Blood Loss:   2 mL    IV fluids:  700 mL    Straight catheter:  300 mL clear yellow urine    Distention media deficit (NS):  100 mL    Anesthesia Type:   General (LMA)    Operative Indications:  Menorrhagia with regular cycle [N92 0]  Intramural leiomyoma of uterus [D25 1]    Operative Findings:  Normal external female genitalia  Uterus anteverted, mobile and sounding to 10 cm  Cervix appeared parous with minimal descensus  A flat based polypoid structure was noted 1 cm to the right of the fundus  Bilateral ostia visualized  Complications:   None apparent    Procedure and Technique:  Patient was identified in the preop holding area as well as the operating suite  Procedure was reviewed and patient consented verbally once again  She underwent successful induction of general anesthesia using LMA  She was placed in the dorsal lithotomy position using yellowfin stirrups  She had pneumatic compression boots in place  She had a Betadine vaginal prep and was draped in sterile fashion  A operative timeout was accomplished  There were no adnexal masses noted  A straight catheter was inserted in the bladder, draining 100cc of clear yellow urine   The anterior lip of the cervix was grasped using a single-tooth tenaculum  Uterus sounded in midposition fashion to 10 cm  The cervical length was measured and was noted to be 4 cm  The endocervix was dilated to size 15 using Alli dilators  The hysteroscope was then introduced with saline as a distention medium  Excellent visualization of the endocervix and endometrial cavity was accomplished  The endocervix was further dilated to size 20 and a medium sharp curette and endometrial curettage was then undertaken with specimen being sent for routine pathology  The NovaSure ablation device was inserted through the cervix and seated into the endometrial cavity  Device was deployed and rotated up, down, left and right, twice, to maximize expansion of the bipolar electrode  Uterine cavity was determine to be 6 cm by previous measurements and uterine width 4 8 cm  A test of the system was performed and the uterine cavity was insufflated with CO2 to ensure cavity integrity and proper placement of the device  No leakage of gas was appreciated  After successful testing, the Novasure system was activated and bipolar cauterization with a Moisture Transport Vacuum System facilitated ablation of the endometrium in 71 seconds under 158 kumar of power  The Novasure system was completely retracted prior to it's removal from the uterine cavity  Inspection of the bipolar electrode showed evidence of burnt endometrial tissue  The tenaculum was removed  The patient had excellent hemostasis at this time  She was cleansed and was awakened from anesthesia without incident and was taken to the recovery room in satisfactory condition  At the conclusion of the procedure, all needle, sponge, and instrument counts were noted to be correct x2  Dr Veto Eisenmenger was present and participated in all key portions of the case      Patient Disposition:  PACU     SIGNATURE: Edilia Gutierrez MD  DATE: August 13, 2020  TIME: 2:29 PM     I agree with the operative report as dictated by Dr Leanna Reinoso and edited by me  I was present for and participated in the entire procedure      Radha Benavides MD

## 2020-08-13 NOTE — INTERVAL H&P NOTE
H&P reviewed  After examining the patient I find no changes in the patients condition since the H&P had been written      Vitals:    08/13/20 1222   BP: 115/67   Pulse: 74   Resp: 18   Temp: 98 3 °F (36 8 °C)   SpO2: 97%

## 2020-08-13 NOTE — ANESTHESIA PREPROCEDURE EVALUATION
Review of Systems/Medical History          Cardiovascular  Hyperlipidemia,    Pulmonary  Negative pulmonary ROS        GI/Hepatic    GERD ,  Hiatal hernia,             Endo/Other    Obesity    GYN       Hematology  Anemia ,     Musculoskeletal    Arthritis     Neurology  Negative neurology ROS      Psychology   Negative psychology ROS              Physical Exam    Airway    Mallampati score: II  TM Distance: >3 FB  Neck ROM: full     Dental   No notable dental hx     Cardiovascular  Rhythm: regular, Rate: normal, Cardiovascular exam normal    Pulmonary  Pulmonary exam normal Breath sounds clear to auscultation,     Other Findings        Anesthesia Plan  ASA Score- 3     Anesthesia Type- general with ASA Monitors  Additional Monitors:   Airway Plan: ETT  Plan Factors-        Patient is not a current smoker  Patient not instructed to abstain from smoking on day of procedure  Patient did not smoke on day of surgery  Induction- intravenous  Postoperative Plan-     Informed Consent- Anesthetic plan and risks discussed with patient and spouse  I personally reviewed this patient with the CRNA  Discussed and agreed on the Anesthesia Plan with the CRNA  Juanita Silva

## 2020-08-13 NOTE — ANESTHESIA POSTPROCEDURE EVALUATION
Post-Op Assessment Note    CV Status:  Stable    Pain management: adequate     Mental Status:  Alert and awake   Hydration Status:  Euvolemic   PONV Controlled:  Controlled   Airway Patency:  Patent      Post Op Vitals Reviewed: Yes      Staff: Anesthesiologist         No complications documented      /71 (08/13/20 1458)    Temp      Pulse 64 (08/13/20 1510)   Resp 12 (08/13/20 1510)    SpO2 100 % (08/13/20 1510)

## 2020-08-13 NOTE — DISCHARGE INSTRUCTIONS
Endometrial Ablation   WHAT YOU NEED TO KNOW:   Endometrial ablation is a procedure to destroy the endometrium (lining of your uterus)  You may need this procedure if you have heavy or abnormal vaginal bleeding  DISCHARGE INSTRUCTIONS:   Call 911 for any of the following:   · You feel lightheaded, short of breath, and have chest pain  · You cough up blood  Seek care immediately if:   · Your arm or leg feels warm, tender, and painful  It may look swollen and red  · You feel dizzy, weak, and confused  · You cannot stop vomiting  · You have severe pain  · You are not able to urinate  Contact your healthcare provider if:   · You have a fever  · You have vaginal bleeding and it is not time for your monthly period  · The bleeding during your monthly period has not decreased  · You have pain when you urinate or see blood in your urine  · You have questions or concerns about your condition or care  Medicines:   · Medicines  can help decrease pain, calm your stomach, and control vomiting  · Take your medicine as directed  Contact your healthcare provider if you think your medicine is not helping or if you have side effects  Tell him or her if you are allergic to any medicine  Keep a list of the medicines, vitamins, and herbs you take  Include the amounts, and when and why you take them  Bring the list or the pill bottles to follow-up visits  Carry your medicine list with you in case of an emergency  Activity:  Ask when you can return to your usual activities  Do not have sex or use tampons or douches for 6 weeks after your procedure, or as directed  Birth control: You may still need to use birth control to prevent pregnancy  Pregnancy risks, such as a miscarriage and tubal pregnancy, are higher after this procedure  Talk to your healthcare provider about birth control or pregnancy after endometrial ablation    Follow up with your healthcare provider as directed:  Write down your questions so you remember to ask them during your visits  © 2017 [address] [employer] Information is for End User's use only and may not be sold, redistributed or otherwise used for commercial purposes  All illustrations and images included in CareNotes® are the copyrighted property of A D A M , Inc  or [employer]  The above information is an  only  It is not intended as medical advice for individual conditions or treatments  Talk to your doctor, nurse or pharmacist before following any medical regimen to see if it is safe and effective for you

## 2020-08-22 ENCOUNTER — TRANSCRIBE ORDERS (OUTPATIENT)
Dept: LAB | Facility: HOSPITAL | Age: 40
End: 2020-08-22

## 2020-08-22 ENCOUNTER — APPOINTMENT (OUTPATIENT)
Dept: LAB | Facility: HOSPITAL | Age: 40
End: 2020-08-22
Payer: COMMERCIAL

## 2020-08-22 DIAGNOSIS — E83.39 HYPOPHOSPHATEMIA: ICD-10-CM

## 2020-08-22 DIAGNOSIS — R23.8 EASY BRUISING: ICD-10-CM

## 2020-08-22 DIAGNOSIS — D50.8 IRON DEFICIENCY ANEMIA SECONDARY TO INADEQUATE DIETARY IRON INTAKE: ICD-10-CM

## 2020-08-22 LAB
ALBUMIN SERPL BCP-MCNC: 3.6 G/DL (ref 3.5–5)
ALP SERPL-CCNC: 83 U/L (ref 46–116)
ALT SERPL W P-5'-P-CCNC: 33 U/L (ref 12–78)
ANION GAP SERPL CALCULATED.3IONS-SCNC: 10 MMOL/L (ref 4–13)
APTT PPP: 29 SECONDS (ref 23–37)
AST SERPL W P-5'-P-CCNC: 15 U/L (ref 5–45)
BASOPHILS # BLD AUTO: 0.04 THOUSANDS/ΜL (ref 0–0.1)
BASOPHILS NFR BLD AUTO: 1 % (ref 0–1)
BILIRUB SERPL-MCNC: 0.24 MG/DL (ref 0.2–1)
BUN SERPL-MCNC: 12 MG/DL (ref 5–25)
CALCIUM SERPL-MCNC: 8.9 MG/DL (ref 8.3–10.1)
CHLORIDE SERPL-SCNC: 103 MMOL/L (ref 100–108)
CO2 SERPL-SCNC: 26 MMOL/L (ref 21–32)
CREAT SERPL-MCNC: 0.73 MG/DL (ref 0.6–1.3)
CRP SERPL QL: 6.1 MG/L
EOSINOPHIL # BLD AUTO: 0.1 THOUSAND/ΜL (ref 0–0.61)
EOSINOPHIL NFR BLD AUTO: 1 % (ref 0–6)
ERYTHROCYTE [DISTWIDTH] IN BLOOD BY AUTOMATED COUNT: 12.8 % (ref 11.6–15.1)
ERYTHROCYTE [SEDIMENTATION RATE] IN BLOOD: 27 MM/HOUR (ref 0–19)
FERRITIN SERPL-MCNC: 78 NG/ML (ref 8–388)
FIBRINOGEN PPP-MCNC: 412 MG/DL (ref 227–495)
FOLATE SERPL-MCNC: >20 NG/ML (ref 3.1–17.5)
GFR SERPL CREATININE-BSD FRML MDRD: 102 ML/MIN/1.73SQ M
GLUCOSE P FAST SERPL-MCNC: 102 MG/DL (ref 65–99)
HCT VFR BLD AUTO: 36.3 % (ref 34.8–46.1)
HGB BLD-MCNC: 11.7 G/DL (ref 11.5–15.4)
IMM GRANULOCYTES # BLD AUTO: 0.06 THOUSAND/UL (ref 0–0.2)
IMM GRANULOCYTES NFR BLD AUTO: 1 % (ref 0–2)
INR PPP: 0.99 (ref 0.84–1.19)
IRON SATN MFR SERPL: 12 %
IRON SERPL-MCNC: 42 UG/DL (ref 50–170)
LYMPHOCYTES # BLD AUTO: 1.95 THOUSANDS/ΜL (ref 0.6–4.47)
LYMPHOCYTES NFR BLD AUTO: 26 % (ref 14–44)
MCH RBC QN AUTO: 30 PG (ref 26.8–34.3)
MCHC RBC AUTO-ENTMCNC: 32.2 G/DL (ref 31.4–37.4)
MCV RBC AUTO: 93 FL (ref 82–98)
MONOCYTES # BLD AUTO: 0.63 THOUSAND/ΜL (ref 0.17–1.22)
MONOCYTES NFR BLD AUTO: 8 % (ref 4–12)
NEUTROPHILS # BLD AUTO: 4.68 THOUSANDS/ΜL (ref 1.85–7.62)
NEUTS SEG NFR BLD AUTO: 63 % (ref 43–75)
NRBC BLD AUTO-RTO: 0 /100 WBCS
PHOSPHATE SERPL-MCNC: 3.1 MG/DL (ref 2.7–4.5)
PLATELET # BLD AUTO: 362 THOUSANDS/UL (ref 149–390)
PMV BLD AUTO: 9.8 FL (ref 8.9–12.7)
POTASSIUM SERPL-SCNC: 4.3 MMOL/L (ref 3.5–5.3)
PROT SERPL-MCNC: 8.1 G/DL (ref 6.4–8.2)
PROTHROMBIN TIME: 12.9 SECONDS (ref 11.6–14.5)
RBC # BLD AUTO: 3.9 MILLION/UL (ref 3.81–5.12)
SODIUM SERPL-SCNC: 139 MMOL/L (ref 136–145)
TIBC SERPL-MCNC: 362 UG/DL (ref 250–450)
VIT B12 SERPL-MCNC: 425 PG/ML (ref 100–900)
WBC # BLD AUTO: 7.46 THOUSAND/UL (ref 4.31–10.16)

## 2020-08-22 PROCEDURE — 83550 IRON BINDING TEST: CPT

## 2020-08-22 PROCEDURE — 84100 ASSAY OF PHOSPHORUS: CPT

## 2020-08-22 PROCEDURE — 82607 VITAMIN B-12: CPT

## 2020-08-22 PROCEDURE — 82746 ASSAY OF FOLIC ACID SERUM: CPT

## 2020-08-22 PROCEDURE — 85025 COMPLETE CBC W/AUTO DIFF WBC: CPT

## 2020-08-22 PROCEDURE — 83540 ASSAY OF IRON: CPT

## 2020-08-22 PROCEDURE — 82728 ASSAY OF FERRITIN: CPT

## 2020-08-22 PROCEDURE — 36415 COLL VENOUS BLD VENIPUNCTURE: CPT

## 2020-08-22 PROCEDURE — 80053 COMPREHEN METABOLIC PANEL: CPT

## 2020-08-22 PROCEDURE — 85730 THROMBOPLASTIN TIME PARTIAL: CPT

## 2020-08-22 PROCEDURE — 86140 C-REACTIVE PROTEIN: CPT

## 2020-08-22 PROCEDURE — 85384 FIBRINOGEN ACTIVITY: CPT

## 2020-08-22 PROCEDURE — 85652 RBC SED RATE AUTOMATED: CPT

## 2020-08-22 PROCEDURE — 85610 PROTHROMBIN TIME: CPT

## 2020-08-25 ENCOUNTER — TELEMEDICINE (OUTPATIENT)
Dept: PSYCHIATRY | Facility: CLINIC | Age: 40
End: 2020-08-25
Payer: COMMERCIAL

## 2020-08-25 ENCOUNTER — TELEMEDICINE (OUTPATIENT)
Dept: OBGYN CLINIC | Facility: CLINIC | Age: 40
End: 2020-08-25
Payer: COMMERCIAL

## 2020-08-25 DIAGNOSIS — F33.0 MILD EPISODE OF RECURRENT MAJOR DEPRESSIVE DISORDER (HCC): Primary | ICD-10-CM

## 2020-08-25 DIAGNOSIS — Z98.890 S/P ENDOMETRIAL ABLATION: Primary | ICD-10-CM

## 2020-08-25 DIAGNOSIS — F41.1 GENERALIZED ANXIETY DISORDER: ICD-10-CM

## 2020-08-25 DIAGNOSIS — Z98.890 S/P DILATION AND CURETTAGE: ICD-10-CM

## 2020-08-25 PROCEDURE — 99213 OFFICE O/P EST LOW 20 MIN: CPT | Performed by: PSYCHIATRY & NEUROLOGY

## 2020-08-25 PROCEDURE — 1036F TOBACCO NON-USER: CPT | Performed by: OBSTETRICS & GYNECOLOGY

## 2020-08-25 PROCEDURE — 99212 OFFICE O/P EST SF 10 MIN: CPT | Performed by: OBSTETRICS & GYNECOLOGY

## 2020-08-25 NOTE — PROGRESS NOTES
Virtual Brief Visit    Assessment/Plan:    Problem List Items Addressed This Visit        Other    S/P endometrial ablation - Primary    S/P dilation and curettage                Reason for visit is   Chief Complaint   Patient presents with    Virtual Brief Visit        Encounter provider Andi Eli MD    Provider located at [address] [place] [address]  [address] [address] [phone]    Recent Visits  No visits were found meeting these conditions  Showing recent visits within past 7 days and meeting all other requirements     Today's Visits  Date Type Provider Dept   08/25/20 Telemedicine Param File, [address] Ob/Gyn   Showing today's visits and meeting all other requirements     Future Appointments  No visits were found meeting these conditions  Showing future appointments within next 150 days and meeting all other requirements        After connecting through telephone, the patient was identified by name and date of birth  Davy Montenegro was informed that this is a telemedicine visit and that the visit is being conducted through telephone  My office door was closed  No one else was in the room  She acknowledged consent and understanding of privacy and security of the platform  The patient has agreed to participate and understands she can discontinue the visit at any time  Patient is aware this is a billable service  Subjective  Patient is a 41 y o  [id number] with Patient's last menstrual period was 08/23/2020  who presents for postoperative evaluation s/p D&C hysteroscopy, novasure endometrial ablation on 8/13/2020  Pt reports after surgery she was tired for a few days, but her pain was minimal  She did not require any medication for pain control  She also reports that she had minimal spotting   She does state that she did get her period on 8/23 as she was supposed to, but thus far it is significantly lighter than usual, requring only 1 pad per day, but the blood is bright red  She reports her usual PMS symptoms of fatigue and cramping, but otherwise is doing well  She is happy with the results thus far and reports "I can't believe this is a period!"  Reviewed benign pathology with patient  Will track menses for the next 3 cycles and follow up for annual examination       Past Medical History:   Diagnosis Date    Anemia     Anxiety 10/16/2019    Colon polyp 2019    Duodenitis without bleeding     Dysphagia     Fibromyalgia     Gastritis     GERD (gastroesophageal reflux disease)     Hiatal hernia     Hx of colonoscopy     Insomnia     Memory loss     Menorrhagia with regular cycle 2014    Moderate episode of recurrent major depressive disorder ([facility] [address] ) 10/16/2019    Obesity     Oral herpes     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 myelopathy or radiculopathy     Vitamin D deficiency        Past Surgical History:   Procedure Laterality Date    APPENDECTOMY      BREAST BIOPSY Right     pt unsure when or where- ? 6 yrs ago    BREAST SURGERY Right 2015    lump removed from breast    [address] COLONOSCOPY      2012; 2019-benign polyp, repeat 5 years    DILATION AND CURETTAGE OF UTERUS      Resolved:2009    ENDOMETRIAL ABLATION N/A 2020    Procedure: ABLATION ENDOMETRIAL Mardell Leaven;  Surgeon: Arnell Libman, MD;  Location: [facility];  Service: Gynecology    ESOPHAGOGASTRODUODENOSCOPY      Diagnostic ; 2012    HYSTEROSCOPY      Resolved:2009    OVARIAN CYST REMOVAL Right     FL HYSTEROSCOPY,W/ENDO BX N/A 2020    Procedure: DILATATION AND CURETTAGE (D&C) WITH HYSTEROSCOPY;  Surgeon: Arnell Libman, MD;  Location: [facility];  Service: Gynecology    WISDOM TOOTH EXTRACTION         OB History    Para Term  AB Living   4 4 3 1   4   SAB TAB Ectopic Multiple Live Births                  # Outcome Date GA Lbr Joe/2nd Weight Sex Delivery Anes PTL Lv   4             3 Term            2 Term            1 Term               Obstetric Comments   1  FT    2  FT C/S--face presentation   3  PT    4   FT       Menarche: 6   Menses:          Current Outpatient Medications:     clindamycin (CLINDAGEL) 1 % gel, Apply topically 2 (two) times a day, Disp: 30 g, Rfl: 2    dexlansoprazole (Dexilant) 60 MG capsule, Take 1 capsule (60 mg total) by mouth every 12 (twelve) hours, Disp: 60 capsule, Rfl: 2    ergocalciferol (VITAMIN D2) 50,000 units, TAKE 1 CAPSULE BY MOUTH ONE TIME PER WEEK, Disp: , Rfl:     magnesium 30 MG tablet, Take 500 mg by mouth daily , Disp: , Rfl:     Milnacipran HCl (Savella) 50 MG TABS, Take 1 tablet by mouth 2 (two) times a day, Disp: 60 tablet, Rfl: 2    Omega-3 Fatty Acids (fish oil) 1,000 mg, Take 1,000 mg by mouth daily, Disp: , Rfl:     sertraline (ZOLOFT) 50 mg tablet, TAKE 1 TABLET BY MOUTH EVERY DAY (Patient taking differently: Take 50 mg by mouth daily at bedtime ), Disp: 90 tablet, Rfl: 0    dicyclomine (BENTYL) 10 mg capsule, Take 1 capsule (10 mg total) by mouth 2 (two) times a day as needed (For abdominal pain), Disp: 60 capsule, Rfl: 4    LINZESS 145 MCG CAPS, TAKE 1 CAPSULE (145 MCG TOTAL) BY MOUTH DAILY FOR 30 DAYS (Patient not taking: Reported on 2020), Disp: 30 capsule, Rfl: 9    ondansetron (ZOFRAN) 4 mg tablet, Take 1 tablet (4 mg total) by mouth every 8 (eight) hours as needed for nausea or vomiting (Patient not taking: Reported on 2020), Disp: 30 tablet, Rfl: 0    valACYclovir (VALTREX) 1,000 mg tablet, Take 2 tablets (2,000 mg total) by mouth 2 (two) times a day for 1 day (Patient not taking: Reported on 2020), Disp: 4 tablet, Rfl: 0    Allergies   Allergen Reactions    Morphine Chest Pain    Percocet [Oxycodone-Acetaminophen] Hives    Domperidone     Ibuprofen      Due to gastritis    Oxycodone Hives       Social History     Socioeconomic History    Marital status: /Civil Union     Spouse name: Guicho Eason Number of children: 4    Years of education: high school    Highest education level: Not on file   Occupational History    Occupation: cash    Social Needs    Financial resource strain: Not very hard    Food insecurity     Worry: Never true     Inability: Never true    Transportation needs     Medical: No     Non-medical: No   Tobacco Use    Smoking status: Never Smoker    Smokeless tobacco: Never Used    Tobacco comment: No passive smoke exposure   Substance and Sexual Activity    Alcohol use: No     Frequency: Never     Binge frequency: Never    Drug use: No    Sexual activity: Yes     Partners: Male     Birth control/protection: Male Sterilization     Comment: life time partners:1   Lifestyle    Physical activity     Days per week: 3 days     Minutes per session: 50 min    Stress:  To some extent   Relationships    Social connections     Talks on phone: More than three times a week     Gets together: More than three times a week     Attends Mosque service: More than 4 times per year     Active member of club or organization: Yes     Attends meetings of clubs or organizations: More than 4 times per year     Relationship status:     Intimate partner violence     Fear of current or ex partner: No     Emotionally abused: No     Physically abused: No     Forced sexual activity: No   Other Topics Concern    Not on file   Social History Narrative    [employer]    Accepts blood products       Family History   Problem Relation Age of Onset    Other Mother         uterine leiomyoma    Diabetes type II Mother         Mellitus    Hypothyroidism Mother     Colon cancer Father 54        Stage IV    Diabetes Father         Type II Mellitus    Hypertension Father     Anxiety disorder Brother     Depression Brother     Diabetes Brother         Mellitus    Asthma Brother     Hypertension Brother     Multiple sclerosis Sister     Asthma Sister     Hypothyroidism Sister     Hypertension Maternal Grandmother     Diabetes Maternal Grandmother     Other Maternal Grandmother         Vulvar cancer    Schizophrenia Maternal Grandfather     Hypertension Maternal Grandfather     Thyroid cancer Paternal Grandmother 80    Diabetes Paternal Grandfather 43         due to complications of DM    Breast cancer Cousin 46    Ovarian cancer Neg Hx        Review of Systems   Constitutional: Negative for chills, fatigue, fever and unexpected weight change  HENT: Negative for congestion, mouth sores and sore throat  Respiratory: Negative for cough, chest tightness, shortness of breath and wheezing  Cardiovascular: Negative for chest pain and palpitations  Gastrointestinal: Negative for abdominal distention, abdominal pain, constipation, diarrhea, nausea and vomiting  Endocrine: Negative for cold intolerance and heat intolerance  Genitourinary: Positive for vaginal bleeding (on menses)  Negative for dyspareunia, dysuria, genital sores, menstrual problem, pelvic pain, vaginal discharge and vaginal pain  Musculoskeletal: Negative for arthralgias  Skin: Negative for color change and rash  Neurological: Negative for dizziness, light-headedness and headaches  Hematological: Negative for adenopathy  Last menstrual period 2020, not currently breastfeeding  and There is no height or weight on file to calculate BMI  A/P:  Pt is a 41 y o  [id number] with      Mumtaz Carpenter was seen today for virtual brief visit  Diagnoses and all orders for this visit:    S/P endometrial ablation    S/P dilation and curettage    Doing well  Pathology benign  Periods improved    F/U for annual examiantion  I spent 13 minutes directly with the patient during this visit    VIRTUAL VISIT DISCLAIMER    Gogobibiana Adrianna acknowledges that she has consented to an online visit or consultation   She understands that the online visit is based solely on information provided by her, and that, in the absence of a face-to-face physical evaluation by the physician, the diagnosis she receives is both limited and provisional in terms of accuracy and completeness  This is not intended to replace a full medical face-to-face evaluation by the physician  Mabel Mack understands and accepts these terms

## 2020-08-25 NOTE — PSYCH
Virtual Brief Visit    Assessment/Plan:    Problem List Items Addressed This Visit        Other    Mild episode of recurrent major depressive disorder ([facility] [address] ) - Primary    Generalized anxiety disorder                Reason for visit is   Chief Complaint   Patient presents with    Virtual Brief Visit        Encounter provider Clive Hampton MD    Provider located at [facility]   [address] [place] [address] B  [facility] [id number] [phone]    Recent Visits  No visits were found meeting these conditions  Showing recent visits within past 7 days and meeting all other requirements     Today's Visits  Date Type Provider Dept   08/25/20 Telemedicine Clive Hampton MD [facility]   Showing today's visits and meeting all other requirements     Future Appointments  No visits were found meeting these conditions  Showing future appointments within next 150 days and meeting all other requirements      The patient was not able to connect to tele video due to which she was called by me on phone  After connecting through telephone, the patient was identified by name and date of birth  Ariadne Both was informed that this is a telemedicine visit and that the visit is being conducted through telephone  My office door was closed  No one else was in the room  She acknowledged consent and understanding of privacy and security of the platform  The patient has agreed to participate and understands she can discontinue the visit at any time  Patient is aware this is a billable service       Subjective  See below  HPI     Past Medical History:   Diagnosis Date    Anemia     Anxiety 10/16/2019    Colon polyp 2019    Duodenitis without bleeding     Dysphagia     Fibromyalgia     Gastritis     GERD (gastroesophageal reflux disease)     Hiatal hernia     Hx of colonoscopy     Insomnia     Memory loss     Menorrhagia with regular cycle 11/20/2014    Moderate episode of recurrent major depressive disorder ([facility] [address] ) 10/16/2019    Obesity     Oral herpes     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 myelopathy or radiculopathy     Vitamin D deficiency        Past Surgical History:   Procedure Laterality Date    APPENDECTOMY      BREAST BIOPSY Right     pt unsure when or where- ? 6 yrs ago    BREAST SURGERY Right 01/08/2015    lump removed from breast    [address] COLONOSCOPY      9/26/2012; 2019-benign polyp, repeat 5 years   [address]      Resolved:1/29/2009    ENDOMETRIAL ABLATION N/A 8/13/2020    Procedure: ABLATION ENDOMETRIAL Himanshu Granado;  Surgeon: See Sosa MD;  [facility]: [facility];  Service: Gynecology    ESOPHAGOGASTRODUODENOSCOPY      Diagnostic ; 9/26/2012    HYSTEROSCOPY      Resolved:1/30/2009    OVARIAN CYST REMOVAL Right 2005    [place] HYSTEROSCOPY,W/ENDO BX N/A 8/13/2020    Procedure: DILATATION AND CURETTAGE (D&C) WITH HYSTEROSCOPY;  Surgeon: See Sosa MD;  [facility];  Service: Gynecology    WISDOM TOOTH EXTRACTION         Current Outpatient Medications   Medication Sig Dispense Refill    clindamycin (CLINDAGEL) 1 % gel Apply topically 2 (two) times a day 30 g 2    dexlansoprazole (Dexilant) 60 MG capsule Take 1 capsule (60 mg total) by mouth every 12 (twelve) hours 60 capsule 2    dicyclomine (BENTYL) 10 mg capsule Take 1 capsule (10 mg total) by mouth 2 (two) times a day as needed (For abdominal pain) 60 capsule 4    ergocalciferol (VITAMIN D2) 50,000 units TAKE 1 CAPSULE BY MOUTH ONE TIME PER WEEK      LINZESS 145 MCG CAPS TAKE 1 CAPSULE (145 MCG TOTAL) BY MOUTH DAILY FOR 30 DAYS (Patient not taking: Reported on 8/6/2020) 30 capsule 9    magnesium 30 MG tablet Take 500 mg by mouth daily       Milnacipran HCl (Savella) 50 MG TABS Take 1 tablet by mouth 2 (two) times a day 60 tablet 2    Omega-3 Fatty Acids (fish oil) 1,000 mg Take 1,000 mg by mouth daily      ondansetron (ZOFRAN) 4 mg tablet Take 1 tablet (4 mg total) by mouth every 8 (eight) hours as needed for nausea or vomiting (Patient not taking: Reported on 8/6/2020) 30 tablet 0    sertraline (ZOLOFT) 50 mg tablet TAKE 1 TABLET BY MOUTH EVERY DAY (Patient taking differently: Take 50 mg by mouth daily at bedtime ) 90 tablet 0    valACYclovir (VALTREX) 1,000 mg tablet Take 2 tablets (2,000 mg total) by mouth 2 (two) times a day for 1 day (Patient not taking: Reported on 8/6/2020) 4 tablet 0     No current facility-administered medications for this visit  Allergies   Allergen Reactions    Morphine Chest Pain    Percocet [Oxycodone-Acetaminophen] Hives    Domperidone     Ibuprofen      Due to gastritis    Oxycodone Hives       Review of Systems    There were no vitals filed for this visit  I spent Ten minutes directly with the patient during this visit  [address] [place] [address]      Name and Date of Birth:  Earlene Castrejon 41 y o  1980 MRN: [id number]    Date of Visit: August 25, 2020    Reason for Visit:  Follow-up for medication management    Subjective:  Patient reports she has been doing well  She reports her depression has been under control  Occasionally might feel mildly depressed but not lasting long  She also reports undergoing lot of treatment including endometrial ablation recently  Reports multiple appointments and due to which she had been somewhat stressed lately  But reports she had coping well and denies any severe anxiety or panic attack  Reports sleep and appetite has been good  Reports stop taking trazodone as she difficulty waking up in the morning  She reports sleeps is good without it  Denies any SI or HI  Her  was next the patient also denied any concern except that she sometimes forgetful    The patient stated that she has so many things to take care of that she usually forgets few things  She though does not have any major concern about it  Reports compliant with the Zoloft and denies any side effect  Review Of Systems:      Constitutional negative   ENT negative   Cardiovascular negative   Respiratory negative   Gastrointestinal negative   Genitourinary negative   Musculoskeletal negative   Integumentary negative   Neurological negative   Endocrine negative   Other Symptoms none       Alcohol/Substance Abuse:  Denies          Past Medical History:    Past Medical History:   Diagnosis Date    Anemia     Anxiety 10/16/2019    Colon polyp 2019    Duodenitis without bleeding     Dysphagia     Fibromyalgia     Gastritis     GERD (gastroesophageal reflux disease)     Hiatal hernia     Hx of colonoscopy     Insomnia     Memory loss     Menorrhagia with regular cycle 11/20/2014    Moderate episode of recurrent major depressive disorder ([facility] [address] ) 10/16/2019    Obesity     Oral herpes     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 myelopathy or radiculopathy     Vitamin D deficiency      Past Medical History Pertinent Negatives:   Diagnosis Date Noted    Abnormal Pap smear of cervix 04/19/2018 2/2016-wnl    Allergic 08/23/2018    Clotting disorder ([facility] [address] ) 08/23/2018    Dementia ([facility] ) 08/23/2018    Diverticulitis of colon 08/23/2018    Eating disorder 08/23/2018    History of transfusion 08/06/2020    HL (hearing loss) 08/23/2018    Infectious viral hepatitis 08/23/2018    Inflammatory bowel disease 08/23/2018    Osteoporosis 08/23/2018    Otitis media 08/23/2018    Scoliosis 08/23/2018    Shingles 08/23/2018    Substance abuse ([facility] ) 08/23/2018    Urinary tract infection 08/23/2018    Visual impairment 08/23/2018     Past Surgical History:   Procedure Laterality Date    APPENDECTOMY      BREAST BIOPSY Right     pt unsure when or where- ? 6 yrs ago    BREAST SURGERY Right 01/08/2015    lump removed from breast    [address]      COLONOSCOPY      9/26/2012; 2019-benign polyp, repeat 5 years    DILATION AND CURETTAGE OF UTERUS      Resolved:1/29/2009    ENDOMETRIAL ABLATION N/A 8/13/2020    Procedure: ABLATION ENDOMETRIAL Sangita Messing;  Surgeon: Terrance Li MD;  [facility];  Service: Gynecology    ESOPHAGOGASTRODUODENOSCOPY      Diagnostic ; 9/26/2012    HYSTEROSCOPY      Resolved:1/30/2009    OVARIAN CYST REMOVAL Right 2005    [place] HYSTEROSCOPY,W/ENDO BX N/A 8/13/2020    Procedure: DILATATION AND CURETTAGE (D&C) WITH HYSTEROSCOPY;  Surgeon: Terrance Li MD;  [facility];  Service: Gynecology    WISDOM TOOTH EXTRACTION       Allergies   Allergen Reactions    Morphine Chest Pain    Percocet [Oxycodone-Acetaminophen] Hives    Domperidone     Ibuprofen      Due to gastritis    Oxycodone Hives       Current Medications:       Current Outpatient Medications:     clindamycin (CLINDAGEL) 1 % gel, Apply topically 2 (two) times a day, Disp: 30 g, Rfl: 2    dexlansoprazole (Dexilant) 60 MG capsule, Take 1 capsule (60 mg total) by mouth every 12 (twelve) hours, Disp: 60 capsule, Rfl: 2    dicyclomine (BENTYL) 10 mg capsule, Take 1 capsule (10 mg total) by mouth 2 (two) times a day as needed (For abdominal pain), Disp: 60 capsule, Rfl: 4    ergocalciferol (VITAMIN D2) 50,000 units, TAKE 1 CAPSULE BY MOUTH ONE TIME PER WEEK, Disp: , Rfl:     LINZESS 145 MCG CAPS, TAKE 1 CAPSULE (145 MCG TOTAL) BY MOUTH DAILY FOR 30 DAYS (Patient not taking: Reported on 8/6/2020), Disp: 30 capsule, Rfl: 9    magnesium 30 MG tablet, Take 500 mg by mouth daily , Disp: , Rfl:     Milnacipran HCl (Savella) 50 MG TABS, Take 1 tablet by mouth 2 (two) times a day, Disp: 60 tablet, Rfl: 2    Omega-3 Fatty Acids (fish oil) 1,000 mg, Take 1,000 mg by mouth daily, Disp: , Rfl:     ondansetron (ZOFRAN) 4 mg tablet, Take 1 tablet (4 mg total) by mouth every 8 (eight) hours as needed for nausea or vomiting (Patient not taking: Reported on 8/6/2020), Disp: 30 tablet, Rfl: 0    sertraline (ZOLOFT) 50 mg tablet, TAKE 1 TABLET BY MOUTH EVERY DAY (Patient taking differently: Take 50 mg by mouth daily at bedtime ), Disp: 90 tablet, Rfl: 0    valACYclovir (VALTREX) 1,000 mg tablet, Take 2 tablets (2,000 mg total) by mouth 2 (two) times a day for 1 day (Patient not taking: Reported on 8/6/2020), Disp: 4 tablet, Rfl: 0       History Review: The following portions of the patient's history were reviewed and updated as appropriate: allergies, current medications, past family history, past medical history, past social history, past surgical history and problem list          OBJECTIVE:     Vital signs in last 24 hours: There were no vitals filed for this visit      Mental Status Evaluation:    Appearance unable to assess today due to virtual visit   Behavior cooperative, calm   Speech normal rate, normal volume, normal pitch   Mood euthymic   Affect Unable to assess due to tele visit   Thought Processes organized, goal directed   Associations intact associations   Thought Content no overt delusions   Perceptual Disturbances: no auditory hallucinations, no visual hallucinations   Abnormal Thoughts  Risk Potential Suicidal ideation - None  Homicidal ideation - None  Potential for aggression - No   Orientation oriented to person, place, time/date and situation   Memory recent and remote memory grossly intact   Consciousness alert and awake   Attention Span Concentration Span attention span and concentration are age appropriate   Intellect appears to be of average intelligence   Insight intact   Judgement intact   Muscle Strength and  Gait unable to assess today due to telephone visit   Motor activity unable to assess today due to virtual visit   Language no difficulty naming common objects, no difficulty repeating a phrase, no difficulty writing a sentence   Fund of Knowledge adequate knowledge of current events  adequate fund of knowledge regarding past history  adequate fund of knowledge regarding vocabulary    Pain none   Pain Scale 0       Laboratory Results:   Most Recent Labs:   Lab Results   Component Value Date    WBC 7 46 08/22/2020    RBC 3 90 08/22/2020    HGB 11 7 08/22/2020    HCT 36 3 08/22/2020     08/22/2020    RDW 12 8 08/22/2020    NEUTROABS 4 68 08/22/2020    K 4 3 08/22/2020     08/22/2020    CO2 26 08/22/2020    BUN 12 08/22/2020    CREATININE 0 73 08/22/2020    CALCIUM 8 9 08/22/2020    AST 15 08/22/2020    ALT 33 08/22/2020    ALKPHOS 83 08/22/2020    HDL 56 05/30/2020    TRIG 65 05/30/2020    LDLCALC 148 (H) 05/30/2020    AZV7CFRGYBFO 1 971 06/27/2020    HCGQUANT <2 08/27/2018     I have personally reviewed all pertinent laboratory/tests results  Assessment/Plan:  The patient overall doing well  Denies any major concern  Plan is to continue with Zoloft 50 mg 1 tablet at bedtime by mouth  Patient has been advised to call us if there is any concern and to call 911 or visit nearby ER in case of any emergency  She will follow-up with me in 3 months or sooner if needed  Diagnoses and all orders for this visit:    Mild episode of recurrent major depressive disorder ([facility] [address] )    Generalized anxiety disorder          Treatment Recommendations/Precautions:      Aware of 24 hour and weekend coverage for urgent situations accessed by calling [facility] main practice number    Risks/Benefits      Risks, Benefits And Possible Side Effects Of Medications:    Risks, benefits, and possible side effects of medications explained to [facility] FOR COGNITIVE DISORDERS and she verbalizes understanding and agreement for treatment      Controlled Medication Discussion:     Not applicable    Psychotherapy Provided:     Individual psychotherapy provided: No     Treatment Plan;    Completed and signed during the session: Not applicable - Treatment Plan not due at this session    Clive Hampton MD 08/25/20  VIRTUAL VISIT DISCLAIMER    [facility] COGNITIVE DISORDERS Amie Fine acknowledges that she has consented to an online visit or consultation  She understands that the online visit is based solely on information provided by her, and that, in the absence of a face-to-face physical evaluation by the physician, the diagnosis she receives is both limited and provisional in terms of accuracy and completeness  This is not intended to replace a full medical face-to-face evaluation by the physician  Davy Montenegro understands and accepts these terms

## 2020-08-28 DIAGNOSIS — K21.9 GASTROESOPHAGEAL REFLUX DISEASE WITHOUT ESOPHAGITIS: ICD-10-CM

## 2020-09-03 ENCOUNTER — TELEPHONE (OUTPATIENT)
Dept: HEMATOLOGY ONCOLOGY | Facility: CLINIC | Age: 40
End: 2020-09-03

## 2020-09-03 NOTE — TELEPHONE ENCOUNTER
Patient has f/u apt on Friday 09/04/2020 at 3:00 pm w/Dr Jalil Contreras at [address]  Called pt and left message asking pt to please call the office to do a covid pre screening questionnaire for her apt, [phone]

## 2020-09-04 ENCOUNTER — OFFICE VISIT (OUTPATIENT)
Dept: HEMATOLOGY ONCOLOGY | Facility: CLINIC | Age: 40
End: 2020-09-04
Payer: COMMERCIAL

## 2020-09-04 VITALS
WEIGHT: 242 LBS | SYSTOLIC BLOOD PRESSURE: 134 MMHG | OXYGEN SATURATION: 98 % | HEIGHT: 65 IN | TEMPERATURE: 98.1 F | DIASTOLIC BLOOD PRESSURE: 80 MMHG | RESPIRATION RATE: 16 BRPM | BODY MASS INDEX: 40.32 KG/M2 | HEART RATE: 79 BPM

## 2020-09-04 DIAGNOSIS — R23.8 EASY BRUISING: ICD-10-CM

## 2020-09-04 DIAGNOSIS — D50.0 IRON DEFICIENCY ANEMIA DUE TO CHRONIC BLOOD LOSS: Primary | ICD-10-CM

## 2020-09-04 PROCEDURE — 99213 OFFICE O/P EST LOW 20 MIN: CPT | Performed by: INTERNAL MEDICINE

## 2020-09-04 NOTE — PROGRESS NOTES
Hematology/Oncology Outpatient Follow-up  Sandro Mcgee 41 y o  female 1980 [id number]    Date:  9/4/2020    Assessment and Plan:  1  Iron deficiency anemia due to chronic blood loss  The patient does not seem to be iron deficient or significantly anemic at this point in time  She just had uterine ablation recently which most likely will help keeping her iron level and iron stores relatively stable  The patient was told that we will not offer her iron IV at this point in time  However, if her iron level starts to decline at the next visit then iron IV treatment will be offered  I did ask the patient to follow up with her PCP regarding her chronic back pain and follow up with the GI team regarding her recently diagnosed hepatomegaly  - CBC and differential; Future  - Comprehensive metabolic panel; Future  - Iron Panel (Includes Ferritin, Iron Sat%, Iron, and TIBC); Future  - Ferritin; Future  - Vitamin B12; Future    2  Easy bruising  Initial workup did not reveal any hint of obvious bleeding  She denies bleeding from any sites besides her heavy menstrual cycle which was recently treated with ablation  HPI:  Patient has a history of gastritis with possible gastroparesis and chronic anemia due to iron deficiency which was treated with iron IV on multiple occasions since 2016   She states that her menses have been heavy due to fibroids; typically lasting 7 days   She has established GI care and had upper and lower endoscopy done in July 2019  The colonoscopy showed large external and internal hemorrhoids and 1 polyp that was removed otherwise normal upper endoscopy was normal   She had a gastric emptying study done recently on 09/24/2019 which confirmed that she does in fact have gastroparesis  Interval history:  The patient came today for a follow-up visit with her   She stated that she was found to have hepatomegaly just recently on an ultrasound from May of this year    She also had an MRI of the abdomen on 07/16/2020 which showed hepatomegaly measuring 21 cm in greatest dimension  No other pathology was found  The patient stated that she had the uterine ablation in the middle of August   She continues to have mild the vaginal discharge  She complained about significant back pain in the middle of her thoracic back down to the lumbar area  She also complained about significant fatigue  Her most recent blood work from 08/22 showed normal fibrinogen normal PT and INR as well as PTT  There is vitamin M65 and folic acid were within normal range  Her ferritin level was 78 with saturation of 12%  Hemoglobin was 11 7 with normal white cells and platelets  Creatinine of 0 7 with normal liver enzymes  Sed rate and C-reactive protein mildly elevated  She denied obvious bleeding from any other sites  ROS: Review of Systems   Constitutional: Positive for fatigue  Negative for activity change, appetite change, chills, diaphoresis, fever and unexpected weight change  HENT: Negative for congestion, dental problem, ear discharge, ear pain, facial swelling, hearing loss, mouth sores, nosebleeds, postnasal drip, sore throat, tinnitus and trouble swallowing  Eyes: Negative for discharge, redness, itching and visual disturbance  Respiratory: Negative for cough, chest tightness, shortness of breath and wheezing  Cardiovascular: Negative for chest pain, palpitations and leg swelling  Gastrointestinal: Positive for nausea  Negative for abdominal distention, abdominal pain, anal bleeding, blood in stool, constipation, diarrhea and vomiting  Genitourinary: Negative for difficulty urinating, dysuria, flank pain, frequency, hematuria and urgency  Musculoskeletal: Positive for back pain  Negative for arthralgias, gait problem, joint swelling, myalgias and neck pain  Skin: Negative for color change, pallor, rash and wound  Neurological: Positive for headaches   Negative for dizziness, syncope, speech difficulty, weakness, light-headedness and numbness  Hematological: Negative for adenopathy  Does not bruise/bleed easily  Psychiatric/Behavioral: Positive for sleep disturbance  Negative for agitation, behavioral problems and confusion         Past Medical History:   Diagnosis Date    Anemia     Anxiety 10/16/2019    Colon polyp 2019    Duodenitis without bleeding     Dysphagia     Fibromyalgia     Gastritis     GERD (gastroesophageal reflux disease)     Hiatal hernia     Hx of colonoscopy     Insomnia     Memory loss     Menorrhagia with regular cycle 11/20/2014    Moderate episode of recurrent major depressive disorder ([address] ) 10/16/2019    Obesity     Oral herpes     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 myelopathy or radiculopathy     Vitamin D deficiency        Past Surgical History:   Procedure Laterality Date    APPENDECTOMY      BREAST BIOPSY Right     pt unsure when or where- ? 6 yrs ago    BREAST SURGERY Right 01/08/2015    lump removed from breast    [facility] COLONOSCOPY      9/26/2012; 2019-benign polyp, repeat 5 years    DILATION AND CURETTAGE OF UTERUS      Resolved:1/29/2009    ENDOMETRIAL ABLATION N/A 8/13/2020    Procedure: ABLATION ENDOMETRIAL Maira Vinny;  Surgeon: Yesenia Rosen MD;  [facility];  Service: Gynecology    ESOPHAGOGASTRODUODENOSCOPY      Diagnostic ; 9/26/2012    HYSTEROSCOPY      Resolved:1/30/2009    OVARIAN CYST REMOVAL Right 2005    [place] HYSTEROSCOPY,W/ENDO BX N/A 8/13/2020    Procedure: DILATATION AND CURETTAGE (D&C) WITH HYSTEROSCOPY;  Surgeon: Yesenia Rosen MD;  [facility];  Service: Gynecology    WISDOM TOOTH EXTRACTION         Social History     Socioeconomic History    Marital status: /Civil Union     Spouse name: Cary Wilder Number of children: 4    Years of education: high school    Highest education level: None   Occupational History    Occupation: cash  Social Needs    Financial resource strain: Not very hard    Food insecurity     Worry: Never true     Inability: Never true    Transportation needs     Medical: No     Non-medical: No   Tobacco Use    Smoking status: Never Smoker    Smokeless tobacco: Never Used    Tobacco comment: No passive smoke exposure   Substance and Sexual Activity    Alcohol use: No     Frequency: Never     Binge frequency: Never    Drug use: No    Sexual activity: Yes     Partners: Male     Birth control/protection: Male Sterilization     Comment: life time partners:1   Lifestyle    Physical activity     Days per week: 3 days     Minutes per session: 50 min    Stress:  To some extent   Relationships    Social connections     Talks on phone: More than three times a week     Gets together: More than three times a week     Attends Protestant service: More than 4 times per year     Active member of club or organization: Yes     Attends meetings of clubs or organizations: More than 4 times per year     Relationship status:     Intimate partner violence     Fear of current or ex partner: No     Emotionally abused: No     Physically abused: No     Forced sexual activity: No   Other Topics Concern    None   Social History Narrative    [employer]    Accepts blood products       Family History   Problem Relation Age of Onset    Other Mother         uterine leiomyoma    Diabetes type II Mother         Mellitus    Hypothyroidism Mother     Colon cancer Father 54        Stage IV    Diabetes Father         Type II Mellitus    Hypertension Father     Anxiety disorder Brother     Depression Brother     Diabetes Brother         Mellitus    Asthma Brother     Hypertension Brother     Multiple sclerosis Sister     Asthma Sister     Hypothyroidism Sister     Hypertension Maternal Grandmother     Diabetes Maternal Grandmother     Other Maternal Grandmother         Vulvar cancer    Schizophrenia Maternal Grandfather     Hypertension Maternal Grandfather     Thyroid cancer Paternal Grandmother 80    Diabetes Paternal Grandfather 43         due to complications of DM    Breast cancer Cousin 46    Ovarian cancer Neg Hx        Allergies   Allergen Reactions    Morphine Chest Pain    Percocet [Oxycodone-Acetaminophen] Hives    Domperidone     Ibuprofen      Due to gastritis    Oxycodone Hives         Current Outpatient Medications:     clindamycin (CLINDAGEL) 1 % gel, Apply topically 2 (two) times a day, Disp: 30 g, Rfl: 2    Dexilant 60 MG capsule, TAKE 1 CAPSULE (60 MG TOTAL) BY MOUTH EVERY 12 (TWELVE) HOURS, Disp: 180 capsule, Rfl: 0    ergocalciferol (VITAMIN D2) 50,000 units, TAKE 1 CAPSULE BY MOUTH ONE TIME PER WEEK, Disp: , Rfl:     magnesium 30 MG tablet, Take 500 mg by mouth daily , Disp: , Rfl:     Milnacipran HCl (Savella) 50 MG TABS, Take 1 tablet by mouth 2 (two) times a day, Disp: 60 tablet, Rfl: 2    Omega-3 Fatty Acids (fish oil) 1,000 mg, Take 1,000 mg by mouth daily, Disp: , Rfl:     dicyclomine (BENTYL) 10 mg capsule, Take 1 capsule (10 mg total) by mouth 2 (two) times a day as needed (For abdominal pain) (Patient not taking: Reported on 2020), Disp: 60 capsule, Rfl: 4    LINZESS 145 MCG CAPS, TAKE 1 CAPSULE (145 MCG TOTAL) BY MOUTH DAILY FOR 30 DAYS (Patient not taking: Reported on 2020), Disp: 30 capsule, Rfl: 9    ondansetron (ZOFRAN) 4 mg tablet, Take 1 tablet (4 mg total) by mouth every 8 (eight) hours as needed for nausea or vomiting (Patient not taking: Reported on 2020), Disp: 30 tablet, Rfl: 0    sertraline (ZOLOFT) 50 mg tablet, TAKE 1 TABLET BY MOUTH EVERY DAY (Patient not taking: No sig reported), Disp: 90 tablet, Rfl: 0    valACYclovir (VALTREX) 1,000 mg tablet, Take 2 tablets (2,000 mg total) by mouth 2 (two) times a day for 1 day (Patient not taking: Reported on 2020), Disp: 4 tablet, Rfl: 0      Physical Exam:  /80 (BP Location: Left arm, Patient Position: Sitting, Cuff Size: Adult)   Pulse 79   Temp 98 1 °F (36 7 °C) (Tympanic)   Resp 16   Ht 5' 5" (1 651 m)   Wt 110 kg (242 lb)   LMP 08/23/2020   SpO2 98%   BMI 40 27 kg/m²     Physical Exam  Constitutional:       General: She is not in acute distress  Appearance: She is well-developed  She is obese  She is not diaphoretic  HENT:      Head: Normocephalic and atraumatic  Eyes:      General: No scleral icterus  Right eye: No discharge  Left eye: No discharge  Conjunctiva/sclera: Conjunctivae normal       Pupils: Pupils are equal, round, and reactive to light  Neck:      Musculoskeletal: Normal range of motion and neck supple  Thyroid: No thyromegaly  Vascular: No JVD  Trachea: No tracheal deviation  Cardiovascular:      Rate and Rhythm: Normal rate and regular rhythm  Heart sounds: Normal heart sounds  No murmur  No friction rub  Pulmonary:      Effort: Pulmonary effort is normal  No respiratory distress  Breath sounds: Normal breath sounds  No stridor  No wheezing or rales  Chest:      Chest wall: No tenderness  Abdominal:      General: Bowel sounds are normal  There is no distension  Palpations: Abdomen is soft  There is no hepatomegaly, splenomegaly or mass  Tenderness: There is no abdominal tenderness  There is no guarding or rebound  Comments: Obese abdomen   Musculoskeletal: Normal range of motion  General: No tenderness or deformity  Lymphadenopathy:      Cervical: No cervical adenopathy  Skin:     General: Skin is warm and dry  Coloration: Skin is not pale  Findings: No erythema or rash  Neurological:      Mental Status: She is alert and oriented to person, place, and time  Cranial Nerves: No cranial nerve deficit  Coordination: Coordination normal       Deep Tendon Reflexes: Reflexes are normal and symmetric     Psychiatric:         Behavior: Behavior normal  Thought Content: Thought content normal          Judgment: Judgment normal            Labs:  Lab Results   Component Value Date    WBC 7 46 08/22/2020    HGB 11 7 08/22/2020    HCT 36 3 08/22/2020    MCV 93 08/22/2020     08/22/2020     Lab Results   Component Value Date    K 4 3 08/22/2020     08/22/2020    CO2 26 08/22/2020    BUN 12 08/22/2020    CREATININE 0 73 08/22/2020    GLUF 102 (H) 08/22/2020    CALCIUM 8 9 08/22/2020    AST 15 08/22/2020    ALT 33 08/22/2020    ALKPHOS 83 08/22/2020    EGFR 102 08/22/2020       Patient voiced understanding and agreement in the above discussion  Aware to contact our office with questions/symptoms in the interim

## 2020-09-14 ENCOUNTER — ANESTHESIA EVENT (OUTPATIENT)
Dept: GASTROENTEROLOGY | Facility: HOSPITAL | Age: 40
End: 2020-09-14

## 2020-09-14 NOTE — ANESTHESIA PREPROCEDURE EVALUATION
Procedure:  EGD    Relevant Problems   ANESTHESIA  old charts reviewed      CARDIO  unable to find '18 ECHO report   (+) Hyperlipidemia   (+) Other chest pain      ENDO  morbid obesity      GI/HEPATIC  fatty liver   (+) Fatty liver   (+) Gastroesophageal reflux disease without esophagitis   (+) Nonalcoholic fatty liver disease      /RENAL (within normal limits)      GYN  ablation done       (-) Currently pregnant      HEMATOLOGY   (+) Iron deficiency anemia   (+) Iron deficiency anemia secondary to inadequate dietary iron intake      MUSCULOSKELETAL   (+) Chronic midline low back pain without sciatica   (+) Chronic midline thoracic back pain   (+) Diaphragmatic hernia   (+) Facet degeneration of lumbar region   (+) Primary fibromyalgia syndrome (Resolved)   (+) 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 myelopathy or radiculopathy      NEURO/PSYCH   (+) Chronic midline thoracic back pain   (+) Generalized anxiety disorder   (+) Mild episode of recurrent major depressive disorder (HCC)   (+) Primary fibromyalgia syndrome (Resolved)      PULMONARY   (-) Sleep apnea   (-) Smoking   (-) URI (upper respiratory infection)        Physical Exam    Airway    Mallampati score: II  TM Distance: >3 FB  Neck ROM: full     Dental       Cardiovascular  Cardiovascular exam normal    Pulmonary  Pulmonary exam normal     Other Findings  Fixed upper and lower teeth and in good repair      Anesthesia Plan  ASA Score- 2     Anesthesia Type- IV sedation with anesthesia with ASA Monitors  Additional Monitors:   Airway Plan:     Comment: Possible OLI  Plan Factors-Exercise tolerance (METS): >4 METS  Chart reviewed  EKG reviewed  Imaging results reviewed  Existing labs reviewed  Patient is not a current smoker  Patient not instructed to abstain from smoking on day of procedure  Patient did not smoke on day of surgery  Obstructive sleep apnea risk education given perioperatively      Induction- intravenous  Postoperative Plan-     Informed Consent- Anesthetic plan and risks discussed with patient  I personally reviewed this patient with the CRNA  Discussed and agreed on the Anesthesia Plan with the CRNA  Fran Mobley

## 2020-09-15 ENCOUNTER — ANESTHESIA (OUTPATIENT)
Dept: GASTROENTEROLOGY | Facility: HOSPITAL | Age: 40
End: 2020-09-15

## 2020-09-15 ENCOUNTER — HOSPITAL ENCOUNTER (OUTPATIENT)
Dept: GASTROENTEROLOGY | Facility: HOSPITAL | Age: 40
Setting detail: OUTPATIENT SURGERY
Discharge: HOME | End: 2020-09-15
Attending: INTERNAL MEDICINE
Payer: COMMERCIAL

## 2020-09-15 VITALS — HEART RATE: 83 BPM

## 2020-09-15 VITALS
OXYGEN SATURATION: 93 % | SYSTOLIC BLOOD PRESSURE: 157 MMHG | HEIGHT: 65 IN | BODY MASS INDEX: 40.32 KG/M2 | HEART RATE: 72 BPM | DIASTOLIC BLOOD PRESSURE: 89 MMHG | TEMPERATURE: 96.3 F | RESPIRATION RATE: 16 BRPM | WEIGHT: 242 LBS

## 2020-09-15 DIAGNOSIS — R11.0 NAUSEA: ICD-10-CM

## 2020-09-15 DIAGNOSIS — R89.4 SEROLOGIC ABNORMALITY: Primary | ICD-10-CM

## 2020-09-15 LAB
EXT PREGNANCY TEST URINE: NEGATIVE
EXT. CONTROL: NORMAL

## 2020-09-15 PROCEDURE — 88305 TISSUE EXAM BY PATHOLOGIST: CPT | Performed by: PATHOLOGY

## 2020-09-15 PROCEDURE — 81025 URINE PREGNANCY TEST: CPT | Performed by: INTERNAL MEDICINE

## 2020-09-15 PROCEDURE — 43239 EGD BIOPSY SINGLE/MULTIPLE: CPT | Performed by: INTERNAL MEDICINE

## 2020-09-15 RX ORDER — SODIUM CHLORIDE 9 MG/ML
125 INJECTION, SOLUTION INTRAVENOUS CONTINUOUS
Status: DISCONTINUED | OUTPATIENT
Start: 2020-09-15 | End: 2020-09-19 | Stop reason: HOSPADM

## 2020-09-15 RX ORDER — LIDOCAINE HYDROCHLORIDE 10 MG/ML
INJECTION, SOLUTION EPIDURAL; INFILTRATION; INTRACAUDAL; PERINEURAL AS NEEDED
Status: DISCONTINUED | OUTPATIENT
Start: 2020-09-15 | End: 2020-09-15

## 2020-09-15 RX ORDER — SODIUM CHLORIDE 9 MG/ML
INJECTION, SOLUTION INTRAVENOUS CONTINUOUS PRN
Status: DISCONTINUED | OUTPATIENT
Start: 2020-09-15 | End: 2020-09-15

## 2020-09-15 RX ORDER — PROPOFOL 10 MG/ML
INJECTION, EMULSION INTRAVENOUS AS NEEDED
Status: DISCONTINUED | OUTPATIENT
Start: 2020-09-15 | End: 2020-09-15

## 2020-09-15 RX ADMIN — LIDOCAINE HYDROCHLORIDE 50 MG: 10 INJECTION, SOLUTION EPIDURAL; INFILTRATION; INTRACAUDAL; PERINEURAL at 07:34

## 2020-09-15 RX ADMIN — SODIUM CHLORIDE 125 ML/HR: 0.9 INJECTION, SOLUTION INTRAVENOUS at 06:41

## 2020-09-15 RX ADMIN — PROPOFOL 20 MG: 10 INJECTION, EMULSION INTRAVENOUS at 07:17

## 2020-09-15 RX ADMIN — SODIUM CHLORIDE: 9 INJECTION, SOLUTION INTRAVENOUS at 07:21

## 2020-09-15 RX ADMIN — PROPOFOL 50 MG: 10 INJECTION, EMULSION INTRAVENOUS at 07:36

## 2020-09-15 RX ADMIN — PROPOFOL 150 MG: 10 INJECTION, EMULSION INTRAVENOUS at 07:34

## 2020-09-15 NOTE — ANESTHESIA POSTPROCEDURE EVALUATION
Post-Op Assessment Note    CV Status:  Stable  Pain Score: 0    Pain management: adequate     Mental Status:  Alert and awake   Hydration Status:  Euvolemic   PONV Controlled:  Controlled   Airway Patency:  Patent      Post Op Vitals Reviewed: Yes      Staff: Anesthesiologist, CRNA         No complications documented      /86 (09/15/20 0758)    Temp (!) 96 3 °F (35 7 °C) (09/15/20 0758)    Pulse 68 (09/15/20 0758)   Resp 16 (09/15/20 0758)    SpO2 93 % (09/15/20 0758)

## 2020-09-15 NOTE — ANESTHESIA POSTPROCEDURE EVALUATION
Post-Op Assessment Note    CV Status:  Stable  Pain Score: 0    Pain management: adequate     Mental Status:  Sleepy   Hydration Status:  Stable   PONV Controlled:  None   Airway Patency:  Patent and adequate      Post Op Vitals Reviewed: Yes      Staff: CRNA         No complications documented      BP   200/99   Temp     Pulse  75   Resp   16   SpO2 100%

## 2020-09-15 NOTE — H&P
History and Physical - [facility]  Earlene Castrejon 41 y o  female MRN: [id number]                  HPI: Earlene Castrejon is a 41y o  year old female who presents for EGD for abdominal pain      REVIEW OF SYSTEMS: Per the HPI, and otherwise unremarkable      Historical Information   Past Medical History:   Diagnosis Date    Anemia     Anxiety 10/16/2019    Colon polyp 2019    Duodenitis without bleeding     Dysphagia     Fibromyalgia     Gastritis     GERD (gastroesophageal reflux disease)     Hiatal hernia     Hx of colonoscopy     Insomnia     Memory loss     Menorrhagia with regular cycle 11/20/2014    Moderate episode of recurrent major depressive disorder ([address] ) 10/16/2019    Obesity     Oral herpes     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 myelopathy or radiculopathy     Vitamin D deficiency      Past Surgical History:   Procedure Laterality Date    APPENDECTOMY      BREAST BIOPSY Right     pt unsure when or where- ? 6 yrs ago    BREAST SURGERY Right 01/08/2015    lump removed from breast    [facility] COLONOSCOPY      9/26/2012; 2019-benign polyp, repeat 5 years    DILATION AND CURETTAGE OF UTERUS      Resolved:1/29/2009    ENDOMETRIAL ABLATION N/A 8/13/2020    Procedure: ABLATION ENDOMETRIAL Cinthia Belch;  Surgeon: Madhuri Mcintyre MD;  [facility];  Service: Gynecology    ESOPHAGOGASTRODUODENOSCOPY      Diagnostic ; 9/26/2012    HYSTEROSCOPY      Resolved:1/30/2009    OVARIAN CYST REMOVAL Right 2005    [place] HYSTEROSCOPY,W/ENDO BX N/A 8/13/2020    Procedure: DILATATION AND CURETTAGE (D&C) WITH HYSTEROSCOPY;  Surgeon: Madhuri Mcintyre MD;  [facility];  Service: Gynecology    WISDOM TOOTH EXTRACTION       Social History   Social History     Substance and Sexual Activity   Alcohol Use No    Frequency: Never    Binge frequency: Never     Social History     Substance and Sexual Activity   Drug Use No     Social History Tobacco Use   Smoking Status Never Smoker   Smokeless Tobacco Never Used   Tobacco Comment    No passive smoke exposure     Family History   Problem Relation Age of Onset    Other Mother         uterine leiomyoma    Diabetes type II Mother         Mellitus    Hypothyroidism Mother     Colon cancer Father 54        Stage IV    Diabetes Father         Type II Mellitus    Hypertension Father     Anxiety disorder Brother     Depression Brother     Diabetes Brother         Mellitus    Asthma Brother     Hypertension Brother     Multiple sclerosis Sister     Asthma Sister     Hypothyroidism Sister     Hypertension Maternal Grandmother     Diabetes Maternal Grandmother     Other Maternal Grandmother         Vulvar cancer    Schizophrenia Maternal Grandfather     Hypertension Maternal Grandfather     Thyroid cancer Paternal Grandmother 80    Diabetes Paternal Grandfather 43         due to complications of DM    Breast cancer Cousin 46    Ovarian cancer Neg Hx        Meds/Allergies     (Not in a hospital admission)      Allergies   Allergen Reactions    Morphine Chest Pain    Percocet [Oxycodone-Acetaminophen] Hives    Domperidone     Ibuprofen      Due to gastritis    Oxycodone Hives       Objective     /64   Pulse 86   Temp (!) 97 °F (36 1 °C)   Resp 20   Ht 5' 5" (1 651 m)   Wt 110 kg (242 lb)   LMP 2020   SpO2 99%   BMI 40 27 kg/m²       PHYSICAL EXAM    Gen: NAD  CV: RRR  CHEST: Clear  ABD: soft, NT/ND  EXT: no edema      ASSESSMENT/PLAN:  This is a 41y o  year old female here for EGD and she is stable and optimized for her procedure

## 2020-09-17 ENCOUNTER — TELEPHONE (OUTPATIENT)
Dept: GASTROENTEROLOGY | Facility: CLINIC | Age: 40
End: 2020-09-17

## 2020-09-17 NOTE — TELEPHONE ENCOUNTER
----- Message from Rob Herrera MD sent at 9/16/2020  2:49 PM EDT -----  Call patient to report normal results

## 2020-09-28 ENCOUNTER — HOSPITAL ENCOUNTER (OUTPATIENT)
Dept: MAMMOGRAPHY | Facility: CLINIC | Age: 40
Discharge: HOME | End: 2020-09-28
Payer: COMMERCIAL

## 2020-09-28 ENCOUNTER — HOSPITAL ENCOUNTER (OUTPATIENT)
Dept: ULTRASOUND IMAGING | Facility: CLINIC | Age: 40
Discharge: HOME | End: 2020-09-28
Payer: COMMERCIAL

## 2020-09-28 VITALS — HEIGHT: 65 IN | WEIGHT: 242 LBS | BODY MASS INDEX: 40.32 KG/M2 | TEMPERATURE: 97.2 F

## 2020-09-28 DIAGNOSIS — R92.8 ABNORMAL FINDINGS ON DIAGNOSTIC IMAGING OF BREAST: ICD-10-CM

## 2020-09-28 PROCEDURE — 77065 DX MAMMO INCL CAD UNI: CPT

## 2020-09-28 PROCEDURE — G0279 TOMOSYNTHESIS, MAMMO: HCPCS

## 2020-09-29 ENCOUNTER — TELEPHONE (OUTPATIENT)
Dept: FAMILY MEDICINE CLINIC | Facility: CLINIC | Age: 40
End: 2020-09-29

## 2020-09-29 DIAGNOSIS — R92.8 FOLLOW-UP EXAMINATION OF ABNORMAL MAMMOGRAM: Primary | ICD-10-CM

## 2020-09-29 NOTE — TELEPHONE ENCOUNTER
----- Message from Trixie Obrien MD sent at 9/28/2020  4:04 PM EDT -----  To order Diagnostic mammogram of right breast in 6 month

## 2020-10-07 ENCOUNTER — TELEMEDICINE (OUTPATIENT)
Dept: BEHAVIORAL/MENTAL HEALTH CLINIC | Facility: CLINIC | Age: 40
End: 2020-10-07
Payer: COMMERCIAL

## 2020-10-07 DIAGNOSIS — F33.0 MILD EPISODE OF RECURRENT MAJOR DEPRESSIVE DISORDER (HCC): ICD-10-CM

## 2020-10-07 DIAGNOSIS — F41.1 GENERALIZED ANXIETY DISORDER: Primary | ICD-10-CM

## 2020-10-07 PROCEDURE — 90834 PSYTX W PT 45 MINUTES: CPT | Performed by: SOCIAL WORKER

## 2020-10-10 ENCOUNTER — HOSPITAL ENCOUNTER (EMERGENCY)
Facility: HOSPITAL | Age: 40
Discharge: HOME | End: 2020-10-10
Attending: EMERGENCY MEDICINE | Admitting: EMERGENCY MEDICINE
Payer: COMMERCIAL

## 2020-10-10 ENCOUNTER — APPOINTMENT (EMERGENCY)
Dept: CT IMAGING | Facility: HOSPITAL | Age: 40
End: 2020-10-10
Payer: COMMERCIAL

## 2020-10-10 VITALS
RESPIRATION RATE: 16 BRPM | BODY MASS INDEX: 40.17 KG/M2 | DIASTOLIC BLOOD PRESSURE: 71 MMHG | OXYGEN SATURATION: 100 % | TEMPERATURE: 100.1 F | SYSTOLIC BLOOD PRESSURE: 136 MMHG | WEIGHT: 241.4 LBS | HEART RATE: 92 BPM

## 2020-10-10 DIAGNOSIS — J03.90 TONSILLITIS: Primary | ICD-10-CM

## 2020-10-10 LAB
ALBUMIN SERPL BCP-MCNC: 4 G/DL (ref 3.5–5)
ALP SERPL-CCNC: 105 U/L (ref 46–116)
ALT SERPL W P-5'-P-CCNC: 79 U/L (ref 12–78)
ANION GAP SERPL CALCULATED.3IONS-SCNC: 8 MMOL/L (ref 4–13)
AST SERPL W P-5'-P-CCNC: 61 U/L (ref 5–45)
BACTERIA UR QL AUTO: ABNORMAL /HPF
BASOPHILS # BLD AUTO: 0.05 THOUSANDS/ΜL (ref 0–0.1)
BASOPHILS NFR BLD AUTO: 0 % (ref 0–1)
BILIRUB DIRECT SERPL-MCNC: 0.17 MG/DL (ref 0–0.2)
BILIRUB SERPL-MCNC: 0.56 MG/DL (ref 0.2–1)
BILIRUB UR QL STRIP: NEGATIVE
BUN SERPL-MCNC: 7 MG/DL (ref 5–25)
CALCIUM SERPL-MCNC: 9.2 MG/DL (ref 8.3–10.1)
CHLORIDE SERPL-SCNC: 98 MMOL/L (ref 100–108)
CLARITY UR: ABNORMAL
CO2 SERPL-SCNC: 25 MMOL/L (ref 21–32)
COLOR UR: YELLOW
CREAT SERPL-MCNC: 0.81 MG/DL (ref 0.6–1.3)
EOSINOPHIL # BLD AUTO: 0 THOUSAND/ΜL (ref 0–0.61)
EOSINOPHIL NFR BLD AUTO: 0 % (ref 0–6)
ERYTHROCYTE [DISTWIDTH] IN BLOOD BY AUTOMATED COUNT: 12.3 % (ref 11.6–15.1)
GFR SERPL CREATININE-BSD FRML MDRD: 90 ML/MIN/1.73SQ M
GLUCOSE SERPL-MCNC: 116 MG/DL (ref 65–140)
GLUCOSE UR STRIP-MCNC: NEGATIVE MG/DL
HCT VFR BLD AUTO: 39.9 % (ref 34.8–46.1)
HGB BLD-MCNC: 12.9 G/DL (ref 11.5–15.4)
HGB UR QL STRIP.AUTO: ABNORMAL
IMM GRANULOCYTES # BLD AUTO: 0.16 THOUSAND/UL (ref 0–0.2)
IMM GRANULOCYTES NFR BLD AUTO: 1 % (ref 0–2)
KETONES UR STRIP-MCNC: NEGATIVE MG/DL
LEUKOCYTE ESTERASE UR QL STRIP: NEGATIVE
LYMPHOCYTES # BLD AUTO: 0.94 THOUSANDS/ΜL (ref 0.6–4.47)
LYMPHOCYTES NFR BLD AUTO: 5 % (ref 14–44)
MCH RBC QN AUTO: 29.6 PG (ref 26.8–34.3)
MCHC RBC AUTO-ENTMCNC: 32.3 G/DL (ref 31.4–37.4)
MCV RBC AUTO: 92 FL (ref 82–98)
MONOCYTES # BLD AUTO: 1.45 THOUSAND/ΜL (ref 0.17–1.22)
MONOCYTES NFR BLD AUTO: 8 % (ref 4–12)
NEUTROPHILS # BLD AUTO: 15.66 THOUSANDS/ΜL (ref 1.85–7.62)
NEUTS SEG NFR BLD AUTO: 86 % (ref 43–75)
NITRITE UR QL STRIP: NEGATIVE
NON-SQ EPI CELLS URNS QL MICRO: ABNORMAL /HPF
NRBC BLD AUTO-RTO: 0 /100 WBCS
PH UR STRIP.AUTO: 5.5 [PH] (ref 4.5–8)
PLATELET # BLD AUTO: 367 THOUSANDS/UL (ref 149–390)
PMV BLD AUTO: 9.7 FL (ref 8.9–12.7)
POTASSIUM SERPL-SCNC: 3.7 MMOL/L (ref 3.5–5.3)
PROT SERPL-MCNC: 9.2 G/DL (ref 6.4–8.2)
PROT UR STRIP-MCNC: ABNORMAL MG/DL
RBC # BLD AUTO: 4.36 MILLION/UL (ref 3.81–5.12)
RBC #/AREA URNS AUTO: ABNORMAL /HPF
S PYO DNA THROAT QL NAA+PROBE: DETECTED
SODIUM SERPL-SCNC: 131 MMOL/L (ref 136–145)
SP GR UR STRIP.AUTO: 1.02 (ref 1–1.03)
UROBILINOGEN UR QL STRIP.AUTO: 0.2 E.U./DL
WBC # BLD AUTO: 18.26 THOUSAND/UL (ref 4.31–10.16)
WBC #/AREA URNS AUTO: ABNORMAL /HPF

## 2020-10-10 PROCEDURE — 80048 BASIC METABOLIC PNL TOTAL CA: CPT | Performed by: EMERGENCY MEDICINE

## 2020-10-10 PROCEDURE — 80076 HEPATIC FUNCTION PANEL: CPT | Performed by: EMERGENCY MEDICINE

## 2020-10-10 PROCEDURE — 70491 CT SOFT TISSUE NECK W/DYE: CPT

## 2020-10-10 PROCEDURE — 99283 EMERGENCY DEPT VISIT LOW MDM: CPT

## 2020-10-10 PROCEDURE — 87086 URINE CULTURE/COLONY COUNT: CPT

## 2020-10-10 PROCEDURE — U0003 INFECTIOUS AGENT DETECTION BY NUCLEIC ACID (DNA OR RNA); SEVERE ACUTE RESPIRATORY SYNDROME CORONAVIRUS 2 (SARS-COV-2) (CORONAVIRUS DISEASE [COVID-19]), AMPLIFIED PROBE TECHNIQUE, MAKING USE OF HIGH THROUGHPUT TECHNOLOGIES AS DESCRIBED BY CMS-2020-01-R: HCPCS | Performed by: EMERGENCY MEDICINE

## 2020-10-10 PROCEDURE — 96374 THER/PROPH/DIAG INJ IV PUSH: CPT

## 2020-10-10 PROCEDURE — 96361 HYDRATE IV INFUSION ADD-ON: CPT

## 2020-10-10 PROCEDURE — 81001 URINALYSIS AUTO W/SCOPE: CPT

## 2020-10-10 PROCEDURE — 85025 COMPLETE CBC W/AUTO DIFF WBC: CPT | Performed by: EMERGENCY MEDICINE

## 2020-10-10 PROCEDURE — 36415 COLL VENOUS BLD VENIPUNCTURE: CPT | Performed by: EMERGENCY MEDICINE

## 2020-10-10 PROCEDURE — G1004 CDSM NDSC: HCPCS

## 2020-10-10 PROCEDURE — 87651 STREP A DNA AMP PROBE: CPT | Performed by: EMERGENCY MEDICINE

## 2020-10-10 PROCEDURE — 99284 EMERGENCY DEPT VISIT MOD MDM: CPT | Performed by: EMERGENCY MEDICINE

## 2020-10-10 RX ORDER — AMOXICILLIN AND CLAVULANATE POTASSIUM 875; 125 MG/1; MG/1
1 TABLET, FILM COATED ORAL ONCE
Status: COMPLETED | OUTPATIENT
Start: 2020-10-10 | End: 2020-10-10

## 2020-10-10 RX ORDER — DEXAMETHASONE SODIUM PHOSPHATE 4 MG/ML
10 INJECTION, SOLUTION INTRA-ARTICULAR; INTRALESIONAL; INTRAMUSCULAR; INTRAVENOUS; SOFT TISSUE ONCE
Status: COMPLETED | OUTPATIENT
Start: 2020-10-10 | End: 2020-10-10

## 2020-10-10 RX ORDER — AMOXICILLIN AND CLAVULANATE POTASSIUM 875; 125 MG/1; MG/1
1 TABLET, FILM COATED ORAL EVERY 12 HOURS
Qty: 14 TABLET | Refills: 0 | Status: SHIPPED | OUTPATIENT
Start: 2020-10-10 | End: 2020-10-17

## 2020-10-10 RX ADMIN — SODIUM CHLORIDE 1000 ML: 0.9 INJECTION, SOLUTION INTRAVENOUS at 09:45

## 2020-10-10 RX ADMIN — DEXAMETHASONE SODIUM PHOSPHATE 10 MG: 4 INJECTION, SOLUTION INTRAMUSCULAR; INTRAVENOUS at 11:15

## 2020-10-10 RX ADMIN — IOHEXOL 85 ML: 350 INJECTION, SOLUTION INTRAVENOUS at 10:39

## 2020-10-10 RX ADMIN — AMOXICILLIN AND CLAVULANATE POTASSIUM 1 TABLET: 875; 125 TABLET, FILM COATED ORAL at 11:15

## 2020-10-11 LAB
BACTERIA UR CULT: NORMAL
SARS-COV-2 RNA SPEC QL NAA+PROBE: NOT DETECTED

## 2020-10-13 DIAGNOSIS — F32.1 CURRENT MODERATE EPISODE OF MAJOR DEPRESSIVE DISORDER WITHOUT PRIOR EPISODE (HCC): ICD-10-CM

## 2020-10-15 ENCOUNTER — TELEPHONE (OUTPATIENT)
Dept: FAMILY MEDICINE CLINIC | Facility: CLINIC | Age: 40
End: 2020-10-15

## 2020-10-19 ENCOUNTER — OFFICE VISIT (OUTPATIENT)
Dept: FAMILY MEDICINE CLINIC | Facility: CLINIC | Age: 40
End: 2020-10-19
Payer: COMMERCIAL

## 2020-10-19 VITALS
DIASTOLIC BLOOD PRESSURE: 80 MMHG | HEART RATE: 71 BPM | SYSTOLIC BLOOD PRESSURE: 120 MMHG | WEIGHT: 245 LBS | OXYGEN SATURATION: 99 % | TEMPERATURE: 98.3 F | BODY MASS INDEX: 40.82 KG/M2 | HEIGHT: 65 IN

## 2020-10-19 DIAGNOSIS — J02.0 STREPTOCOCCAL PHARYNGITIS: ICD-10-CM

## 2020-10-19 DIAGNOSIS — J30.89 SEASONAL ALLERGIC RHINITIS DUE TO OTHER ALLERGIC TRIGGER: Primary | ICD-10-CM

## 2020-10-19 DIAGNOSIS — R73.01 IMPAIRED FASTING GLUCOSE: ICD-10-CM

## 2020-10-19 DIAGNOSIS — R79.89 ABNORMAL LFTS: ICD-10-CM

## 2020-10-19 DIAGNOSIS — Z28.21 IMMUNIZATION REFUSED: ICD-10-CM

## 2020-10-19 DIAGNOSIS — K59.09 OTHER CONSTIPATION: ICD-10-CM

## 2020-10-19 DIAGNOSIS — E87.1 SODIUM (NA) DEFICIENCY: ICD-10-CM

## 2020-10-19 PROCEDURE — 99214 OFFICE O/P EST MOD 30 MIN: CPT | Performed by: FAMILY MEDICINE

## 2020-10-19 PROCEDURE — 1036F TOBACCO NON-USER: CPT | Performed by: FAMILY MEDICINE

## 2020-10-19 RX ORDER — FLUTICASONE PROPIONATE 50 MCG
2 SPRAY, SUSPENSION (ML) NASAL DAILY
Qty: 16 G | Refills: 0 | Status: SHIPPED | OUTPATIENT
Start: 2020-10-19 | End: 2020-11-12

## 2020-10-23 ENCOUNTER — TELEPHONE (OUTPATIENT)
Dept: FAMILY MEDICINE CLINIC | Facility: CLINIC | Age: 40
End: 2020-10-23

## 2020-10-29 ENCOUNTER — TELEPHONE (OUTPATIENT)
Dept: FAMILY MEDICINE CLINIC | Facility: CLINIC | Age: 40
End: 2020-10-29

## 2020-11-03 ENCOUNTER — ANNUAL EXAM (OUTPATIENT)
Dept: OBGYN CLINIC | Facility: CLINIC | Age: 40
End: 2020-11-03
Payer: COMMERCIAL

## 2020-11-03 VITALS
TEMPERATURE: 98.5 F | SYSTOLIC BLOOD PRESSURE: 114 MMHG | OXYGEN SATURATION: 98 % | DIASTOLIC BLOOD PRESSURE: 76 MMHG | HEART RATE: 74 BPM | HEIGHT: 65 IN | WEIGHT: 247.8 LBS | BODY MASS INDEX: 41.29 KG/M2

## 2020-11-03 DIAGNOSIS — Z12.4 PAP SMEAR FOR CERVICAL CANCER SCREENING: ICD-10-CM

## 2020-11-03 DIAGNOSIS — Z01.419 ENCOUNTER FOR ANNUAL ROUTINE GYNECOLOGICAL EXAMINATION: Primary | ICD-10-CM

## 2020-11-03 DIAGNOSIS — Z72.3 INADEQUATE EXERCISE: ICD-10-CM

## 2020-11-03 DIAGNOSIS — Z12.31 VISIT FOR SCREENING MAMMOGRAM: ICD-10-CM

## 2020-11-03 PROBLEM — D50.8 IRON DEFICIENCY ANEMIA SECONDARY TO INADEQUATE DIETARY IRON INTAKE: Status: RESOLVED | Noted: 2019-10-18 | Resolved: 2020-11-03

## 2020-11-03 PROBLEM — J01.00 ACUTE NON-RECURRENT MAXILLARY SINUSITIS: Status: RESOLVED | Noted: 2020-01-27 | Resolved: 2020-11-03

## 2020-11-03 PROBLEM — B37.31 CANDIDA VAGINITIS: Status: RESOLVED | Noted: 2019-04-25 | Resolved: 2020-11-03

## 2020-11-03 PROBLEM — J02.0 STREPTOCOCCAL PHARYNGITIS: Status: RESOLVED | Noted: 2020-10-19 | Resolved: 2020-11-03

## 2020-11-03 PROBLEM — N92.0 MENORRHAGIA WITH REGULAR CYCLE: Status: RESOLVED | Noted: 2020-07-22 | Resolved: 2020-11-03

## 2020-11-03 PROBLEM — L81.9 DISCOLORATION OF SKIN OF HAND: Status: RESOLVED | Noted: 2019-03-20 | Resolved: 2020-11-03

## 2020-11-03 PROBLEM — B37.3 CANDIDA VAGINITIS: Status: RESOLVED | Noted: 2019-04-25 | Resolved: 2020-11-03

## 2020-11-03 PROBLEM — N89.8 VAGINAL DISCHARGE: Status: RESOLVED | Noted: 2018-09-01 | Resolved: 2020-11-03

## 2020-11-03 PROCEDURE — 3008F BODY MASS INDEX DOCD: CPT | Performed by: FAMILY MEDICINE

## 2020-11-03 PROCEDURE — G0145 SCR C/V CYTO,THINLAYER,RESCR: HCPCS | Performed by: OBSTETRICS & GYNECOLOGY

## 2020-11-03 PROCEDURE — 87624 HPV HI-RISK TYP POOLED RSLT: CPT | Performed by: OBSTETRICS & GYNECOLOGY

## 2020-11-03 PROCEDURE — S0612 ANNUAL GYNECOLOGICAL EXAMINA: HCPCS | Performed by: OBSTETRICS & GYNECOLOGY

## 2020-11-05 ENCOUNTER — TELEPHONE (OUTPATIENT)
Dept: BEHAVIORAL/MENTAL HEALTH CLINIC | Facility: CLINIC | Age: 40
End: 2020-11-05

## 2020-11-05 LAB
HPV HR 12 DNA CVX QL NAA+PROBE: NEGATIVE
HPV16 DNA CVX QL NAA+PROBE: NEGATIVE
HPV18 DNA CVX QL NAA+PROBE: NEGATIVE

## 2020-11-09 LAB
LAB AP GYN PRIMARY INTERPRETATION: NORMAL
LAB AP LMP: NORMAL
Lab: NORMAL

## 2020-11-12 DIAGNOSIS — J30.89 SEASONAL ALLERGIC RHINITIS DUE TO OTHER ALLERGIC TRIGGER: ICD-10-CM

## 2020-11-12 RX ORDER — FLUTICASONE PROPIONATE 50 MCG
SPRAY, SUSPENSION (ML) NASAL
Qty: 16 ML | Refills: 0 | Status: SHIPPED | OUTPATIENT
Start: 2020-11-12 | End: 2020-11-30

## 2020-11-16 ENCOUNTER — TELEPHONE (OUTPATIENT)
Dept: PSYCHIATRY | Facility: CLINIC | Age: 40
End: 2020-11-16

## 2020-11-18 ENCOUNTER — TELEPHONE (OUTPATIENT)
Dept: PSYCHIATRY | Facility: CLINIC | Age: 40
End: 2020-11-18

## 2020-11-20 ENCOUNTER — APPOINTMENT (OUTPATIENT)
Dept: LAB | Facility: CLINIC | Age: 40
End: 2020-11-20
Payer: COMMERCIAL

## 2020-11-20 DIAGNOSIS — D50.0 IRON DEFICIENCY ANEMIA DUE TO CHRONIC BLOOD LOSS: ICD-10-CM

## 2020-11-20 DIAGNOSIS — M79.7 PRIMARY FIBROMYALGIA SYNDROME: ICD-10-CM

## 2020-11-20 DIAGNOSIS — K21.9 GASTROESOPHAGEAL REFLUX DISEASE WITHOUT ESOPHAGITIS: ICD-10-CM

## 2020-11-20 DIAGNOSIS — E66.01 MORBID OBESITY WITH BMI OF 40.0-44.9, ADULT (HCC): ICD-10-CM

## 2020-11-20 DIAGNOSIS — R79.89 ABNORMAL LFTS: ICD-10-CM

## 2020-11-20 DIAGNOSIS — E78.2 MIXED HYPERLIPIDEMIA: ICD-10-CM

## 2020-11-20 DIAGNOSIS — R89.4 SEROLOGIC ABNORMALITY: ICD-10-CM

## 2020-11-20 LAB
ALBUMIN SERPL BCP-MCNC: 4 G/DL (ref 3.5–5)
ALP SERPL-CCNC: 91 U/L (ref 46–116)
ALT SERPL W P-5'-P-CCNC: 27 U/L (ref 12–78)
ANION GAP SERPL CALCULATED.3IONS-SCNC: 1 MMOL/L (ref 4–13)
AST SERPL W P-5'-P-CCNC: 13 U/L (ref 5–45)
BASOPHILS # BLD AUTO: 0.05 THOUSANDS/ΜL (ref 0–0.1)
BASOPHILS NFR BLD AUTO: 1 % (ref 0–1)
BILIRUB SERPL-MCNC: 0.45 MG/DL (ref 0.2–1)
BUN SERPL-MCNC: 10 MG/DL (ref 5–25)
CALCIUM SERPL-MCNC: 9.8 MG/DL (ref 8.3–10.1)
CHLORIDE SERPL-SCNC: 105 MMOL/L (ref 100–108)
CHOLEST SERPL-MCNC: 218 MG/DL (ref 50–200)
CO2 SERPL-SCNC: 31 MMOL/L (ref 21–32)
CREAT SERPL-MCNC: 0.66 MG/DL (ref 0.6–1.3)
EOSINOPHIL # BLD AUTO: 0.11 THOUSAND/ΜL (ref 0–0.61)
EOSINOPHIL NFR BLD AUTO: 1 % (ref 0–6)
ERYTHROCYTE [DISTWIDTH] IN BLOOD BY AUTOMATED COUNT: 13 % (ref 11.6–15.1)
GFR SERPL CREATININE-BSD FRML MDRD: 109 ML/MIN/1.73SQ M
GLUCOSE P FAST SERPL-MCNC: 91 MG/DL (ref 65–99)
HCT VFR BLD AUTO: 39.8 % (ref 34.8–46.1)
HDLC SERPL-MCNC: 65 MG/DL
HGB BLD-MCNC: 12.5 G/DL (ref 11.5–15.4)
IMM GRANULOCYTES # BLD AUTO: 0.07 THOUSAND/UL (ref 0–0.2)
IMM GRANULOCYTES NFR BLD AUTO: 1 % (ref 0–2)
LDLC SERPL CALC-MCNC: 130 MG/DL (ref 0–100)
LYMPHOCYTES # BLD AUTO: 2.23 THOUSANDS/ΜL (ref 0.6–4.47)
LYMPHOCYTES NFR BLD AUTO: 27 % (ref 14–44)
MCH RBC QN AUTO: 28.7 PG (ref 26.8–34.3)
MCHC RBC AUTO-ENTMCNC: 31.4 G/DL (ref 31.4–37.4)
MCV RBC AUTO: 92 FL (ref 82–98)
MONOCYTES # BLD AUTO: 0.72 THOUSAND/ΜL (ref 0.17–1.22)
MONOCYTES NFR BLD AUTO: 9 % (ref 4–12)
NEUTROPHILS # BLD AUTO: 5.1 THOUSANDS/ΜL (ref 1.85–7.62)
NEUTS SEG NFR BLD AUTO: 61 % (ref 43–75)
NRBC BLD AUTO-RTO: 0 /100 WBCS
PLATELET # BLD AUTO: 383 THOUSANDS/UL (ref 149–390)
PMV BLD AUTO: 10.2 FL (ref 8.9–12.7)
POTASSIUM SERPL-SCNC: 4.3 MMOL/L (ref 3.5–5.3)
PROT SERPL-MCNC: 8.3 G/DL (ref 6.4–8.2)
RBC # BLD AUTO: 4.35 MILLION/UL (ref 3.81–5.12)
SODIUM SERPL-SCNC: 137 MMOL/L (ref 136–145)
TRIGL SERPL-MCNC: 115 MG/DL
TSH SERPL DL<=0.05 MIU/L-ACNC: 1.91 UIU/ML (ref 0.36–3.74)
WBC # BLD AUTO: 8.28 THOUSAND/UL (ref 4.31–10.16)

## 2020-11-20 PROCEDURE — 84443 ASSAY THYROID STIM HORMONE: CPT

## 2020-11-20 PROCEDURE — 85025 COMPLETE CBC W/AUTO DIFF WBC: CPT

## 2020-11-20 PROCEDURE — 36415 COLL VENOUS BLD VENIPUNCTURE: CPT

## 2020-11-20 PROCEDURE — 82784 ASSAY IGA/IGD/IGG/IGM EACH: CPT

## 2020-11-20 PROCEDURE — 83516 IMMUNOASSAY NONANTIBODY: CPT

## 2020-11-20 PROCEDURE — 80053 COMPREHEN METABOLIC PANEL: CPT

## 2020-11-20 PROCEDURE — 86255 FLUORESCENT ANTIBODY SCREEN: CPT

## 2020-11-20 PROCEDURE — 80061 LIPID PANEL: CPT

## 2020-11-23 LAB
ENDOMYSIUM IGA SER QL: NEGATIVE
GLIADIN PEPTIDE IGA SER-ACNC: 6 UNITS (ref 0–19)
GLIADIN PEPTIDE IGG SER-ACNC: 10 UNITS (ref 0–19)
IGA SERPL-MCNC: 171 MG/DL (ref 87–352)
TTG IGA SER-ACNC: <2 U/ML (ref 0–3)
TTG IGG SER-ACNC: 5 U/ML (ref 0–5)

## 2020-11-28 DIAGNOSIS — J30.89 SEASONAL ALLERGIC RHINITIS DUE TO OTHER ALLERGIC TRIGGER: ICD-10-CM

## 2020-11-30 RX ORDER — FLUTICASONE PROPIONATE 50 MCG
SPRAY, SUSPENSION (ML) NASAL
Qty: 48 ML | Refills: 1 | Status: SHIPPED | OUTPATIENT
Start: 2020-11-30 | End: 2021-11-18 | Stop reason: ALTCHOICE

## 2020-12-14 ENCOUNTER — TELEMEDICINE (OUTPATIENT)
Dept: PSYCHIATRY | Facility: CLINIC | Age: 40
End: 2020-12-14
Payer: COMMERCIAL

## 2020-12-14 DIAGNOSIS — F32.1 CURRENT MODERATE EPISODE OF MAJOR DEPRESSIVE DISORDER WITHOUT PRIOR EPISODE (HCC): ICD-10-CM

## 2020-12-14 PROCEDURE — 99213 OFFICE O/P EST LOW 20 MIN: CPT | Performed by: PSYCHIATRY & NEUROLOGY

## 2020-12-18 ENCOUNTER — TELEMEDICINE (OUTPATIENT)
Dept: GASTROENTEROLOGY | Facility: CLINIC | Age: 40
End: 2020-12-18
Payer: COMMERCIAL

## 2020-12-18 DIAGNOSIS — K21.9 GASTROESOPHAGEAL REFLUX DISEASE WITHOUT ESOPHAGITIS: ICD-10-CM

## 2020-12-18 DIAGNOSIS — K59.09 OTHER CONSTIPATION: ICD-10-CM

## 2020-12-18 DIAGNOSIS — K31.84 GASTROPARESIS: ICD-10-CM

## 2020-12-18 DIAGNOSIS — R10.11 RUQ PAIN: Primary | ICD-10-CM

## 2020-12-18 DIAGNOSIS — R79.89 ABNORMAL LFTS: ICD-10-CM

## 2020-12-18 PROCEDURE — 99214 OFFICE O/P EST MOD 30 MIN: CPT | Performed by: INTERNAL MEDICINE

## 2021-01-08 ENCOUNTER — TELEPHONE (OUTPATIENT)
Dept: HEMATOLOGY ONCOLOGY | Facility: CLINIC | Age: 41
End: 2021-01-08

## 2021-01-08 NOTE — TELEPHONE ENCOUNTER
Patient has 3:00 pm f/u apt on Friday 01/22/2021 w/Shelby Rodríguez at the [address] [place] [address] office  Unfortunately the apt needs to be rescheduled as the provider will be at the hospital rounding  Called pt and left message that apt has been rescheduled for Wed 02/03/2021 at 4:00 pm w/Shelby at [address] [place] [address]  Asked pt to call the office if new apt doesn't work, [phone]

## 2021-01-28 DIAGNOSIS — M79.7 PRIMARY FIBROMYALGIA SYNDROME: ICD-10-CM

## 2021-01-28 RX ORDER — MILNACIPRAN HYDROCHLORIDE 50 MG/1
TABLET, FILM COATED ORAL
Qty: 180 TABLET | Refills: 0 | Status: SHIPPED | OUTPATIENT
Start: 2021-01-28 | End: 2021-05-17

## 2021-01-30 ENCOUNTER — LAB (OUTPATIENT)
Dept: LAB | Facility: HOSPITAL | Age: 41
End: 2021-01-30
Attending: INTERNAL MEDICINE
Payer: COMMERCIAL

## 2021-01-30 DIAGNOSIS — D50.0 IRON DEFICIENCY ANEMIA DUE TO CHRONIC BLOOD LOSS: ICD-10-CM

## 2021-01-30 DIAGNOSIS — R10.11 RUQ PAIN: ICD-10-CM

## 2021-01-30 DIAGNOSIS — R79.89 ABNORMAL LFTS: ICD-10-CM

## 2021-01-30 LAB
ALBUMIN SERPL BCP-MCNC: 4.2 G/DL (ref 3.5–5)
ALP SERPL-CCNC: 86 U/L (ref 46–116)
ALT SERPL W P-5'-P-CCNC: 37 U/L (ref 12–78)
ANION GAP SERPL CALCULATED.3IONS-SCNC: 10 MMOL/L (ref 4–13)
AST SERPL W P-5'-P-CCNC: 27 U/L (ref 5–45)
BASOPHILS # BLD AUTO: 0.03 THOUSANDS/ΜL (ref 0–0.1)
BASOPHILS NFR BLD AUTO: 1 % (ref 0–1)
BILIRUB SERPL-MCNC: 0.57 MG/DL (ref 0.2–1)
BUN SERPL-MCNC: 11 MG/DL (ref 5–25)
CALCIUM SERPL-MCNC: 9.3 MG/DL (ref 8.3–10.1)
CHLORIDE SERPL-SCNC: 101 MMOL/L (ref 100–108)
CO2 SERPL-SCNC: 28 MMOL/L (ref 21–32)
CREAT SERPL-MCNC: 0.81 MG/DL (ref 0.6–1.3)
EOSINOPHIL # BLD AUTO: 0.1 THOUSAND/ΜL (ref 0–0.61)
EOSINOPHIL NFR BLD AUTO: 2 % (ref 0–6)
ERYTHROCYTE [DISTWIDTH] IN BLOOD BY AUTOMATED COUNT: 12.9 % (ref 11.6–15.1)
FERRITIN SERPL-MCNC: 63 NG/ML (ref 8–388)
GFR SERPL CREATININE-BSD FRML MDRD: 89 ML/MIN/1.73SQ M
GLUCOSE P FAST SERPL-MCNC: 104 MG/DL (ref 65–99)
HCT VFR BLD AUTO: 40.7 % (ref 34.8–46.1)
HGB BLD-MCNC: 13 G/DL (ref 11.5–15.4)
IMM GRANULOCYTES # BLD AUTO: 0.04 THOUSAND/UL (ref 0–0.2)
IMM GRANULOCYTES NFR BLD AUTO: 1 % (ref 0–2)
LYMPHOCYTES # BLD AUTO: 1.84 THOUSANDS/ΜL (ref 0.6–4.47)
LYMPHOCYTES NFR BLD AUTO: 28 % (ref 14–44)
MCH RBC QN AUTO: 29.3 PG (ref 26.8–34.3)
MCHC RBC AUTO-ENTMCNC: 31.9 G/DL (ref 31.4–37.4)
MCV RBC AUTO: 92 FL (ref 82–98)
MONOCYTES # BLD AUTO: 0.56 THOUSAND/ΜL (ref 0.17–1.22)
MONOCYTES NFR BLD AUTO: 9 % (ref 4–12)
NEUTROPHILS # BLD AUTO: 3.91 THOUSANDS/ΜL (ref 1.85–7.62)
NEUTS SEG NFR BLD AUTO: 59 % (ref 43–75)
NRBC BLD AUTO-RTO: 0 /100 WBCS
PLATELET # BLD AUTO: 409 THOUSANDS/UL (ref 149–390)
PMV BLD AUTO: 9.7 FL (ref 8.9–12.7)
POTASSIUM SERPL-SCNC: 3.9 MMOL/L (ref 3.5–5.3)
PROT SERPL-MCNC: 9.1 G/DL (ref 6.4–8.2)
RBC # BLD AUTO: 4.44 MILLION/UL (ref 3.81–5.12)
SODIUM SERPL-SCNC: 139 MMOL/L (ref 136–145)
VIT B12 SERPL-MCNC: 615 PG/ML (ref 100–900)
WBC # BLD AUTO: 6.48 THOUSAND/UL (ref 4.31–10.16)

## 2021-01-30 PROCEDURE — 86235 NUCLEAR ANTIGEN ANTIBODY: CPT

## 2021-01-30 PROCEDURE — 85025 COMPLETE CBC W/AUTO DIFF WBC: CPT

## 2021-01-30 PROCEDURE — 80053 COMPREHEN METABOLIC PANEL: CPT

## 2021-01-30 PROCEDURE — 82728 ASSAY OF FERRITIN: CPT

## 2021-01-30 PROCEDURE — 82607 VITAMIN B-12: CPT

## 2021-01-30 PROCEDURE — 36415 COLL VENOUS BLD VENIPUNCTURE: CPT

## 2021-01-30 PROCEDURE — 86256 FLUORESCENT ANTIBODY TITER: CPT

## 2021-02-01 LAB
ACTIN IGG SERPL-ACNC: 3 UNITS (ref 0–19)
MITOCHONDRIA M2 IGG SER-ACNC: <20 UNITS (ref 0–20)

## 2021-02-03 ENCOUNTER — TELEMEDICINE (OUTPATIENT)
Dept: HEMATOLOGY ONCOLOGY | Facility: CLINIC | Age: 41
End: 2021-02-03
Payer: COMMERCIAL

## 2021-02-03 ENCOUNTER — TELEPHONE (OUTPATIENT)
Dept: HEMATOLOGY ONCOLOGY | Facility: CLINIC | Age: 41
End: 2021-02-03

## 2021-02-03 DIAGNOSIS — D50.0 IRON DEFICIENCY ANEMIA DUE TO CHRONIC BLOOD LOSS: Primary | ICD-10-CM

## 2021-02-03 DIAGNOSIS — R10.11 RIGHT UPPER QUADRANT ABDOMINAL PAIN: ICD-10-CM

## 2021-02-03 DIAGNOSIS — R77.8 ELEVATED TOTAL PROTEIN: ICD-10-CM

## 2021-02-03 DIAGNOSIS — D75.839 THROMBOCYTOSIS: ICD-10-CM

## 2021-02-03 PROCEDURE — 99214 OFFICE O/P EST MOD 30 MIN: CPT | Performed by: NURSE PRACTITIONER

## 2021-02-03 NOTE — PROGRESS NOTES
Virtual Regular Visit      Assessment/Plan:  1  Iron deficiency anemia due to chronic blood loss  The patient does not seem to be anemic or iron deficient at this point in time  She had her menstrual cycles are no longer heavy after her ablation which is likely the reason for the  Improvement in her red cells/iron stores  We will continue to monitor her laboratory studies on a regular basis since she does have a history of gastroparesis  The patient will be back for follow-up again in about 4 months with repeat labs prior     - CBC and differential; Future  - Comprehensive metabolic panel; Future  - C-reactive protein; Future  - Sedimentation rate, automated; Future  - Vitamin B12; Future  - Ferritin; Future  - Iron Panel (Includes Ferritin, Iron Sat%, Iron, and TIBC); Future    2  Right upper quadrant abdominal pain  The patient seems to be concerned about ongoing right upper abdomen /right flank pain of unknown etiology  She states the pain is worse with inspiration  Mentions that she does have a history of a right adrenal adenoma although this was not reported on her most recent MRI of the abdomen July 2020  She does have history of multiple abdominal surgeries and mentions that she did have adhesions on the left side in the past which her gyn discovered  Was seen by another general surgeon in the past who did not on of pursue any further intervention  The patient is planning to discuss with her new PCP in the near future  In the meantime we will pursue an ultrasound the abdomen for completeness sake  - US abdomen complete; Future    3  Elevated total protein  Patient was noted to have elevated protein which has been present at least since October 2020  We will pursue additional workup to rule out monoclonal gammopathy     - CBC and differential; Future  - Comprehensive metabolic panel; Future  - C-reactive protein; Future  - Sedimentation rate, automated; Future  - IgG, IgA, IgM;  Future  - Immunoglobulin free LT chains blood; Future  - Protein electrophoresis, serum; Future  - US abdomen complete; Future    4  Thrombocytosis ([address] )  Patient noted to have mild elevation of her platelet count which is likely reactive in nature  We will repeat her CBC she continues to have persistent worsening elevation of her platelet count additional workup will be pursued  Problem List Items Addressed This Visit     None      Visit Diagnoses     Iron deficiency anemia due to chronic blood loss    -  Primary    Relevant Orders    CBC and differential    Comprehensive metabolic panel    C-reactive protein    Sedimentation rate, automated    Vitamin B12    Ferritin    Iron Panel (Includes Ferritin, Iron Sat%, Iron, and TIBC)    Right upper quadrant abdominal pain        Relevant Orders    US abdomen complete    Elevated total protein        Relevant Orders    CBC and differential    Comprehensive metabolic panel    C-reactive protein    Sedimentation rate, automated    IgG, IgA, IgM    Immunoglobulin free LT chains blood    Protein electrophoresis, serum    US abdomen complete               Reason for visit is No chief complaint on file  Encounter provider JOAO Larios    Provider located at [address] [id number]      Recent Visits  No visits were found meeting these conditions  Showing recent visits within past 7 days and meeting all other requirements     Today's Visits  Date Type Provider Dept   02/03/21 Telemedicine [facility]   Showing today's visits and meeting all other requirements     Future Appointments  No visits were found meeting these conditions  Showing future appointments within next 150 days and meeting all other requirements        The patient was identified by name and date of birth   Nick Camara was informed that this is a telemedicine visit and that the visit is being conducted through payever and patient was informed that this is a secure, HIPAA-compliant platform  She agrees to proceed     My office door was closed  No one else was in the room  She acknowledged consent and understanding of privacy and security of the video platform  The patient has agreed to participate and understands they can discontinue the visit at any time  Patient is aware this is a billable service  Subjective  Pauline Laird is a 35 y o  female with a history of gastritis, possible gastroparesis and chronic anemia due to iron deficiency which was treated with iron IV on multiple occasions since 2016    She states that her menses have been heavy due to fibroids; typically lasting 7 days   She has established GI care and had upper and lower endoscopy done in July 2019  The colonoscopy showed large external and internal hemorrhoids and 1 polyp that was removed otherwise normal upper endoscopy was normal   She had a gastric emptying study done recently on 09/24/2019 which confirmed that she does in fact have gastroparesis      She stated that she was found to have hepatomegaly just recently on an ultrasound May202  She also had an MRI of the abdomen on 07/16/2020 which showed hepatomegaly measuring 21 cm in greatest dimension  No other pathology was found  She had uterine ablation August 2020 which significantly improved her menstrual bleeding  Patient was contacted today for a virtual follow-up visit  She denies any bleeding from any site other than her menstrual cycles  Her menstrual bleeding has significantly decreased after her ablation  She seems to be concerned today about some weight gain about 10 lb and an ongoing pain to her right abdomen/right flank area of unknown etiology  Her most recent laboratory studies from 01/30/2021 showed normal WBC 6 48, she is not anemic H&H 13 0/40 7, MCV 92 platelet count is mildly elevated 409,000   Fasting glucose 104 her total protein is elevated 9 1,  Remaining metabolic panel is normal   B12 is appropriate 615  Complete iron panel was not done but she does not seem to be iron deficient with ferritin 63        Past Medical History:   Diagnosis Date    Acute non-recurrent maxillary sinusitis 1/27/2020    Anemia     Anxiety 10/16/2019    Candida vaginitis 4/25/2019    Colon polyp 2019    Discoloration of skin of hand 3/20/2019    Duodenitis without bleeding     Dysphagia     Fibromyalgia     Gastritis     GERD (gastroesophageal reflux disease)     Hiatal hernia     Hx of colonoscopy     Insomnia     Iron deficiency anemia secondary to inadequate dietary iron intake 10/18/2019    Memory loss     Menorrhagia with regular cycle 11/20/2014    Moderate episode of recurrent major depressive disorder ([address] ) 10/16/2019    Obesity     Oral herpes     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 myelopathy or radiculopathy     Streptococcal pharyngitis 10/19/2020    Vaginal discharge 9/1/2018    Vitamin D deficiency        Past Surgical History:   Procedure Laterality Date    APPENDECTOMY      BREAST BIOPSY Right     pt unsure when or where- ? 6 yrs ago- benign    BREAST SURGERY Right 01/08/2015    lump removed from breast    [address] COLONOSCOPY      9/26/2012; 2019-benign polyp, repeat 5 years    DILATION AND CURETTAGE OF UTERUS      Resolved:1/29/2009    ENDOMETRIAL ABLATION N/A 8/13/2020    Procedure: ABLATION ENDOMETRIAL Dinorah Lo;  Surgeon: Stacy Rodriguez MD;  Location: [facility];  Service: Gynecology    ESOPHAGOGASTRODUODENOSCOPY      Diagnostic ; 9/26/2012    HYSTEROSCOPY      Resolved:1/30/2009    OVARIAN CYST REMOVAL Right 2005    LA HYSTEROSCOPY,W/ENDO BX N/A 8/13/2020    Procedure: DILATATION AND CURETTAGE (D&C) WITH HYSTEROSCOPY;  Surgeon: Stacy Rodriguez MD;  Location: [facility];  Service: Gynecology    WISDOM TOOTH EXTRACTION         Current Outpatient Medications   Medication Sig Dispense Refill    clindamycin (CLINDAGEL) 1 % gel Apply topically 2 (two) times a day 30 g 2    Dexilant 60 MG capsule TAKE 1 CAPSULE (60 MG TOTAL) BY MOUTH EVERY 12 (TWELVE) HOURS 180 capsule 0    ergocalciferol (VITAMIN D2) 50,000 units TAKE 1 CAPSULE BY MOUTH ONE TIME PER WEEK      fluticasone (FLONASE) 50 mcg/act nasal spray SPRAY 2 SPRAYS INTO EACH NOSTRIL EVERY DAY 48 mL 1    hyoscyamine (LEVSIN/SL) 0 125 mg SL tablet Take 1 tablet (0 125 mg total) by mouth every 6 (six) hours as needed for cramping 180 tablet 4    magnesium 30 MG tablet Take 30 mg by mouth daily       Omega-3 Fatty Acids (fish oil) 1,000 mg Take 1,000 mg by mouth daily      Savella 50 MG TABS TAKE 1 TABLET BY MOUTH TWICE A  tablet 0    sertraline (ZOLOFT) 50 mg tablet Take 1 5 tablets (75 mg total) by mouth daily 135 tablet 0    valACYclovir (VALTREX) 1,000 mg tablet Take 2 tablets (2,000 mg total) by mouth 2 (two) times a day for 1 day (Patient not taking: Reported on 8/6/2020) 4 tablet 0     No current facility-administered medications for this visit  Allergies   Allergen Reactions    Morphine Chest Pain and Hives     Other reaction(s): chest pain    Percocet [Oxycodone-Acetaminophen] Hives    Domperidone     Ibuprofen      Due to gastritis       Review of Systems   Constitutional: Negative for activity change, appetite change, chills, fatigue, fever and unexpected weight change  HENT: Negative for congestion, mouth sores, nosebleeds, sore throat and trouble swallowing  Eyes: Negative  Respiratory: Negative for cough, chest tightness and shortness of breath  Cardiovascular: Negative for chest pain, palpitations and leg swelling  Gastrointestinal: Positive for abdominal pain (right upper radiates to right flank area)  Negative for abdominal distention, blood in stool, constipation, diarrhea, nausea and vomiting     Genitourinary: Negative for difficulty urinating, dysuria, frequency, hematuria and urgency  Musculoskeletal: Positive for arthralgias and myalgias  Negative for back pain, gait problem and joint swelling  fibromyalgia   Skin: Negative for color change, pallor and rash  Neurological: Negative for dizziness, weakness, light-headedness, numbness and headaches  Hematological: Negative for adenopathy  Does not bruise/bleed easily  Psychiatric/Behavioral: Negative for dysphoric mood and sleep disturbance  The patient is not nervous/anxious  Video Exam    There were no vitals filed for this visit  Physical Exam  Constitutional:       General: She is not in acute distress  Appearance: She is well-developed  She is obese  She is not diaphoretic  HENT:      Head: Normocephalic and atraumatic  Nose: Nose normal    Eyes:      General: No scleral icterus  Conjunctiva/sclera: Conjunctivae normal    Neck:      Musculoskeletal: Normal range of motion and neck supple  Pulmonary:      Effort: Pulmonary effort is normal  No respiratory distress  Abdominal:      General: There is no distension  Palpations: Abdomen is soft  Musculoskeletal: Normal range of motion  General: No deformity  Skin:     Coloration: Skin is not pale  Findings: No rash  Neurological:      Mental Status: She is alert and oriented to person, place, and time  Psychiatric:         Mood and Affect: Affect is blunt  Behavior: Behavior normal          Thought Content: Thought content normal          Judgment: Judgment normal           I spent 20 minutes directly with the patient during this visit      [address] acknowledges that she has consented to an online visit or consultation   She understands that the online visit is based solely on information provided by her, and that, in the absence of a face-to-face physical evaluation by the physician, the diagnosis she receives is both limited and provisional in terms of accuracy and completeness  This is not intended to replace a full medical face-to-face evaluation by the physician  Henrry Dyer understands and accepts these terms

## 2021-02-03 NOTE — TELEPHONE ENCOUNTER
I called the patient and informed her that she is scheduled for the [address] for the [facility] on 2/10/21 @ 8 am  And then her follow up is scheduled for 6/4@ 4 pm @ [address] in [place]  Patient voiced agreement and understanding

## 2021-02-10 ENCOUNTER — HOSPITAL ENCOUNTER (OUTPATIENT)
Dept: ULTRASOUND IMAGING | Facility: HOSPITAL | Age: 41
Discharge: HOME | End: 2021-02-10
Payer: COMMERCIAL

## 2021-02-10 DIAGNOSIS — R10.11 RIGHT UPPER QUADRANT ABDOMINAL PAIN: ICD-10-CM

## 2021-02-10 DIAGNOSIS — R77.8 ELEVATED TOTAL PROTEIN: ICD-10-CM

## 2021-02-10 PROCEDURE — 76700 US EXAM ABDOM COMPLETE: CPT

## 2021-02-15 ENCOUNTER — TELEMEDICINE (OUTPATIENT)
Dept: PSYCHIATRY | Facility: CLINIC | Age: 41
End: 2021-02-15
Payer: COMMERCIAL

## 2021-02-15 DIAGNOSIS — F32.1 CURRENT MODERATE EPISODE OF MAJOR DEPRESSIVE DISORDER WITHOUT PRIOR EPISODE (HCC): ICD-10-CM

## 2021-02-15 PROCEDURE — 99214 OFFICE O/P EST MOD 30 MIN: CPT | Performed by: PSYCHIATRY & NEUROLOGY

## 2021-02-15 PROCEDURE — 1036F TOBACCO NON-USER: CPT | Performed by: PSYCHIATRY & NEUROLOGY

## 2021-02-15 RX ORDER — BUPROPION HYDROCHLORIDE 150 MG/1
150 TABLET ORAL DAILY
Qty: 30 TABLET | Refills: 1 | Status: SHIPPED | OUTPATIENT
Start: 2021-02-15 | End: 2021-03-19

## 2021-02-15 NOTE — PSYCH
Virtual Regular Visit      Assessment/Plan:    Problem List Items Addressed This Visit     None      Visit Diagnoses     Current moderate episode of major depressive disorder without prior episode ([facility] [address] )        Relevant Medications    buPROPion (WELLBUTRIN XL) 150 mg 24 hr tablet               Reason for visit is   Chief Complaint   Patient presents with    Virtual Regular Visit        Encounter provider Juliocesar Garcia MD    Provider located at [facility] [id number]      Recent Visits  No visits were found meeting these conditions  Showing recent visits within past 7 days and meeting all other requirements     Today's Visits  Date Type Provider Dept   02/15/21 Telemedicine Juliocesar Garcia MD [facility] 70 today's visits and meeting all other requirements     Future Appointments  No visits were found meeting these conditions  Showing future appointments within next 150 days and meeting all other requirements        The patient was identified by name and date of birth  Sharon Klein was informed that this is a telemedicine visit and that the visit is being conducted through [employer] and patient was informed that this is a secure, HIPAA-compliant platform  She agrees to proceed     My office door was closed  No one else was in the room  She acknowledged consent and understanding of privacy and security of the video platform  The patient has agreed to participate and understands they can discontinue the visit at any time  Patient is aware this is a billable service       Subjective    See below    HPI     Past Medical History:   Diagnosis Date    Acute non-recurrent maxillary sinusitis 1/27/2020    Anemia     Anxiety 10/16/2019    Candida vaginitis 4/25/2019    Colon polyp 2019    Discoloration of skin of hand 3/20/2019    Duodenitis without bleeding     Dysphagia     Fibromyalgia     Gastritis     GERD (gastroesophageal reflux disease)     Hiatal hernia     Hx of colonoscopy     Insomnia     Iron deficiency anemia secondary to inadequate dietary iron intake 10/18/2019    Memory loss     Menorrhagia with regular cycle 11/20/2014    Moderate episode of recurrent major depressive disorder ([facility] [address] ) 10/16/2019    Obesity     Oral herpes     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 myelopathy or radiculopathy     Streptococcal pharyngitis 10/19/2020    Vaginal discharge 9/1/2018    Vitamin D deficiency        Past Surgical History:   Procedure Laterality Date    APPENDECTOMY      BREAST BIOPSY Right     pt unsure when or where- ? 6 yrs ago- benign    BREAST SURGERY Right 01/08/2015    lump removed from breast    [address] COLONOSCOPY      9/26/2012; 2019-benign polyp, repeat 5 years    DILATION AND CURETTAGE OF UTERUS      Resolved:1/29/2009    ENDOMETRIAL ABLATION N/A 8/13/2020    Procedure: ABLATION ENDOMETRIAL Roxanneelyn Parmaria ines;  Surgeon: Xiomy Tran MD;  [facility]: [facility];  Service: Gynecology    ESOPHAGOGASTRODUODENOSCOPY      Diagnostic ; 9/26/2012    HYSTEROSCOPY      Resolved:1/30/2009    OVARIAN CYST REMOVAL Right 2005    MO HYSTEROSCOPY,W/ENDO BX N/A 8/13/2020    Procedure: DILATATION AND CURETTAGE (D&C) WITH HYSTEROSCOPY;  Surgeon: Xiomy Tran MD;  [facility];  Service: Gynecology    WISDOM TOOTH EXTRACTION         Current Outpatient Medications   Medication Sig Dispense Refill    buPROPion (WELLBUTRIN XL) 150 mg 24 hr tablet Take 1 tablet (150 mg total) by mouth daily 30 tablet 1    clindamycin (CLINDAGEL) 1 % gel Apply topically 2 (two) times a day 30 g 2    Dexilant 60 MG capsule TAKE 1 CAPSULE (60 MG TOTAL) BY MOUTH EVERY 12 (TWELVE) HOURS 180 capsule 0    ergocalciferol (VITAMIN D2) 50,000 units TAKE 1 CAPSULE BY MOUTH ONE TIME PER WEEK      fluticasone (FLONASE) 50 mcg/act nasal spray SPRAY 2 SPRAYS INTO EACH NOSTRIL EVERY DAY 48 mL 1    hyoscyamine (LEVSIN/SL) 0 125 mg SL tablet Take 1 tablet (0 125 mg total) by mouth every 6 (six) hours as needed for cramping 180 tablet 4    magnesium 30 MG tablet Take 30 mg by mouth daily       Omega-3 Fatty Acids (fish oil) 1,000 mg Take 1,000 mg by mouth daily      Savella 50 MG TABS TAKE 1 TABLET BY MOUTH TWICE A  tablet 0    valACYclovir (VALTREX) 1,000 mg tablet Take 2 tablets (2,000 mg total) by mouth 2 (two) times a day for 1 day (Patient not taking: Reported on 8/6/2020) 4 tablet 0     No current facility-administered medications for this visit  Allergies   Allergen Reactions    Morphine Chest Pain and Hives     Other reaction(s): chest pain    Percocet [Oxycodone-Acetaminophen] Hives    Domperidone     Ibuprofen      Due to gastritis       Review of Systems    Video Exam    There were no vitals filed for this visit  Physical Exam     I spent 22 minutes directly with the patient during this visit   [address] [place] [address]      Name and Date of Birth:  Maria Elena Reagan 35 y o  1980 MRN: [id number]    Date of Visit: February 15, 2021    Reason for Visit: follow-up for medication management    Subjective: the patient reports she still continues to feel not very motivated  Reports  Difficulty getting out of the bed in the morning  Though denies feeling sad or hopeless  Denies any SI or HI  Reports energy level has not been too good  Feels tired all the time  She reported she decrease the Zoloft dose back to 50 mg daily as she was feeling more tired on it  Reports anxiety though has been under control  Reports sleep has been good  Denies any other concerns  Denies any acute medical issues lately  I discussed with the patient various other alternatives and she agreed to try Wellbutrin XL  I will discontinue Zoloft    The patient already takes [address] [place] [address]  for her fibromyalgia which can also help his depression due to which I do not feel 3 medications are needed at this time for depression  Also the risk of side effects such as serotonin syndrome can increase on combination of Zoloft and Savella  The patient was educated about the medications in detail and she agrees with the plan        Review Of Systems:      Constitutional negative   ENT negative   Cardiovascular negative   Respiratory negative   Gastrointestinal negative   Genitourinary negative   Musculoskeletal negative   Integumentary negative   Neurological negative   Endocrine negative   Other Symptoms none       Alcohol/Substance Abuse: denies        Past Medical History:    Past Medical History:   Diagnosis Date    Acute non-recurrent maxillary sinusitis 1/27/2020    Anemia     Anxiety 10/16/2019    Candida vaginitis 4/25/2019    Colon polyp 2019    Discoloration of skin of hand 3/20/2019    Duodenitis without bleeding     Dysphagia     Fibromyalgia     Gastritis     GERD (gastroesophageal reflux disease)     Hiatal hernia     Hx of colonoscopy     Insomnia     Iron deficiency anemia secondary to inadequate dietary iron intake 10/18/2019    Memory loss     Menorrhagia with regular cycle 11/20/2014    Moderate episode of recurrent major depressive disorder ([address] ) 10/16/2019    Obesity     Oral herpes     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 myelopathy or radiculopathy     Streptococcal pharyngitis 10/19/2020    Vaginal discharge 9/1/2018    Vitamin D deficiency      Past Medical History Pertinent Negatives:   Diagnosis Date Noted    Abnormal Pap smear of cervix 04/19/2018 2/2016-wnl; 11/2020-wnl, HRHPV neg    Allergic 08/23/2018    Clotting disorder ([address] ) 08/23/2018    Dementia ([facility] [address] ) 08/23/2018    Diverticulitis of colon 08/23/2018    Eating disorder 08/23/2018    History of transfusion 08/06/2020    HL (hearing loss) 08/23/2018    Infectious viral hepatitis 08/23/2018    Inflammatory bowel disease 08/23/2018    Osteoporosis 08/23/2018    Otitis media 08/23/2018    Scoliosis 08/23/2018    Shingles 08/23/2018    Substance abuse ([address] ) 08/23/2018    Urinary tract infection 08/23/2018    Visual impairment 08/23/2018     Past Surgical History:   Procedure Laterality Date    APPENDECTOMY      BREAST BIOPSY Right     pt unsure when or where- ? 6 yrs ago- benign    BREAST SURGERY Right 01/08/2015    lump removed from [address]      COLONOSCOPY      9/26/2012; 2019-benign polyp, repeat 5 years    DILATION AND CURETTAGE OF UTERUS      Resolved:1/29/2009    ENDOMETRIAL ABLATION N/A 8/13/2020    Procedure: ABLATION ENDOMETRIAL Rozaotfa Leo;  Surgeon: Abdulkadir Wilson MD;  [facility];  Service: Gynecology    ESOPHAGOGASTRODUODENOSCOPY      Diagnostic ; 9/26/2012    HYSTEROSCOPY      Resolved:1/30/2009    OVARIAN CYST REMOVAL Right 2005    NM HYSTEROSCOPY,W/ENDO BX N/A 8/13/2020    Procedure: DILATATION AND CURETTAGE (D&C) WITH HYSTEROSCOPY;  Surgeon: Abdulkadir Wilson MD;  [facility]: [facility];  Service: Gynecology    WISDOM TOOTH EXTRACTION       Allergies   Allergen Reactions    Morphine Chest Pain and Hives     Other reaction(s): chest pain    Percocet [Oxycodone-Acetaminophen] Hives    Domperidone     Ibuprofen      Due to gastritis       Current Medications:       Current Outpatient Medications:     buPROPion (WELLBUTRIN XL) 150 mg 24 hr tablet, Take 1 tablet (150 mg total) by mouth daily, Disp: 30 tablet, Rfl: 1    clindamycin (CLINDAGEL) 1 % gel, Apply topically 2 (two) times a day, Disp: 30 g, Rfl: 2    Dexilant 60 MG capsule, TAKE 1 CAPSULE (60 MG TOTAL) BY MOUTH EVERY 12 (TWELVE) HOURS, Disp: 180 capsule, Rfl: 0    ergocalciferol (VITAMIN D2) 50,000 units, TAKE 1 CAPSULE BY MOUTH ONE TIME PER WEEK, Disp: , Rfl:     fluticasone (FLONASE) 50 mcg/act nasal spray, SPRAY 2 SPRAYS INTO EACH NOSTRIL EVERY DAY, Disp: 48 mL, Rfl: 1    hyoscyamine (LEVSIN/SL) 0 125 mg SL tablet, Take 1 tablet (0 125 mg total) by mouth every 6 (six) hours as needed for cramping, Disp: 180 tablet, Rfl: 4    magnesium 30 MG tablet, Take 30 mg by mouth daily , Disp: , Rfl:     Omega-3 Fatty Acids (fish oil) 1,000 mg, Take 1,000 mg by mouth daily, Disp: , Rfl:     Savella 50 MG TABS, TAKE 1 TABLET BY MOUTH TWICE A DAY, Disp: 180 tablet, Rfl: 0    valACYclovir (VALTREX) 1,000 mg tablet, Take 2 tablets (2,000 mg total) by mouth 2 (two) times a day for 1 day (Patient not taking: Reported on 8/6/2020), Disp: 4 tablet, Rfl: 0       History Review: The following portions of the patient's history were reviewed and updated as appropriate: allergies, current medications, past family history, past medical history, past social history, past surgical history and problem list          OBJECTIVE:     Vital signs in last 24 hours: There were no vitals filed for this visit      Mental Status Evaluation:    Appearance age appropriate, casually dressed   Behavior cooperative, calm   Speech normal rate, normal volume, normal pitch   Mood depressed   Affect slightly brighter   Thought Processes organized, goal directed   Associations intact associations   Thought Content no overt delusions   Perceptual Disturbances: no auditory hallucinations, no visual hallucinations   Abnormal Thoughts  Risk Potential Suicidal ideation - None  Homicidal ideation - None  Potential for aggression - No   Orientation oriented to person, place, time/date and situation   Memory recent and remote memory grossly intact   Consciousness alert and awake   Attention Span Concentration Span attention span and concentration are age appropriate   Intellect appears to be of average intelligence   Insight intact   Judgement intact   Muscle Strength and  Gait normal gait and normal balance   Motor activity no abnormal movements   Language no difficulty naming common objects, no difficulty repeating a phrase, no difficulty writing a sentence   Fund of Knowledge adequate knowledge of current events  adequate fund of knowledge regarding past history  adequate fund of knowledge regarding vocabulary    Pain none   Pain Scale 0       Laboratory Results:   Most Recent Labs:   Lab Results   Component Value Date    WBC 6 48 01/30/2021    RBC 4 44 01/30/2021    HGB 13 0 01/30/2021    HCT 40 7 01/30/2021     (H) 01/30/2021    RDW 12 9 01/30/2021    NEUTROABS 3 91 01/30/2021    K 3 9 01/30/2021     01/30/2021    CO2 28 01/30/2021    BUN 11 01/30/2021    CREATININE 0 81 01/30/2021    CALCIUM 9 3 01/30/2021    AST 27 01/30/2021    ALT 37 01/30/2021    ALKPHOS 86 01/30/2021    HDL 65 11/20/2020    TRIG 115 11/20/2020    LDLCALC 130 (H) 11/20/2020    [id number] 1 910 11/20/2020    HCGQUANT <2 08/27/2018     I have personally reviewed all pertinent laboratory/tests results  Assessment/Plan:   The patient continues to feel not motivated, low energy level and difficulty taking initiative  Zoloft has been not helpful and the increase in the dose makes her tired  The plan is to discontinue Zoloft and add Wellbutrin  mg 1 tablet daily for her depression  The patient was educated about her medication including benefit, risk, side effects, alternative, contraindication, dosage and frequency  She verbalized understanding and agrees with the plan  The patient will follow-up with me in 6 weeks or sooner if needed  She was advised to call us if there is any concern and to call crisis or visit nearby ER in case of any emergency or having any SI or HI  The patient agreed with the plan  Diagnoses and all orders for this visit:    Current moderate episode of major depressive disorder without prior episode (HCC)  -     buPROPion (WELLBUTRIN XL) 150 mg 24 hr tablet;  Take 1 tablet (150 mg total) by mouth daily          Treatment Recommendations/Precautions:      Aware of 24 hour and weekend coverage for urgent situations accessed by calling [facility] main practice number    Risks/Benefits      Risks, Benefits And Possible Side Effects Of Medications:    Risks, benefits, and possible side effects of medications explained to [facility] COGNITIVE DISORDERS and she verbalizes understanding and agreement for treatment  Risks of medications in pregnancy explained to [facility] COGNITIVE DISORDERS  She verbalizes understanding and agrees to notify her doctor if she becomes pregnant  Controlled Medication Discussion:     Not applicable    Psychotherapy Provided:     Individual psychotherapy provided: Medications, treatment progress and treatment plan reviewed with [facility] COGNITIVE DISORDERS  Medication changes discussed with [facility] COGNITIVE DISORDERS  Medication education provided to [facility] COGNITIVE DISORDERS  Supportive therapy provided  Treatment Plan;    Completed and signed during the session: Not applicable - Treatment Plan to be completed by Oleg [address] [place] [address] therapist    Kodi Ansari MD 02/15/21      VIRTUAL VISIT DISCLAIMER    Mahendra Florez acknowledges that she has consented to an online visit or consultation  She understands that the online visit is based solely on information provided by her, and that, in the absence of a face-to-face physical evaluation by the physician, the diagnosis she receives is both limited and provisional in terms of accuracy and completeness  This is not intended to replace a full medical face-to-face evaluation by the physician  Mahendra Florez understands and accepts these terms

## 2021-02-23 ENCOUNTER — TELEPHONE (OUTPATIENT)
Dept: PSYCHIATRY | Facility: CLINIC | Age: 41
End: 2021-02-23

## 2021-02-23 NOTE — TELEPHONE ENCOUNTER
LVM for patient to call back and set up an appt in April with our new therapist        Asked for patient to speak with Javed Nichols to discuss

## 2021-03-19 DIAGNOSIS — F32.1 CURRENT MODERATE EPISODE OF MAJOR DEPRESSIVE DISORDER WITHOUT PRIOR EPISODE (HCC): ICD-10-CM

## 2021-03-19 RX ORDER — BUPROPION HYDROCHLORIDE 150 MG/1
TABLET ORAL
Qty: 30 TABLET | Refills: 1 | Status: SHIPPED | OUTPATIENT
Start: 2021-03-19 | End: 2021-04-04

## 2021-03-23 ENCOUNTER — TELEPHONE (OUTPATIENT)
Dept: OBGYN CLINIC | Facility: CLINIC | Age: 41
End: 2021-03-23

## 2021-03-23 NOTE — TELEPHONE ENCOUNTER
Pt called stating the mammogram center called her stating that her insurance may not pay for a screening mammogram of one breast and a dx mammogram of the other breast as her mammogram was ordered  Spoke to Dr Valerie Vazquez and advised patient to contact the mammogram center and ask how this should be ordered  Also advised the patient to contact her insurance to check coverage  Patient aware and agrees

## 2021-04-02 DIAGNOSIS — F32.1 CURRENT MODERATE EPISODE OF MAJOR DEPRESSIVE DISORDER WITHOUT PRIOR EPISODE (HCC): ICD-10-CM

## 2021-04-04 RX ORDER — BUPROPION HYDROCHLORIDE 150 MG/1
TABLET ORAL
Qty: 90 TABLET | Refills: 1 | Status: SHIPPED | OUTPATIENT
Start: 2021-04-04 | End: 2021-07-29

## 2021-04-06 ENCOUNTER — TELEMEDICINE (OUTPATIENT)
Dept: PSYCHIATRY | Facility: CLINIC | Age: 41
End: 2021-04-06
Payer: COMMERCIAL

## 2021-04-06 DIAGNOSIS — F32.1 CURRENT MODERATE EPISODE OF MAJOR DEPRESSIVE DISORDER WITHOUT PRIOR EPISODE (HCC): ICD-10-CM

## 2021-04-06 PROCEDURE — 99212 OFFICE O/P EST SF 10 MIN: CPT | Performed by: PSYCHIATRY & NEUROLOGY

## 2021-04-06 PROCEDURE — 1036F TOBACCO NON-USER: CPT | Performed by: PSYCHIATRY & NEUROLOGY

## 2021-04-06 NOTE — PSYCH
Virtual Regular Visit      Assessment/Plan:    Problem List Items Addressed This Visit     None      Visit Diagnoses     Current moderate episode of major depressive disorder without prior episode [facility])                   Reason for visit is   Chief Complaint   Patient presents with    Virtual Regular Visit        Encounter provider Chela Lovelace MD    Provider located at [facility] [id number]      Recent Visits  No visits were found meeting these conditions  Showing recent visits within past 7 days and meeting all other requirements     Today's Visits  Date Type Provider Dept   04/06/21 Telemedicine Chela Lovelace MD [facility] 72 today's visits and meeting all other requirements     Future Appointments  No visits were found meeting these conditions  Showing future appointments within next 150 days and meeting all other requirements        The patient was identified by name and date of birth  Svitlana De La Torre was informed that this is a telemedicine visit and that the visit is being conducted through [employer] and patient was informed that this is a secure, HIPAA-compliant platform  She agrees to proceed     My office door was closed  No one else was in the room  She acknowledged consent and understanding of privacy and security of the video platform  The patient has agreed to participate and understands they can discontinue the visit at any time  Patient is aware this is a billable service  Subjective    Follow-up for medication management        HPI     Past Medical History:   Diagnosis Date    Acute non-recurrent maxillary sinusitis 1/27/2020    Anemia     Anxiety 10/16/2019    Candida vaginitis 4/25/2019    Colon polyp 2019    Discoloration of skin of hand 3/20/2019    Duodenitis without bleeding     Dysphagia     Fibromyalgia     Gastritis     GERD (gastroesophageal reflux disease)     Hiatal hernia     Hx of colonoscopy     Insomnia     Iron deficiency anemia secondary to inadequate dietary iron intake 10/18/2019    Memory loss     Menorrhagia with regular cycle 11/20/2014    Moderate episode of recurrent major depressive disorder ([facility] [address] ) 10/16/2019    Obesity     Oral herpes     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 myelopathy or radiculopathy     Streptococcal pharyngitis 10/19/2020    Vaginal discharge 9/1/2018    Vitamin D deficiency        Past Surgical History:   Procedure Laterality Date    APPENDECTOMY      BREAST BIOPSY Right     pt unsure when or where- ? 6 yrs ago- benign    BREAST SURGERY Right 01/08/2015    lump removed from breast    [address] COLONOSCOPY      9/26/2012; 2019-benign polyp, repeat 5 years    DILATION AND CURETTAGE OF UTERUS      Resolved:1/29/2009    ENDOMETRIAL ABLATION N/A 8/13/2020    Procedure: ABLATION ENDOMETRIAL [place];  Surgeon: Purvi Keller MD;  [facility];  Service: Gynecology    ESOPHAGOGASTRODUODENOSCOPY      Diagnostic ; 9/26/2012    HYSTEROSCOPY      Resolved:1/30/2009    OVARIAN CYST REMOVAL Right 2005    [place] HYSTEROSCOPY,W/ENDO BX N/A 8/13/2020    Procedure: DILATATION AND CURETTAGE (D&C) WITH HYSTEROSCOPY;  Surgeon: Purvi Keller MD;  [facility];  Service: Gynecology    WISDOM TOOTH EXTRACTION         Current Outpatient Medications   Medication Sig Dispense Refill    buPROPion (WELLBUTRIN XL) 150 mg 24 hr tablet TAKE 1 TABLET BY MOUTH EVERY DAY 90 tablet 1    clindamycin (CLINDAGEL) 1 % gel Apply topically 2 (two) times a day 30 g 2    Dexilant 60 MG capsule TAKE 1 CAPSULE (60 MG TOTAL) BY MOUTH EVERY 12 (TWELVE) HOURS 180 capsule 0    ergocalciferol (VITAMIN D2) 50,000 units TAKE 1 CAPSULE BY MOUTH ONE TIME PER WEEK      fluticasone (FLONASE) 50 mcg/act nasal spray SPRAY 2 SPRAYS INTO EACH NOSTRIL EVERY DAY 48 mL 1    hyoscyamine (LEVSIN/SL) 0 125 mg SL tablet Take 1 tablet (0 125 mg total) by mouth every 6 (six) hours as needed for cramping 180 tablet 4    magnesium 30 MG tablet Take 30 mg by mouth daily       Omega-3 Fatty Acids (fish oil) 1,000 mg Take 1,000 mg by mouth daily      Savella 50 MG TABS TAKE 1 TABLET BY MOUTH TWICE A  tablet 0    valACYclovir (VALTREX) 1,000 mg tablet Take 2 tablets (2,000 mg total) by mouth 2 (two) times a day for 1 day (Patient not taking: Reported on 8/6/2020) 4 tablet 0     No current facility-administered medications for this visit  Allergies   Allergen Reactions    Morphine Chest Pain and Hives     Other reaction(s): chest pain    Percocet [Oxycodone-Acetaminophen] Hives    Domperidone     Ibuprofen      Due to gastritis       Review of Systems    Video Exam    There were no vitals filed for this visit  Physical Exam     I spent 20 minutes directly with the patient during this visit    [address] [place] [address]      Name and Date of Birth:  Enma Arias y o  1980 MRN: [id number]    Date of Visit: April 6, 2021    Reason for Visit:  Follow-up for medication management  Subjective: The patient reports her depression has been much better on the Wellbutrin but she is concerned about weight gain  The patient reports her appetite has improved and she is worried about the weight  Patient concerns were validated but also advised that it is not very common to have weight gain on the Wellbutrin and probably other medication which she is taking might be contributing  The patient also was advised even if it is the Wellbutrin should not be any significant weight gain and given depression has been improved on it I feel the benefit is more than the risk  The patient also reports her sleep has not been very good lately    Reports able to sleep for 3-4 hours and then start waking on and off through the night  Reports energy level has been better  Reports improve in concentration  Does not feel tired all the time  Takes Wellbutrin in the morning  Denies any other concern or side effects  Denies any suicidal thoughts or hopelessness  Denies any medical issues lately other than mentioned above  Denies any symptoms for serotonin syndrome        Review Of Systems:      Constitutional negative   ENT negative   Cardiovascular negative   Respiratory negative   Gastrointestinal negative   Genitourinary negative   Musculoskeletal negative   Integumentary negative   Neurological negative   Endocrine negative   Other Symptoms none       Alcohol/Substance Abuse:   denies        Past Medical History:    Past Medical History:   Diagnosis Date    Acute non-recurrent maxillary sinusitis 1/27/2020    Anemia     Anxiety 10/16/2019    Candida vaginitis 4/25/2019    Colon polyp 2019    Discoloration of skin of hand 3/20/2019    Duodenitis without bleeding     Dysphagia     Fibromyalgia     Gastritis     GERD (gastroesophageal reflux disease)     Hiatal hernia     Hx of colonoscopy     Insomnia     Iron deficiency anemia secondary to inadequate dietary iron intake 10/18/2019    Memory loss     Menorrhagia with regular cycle 11/20/2014    Moderate episode of recurrent major depressive disorder ([facility] [address] ) 10/16/2019    Obesity     Oral herpes     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 myelopathy or radiculopathy     Streptococcal pharyngitis 10/19/2020    Vaginal discharge 9/1/2018    Vitamin D deficiency      Past Medical History Pertinent Negatives:   Diagnosis Date Noted    Abnormal Pap smear of cervix 04/19/2018 2/2016-wnl; 11/2020-wnl, HRHPV neg    Allergic 08/23/2018    Clotting disorder ([address] ) 08/23/2018    Dementia ([facility] [address] ) 08/23/2018    Diverticulitis of colon 08/23/2018    Eating disorder 08/23/2018    History of transfusion 08/06/2020    HL (hearing loss) 08/23/2018    Infectious viral hepatitis 08/23/2018    Inflammatory bowel disease 08/23/2018    Osteoporosis 08/23/2018    Otitis media 08/23/2018    Scoliosis 08/23/2018    Shingles 08/23/2018    Substance abuse ([address] ) 08/23/2018    Urinary tract infection 08/23/2018    Visual impairment 08/23/2018     Past Surgical History:   Procedure Laterality Date    APPENDECTOMY      BREAST BIOPSY Right     pt unsure when or where- ? 6 yrs ago- benign    BREAST SURGERY Right 01/08/2015    [address]ump removed from [address]      COLONOSCOPY      9/26/2012; 2019-benign polyp, repeat 5 years    DILATION AND CURETTAGE OF UTERUS      Resolved:1/29/2009    ENDOMETRIAL ABLATION N/A 8/13/2020    Procedure: ABLATION ENDOMETRIAL [place];  Surgeon: Melly Jones MD;  [facility];  Service: Gynecology    ESOPHAGOGASTRODUODENOSCOPY      Diagnostic ; 9/26/2012    HYSTEROSCOPY      Resolved:1/30/2009    OVARIAN CYST REMOVAL Right 2005    [place] HYSTEROSCOPY,W/ENDO BX N/A 8/13/2020    Procedure: DILATATION AND CURETTAGE (D&C) WITH HYSTEROSCOPY;  Surgeon: Melly Jones MD;  [facility];  Service: Gynecology    WISDOM TOOTH EXTRACTION       Allergies   Allergen Reactions    Morphine Chest Pain and Hives     Other reaction(s): chest pain    Percocet [Oxycodone-Acetaminophen] Hives    Domperidone     Ibuprofen      Due to gastritis       Current Medications:       Current Outpatient Medications:     buPROPion (WELLBUTRIN XL) 150 mg 24 hr tablet, TAKE 1 TABLET BY MOUTH EVERY DAY, Disp: 90 tablet, Rfl: 1    clindamycin (CLINDAGEL) 1 % gel, Apply topically 2 (two) times a day, Disp: 30 g, Rfl: 2    Dexilant 60 MG capsule, TAKE 1 CAPSULE (60 MG TOTAL) BY MOUTH EVERY 12 (TWELVE) HOURS, Disp: 180 capsule, Rfl: 0    ergocalciferol (VITAMIN D2) 50,000 units, TAKE 1 CAPSULE BY MOUTH ONE TIME PER WEEK, Disp: , Rfl:     fluticasone (FLONASE) 50 mcg/act nasal spray, SPRAY 2 SPRAYS INTO EACH NOSTRIL EVERY DAY, Disp: 48 mL, Rfl: 1    hyoscyamine (LEVSIN/SL) 0 125 mg SL tablet, Take 1 tablet (0 125 mg total) by mouth every 6 (six) hours as needed for cramping, Disp: 180 tablet, Rfl: 4    magnesium 30 MG tablet, Take 30 mg by mouth daily , Disp: , Rfl:     Omega-3 Fatty Acids (fish oil) 1,000 mg, Take 1,000 mg by mouth daily, Disp: , Rfl:     Savella 50 MG TABS, TAKE 1 TABLET BY MOUTH TWICE A DAY, Disp: 180 tablet, Rfl: 0    valACYclovir (VALTREX) 1,000 mg tablet, Take 2 tablets (2,000 mg total) by mouth 2 (two) times a day for 1 day (Patient not taking: Reported on 8/6/2020), Disp: 4 tablet, Rfl: 0       History Review: The following portions of the patient's history were reviewed and updated as appropriate: allergies, current medications, past family history, past medical history, past social history, past surgical history and problem list          OBJECTIVE:     Vital signs in last 24 hours: There were no vitals filed for this visit      Mental Status Evaluation:    Appearance age appropriate, casually dressed   Behavior cooperative, calm   Speech normal rate, normal volume, normal pitch   Mood improved   Affect normal range and intensity, appropriate   Thought Processes organized, goal directed   Associations intact associations   Thought Content no overt delusions   Perceptual Disturbances: no auditory hallucinations, no visual hallucinations   Abnormal Thoughts  Risk Potential Suicidal ideation - None  Homicidal ideation - None  Potential for aggression - No   Orientation oriented to person, place, time/date and situation   Memory recent and remote memory grossly intact   Consciousness alert and awake   Attention Span Concentration Span attention span and concentration are age appropriate   Intellect appears to be of average intelligence   Insight intact   Judgement intact   Muscle Strength and  Gait unable to assess today due to virtual visit   Motor activity unable to assess today due to virtual visit Language no difficulty naming common objects, no difficulty repeating a phrase, no difficulty writing a sentence   Fund of Knowledge adequate knowledge of current events  adequate fund of knowledge regarding past history  adequate fund of knowledge regarding vocabulary    Pain none   Pain Scale 0       Laboratory Results:   Most Recent Labs:   Lab Results   Component Value Date    WBC 6 48 01/30/2021    RBC 4 44 01/30/2021    HGB 13 0 01/30/2021    HCT 40 7 01/30/2021     (H) 01/30/2021    RDW 12 9 01/30/2021    NEUTROABS 3 91 01/30/2021    K 3 9 01/30/2021     01/30/2021    CO2 28 01/30/2021    BUN 11 01/30/2021    CREATININE 0 81 01/30/2021    CALCIUM 9 3 01/30/2021    AST 27 01/30/2021    ALT 37 01/30/2021    ALKPHOS 86 01/30/2021    HDL 65 11/20/2020    TRIG 115 11/20/2020    LDLCALC 130 (H) 11/20/2020    [id number] 1 910 11/20/2020    HCGQUANT <2 08/27/2018     I have personally reviewed all pertinent laboratory/tests results  Assessment/Plan:   The patient depression has been much better on the Wellbutrin with improved alertness and concentration  Concern about weight gain  Plan is to continue with the Wellbutrin  mg daily with no change  The patient has been advised to take melatonin which is over-the-counter medication for poor sleep starting with 3 mg at night and gradually over next 2-3 weeks can go up to 6-9 mg at night if needed for poor sleep  The patient was educated about her medication including benefit, risk, side effects, alternative, contraindication, dosage and frequency  She verbalized understanding and agrees with the plan  She was advised to call us if there is any concern and to call crisis or visit nearby he ER in case of any emergency  The patient agreed with the plan        Diagnoses and all orders for this visit:    Current moderate episode of major depressive disorder without prior episode [facility])          Treatment Recommendations/Precautions:      Aware of 24 hour and weekend coverage for urgent situations accessed by calling [facility] main practice number    Risks/Benefits      Risks, Benefits And Possible Side Effects Of Medications:    Risks, benefits, and possible side effects of medications explained to [facility] FOR COGNITIVE DISORDERS and she verbalizes understanding and agreement for treatment  Risks of medications in pregnancy explained to [facility] DISORD[facility]  She verbalizes understanding and agrees to notify her doctor if she becomes pregnant  Controlled Medication Discussion:     Not applicable    Psychotherapy Provided:     Individual psychotherapy provided: Medications, treatment progress and treatment plan reviewed with [facility] COGNITIVE DISORDERS  Medication education provided to [facility] COGNITIVE DISORDERS  Reassurance and supportive therapy provided  Crisis/safety plan discussed with [facility] COGNITIVE [facility]  Treatment Plan;    Completed and signed during the session: Not applicable - Treatment Plan not due at this session    Carlos Marshall MD 04/06/21  VIRTUAL VISIT DISCLAIMER    Denver Ashley acknowledges that she has consented to an online visit or consultation  She understands that the online visit is based solely on information provided by her, and that, in the absence of a face-to-face physical evaluation by the physician, the diagnosis she receives is both limited and provisional in terms of accuracy and completeness  This is not intended to replace a full medical face-to-face evaluation by the physician  eDnver Ashley understands and accepts these terms

## 2021-04-20 ENCOUNTER — HOSPITAL ENCOUNTER (OUTPATIENT)
Dept: ULTRASOUND IMAGING | Facility: CLINIC | Age: 41
Discharge: HOME | End: 2021-04-20
Payer: COMMERCIAL

## 2021-04-20 ENCOUNTER — TELEPHONE (OUTPATIENT)
Dept: FAMILY MEDICINE CLINIC | Facility: CLINIC | Age: 41
End: 2021-04-20

## 2021-04-20 ENCOUNTER — HOSPITAL ENCOUNTER (OUTPATIENT)
Dept: MAMMOGRAPHY | Facility: CLINIC | Age: 41
Discharge: HOME | End: 2021-04-20
Payer: COMMERCIAL

## 2021-04-20 VITALS — WEIGHT: 247 LBS | HEIGHT: 65 IN | BODY MASS INDEX: 41.15 KG/M2

## 2021-04-20 DIAGNOSIS — R92.8 FOLLOW-UP EXAMINATION OF ABNORMAL MAMMOGRAM: ICD-10-CM

## 2021-04-20 DIAGNOSIS — Z12.31 ENCOUNTER FOR SCREENING MAMMOGRAM FOR MALIGNANT NEOPLASM OF BREAST: Primary | ICD-10-CM

## 2021-04-20 DIAGNOSIS — R92.8 ABNORMAL FINDINGS ON DIAGNOSTIC IMAGING OF BREAST: ICD-10-CM

## 2021-04-20 PROCEDURE — 76642 ULTRASOUND BREAST LIMITED: CPT

## 2021-04-20 PROCEDURE — G0279 TOMOSYNTHESIS, MAMMO: HCPCS

## 2021-04-20 PROCEDURE — 77066 DX MAMMO INCL CAD BI: CPT

## 2021-04-20 NOTE — TELEPHONE ENCOUNTER
notified patient and scheduled annual mammo back at [place] on THursday [email or web address]  Requested patient to return call to schedule her physical

## 2021-04-20 NOTE — TELEPHONE ENCOUNTER
----- Message from Eugenio Conrad MD sent at 4/20/2021 10:40 AM EDT -----  Patient is due for Physical exam on 06/04/21 to schedule  Mammogram is normal

## 2021-04-23 ENCOUNTER — IMMUNIZATIONS (OUTPATIENT)
Dept: FAMILY MEDICINE CLINIC | Facility: HOSPITAL | Age: 41
End: 2021-04-23

## 2021-04-23 DIAGNOSIS — Z23 ENCOUNTER FOR IMMUNIZATION: Primary | ICD-10-CM

## 2021-04-23 PROCEDURE — 0011A SARS-COV-2 / COVID-19 MRNA VACCINE (MODERNA) 100 MCG: CPT

## 2021-04-23 PROCEDURE — 91301 SARS-COV-2 / COVID-19 MRNA VACCINE (MODERNA) 100 MCG: CPT

## 2021-05-14 DIAGNOSIS — M79.7 PRIMARY FIBROMYALGIA SYNDROME: ICD-10-CM

## 2021-05-15 ENCOUNTER — APPOINTMENT (OUTPATIENT)
Dept: LAB | Facility: HOSPITAL | Age: 41
End: 2021-05-15
Payer: COMMERCIAL

## 2021-05-15 DIAGNOSIS — R77.8 ELEVATED TOTAL PROTEIN: ICD-10-CM

## 2021-05-15 DIAGNOSIS — D50.0 IRON DEFICIENCY ANEMIA DUE TO CHRONIC BLOOD LOSS: ICD-10-CM

## 2021-05-15 LAB
ALBUMIN SERPL BCP-MCNC: 3.8 G/DL (ref 3.5–5)
ALP SERPL-CCNC: 86 U/L (ref 46–116)
ALT SERPL W P-5'-P-CCNC: 26 U/L (ref 12–78)
ANION GAP SERPL CALCULATED.3IONS-SCNC: 8 MMOL/L (ref 4–13)
AST SERPL W P-5'-P-CCNC: 15 U/L (ref 5–45)
BASOPHILS # BLD AUTO: 0.03 THOUSANDS/ΜL (ref 0–0.1)
BASOPHILS NFR BLD AUTO: 1 % (ref 0–1)
BILIRUB SERPL-MCNC: 0.27 MG/DL (ref 0.2–1)
BUN SERPL-MCNC: 8 MG/DL (ref 5–25)
CALCIUM SERPL-MCNC: 9.3 MG/DL (ref 8.3–10.1)
CHLORIDE SERPL-SCNC: 105 MMOL/L (ref 100–108)
CO2 SERPL-SCNC: 29 MMOL/L (ref 21–32)
CREAT SERPL-MCNC: 0.77 MG/DL (ref 0.6–1.3)
CRP SERPL QL: 3.9 MG/L
EOSINOPHIL # BLD AUTO: 0.13 THOUSAND/ΜL (ref 0–0.61)
EOSINOPHIL NFR BLD AUTO: 3 % (ref 0–6)
ERYTHROCYTE [DISTWIDTH] IN BLOOD BY AUTOMATED COUNT: 12.6 % (ref 11.6–15.1)
ERYTHROCYTE [SEDIMENTATION RATE] IN BLOOD: 17 MM/HOUR (ref 0–19)
FERRITIN SERPL-MCNC: 43 NG/ML (ref 8–388)
GFR SERPL CREATININE-BSD FRML MDRD: 95 ML/MIN/1.73SQ M
GLUCOSE P FAST SERPL-MCNC: 105 MG/DL (ref 65–99)
HCT VFR BLD AUTO: 37.2 % (ref 34.8–46.1)
HGB BLD-MCNC: 12.1 G/DL (ref 11.5–15.4)
IGA SERPL-MCNC: 172 MG/DL (ref 70–400)
IGG SERPL-MCNC: 1480 MG/DL (ref 700–1600)
IGM SERPL-MCNC: 88 MG/DL (ref 40–230)
IMM GRANULOCYTES # BLD AUTO: 0.03 THOUSAND/UL (ref 0–0.2)
IMM GRANULOCYTES NFR BLD AUTO: 1 % (ref 0–2)
IRON SATN MFR SERPL: 20 %
IRON SERPL-MCNC: 74 UG/DL (ref 50–170)
LYMPHOCYTES # BLD AUTO: 1.49 THOUSANDS/ΜL (ref 0.6–4.47)
LYMPHOCYTES NFR BLD AUTO: 28 % (ref 14–44)
MCH RBC QN AUTO: 29.8 PG (ref 26.8–34.3)
MCHC RBC AUTO-ENTMCNC: 32.5 G/DL (ref 31.4–37.4)
MCV RBC AUTO: 92 FL (ref 82–98)
MONOCYTES # BLD AUTO: 0.5 THOUSAND/ΜL (ref 0.17–1.22)
MONOCYTES NFR BLD AUTO: 10 % (ref 4–12)
NEUTROPHILS # BLD AUTO: 3.1 THOUSANDS/ΜL (ref 1.85–7.62)
NEUTS SEG NFR BLD AUTO: 57 % (ref 43–75)
NRBC BLD AUTO-RTO: 0 /100 WBCS
PLATELET # BLD AUTO: 382 THOUSANDS/UL (ref 149–390)
PMV BLD AUTO: 9.4 FL (ref 8.9–12.7)
POTASSIUM SERPL-SCNC: 4.3 MMOL/L (ref 3.5–5.3)
PROT SERPL-MCNC: 8 G/DL (ref 6.4–8.2)
RBC # BLD AUTO: 4.06 MILLION/UL (ref 3.81–5.12)
SODIUM SERPL-SCNC: 142 MMOL/L (ref 136–145)
TIBC SERPL-MCNC: 366 UG/DL (ref 250–450)
VIT B12 SERPL-MCNC: 523 PG/ML (ref 100–900)
WBC # BLD AUTO: 5.28 THOUSAND/UL (ref 4.31–10.16)

## 2021-05-15 PROCEDURE — 36415 COLL VENOUS BLD VENIPUNCTURE: CPT

## 2021-05-15 PROCEDURE — 80053 COMPREHEN METABOLIC PANEL: CPT

## 2021-05-15 PROCEDURE — 86140 C-REACTIVE PROTEIN: CPT

## 2021-05-15 PROCEDURE — 84165 PROTEIN E-PHORESIS SERUM: CPT

## 2021-05-15 PROCEDURE — 83550 IRON BINDING TEST: CPT

## 2021-05-15 PROCEDURE — 82728 ASSAY OF FERRITIN: CPT

## 2021-05-15 PROCEDURE — 82607 VITAMIN B-12: CPT

## 2021-05-15 PROCEDURE — 85025 COMPLETE CBC W/AUTO DIFF WBC: CPT

## 2021-05-15 PROCEDURE — 82784 ASSAY IGA/IGD/IGG/IGM EACH: CPT

## 2021-05-15 PROCEDURE — 83883 ASSAY NEPHELOMETRY NOT SPEC: CPT

## 2021-05-15 PROCEDURE — 84165 PROTEIN E-PHORESIS SERUM: CPT | Performed by: PATHOLOGY

## 2021-05-15 PROCEDURE — 85652 RBC SED RATE AUTOMATED: CPT

## 2021-05-15 PROCEDURE — 83540 ASSAY OF IRON: CPT

## 2021-05-17 LAB
ALBUMIN SERPL ELPH-MCNC: 4.3 G/DL (ref 3.5–5)
ALBUMIN SERPL ELPH-MCNC: 55.9 % (ref 52–65)
ALPHA1 GLOB SERPL ELPH-MCNC: 0.29 G/DL (ref 0.1–0.4)
ALPHA1 GLOB SERPL ELPH-MCNC: 3.8 % (ref 2.5–5)
ALPHA2 GLOB SERPL ELPH-MCNC: 0.73 G/DL (ref 0.4–1.2)
ALPHA2 GLOB SERPL ELPH-MCNC: 9.5 % (ref 7–13)
BETA GLOB ABNORMAL SERPL ELPH-MCNC: 0.52 G/DL (ref 0.4–0.8)
BETA1 GLOB SERPL ELPH-MCNC: 6.8 % (ref 5–13)
BETA2 GLOB SERPL ELPH-MCNC: 5.4 % (ref 2–8)
BETA2+GAMMA GLOB SERPL ELPH-MCNC: 0.42 G/DL (ref 0.2–0.5)
GAMMA GLOB ABNORMAL SERPL ELPH-MCNC: 1.43 G/DL (ref 0.5–1.6)
GAMMA GLOB SERPL ELPH-MCNC: 18.6 % (ref 12–22)
IGG/ALB SER: 1.27 {RATIO} (ref 1.1–1.8)
PROT PATTERN SERPL ELPH-IMP: NORMAL
PROT SERPL-MCNC: 7.7 G/DL (ref 6.4–8.2)

## 2021-05-17 RX ORDER — MILNACIPRAN HYDROCHLORIDE 50 MG/1
TABLET, FILM COATED ORAL
Qty: 60 TABLET | Refills: 0 | Status: SHIPPED | OUTPATIENT
Start: 2021-05-17 | End: 2021-06-28

## 2021-05-18 LAB
KAPPA LC FREE SER-MCNC: 16.3 MG/L (ref 3.3–19.4)
KAPPA LC FREE/LAMBDA FREE SER: 1.11 {RATIO} (ref 0.26–1.65)
LAMBDA LC FREE SERPL-MCNC: 14.7 MG/L (ref 5.7–26.3)

## 2021-05-21 ENCOUNTER — IMMUNIZATIONS (OUTPATIENT)
Dept: FAMILY MEDICINE CLINIC | Facility: HOSPITAL | Age: 41
End: 2021-05-21

## 2021-05-21 DIAGNOSIS — Z23 ENCOUNTER FOR IMMUNIZATION: Primary | ICD-10-CM

## 2021-05-21 PROCEDURE — 0012A SARS-COV-2 / COVID-19 MRNA VACCINE (MODERNA) 100 MCG: CPT

## 2021-05-21 PROCEDURE — 91301 SARS-COV-2 / COVID-19 MRNA VACCINE (MODERNA) 100 MCG: CPT

## 2021-06-04 ENCOUNTER — OFFICE VISIT (OUTPATIENT)
Dept: HEMATOLOGY ONCOLOGY | Facility: CLINIC | Age: 41
End: 2021-06-04
Payer: COMMERCIAL

## 2021-06-04 VITALS
HEART RATE: 75 BPM | OXYGEN SATURATION: 98 % | DIASTOLIC BLOOD PRESSURE: 74 MMHG | RESPIRATION RATE: 16 BRPM | BODY MASS INDEX: 40.82 KG/M2 | HEIGHT: 65 IN | WEIGHT: 245 LBS | SYSTOLIC BLOOD PRESSURE: 108 MMHG | TEMPERATURE: 99.2 F

## 2021-06-04 DIAGNOSIS — D50.8 IRON DEFICIENCY ANEMIA SECONDARY TO INADEQUATE DIETARY IRON INTAKE: Primary | ICD-10-CM

## 2021-06-04 DIAGNOSIS — R77.8 ELEVATED TOTAL PROTEIN: ICD-10-CM

## 2021-06-04 PROCEDURE — 1036F TOBACCO NON-USER: CPT | Performed by: NURSE PRACTITIONER

## 2021-06-04 PROCEDURE — 3008F BODY MASS INDEX DOCD: CPT | Performed by: NURSE PRACTITIONER

## 2021-06-04 PROCEDURE — 99214 OFFICE O/P EST MOD 30 MIN: CPT | Performed by: NURSE PRACTITIONER

## 2021-06-04 NOTE — PROGRESS NOTES
Hematology/Oncology Outpatient Follow-up  Earlene Castrejon 35 y o  female 1980 [id number]    Date:  6/4/2021      Assessment and Plan:  1  Iron deficiency anemia secondary to inadequate dietary iron intake  The patient does not seem to be anemic or iron deficient at this point in time  We will continue to monitor her and her laboratory studies on a regular basis since her ferritin is in the low-normal range 43 and she has history of gastroparesis  She is no longer having menorrhagia after her ablation  She will be back for follow-up again in 6 months with repeat labs prior     - CBC and differential; Future  - Comprehensive metabolic panel; Future  - C-reactive protein; Future  - Sedimentation rate, automated; Future  - Vitamin B12; Future  - Iron Panel (Includes Ferritin, Iron Sat%, Iron, and TIBC); Future  - Ferritin; Future    2  Elevated total protein  Resolved  Additional workup did not reveal any monoclonal gammopathy        HPI:  Earlene Castrejon is a 35 y o  female with a history of gastritis, possible gastroparesis and chronic anemia due to iron deficiency which was treated with iron IV on multiple occasions since 2016    She states that her menses have been heavy due to fibroids; typically lasting 7 days   She has established GI care and had upper and lower endoscopy done in July 2019  The colonoscopy showed large external and internal hemorrhoids and 1 polyp that was removed otherwise normal upper endoscopy was normal   She had a gastric emptying study done recently on 09/24/2019 which confirmed that she does in fact have gastroparesis      She stated that she was found to have hepatomegaly just recently on an ultrasound May202   She also had an MRI of the abdomen on 07/16/2020 which showed hepatomegaly measuring 21 cm in greatest dimension   No other pathology was found   She had uterine ablation August 2020 which significantly improved her menstrual bleeding        Interval history:  Patient presents today for a follow-up visit  She states that she is doing well and has no complaints  Has been trying to be more active and walk more  States that the abdominal pain she was experiencing on the right side has just about resolved  She denies bleeding from any site other than her menstrual cycles which are very light since her ablation  Her most recent laboratory studies from 05/15/2021 showed normal CBC hemoglobin 12  1  glucose 105 otherwise normal CMP; her total protein is no longer elevated  Her C-reactive protein is again elevated 3 9 with normal sed rate  B12 is appropriate  She had additional laboratory studies done due to elevated total protein which did not reveal any hint of monoclonal gammopathy  B12 523 per  Her iron panel revealed iron saturation 20 percent with a ferritin of 43  ROS: Review of Systems   Constitutional: Negative for activity change, appetite change, chills, fatigue, fever and unexpected weight change  HENT: Negative for congestion, mouth sores, nosebleeds, sore throat and trouble swallowing  Eyes: Negative  Respiratory: Negative for cough, chest tightness and shortness of breath  Cardiovascular: Negative for chest pain, palpitations and leg swelling  Gastrointestinal: Negative for abdominal distention, abdominal pain, blood in stool, constipation, diarrhea, nausea and vomiting  Genitourinary: Negative for difficulty urinating, dysuria, frequency, hematuria and urgency  Musculoskeletal: Negative for arthralgias, back pain, gait problem, joint swelling and myalgias  Skin: Negative for color change, pallor and rash  Neurological: Negative for dizziness, weakness, light-headedness, numbness and headaches  Hematological: Negative for adenopathy  Does not bruise/bleed easily  Psychiatric/Behavioral: Negative for dysphoric mood and sleep disturbance  The patient is not nervous/anxious          Past Medical History:   Diagnosis Date    Acute non-recurrent maxillary sinusitis 1/27/2020    Anemia     Anxiety 10/16/2019    Candida vaginitis 4/25/2019    Colon polyp 2019    Discoloration of skin of hand 3/20/2019    Duodenitis without bleeding     Dysphagia     Fibromyalgia     Gastritis     GERD (gastroesophageal reflux disease)     Hiatal hernia     Hx of colonoscopy     Insomnia     Iron deficiency anemia secondary to inadequate dietary iron intake 10/18/2019    Memory loss     Menorrhagia with regular cycle 11/20/2014    Moderate episode of recurrent major depressive disorder ([address] ) 10/16/2019    Obesity     Oral herpes     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 myelopathy or radiculopathy     Streptococcal pharyngitis 10/19/2020    Vaginal discharge 9/1/2018    Vitamin D deficiency        Past Surgical History:   Procedure Laterality Date    APPENDECTOMY      BREAST BIOPSY Right     pt unsure when or where- ? 6 yrs ago- benign    BREAST SURGERY Right 01/08/2015    lump removed from breast    [facility] COLONOSCOPY      9/26/2012; 2019-benign polyp, repeat 5 years    DILATION AND CURETTAGE OF UTERUS      Resolved:1/29/2009    ENDOMETRIAL ABLATION N/A 8/13/2020    Procedure: ABLATION ENDOMETRIAL Luisanda Mannford;  Surgeon: Roosevelt Hobson MD;  [facility];  Service: Gynecology    ESOPHAGOGASTRODUODENOSCOPY      Diagnostic ; 9/26/2012    HYSTEROSCOPY      Resolved:1/30/2009    OVARIAN CYST REMOVAL Right 2005    [place] HYSTEROSCOPY,W/ENDO BX N/A 8/13/2020    Procedure: DILATATION AND CURETTAGE (D&C) WITH HYSTEROSCOPY;  Surgeon: Roosevelt Hobson MD;  [facility];  Service: Gynecology    WISDOM TOOTH EXTRACTION         Social History     Socioeconomic History    Marital status: /Civil Union     Spouse name: Abbie Calvinphrey Number of children: 4    Years of education: high school    Highest education level: None   Occupational History    Occupation: cash    Social Needs    Financial resource strain: Not very hard    Food insecurity     Worry: Never true     Inability: Never true    Transportation needs     Medical: No     Non-medical: No   Tobacco Use    Smoking status: Never Smoker    Smokeless tobacco: Never Used    Tobacco comment: No passive smoke exposure   Substance and Sexual Activity    Alcohol use: No     Frequency: Never     Binge frequency: Never    Drug use: No    Sexual activity: Yes     Partners: Male     Birth control/protection: Male Sterilization     Comment: life time partners:1   Lifestyle    Physical activity     Days per week: 0 days     Minutes per session: 0 min    Stress:  To some extent   Relationships    Social connections     Talks on phone: More than three times a week     Gets together: More than three times a week     Attends Hinduism service: More than 4 times per year     Active member of club or organization: Yes     Attends meetings of clubs or organizations: More than 4 times per year     Relationship status:     Intimate partner violence     Fear of current or ex partner: No     Emotionally abused: No     Physically abused: No     Forced sexual activity: No   Other Topics Concern    None   Social History Narrative    [employer]    Accepts blood products            Exercise: 0x/week    Calcium: 1 c almond milk 3-4x/week; 1 cheese daily, 1 yogurt daily, multivitamin for women,        Family History   Problem Relation Age of Onset    Other Mother         uterine leiomyoma    Diabetes type II Mother         Mellitus    Hypothyroidism Mother     Colon cancer Father 54        Stage IV    Diabetes Father         Type II Mellitus    Hypertension Father     Anxiety disorder Brother     Depression Brother     Diabetes Brother         Mellitus    Asthma Brother     Hypertension Brother     Multiple sclerosis Sister     Asthma Sister     Hypothyroidism Sister     Hypertension Maternal Grandmother     Diabetes Maternal Grandmother     Other Maternal Grandmother         Vulvar cancer    Schizophrenia Maternal Grandfather     Hypertension Maternal Grandfather     Thyroid cancer Paternal Grandmother 80    Diabetes Paternal Grandfather 43         due to complications of DM    Breast cancer Cousin 46    Ovarian cancer Neg Hx        Allergies   Allergen Reactions    Morphine Chest Pain and Hives     Other reaction(s): chest pain    Percocet [Oxycodone-Acetaminophen] Hives    Domperidone     Ibuprofen      Due to gastritis         Current Outpatient Medications:     buPROPion (WELLBUTRIN XL) 150 mg 24 hr tablet, TAKE 1 TABLET BY MOUTH EVERY DAY, Disp: 90 tablet, Rfl: 1    clindamycin (CLINDAGEL) 1 % gel, Apply topically 2 (two) times a day, Disp: 30 g, Rfl: 2    Dexilant 60 MG capsule, TAKE 1 CAPSULE (60 MG TOTAL) BY MOUTH EVERY 12 (TWELVE) HOURS, Disp: 180 capsule, Rfl: 0    fluticasone (FLONASE) 50 mcg/act nasal spray, SPRAY 2 SPRAYS INTO EACH NOSTRIL EVERY DAY, Disp: 48 mL, Rfl: 1    Savella 50 MG TABS, TAKE 1 TABLET BY MOUTH TWICE A DAY, Disp: 60 tablet, Rfl: 0    ergocalciferol (VITAMIN D2) 50,000 units, TAKE 1 CAPSULE BY MOUTH ONE TIME PER WEEK, Disp: , Rfl:     hyoscyamine (LEVSIN/SL) 0 125 mg SL tablet, Take 1 tablet (0 125 mg total) by mouth every 6 (six) hours as needed for cramping (Patient not taking: Reported on 2021), Disp: 180 tablet, Rfl: 4    magnesium 30 MG tablet, Take 30 mg by mouth daily , Disp: , Rfl:     Omega-3 Fatty Acids (fish oil) 1,000 mg, Take 1,000 mg by mouth daily, Disp: , Rfl:     valACYclovir (VALTREX) 1,000 mg tablet, Take 2 tablets (2,000 mg total) by mouth 2 (two) times a day for 1 day (Patient not taking: Reported on 2020), Disp: 4 tablet, Rfl: 0      Physical Exam:  /74 (BP Location: Left arm, Patient Position: Sitting, Cuff Size: Large)   Pulse 75   Temp 99 2 °F (37 3 °C) (Tympanic)   Resp 16   Ht 5' 5" (1 651 m)   Wt 111 kg (245 lb)   SpO2 98%   BMI 40 77 kg/m² Physical Exam  Vitals signs reviewed  Constitutional:       General: She is not in acute distress  Appearance: She is well-developed  She is not diaphoretic  HENT:      Head: Normocephalic and atraumatic  Mouth/Throat:      Pharynx: No oropharyngeal exudate  Eyes:      General: No scleral icterus  Conjunctiva/sclera: Conjunctivae normal       Pupils: Pupils are equal, round, and reactive to light  Neck:      Musculoskeletal: Normal range of motion and neck supple  Thyroid: No thyromegaly  Cardiovascular:      Rate and Rhythm: Normal rate and regular rhythm  Heart sounds: Normal heart sounds  No murmur  Pulmonary:      Effort: Pulmonary effort is normal  No respiratory distress  Breath sounds: Normal breath sounds  Abdominal:      General: Bowel sounds are normal  There is no distension  Palpations: Abdomen is soft  Tenderness: There is no abdominal tenderness  Comments: Obese   Musculoskeletal: Normal range of motion  Lymphadenopathy:      Cervical: No cervical adenopathy  Skin:     General: Skin is warm and dry  Coloration: Skin is pale  Findings: No erythema or rash  Neurological:      Mental Status: She is alert and oriented to person, place, and time  Psychiatric:         Behavior: Behavior normal          Thought Content: Thought content normal          Judgment: Judgment normal            Labs:  Lab Results   Component Value Date    WBC 5 28 05/15/2021    HGB 12 1 05/15/2021    HCT 37 2 05/15/2021    MCV 92 05/15/2021     05/15/2021     Lab Results   Component Value Date    K 4 3 05/15/2021     05/15/2021    CO2 29 05/15/2021    BUN 8 05/15/2021    CREATININE 0 77 05/15/2021    GLUF 105 (H) 05/15/2021    CALCIUM 9 3 05/15/2021    AST 15 05/15/2021    ALT 26 05/15/2021    ALKPHOS 86 05/15/2021    EGFR 95 05/15/2021       Patient voiced understanding and agreement in the above discussion   Aware to contact our office with questions/symptoms in the interim  This note has been generated by voice recognition software system  Therefore, there may be spelling, grammar, and or syntax errors  Please contact if questions arise

## 2021-06-23 ENCOUNTER — HOSPITAL ENCOUNTER (EMERGENCY)
Facility: HOSPITAL | Age: 41
Discharge: HOME | End: 2021-06-23
Attending: EMERGENCY MEDICINE
Payer: COMMERCIAL

## 2021-06-23 ENCOUNTER — APPOINTMENT (EMERGENCY)
Dept: RADIOLOGY | Facility: HOSPITAL | Age: 41
End: 2021-06-23
Payer: COMMERCIAL

## 2021-06-23 VITALS
BODY MASS INDEX: 40.36 KG/M2 | TEMPERATURE: 98 F | OXYGEN SATURATION: 100 % | DIASTOLIC BLOOD PRESSURE: 72 MMHG | RESPIRATION RATE: 18 BRPM | SYSTOLIC BLOOD PRESSURE: 132 MMHG | HEART RATE: 78 BPM | WEIGHT: 242.51 LBS

## 2021-06-23 DIAGNOSIS — J20.9 ACUTE BRONCHITIS: Primary | ICD-10-CM

## 2021-06-23 LAB
ATRIAL RATE: 72 BPM
P AXIS: 70 DEGREES
PR INTERVAL: 148 MS
QRS AXIS: 72 DEGREES
QRSD INTERVAL: 82 MS
QT INTERVAL: 372 MS
QTC INTERVAL: 407 MS
SARS-COV-2 RNA RESP QL NAA+PROBE: NEGATIVE
T WAVE AXIS: 69 DEGREES
VENTRICULAR RATE: 72 BPM

## 2021-06-23 PROCEDURE — 93005 ELECTROCARDIOGRAM TRACING: CPT

## 2021-06-23 PROCEDURE — 99283 EMERGENCY DEPT VISIT LOW MDM: CPT | Performed by: EMERGENCY MEDICINE

## 2021-06-23 PROCEDURE — U0005 INFEC AGEN DETEC AMPLI PROBE: HCPCS | Performed by: EMERGENCY MEDICINE

## 2021-06-23 PROCEDURE — 93010 ELECTROCARDIOGRAM REPORT: CPT

## 2021-06-23 PROCEDURE — U0003 INFECTIOUS AGENT DETECTION BY NUCLEIC ACID (DNA OR RNA); SEVERE ACUTE RESPIRATORY SYNDROME CORONAVIRUS 2 (SARS-COV-2) (CORONAVIRUS DISEASE [COVID-19]), AMPLIFIED PROBE TECHNIQUE, MAKING USE OF HIGH THROUGHPUT TECHNOLOGIES AS DESCRIBED BY CMS-2020-01-R: HCPCS | Performed by: EMERGENCY MEDICINE

## 2021-06-23 PROCEDURE — 71045 X-RAY EXAM CHEST 1 VIEW: CPT

## 2021-06-23 PROCEDURE — 99285 EMERGENCY DEPT VISIT HI MDM: CPT

## 2021-06-23 RX ORDER — ALBUTEROL SULFATE 90 UG/1
2 AEROSOL, METERED RESPIRATORY (INHALATION) ONCE
Status: COMPLETED | OUTPATIENT
Start: 2021-06-23 | End: 2021-06-23

## 2021-06-23 RX ORDER — AZITHROMYCIN 250 MG/1
TABLET, FILM COATED ORAL
Qty: 6 TABLET | Refills: 0 | Status: SHIPPED | OUTPATIENT
Start: 2021-06-23 | End: 2021-06-27

## 2021-06-23 RX ORDER — GUAIFENESIN/DEXTROMETHORPHAN 100-10MG/5
5 SYRUP ORAL 3 TIMES DAILY PRN
Qty: 236 ML | Refills: 0 | Status: SHIPPED | OUTPATIENT
Start: 2021-06-23 | End: 2021-11-18 | Stop reason: ALTCHOICE

## 2021-06-23 RX ADMIN — ALBUTEROL SULFATE 2 PUFF: 90 AEROSOL, METERED RESPIRATORY (INHALATION) at 14:37

## 2021-06-23 NOTE — ED PROVIDER NOTES
History  Chief Complaint   Patient presents with    Shortness of Breath     dry cough with SOB for past 10 days; Pt c/o tightness when she breathes; denies covid contacts    Cough     C/o dry cough and shortness of breath for the past 10 days  At times she has chest tightness when coughing  No known COVID contacts          Prior to Admission Medications   Prescriptions Last Dose Informant Patient Reported? Taking?    Dexilant 60 MG capsule  Self No No   Sig: TAKE 1 CAPSULE (60 MG TOTAL) BY MOUTH EVERY 12 (TWELVE) HOURS   Omega-3 Fatty Acids (fish oil) 1,000 mg  Self Yes No   Sig: Take 1,000 mg by mouth daily   Savella 50 MG TABS  Self No No   Sig: TAKE 1 TABLET BY MOUTH TWICE A DAY   buPROPion (WELLBUTRIN XL) 150 mg 24 hr tablet  Self No No   Sig: TAKE 1 TABLET BY MOUTH EVERY DAY   clindamycin (CLINDAGEL) 1 % gel  Self No No   Sig: Apply topically 2 (two) times a day   ergocalciferol (VITAMIN D2) 50,000 units  Self Yes No   Sig: TAKE 1 CAPSULE BY MOUTH ONE TIME PER WEEK   fluticasone (FLONASE) 50 mcg/act nasal spray  Self No No   Sig: SPRAY 2 SPRAYS INTO EACH NOSTRIL EVERY DAY   hyoscyamine (LEVSIN/SL) 0 125 mg SL tablet   No No   Sig: Take 1 tablet (0 125 mg total) by mouth every 6 (six) hours as needed for cramping   Patient not taking: Reported on 6/4/2021   magnesium 30 MG tablet  Self Yes No   Sig: Take 30 mg by mouth daily    valACYclovir (VALTREX) 1,000 mg tablet   No No   Sig: Take 2 tablets (2,000 mg total) by mouth 2 (two) times a day for 1 day   Patient not taking: Reported on 8/6/2020      Facility-Administered Medications: None       Past Medical History:   Diagnosis Date    Acute non-recurrent maxillary sinusitis 1/27/2020    Anemia     Anxiety 10/16/2019    Candida vaginitis 4/25/2019    Colon polyp 2019    Discoloration of skin of hand 3/20/2019    Duodenitis without bleeding     Dysphagia     Fibromyalgia     Gastritis     GERD (gastroesophageal reflux disease)     Hiatal hernia     Hx of colonoscopy     Insomnia     Iron deficiency anemia secondary to inadequate dietary iron intake 10/18/2019    Memory loss     Menorrhagia with regular cycle 11/20/2014    Moderate episode of recurrent major depressive disorder ([address] ) 10/16/2019    Obesity     Oral herpes     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 myelopathy or radiculopathy     Streptococcal pharyngitis 10/19/2020    Vaginal discharge 9/1/2018    Vitamin D deficiency        Past Surgical History:   Procedure Laterality Date    APPENDECTOMY      BREAST BIOPSY Right     pt unsure when or where- ? 6 yrs ago- benign    BREAST SURGERY Right 01/08/2015    lump removed from breast    [facility] COLONOSCOPY      9/26/2012; 2019-benign polyp, repeat 5 years    DILATION AND CURETTAGE OF UTERUS      Resolved:1/29/2009    ENDOMETRIAL ABLATION N/A 8/13/2020    Procedure: ABLATION ENDOMETRIAL [facility];  Surgeon: Mike Ho MD;  [facility];  Service: Gynecology    ESOPHAGOGASTRODUODENOSCOPY      Diagnostic ; 9/26/2012    HYSTEROSCOPY      Resolved:1/30/2009    OVARIAN CYST REMOVAL Right 2005    [place] HYSTER[place]COPY,W/ENDO BX N/A 8/13/2020    Procedure: DILATATION AND CURETTAGE (D&C) WITH HYSTEROSCOPY;  Surgeon: Mike Ho MD;  [facility];  Service: Gynecology    WISDOM TOOTH EXTRACTION         Family History   Problem Relation Age of Onset    Other Mother         uterine leiomyoma    Diabetes type II Mother         Mellitus    Hypothyroidism Mother     Colon cancer Father 54        Stage IV    Diabetes Father         Type II Mellitus    Hypertension Father     Anxiety disorder Brother     Depression Brother     Diabetes Brother         Mellitus    Asthma Brother     Hypertension Brother     Multiple sclerosis Sister     Asthma Sister     Hypothyroidism Sister     Hypertension Maternal Grandmother     Diabetes Maternal Grandmother     Other Maternal Grandmother Vulvar cancer    Schizophrenia Maternal Grandfather     Hypertension Maternal Grandfather     Thyroid cancer Paternal Grandmother 80    Diabetes Paternal Grandfather 43         due to complications of DM    Breast cancer Cousin 46    Ovarian cancer Neg Hx      I have reviewed and agree with the history as documented  E-Cigarette/Vaping    E-Cigarette Use Never User      E-Cigarette/Vaping Substances    Nicotine No     THC No     CBD No     Flavoring No     Other No     Unknown No      Social History     Tobacco Use    Smoking status: Never Smoker    Smokeless tobacco: Never Used    Tobacco comment: No passive smoke exposure   Vaping Use    Vaping Use: Never used   Substance Use Topics    Alcohol use: No    Drug use: No       Review of Systems   Constitutional: Negative for appetite change, fatigue and fever  HENT: Negative for rhinorrhea and sore throat  Eyes: Negative for pain  Respiratory: Positive for cough, chest tightness and shortness of breath  Cardiovascular: Negative for leg swelling  Gastrointestinal: Negative for abdominal pain, diarrhea and vomiting  Genitourinary: Negative for dysuria and flank pain  Musculoskeletal: Negative for back pain and neck pain  Skin: Negative for rash  Neurological: Negative for syncope and headaches  Psychiatric/Behavioral:        Mood normal       Physical Exam  Physical Exam  Vitals and nursing note reviewed  Constitutional:       Appearance: She is well-developed  HENT:      Head: Normocephalic and atraumatic  Cardiovascular:      Rate and Rhythm: Normal rate and regular rhythm  Pulmonary:      Effort: Pulmonary effort is normal       Breath sounds: Normal breath sounds  Abdominal:      Palpations: Abdomen is soft  Tenderness: There is no abdominal tenderness  Musculoskeletal:         General: Normal range of motion  Cervical back: Normal range of motion and neck supple     Skin:     General: Skin is warm and dry  Neurological:      Mental Status: She is alert and oriented to person, place, and time  Vital Signs  ED Triage Vitals   Temperature Pulse Respirations Blood Pressure SpO2   06/23/21 1405 06/23/21 1405 06/23/21 1405 06/23/21 1405 06/23/21 1405   98 °F (36 7 °C) 92 20 139/78 100 %      Temp Source Heart Rate Source Patient Position - Orthostatic VS BP Location FiO2 (%)   06/23/21 1405 06/23/21 1405 -- 06/23/21 1527 --   Oral Monitor  Right arm       Pain Score       06/23/21 1405       No Pain           Vitals:    06/23/21 1405 06/23/21 1527   BP: 139/78 132/72   Pulse: 92 78         Visual Acuity      ED Medications  Medications   albuterol (PROVENTIL HFA,VENTOLIN HFA) inhaler 2 puff (2 puffs Inhalation Given 6/23/21 1437)       Diagnostic Studies  Results Reviewed     Procedure Component Value Units Date/Time    Novel Coronavirus (Covid-19),PCR [facility] - 24 Hour Routine [[id number]]  (Normal) Collected: 06/23/21 1435    Lab Status: Final result Specimen: Nares from Nasopharyngeal Swab Updated: 06/23/21 1534     SARS-CoV-2 Negative    Narrative: The specimen collection materials, transport medium, and/or testing methodology utilized in the production of these test results have been proven to be reliable in a limited validation with an abbreviated program under the Emergency Utilization Authorization provided by the FDA  Testing reported as "Presumptive positive" will be confirmed with secondary testing to ensure result accuracy  Clinical caution and judgement should be used with the interpretation of these results with consideration of the clinical impression and other laboratory testing  Testing reported as "Positive" or "Negative" has been proven to be accurate according to standard laboratory validation requirements  All testing is performed with control materials showing appropriate reactivity at standard intervals                     XR chest 1 view portable   Final Result by Varinder Diaz MD (06/23 3586)      No acute cardiopulmonary disease  Workstation performed: [id number]                    Procedures  Procedures         ED Course                             SBIRT 20yo+      Most Recent Value   SBIRT (22 yo +)   In order to provide better care to our patients, we are screening all of our patients for alcohol and drug use  Would it be okay to ask you these screening questions? No Filed at: 06/23/2021 1440                    MDM  Number of Diagnoses or Management Options     Amount and/or Complexity of Data Reviewed  Tests in the radiology section of CPT®: ordered and reviewed    Risk of Complications, Morbidity, and/or Mortality  Presenting problems: moderate  General comments: Patient felt better in the ER  Disposition  Final diagnoses:   Acute bronchitis     Time reflects when diagnosis was documented in both MDM as applicable and the Disposition within this note     Time User Action Codes Description Comment    6/23/2021  3:16 PM Tiffanie Toribio [J20 9] Acute bronchitis       ED Disposition     ED Disposition Condition Date/Time Comment    Discharge Stable Wed Jun 23, 2021  3:16 PM Branden Delacruz discharge to home/self care  Follow-up Information     Follow up With Specialties Details Why [address] [facility]   [address]    [address]            Discharge Medication List as of 6/23/2021  3:18 PM      START taking these medications    Details   azithromycin (ZITHROMAX) 250 mg tablet Take 2 tablets today then 1 tablet daily x 4 days, Normal      dextromethorphan-guaiFENesin (ROBITUSSIN DM)  mg/5 mL syrup Take 5 mL by mouth 3 (three) times a day as needed for cough, Starting Wed 6/23/2021, Normal         CONTINUE these medications which have NOT CHANGED    Details   buPROPion (WELLBUTRIN XL) 150 mg 24 hr tablet TAKE 1 TABLET BY MOUTH EVERY DAY, Normal clindamycin (CLINDAGEL) 1 % gel Apply topically 2 (two) times a day, Starting Thu 6/4/2020, Normal      Dexilant 60 MG capsule TAKE 1 CAPSULE (60 MG TOTAL) BY MOUTH EVERY 12 (TWELVE) HOURS, Normal      ergocalciferol (VITAMIN D2) 50,000 units TAKE 1 CAPSULE BY MOUTH ONE TIME PER WEEK, Historical Med      fluticasone (FLONASE) 50 mcg/act nasal spray SPRAY 2 SPRAYS INTO EACH NOSTRIL EVERY DAY, Normal      hyoscyamine (LEVSIN/SL) 0 125 mg SL tablet Take 1 tablet (0 125 mg total) by mouth every 6 (six) hours as needed for cramping, Starting Fri 12/18/2020, Until Thu 3/18/2021, Normal      magnesium 30 MG tablet Take 30 mg by mouth daily , Historical Med      Omega-3 Fatty Acids (fish oil) 1,000 mg Take 1,000 mg by mouth daily, Historical Med      Savella 50 MG TABS TAKE 1 TABLET BY MOUTH TWICE A DAY, Normal      valACYclovir (VALTREX) 1,000 mg tablet Take 2 tablets (2,000 mg total) by mouth 2 (two) times a day for 1 day, Starting Mon 12/9/2019, Until Thu 8/6/2020, Normal           No discharge procedures on file      PDMP Review     None          ED Provider  Electronically Signed by           Steffen Don MD  06/24/21 4100

## 2021-06-24 ENCOUNTER — TELEPHONE (OUTPATIENT)
Dept: FAMILY MEDICINE CLINIC | Facility: CLINIC | Age: 41
End: 2021-06-24

## 2021-06-26 DIAGNOSIS — M79.7 PRIMARY FIBROMYALGIA SYNDROME: ICD-10-CM

## 2021-06-26 DIAGNOSIS — R10.11 RUQ PAIN: ICD-10-CM

## 2021-06-28 RX ORDER — MILNACIPRAN HYDROCHLORIDE 50 MG/1
TABLET, FILM COATED ORAL
Qty: 60 TABLET | Refills: 0 | Status: SHIPPED | OUTPATIENT
Start: 2021-06-28

## 2021-06-29 RX ORDER — POTASSIUM PHOSPHATE, MONOBASIC 500 MG/1
TABLET, SOLUBLE ORAL
Qty: 120 TABLET | Refills: 1 | OUTPATIENT
Start: 2021-06-29

## 2021-07-13 ENCOUNTER — TELEPHONE (OUTPATIENT)
Dept: FAMILY MEDICINE CLINIC | Facility: CLINIC | Age: 41
End: 2021-07-13

## 2021-07-13 NOTE — TELEPHONE ENCOUNTER
spoke to patient states she would call back currently  her schedule is busy and is unable to make appointment at this time

## 2021-07-29 ENCOUNTER — OFFICE VISIT (OUTPATIENT)
Dept: PSYCHIATRY | Facility: CLINIC | Age: 41
End: 2021-07-29
Payer: COMMERCIAL

## 2021-07-29 DIAGNOSIS — F32.1 CURRENT MODERATE EPISODE OF MAJOR DEPRESSIVE DISORDER WITHOUT PRIOR EPISODE (HCC): ICD-10-CM

## 2021-07-29 PROCEDURE — 99213 OFFICE O/P EST LOW 20 MIN: CPT | Performed by: PSYCHIATRY & NEUROLOGY

## 2021-07-29 RX ORDER — BUPROPION HYDROCHLORIDE 300 MG/1
300 TABLET ORAL DAILY
Qty: 90 TABLET | Refills: 1 | Status: SHIPPED | OUTPATIENT
Start: 2021-07-29 | End: 2021-11-18 | Stop reason: SDUPTHER

## 2021-07-29 NOTE — PSYCH
MEDICATION MANAGEMENT NOTE        [address]      Name and Date of Birth:  Buddy Diaz 35 y o  1980 MRN: [id number]    Date of Visit: July 29, 2021    Reason for Visit:  Follow-up for medication management  Subjective: The patient reports her mood has been stable and does not feel sad but endorses few neurovegetative symptoms of depression  Reports she feels tired all the time  She had seen her primary care physician yesterday and blood work has been ordered  The patient has a history of fibromyalgia but denies any pain nowadays  The patient also struggles with poor concentration  Denies any hopelessness or suicidal thoughts  Reports sleep has been okay  Denies any concern about appetite  Reports her weight has been stable and has not gained since last visit with me in April this year  Denies any anxiety  Denies any anger outburst or irritability nowadays  Reports compliant with the medication and denies any side effect  Denies any symptoms for serotonin syndrome        Review Of Systems:      Constitutional feeling tired   ENT negative   Cardiovascular negative   Respiratory negative   Gastrointestinal negative   Genitourinary negative   Musculoskeletal negative   Integumentary negative   Neurological negative   Endocrine negative   Other Symptoms none       Alcohol/Substance Abuse:  Denies        Past Medical History:    Past Medical History:   Diagnosis Date    Acute non-recurrent maxillary sinusitis 1/27/2020    Anemia     Anxiety 10/16/2019    Candida vaginitis 4/25/2019    Colon polyp 2019    Discoloration of skin of hand 3/20/2019    Duodenitis without bleeding     Dysphagia     Fibromyalgia     Gastritis     GERD (gastroesophageal reflux disease)     Hiatal hernia     Hx of colonoscopy     Insomnia     Iron deficiency anemia secondary to inadequate dietary iron intake 10/18/2019    Memory loss     Menorrhagia with regular cycle 11/20/2014    Moderate episode of recurrent major depressive disorder ([facility] [address] ) 10/16/2019    Obesity     Oral herpes     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 myelopathy or radiculopathy     Streptococcal pharyngitis 10/19/2020    Vaginal discharge 9/1/2018    Vitamin D deficiency      Past Medical History Pertinent Negatives:   Diagnosis Date Noted    Abnormal Pap smear of cervix 04/19/2018 2/2016-wnl; 11/2020-wnl, HRHPV neg    Allergic 08/23/2018    Clotting disorder ([facility] [address] ) 08/23/2018    [facility] 08/23/2018    Diverticulitis of colon 08/23/2018    Eating disorder 08/23/2018    History of transfusion 08/06/2020    HL (hearing loss) 08/23/2018    Infectious viral hepatitis 08/23/2018    Inflammatory bowel disease 08/23/2018    Osteoporosis 08/23/2018    Otitis media 08/23/2018    Scoliosis 08/23/2018    Shingles 08/23/2018    Substance abuse ([facility] ) 08/23/2018    Urinary tract infection 08/23/2018    Visual impairment 08/23/2018     Past Surgical History:   Procedure Laterality Date    APPENDECTOMY      BREAST BIOPSY Right     pt unsure when or where- ? 6 yrs ago- benign    BREAST SURGERY Right 01/08/2015    lump removed from breast    [facility] COLONOSCOPY      9/26/2012; 2019-benign polyp, repeat 5 years    DILATION AND CURETTAGE OF UTERUS      Resolved:1/29/2009    ENDOMETRIAL ABLATION N/A 8/13/2020    Procedure: ABLATION ENDOMETRIAL Derald Fetch;  Surgeon: Garrick Bolton MD;  Location: [facility];  Service: Gynecology    ESOPHAGOGASTRODUODENOSCOPY      Diagnostic ; 9/26/2012    HYSTEROSCOPY      Resolved:1/30/2009    OVARIAN CYST REMOVAL Right 2005    VA HYSTEROSCOPY,W/ENDO BX N/A 8/13/2020    Procedure: DILATATION AND CURETTAGE (D&C) WITH HYSTEROSCOPY;  Surgeon: Garrick Bolton MD;  Location: [facility];  Service: Gynecology    WISDOM TOOTH EXTRACTION       Allergies   Allergen Reactions    Morphine Chest Pain and Hives     Other reaction(s): chest pain    Percocet [Oxycodone-Acetaminophen] Hives    Domperidone     Ibuprofen      Due to gastritis       Current Medications:       Current Outpatient Medications:     buPROPion (WELLBUTRIN XL) 300 mg 24 hr tablet, Take 1 tablet (300 mg total) by mouth daily, Disp: 90 tablet, Rfl: 1    clindamycin (CLINDAGEL) 1 % gel, Apply topically 2 (two) times a day, Disp: 30 g, Rfl: 2    Dexilant 60 MG capsule, TAKE 1 CAPSULE (60 MG TOTAL) BY MOUTH EVERY 12 (TWELVE) HOURS, Disp: 180 capsule, Rfl: 0    Savella 50 MG TABS, TAKE 1 TABLET BY MOUTH TWICE A DAY, Disp: 60 tablet, Rfl: 0    dextromethorphan-guaiFENesin (ROBITUSSIN DM)  mg/5 mL syrup, Take 5 mL by mouth 3 (three) times a day as needed for cough, Disp: 236 mL, Rfl: 0    ergocalciferol (VITAMIN D2) 50,000 units, TAKE 1 CAPSULE BY MOUTH ONE TIME PER WEEK (Patient not taking: Reported on 7/29/2021), Disp: , Rfl:     fluticasone (FLONASE) 50 mcg/act nasal spray, SPRAY 2 SPRAYS INTO EACH NOSTRIL EVERY DAY (Patient not taking: Reported on 7/29/2021), Disp: 48 mL, Rfl: 1    hyoscyamine (LEVSIN/SL) 0 125 mg SL tablet, TAKE 1 TABLET (0 125 MG TOTAL) BY MOUTH EVERY 6 (SIX) HOURS AS NEEDED FOR CRAMPING, Disp: 360 tablet, Rfl: 2    magnesium 30 MG tablet, Take 30 mg by mouth daily , Disp: , Rfl:     Omega-3 Fatty Acids (fish oil) 1,000 mg, Take 1,000 mg by mouth daily (Patient not taking: Reported on 7/29/2021), Disp: , Rfl:     valACYclovir (VALTREX) 1,000 mg tablet, Take 2 tablets (2,000 mg total) by mouth 2 (two) times a day for 1 day (Patient not taking: Reported on 8/6/2020), Disp: 4 tablet, Rfl: 0       History Review: The following portions of the patient's history were reviewed and updated as appropriate: allergies, current medications, past family history, past medical history, past social history, past surgical history and problem list          OBJECTIVE:     Vital signs in last 24 hours:     There were no vitals filed for this visit     Mental Status Evaluation:    Appearance age appropriate, casually dressed   Behavior cooperative, calm   Speech normal rate, normal volume, normal pitch   Mood euthymic   Affect normal range and intensity, appropriate   Thought Processes organized, goal directed   Associations intact associations   Thought Content no overt delusions   Perceptual Disturbances: no auditory hallucinations, no visual hallucinations   Abnormal Thoughts  Risk Potential Suicidal ideation - None  Homicidal ideation - None  Potential for aggression - No   Orientation oriented to person, place, time/date and situation   Memory recent and remote memory grossly intact   Consciousness alert and awake   Attention Span Concentration Span attention span and concentration are age appropriate   Intellect appears to be of average intelligence   Insight intact   Judgement intact   Muscle Strength and  Gait normal gait and normal balance   Motor activity no abnormal movements   Language no difficulty naming common objects, no difficulty repeating a phrase, no difficulty writing a sentence   Fund of Knowledge adequate knowledge of current events  adequate fund of knowledge regarding past history  adequate fund of knowledge regarding vocabulary    Pain none   Pain Scale 0       Laboratory Results:   Most Recent Labs:   Lab Results   Component Value Date    WBC 5 28 05/15/2021    RBC 4 06 05/15/2021    HGB 12 1 05/15/2021    HCT 37 2 05/15/2021     05/15/2021    RDW 12 6 05/15/2021    NEUTROABS 3 10 05/15/2021    K 4 3 05/15/2021     05/15/2021    CO2 29 05/15/2021    BUN 8 05/15/2021    CREATININE 0 77 05/15/2021    CALCIUM 9 3 05/15/2021    AST 15 05/15/2021    ALT 26 05/15/2021    ALKPHOS 86 05/15/2021    HDL 65 11/20/2020    TRIG 115 11/20/2020    LDLCALC 130 (H) 11/20/2020    AIJ2ODGEPENW 1 910 11/20/2020    HCGQUANT <2 08/27/2018     I have personally reviewed all pertinent laboratory/tests results  Assessment/Plan:   The patient overall has been doing better though still struggles with poor energy and concentration  Plan is to increase the dose of Wellbutrin XL to 300 mg 1 tablet daily for depression and improving energy level and concentration  The patient was educated about her medication including interaction with Shruti Evans and was advised to monitor for any involvement of serotonin syndrome and to call us or visit nearby ER in case of any symptoms for it  She also was educated about the medication including dosage, frequency, alternative, contraindication and advised to call us if there is any concern and to call crisis or visit nearby ER in case of any emergency or having SI  The patient currently denies any SI or HI  Reports her grandkids are the major protective factor  She said I have lot of things to live for  Diagnoses and all orders for this visit:    Current moderate episode of major depressive disorder without prior episode (HCC)  -     buPROPion (WELLBUTRIN XL) 300 mg 24 hr tablet; Take 1 tablet (300 mg total) by mouth daily          Treatment Recommendations/Precautions:      Aware of 24 hour and weekend coverage for urgent situations accessed by calling [facility] main practice number    Risks/Benefits      Risks, Benefits And Possible Side Effects Of Medications:    Risks, benefits, and possible side effects of medications explained to [facility] and she verbalizes understanding and agreement for treatment  Controlled Medication Discussion:     Not applicable    Psychotherapy Provided:     Individual psychotherapy provided: Counseling was provided during the session today for 10 minutes  Medications, treatment progress and treatment plan reviewed with [facility]  Medication education provided to [facility]  Reassurance and supportive therapy provided  Crisis/safety plan discussed with [facility]       Treatment Plan;    Completed and signed during the session: Yes - Treatment Plan done but not signed at time of office visit due to:  Plan reviewed in person and verbal consent given due to COVID social distancing    Yanci Botello MD 07/29/21

## 2021-07-29 NOTE — BH TREATMENT PLAN
TREATMENT PLAN (Medication Management Only)        [facility]    Name and Date of Birth:  Jacquie Paredes 35 y o  1980  Date of Treatment Plan: July 29, 2021  Diagnosis/Diagnoses:    1  Current moderate episode of major depressive disorder without prior episode [facility])      Strengths/Personal Resources for Self-Care: supportive family, taking medications as prescribed  Area/Areas of need (in own words): depression  1  Long Term Goal: Feel better about self  Target Date:3 months - 10/29/2021  Person/Persons responsible for completion of goal: Janie Torre  2  Short Term Objective (s) - How will we reach this goal?:   A  Provider new recommended medication/dosage changes and/or continue medication(s): continue current medications as prescribed  B  N/A   C  N/A  Target Date:3 months - 10/29/2021  Person/Persons Responsible for Completion of Goal: Lianne  Progress Towards Goals: continuing treatment  Treatment Modality: medication management every 3 months  Review due 180 days from date of this plan: 4 months - 11/29/2021  Expected length of service: ongoing treatment  My Physician/PA/NP and I have developed this plan together and I agree to work on the goals and objectives  I understand the treatment goals that were developed for my treatment

## 2021-10-15 ENCOUNTER — TELEPHONE (OUTPATIENT)
Dept: FAMILY MEDICINE CLINIC | Facility: CLINIC | Age: 41
End: 2021-10-15

## 2021-11-02 ENCOUNTER — TELEPHONE (OUTPATIENT)
Dept: FAMILY MEDICINE CLINIC | Facility: CLINIC | Age: 41
End: 2021-11-02

## 2021-11-18 ENCOUNTER — ANNUAL EXAM (OUTPATIENT)
Dept: OBGYN CLINIC | Facility: CLINIC | Age: 41
End: 2021-11-18
Payer: COMMERCIAL

## 2021-11-18 ENCOUNTER — OFFICE VISIT (OUTPATIENT)
Dept: PSYCHIATRY | Facility: CLINIC | Age: 41
End: 2021-11-18
Payer: COMMERCIAL

## 2021-11-18 VITALS
BODY MASS INDEX: 38.32 KG/M2 | DIASTOLIC BLOOD PRESSURE: 64 MMHG | WEIGHT: 230 LBS | SYSTOLIC BLOOD PRESSURE: 112 MMHG | HEIGHT: 65 IN

## 2021-11-18 DIAGNOSIS — Z72.3 INADEQUATE EXERCISE: ICD-10-CM

## 2021-11-18 DIAGNOSIS — E58 DIETARY CALCIUM DEFICIENCY: ICD-10-CM

## 2021-11-18 DIAGNOSIS — Z12.31 VISIT FOR SCREENING MAMMOGRAM: ICD-10-CM

## 2021-11-18 DIAGNOSIS — Z01.419 ENCOUNTER FOR ANNUAL ROUTINE GYNECOLOGICAL EXAMINATION: Primary | ICD-10-CM

## 2021-11-18 DIAGNOSIS — F32.1 CURRENT MODERATE EPISODE OF MAJOR DEPRESSIVE DISORDER WITHOUT PRIOR EPISODE (HCC): ICD-10-CM

## 2021-11-18 PROBLEM — M47.816 SPONDYLOSIS WITHOUT MYELOPATHY OR RADICULOPATHY, LUMBAR REGION: Status: ACTIVE | Noted: 2021-10-14

## 2021-11-18 PROBLEM — E87.1: Status: RESOLVED | Noted: 2020-10-19 | Resolved: 2021-11-18

## 2021-11-18 PROBLEM — M79.18 MYOFASCIAL MUSCLE PAIN: Status: ACTIVE | Noted: 2021-10-14

## 2021-11-18 PROBLEM — Z28.21 IMMUNIZATION REFUSED: Status: RESOLVED | Noted: 2020-10-19 | Resolved: 2021-11-18

## 2021-11-18 PROCEDURE — S0612 ANNUAL GYNECOLOGICAL EXAMINA: HCPCS | Performed by: OBSTETRICS & GYNECOLOGY

## 2021-11-18 PROCEDURE — 3008F BODY MASS INDEX DOCD: CPT | Performed by: PSYCHIATRY & NEUROLOGY

## 2021-11-18 PROCEDURE — 99213 OFFICE O/P EST LOW 20 MIN: CPT | Performed by: PSYCHIATRY & NEUROLOGY

## 2021-11-18 PROCEDURE — 1036F TOBACCO NON-USER: CPT | Performed by: PSYCHIATRY & NEUROLOGY

## 2021-11-18 RX ORDER — BUPROPION HYDROCHLORIDE 300 MG/1
300 TABLET ORAL DAILY
Qty: 90 TABLET | Refills: 1 | Status: SHIPPED | OUTPATIENT
Start: 2021-11-18 | End: 2022-05-18 | Stop reason: SDUPTHER

## 2021-11-29 ENCOUNTER — TELEPHONE (OUTPATIENT)
Dept: FAMILY MEDICINE CLINIC | Facility: CLINIC | Age: 41
End: 2021-11-29

## 2021-12-03 ENCOUNTER — TELEPHONE (OUTPATIENT)
Dept: HEMATOLOGY ONCOLOGY | Facility: CLINIC | Age: 41
End: 2021-12-03

## 2021-12-04 ENCOUNTER — APPOINTMENT (OUTPATIENT)
Dept: LAB | Facility: HOSPITAL | Age: 41
End: 2021-12-04
Payer: COMMERCIAL

## 2021-12-04 DIAGNOSIS — D50.8 IRON DEFICIENCY ANEMIA SECONDARY TO INADEQUATE DIETARY IRON INTAKE: ICD-10-CM

## 2021-12-04 LAB
ALBUMIN SERPL BCP-MCNC: 3.8 G/DL (ref 3.5–5)
ALP SERPL-CCNC: 87 U/L (ref 46–116)
ALT SERPL W P-5'-P-CCNC: 32 U/L (ref 12–78)
ANION GAP SERPL CALCULATED.3IONS-SCNC: 9 MMOL/L (ref 4–13)
AST SERPL W P-5'-P-CCNC: 13 U/L (ref 5–45)
BASOPHILS # BLD AUTO: 0.03 THOUSANDS/ΜL (ref 0–0.1)
BASOPHILS NFR BLD AUTO: 1 % (ref 0–1)
BILIRUB SERPL-MCNC: 0.28 MG/DL (ref 0.2–1)
BUN SERPL-MCNC: 8 MG/DL (ref 5–25)
CALCIUM SERPL-MCNC: 8.9 MG/DL (ref 8.3–10.1)
CHLORIDE SERPL-SCNC: 104 MMOL/L (ref 100–108)
CO2 SERPL-SCNC: 27 MMOL/L (ref 21–32)
CREAT SERPL-MCNC: 0.88 MG/DL (ref 0.6–1.3)
CRP SERPL QL: <3 MG/L
EOSINOPHIL # BLD AUTO: 0.12 THOUSAND/ΜL (ref 0–0.61)
EOSINOPHIL NFR BLD AUTO: 2 % (ref 0–6)
ERYTHROCYTE [DISTWIDTH] IN BLOOD BY AUTOMATED COUNT: 12.7 % (ref 11.6–15.1)
ERYTHROCYTE [SEDIMENTATION RATE] IN BLOOD: 13 MM/HOUR (ref 0–19)
FERRITIN SERPL-MCNC: 47 NG/ML (ref 8–388)
GFR SERPL CREATININE-BSD FRML MDRD: 81 ML/MIN/1.73SQ M
GLUCOSE P FAST SERPL-MCNC: 98 MG/DL (ref 65–99)
HCT VFR BLD AUTO: 38.7 % (ref 34.8–46.1)
HGB BLD-MCNC: 12.2 G/DL (ref 11.5–15.4)
IMM GRANULOCYTES # BLD AUTO: 0.02 THOUSAND/UL (ref 0–0.2)
IMM GRANULOCYTES NFR BLD AUTO: 0 % (ref 0–2)
IRON SATN MFR SERPL: 22 % (ref 15–50)
IRON SERPL-MCNC: 81 UG/DL (ref 50–170)
LYMPHOCYTES # BLD AUTO: 1.76 THOUSANDS/ΜL (ref 0.6–4.47)
LYMPHOCYTES NFR BLD AUTO: 29 % (ref 14–44)
MCH RBC QN AUTO: 28.7 PG (ref 26.8–34.3)
MCHC RBC AUTO-ENTMCNC: 31.5 G/DL (ref 31.4–37.4)
MCV RBC AUTO: 91 FL (ref 82–98)
MONOCYTES # BLD AUTO: 0.6 THOUSAND/ΜL (ref 0.17–1.22)
MONOCYTES NFR BLD AUTO: 10 % (ref 4–12)
NEUTROPHILS # BLD AUTO: 3.5 THOUSANDS/ΜL (ref 1.85–7.62)
NEUTS SEG NFR BLD AUTO: 58 % (ref 43–75)
NRBC BLD AUTO-RTO: 0 /100 WBCS
PLATELET # BLD AUTO: 397 THOUSANDS/UL (ref 149–390)
PMV BLD AUTO: 9.3 FL (ref 8.9–12.7)
POTASSIUM SERPL-SCNC: 4.4 MMOL/L (ref 3.5–5.3)
PROT SERPL-MCNC: 8 G/DL (ref 6.4–8.2)
RBC # BLD AUTO: 4.25 MILLION/UL (ref 3.81–5.12)
SODIUM SERPL-SCNC: 140 MMOL/L (ref 136–145)
TIBC SERPL-MCNC: 372 UG/DL (ref 250–450)
VIT B12 SERPL-MCNC: 428 PG/ML (ref 100–900)
WBC # BLD AUTO: 6.03 THOUSAND/UL (ref 4.31–10.16)

## 2021-12-04 PROCEDURE — 82728 ASSAY OF FERRITIN: CPT

## 2021-12-04 PROCEDURE — 85652 RBC SED RATE AUTOMATED: CPT

## 2021-12-04 PROCEDURE — 86140 C-REACTIVE PROTEIN: CPT

## 2021-12-04 PROCEDURE — 83550 IRON BINDING TEST: CPT

## 2021-12-04 PROCEDURE — 80053 COMPREHEN METABOLIC PANEL: CPT

## 2021-12-04 PROCEDURE — 82607 VITAMIN B-12: CPT

## 2021-12-04 PROCEDURE — 83540 ASSAY OF IRON: CPT

## 2021-12-04 PROCEDURE — 36415 COLL VENOUS BLD VENIPUNCTURE: CPT

## 2021-12-04 PROCEDURE — 85025 COMPLETE CBC W/AUTO DIFF WBC: CPT

## 2021-12-08 ENCOUNTER — OFFICE VISIT (OUTPATIENT)
Dept: HEMATOLOGY ONCOLOGY | Facility: CLINIC | Age: 41
End: 2021-12-08
Payer: COMMERCIAL

## 2021-12-08 VITALS
OXYGEN SATURATION: 99 % | SYSTOLIC BLOOD PRESSURE: 122 MMHG | HEART RATE: 81 BPM | HEIGHT: 65 IN | TEMPERATURE: 97.7 F | RESPIRATION RATE: 16 BRPM | DIASTOLIC BLOOD PRESSURE: 74 MMHG | WEIGHT: 234.2 LBS | BODY MASS INDEX: 39.02 KG/M2

## 2021-12-08 DIAGNOSIS — D75.839 THROMBOCYTOSIS: ICD-10-CM

## 2021-12-08 DIAGNOSIS — D50.8 IRON DEFICIENCY ANEMIA SECONDARY TO INADEQUATE DIETARY IRON INTAKE: Primary | ICD-10-CM

## 2021-12-08 PROCEDURE — 99214 OFFICE O/P EST MOD 30 MIN: CPT | Performed by: NURSE PRACTITIONER

## 2021-12-08 RX ORDER — SODIUM CHLORIDE 9 MG/ML
20 INJECTION, SOLUTION INTRAVENOUS ONCE
Status: CANCELLED | OUTPATIENT
Start: 2021-12-20

## 2021-12-20 ENCOUNTER — HOSPITAL ENCOUNTER (OUTPATIENT)
Dept: INFUSION CENTER | Facility: HOSPITAL | Age: 41
Discharge: HOME | End: 2021-12-20
Attending: INTERNAL MEDICINE
Payer: COMMERCIAL

## 2021-12-20 VITALS
HEART RATE: 73 BPM | RESPIRATION RATE: 16 BRPM | DIASTOLIC BLOOD PRESSURE: 76 MMHG | TEMPERATURE: 97.4 F | SYSTOLIC BLOOD PRESSURE: 111 MMHG | OXYGEN SATURATION: 97 %

## 2021-12-20 DIAGNOSIS — D50.8 IRON DEFICIENCY ANEMIA SECONDARY TO INADEQUATE DIETARY IRON INTAKE: Primary | ICD-10-CM

## 2021-12-20 PROCEDURE — 96365 THER/PROPH/DIAG IV INF INIT: CPT

## 2021-12-20 RX ORDER — SODIUM CHLORIDE 9 MG/ML
20 INJECTION, SOLUTION INTRAVENOUS ONCE
Status: CANCELLED | OUTPATIENT
Start: 2021-12-27

## 2021-12-20 RX ORDER — SODIUM CHLORIDE 9 MG/ML
20 INJECTION, SOLUTION INTRAVENOUS ONCE
Status: COMPLETED | OUTPATIENT
Start: 2021-12-20 | End: 2021-12-20

## 2021-12-20 RX ADMIN — IRON SUCROSE 200 MG: 20 INJECTION, SOLUTION INTRAVENOUS at 12:27

## 2021-12-20 RX ADMIN — SODIUM CHLORIDE 20 ML/HR: 0.9 INJECTION, SOLUTION INTRAVENOUS at 12:26

## 2021-12-21 ENCOUNTER — TELEPHONE (OUTPATIENT)
Dept: OTHER | Facility: OTHER | Age: 41
End: 2021-12-21

## 2021-12-21 ENCOUNTER — TELEPHONE (OUTPATIENT)
Dept: GASTROENTEROLOGY | Facility: CLINIC | Age: 41
End: 2021-12-21

## 2021-12-27 ENCOUNTER — HOSPITAL ENCOUNTER (OUTPATIENT)
Dept: INFUSION CENTER | Facility: HOSPITAL | Age: 41
Discharge: HOME | End: 2021-12-27
Attending: INTERNAL MEDICINE
Payer: COMMERCIAL

## 2021-12-27 VITALS
RESPIRATION RATE: 16 BRPM | DIASTOLIC BLOOD PRESSURE: 82 MMHG | HEART RATE: 72 BPM | TEMPERATURE: 97.1 F | SYSTOLIC BLOOD PRESSURE: 129 MMHG

## 2021-12-27 DIAGNOSIS — D50.8 IRON DEFICIENCY ANEMIA SECONDARY TO INADEQUATE DIETARY IRON INTAKE: Primary | ICD-10-CM

## 2021-12-27 PROCEDURE — 96365 THER/PROPH/DIAG IV INF INIT: CPT

## 2021-12-27 RX ORDER — SODIUM CHLORIDE 9 MG/ML
20 INJECTION, SOLUTION INTRAVENOUS ONCE
Status: COMPLETED | OUTPATIENT
Start: 2021-12-27 | End: 2021-12-27

## 2021-12-27 RX ORDER — SODIUM CHLORIDE 9 MG/ML
20 INJECTION, SOLUTION INTRAVENOUS ONCE
Status: CANCELLED | OUTPATIENT
Start: 2021-12-27

## 2021-12-27 RX ADMIN — IRON SUCROSE 200 MG: 20 INJECTION, SOLUTION INTRAVENOUS at 10:51

## 2021-12-27 RX ADMIN — SODIUM CHLORIDE 20 ML/HR: 0.9 INJECTION, SOLUTION INTRAVENOUS at 10:48

## 2021-12-27 NOTE — PROGRESS NOTES
Pt tolerated #2/2 venofer today without complications  Confirmed appt with Shelby in June and to get labs one week prior, AVS provided

## 2021-12-29 ENCOUNTER — OFFICE VISIT (OUTPATIENT)
Dept: GASTROENTEROLOGY | Facility: CLINIC | Age: 41
End: 2021-12-29
Payer: COMMERCIAL

## 2021-12-29 VITALS
SYSTOLIC BLOOD PRESSURE: 110 MMHG | BODY MASS INDEX: 39.15 KG/M2 | WEIGHT: 235 LBS | TEMPERATURE: 97.7 F | DIASTOLIC BLOOD PRESSURE: 64 MMHG | HEIGHT: 65 IN

## 2021-12-29 DIAGNOSIS — K31.84 GASTROPARESIS: ICD-10-CM

## 2021-12-29 DIAGNOSIS — K76.0 NONALCOHOLIC FATTY LIVER DISEASE: ICD-10-CM

## 2021-12-29 DIAGNOSIS — K21.9 GASTROESOPHAGEAL REFLUX DISEASE WITHOUT ESOPHAGITIS: ICD-10-CM

## 2021-12-29 DIAGNOSIS — K59.04 CHRONIC IDIOPATHIC CONSTIPATION: Primary | ICD-10-CM

## 2021-12-29 DIAGNOSIS — R11.0 NAUSEA: ICD-10-CM

## 2021-12-29 PROCEDURE — 99215 OFFICE O/P EST HI 40 MIN: CPT | Performed by: INTERNAL MEDICINE

## 2021-12-29 PROCEDURE — 3008F BODY MASS INDEX DOCD: CPT | Performed by: NURSE PRACTITIONER

## 2021-12-29 RX ORDER — ONDANSETRON 4 MG/1
4 TABLET, ORALLY DISINTEGRATING ORAL EVERY 8 HOURS PRN
Qty: 180 TABLET | Refills: 3 | Status: SHIPPED | OUTPATIENT
Start: 2021-12-29 | End: 2022-03-29

## 2021-12-29 RX ORDER — PRUCALOPRIDE 2 MG/1
2 TABLET, FILM COATED ORAL DAILY
Qty: 90 TABLET | Refills: 3 | Status: SHIPPED | OUTPATIENT
Start: 2021-12-29

## 2022-02-16 ENCOUNTER — TELEMEDICINE (OUTPATIENT)
Dept: PSYCHIATRY | Facility: CLINIC | Age: 42
End: 2022-02-16
Payer: COMMERCIAL

## 2022-02-16 ENCOUNTER — RA CDI HCC (OUTPATIENT)
Dept: OTHER | Facility: HOSPITAL | Age: 42
End: 2022-02-16

## 2022-02-16 DIAGNOSIS — F33.40 MDD (RECURRENT MAJOR DEPRESSIVE DISORDER) IN REMISSION (HCC): Primary | ICD-10-CM

## 2022-02-16 PROCEDURE — 99213 OFFICE O/P EST LOW 20 MIN: CPT | Performed by: PSYCHIATRY & NEUROLOGY

## 2022-02-16 NOTE — PROGRESS NOTES
The following dx found on active problem list - please assess using MEAT for  billing    Mild episode of recurrent major depressive disorder ([facility] [address] ) [F33 0]    [facility]  coding opportunities          Number of diagnosis code(s) already on the problem list added to  fla                     Patients insurance company: [email or web address] ([email or web address])

## 2022-02-16 NOTE — PSYCH
Virtual Regular Visit    Verification of patient location:    Patient is located in the following state in which I hold an active license [place]      Assessment/Plan:    Problem List Items Addressed This Visit        Other    Mild episode of recurrent major depressive disorder ([facility] [address] ) - Primary          Goals addressed in session: Goal 1 and Goal 2          Reason for visit is   Chief Complaint   Patient presents with    Virtual Regular Visit        Encounter provider Carter Brito MD    Provider located at [address] [facility] [id number]      Recent Visits  No visits were found meeting these conditions  Showing recent visits within past 7 days and meeting all other requirements  Today's Visits  Date Type Provider Dept   02/16/22 Telemedicine Carter Brito MD [facility] 72 today's visits and meeting all other requirements  Future Appointments  No visits were found meeting these conditions  Showing future appointments within next 150 days and meeting all other requirements       The patient was identified by name and date of birth  Gi Jiménez was informed that this is a telemedicine visit and that the visit is being conducted throughEpic Embedded and patient was informed this is a secure, HIPAA-complaint platform  She agrees to proceed     My office door was closed  No one else was in the room  She acknowledged consent and understanding of privacy and security of the video platform  The patient has agreed to participate and understands they can discontinue the visit at any time  Patient is aware this is a billable service       Subjective  See below      HPI     Past Medical History:   Diagnosis Date    Acquired hallux valgus 03/07/2016    Acute non-recurrent maxillary sinusitis 01/27/2020    Anemia     Anxiety 10/16/2019    Candida vaginitis 04/25/2019    Colon polyp 2019    COVID-19 vaccine series completed     Moderna: 2nd dose 5/21/2021    Discoloration of skin of hand 03/20/2019    Duodenitis without bleeding     Dysphagia     Fibromyalgia     Gastritis     GERD (gastroesophageal reflux disease)     Hiatal hernia     Hx of colonoscopy     Insomnia     Iron deficiency anemia secondary to inadequate dietary iron intake 10/18/2019    Memory loss     Menorrhagia with regular cycle 11/20/2014    Moderate episode of recurrent major depressive disorder ([facility] [address] ) 10/16/2019    Obesity     Oral herpes     Sodium (Na) deficiency 10/19/2020    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 myelopathy or radiculopathy     Streptococcal pharyngitis 10/19/2020    Vaginal discharge 09/01/2018    Vitamin D deficiency        Past Surgical History:   Procedure Laterality Date    APPENDECTOMY      BREAST BIOPSY Right     pt unsure when or where- ? 6 yrs ago- benign    BREAST SURGERY Right 01/08/2015    lump removed from breast    [address] COLONOSCOPY      9/26/2012; 2019-benign polyp, repeat 5 years    DILATION AND CURETTAGE OF UTERUS      Resolved:1/29/2009    ENDOMETRIAL ABLATION N/A 8/13/2020    Procedure: ABLATION ENDOMETRIAL Sean Sandoval;  Surgeon: Josue Cardozo MD;  [facility];  Service: Gynecology    ESOPHAGOGASTRODUODENOSCOPY      Diagnostic ; 9/26/2012    HYSTEROSCOPY      Resolved:1/30/2009    OVARIAN CYST REMOVAL Right 2005    RI HYSTEROSCOPY,W/ENDO BX N/A 8/13/2020    Procedure: DILATATION AND CURETTAGE (D&C) WITH HYSTEROSCOPY;  Surgeon: Josue Cardozo MD;  [facility];  Service: Gynecology    WISDOM TOOTH EXTRACTION         Current Outpatient Medications   Medication Sig Dispense Refill    buPROPion (WELLBUTRIN XL) 300 mg 24 hr tablet Take 1 tablet (300 mg total) by mouth daily 90 tablet 1    clindamycin (CLINDAGEL) 1 % gel Apply topically 2 (two) times a day 30 g 2    Dexilant 60 MG capsule TAKE 1 CAPSULE (60 MG TOTAL) BY MOUTH EVERY 12 (TWELVE) HOURS 180 capsule 0    ergocalciferol (VITAMIN D2) 50,000 units TAKE 1 CAPSULE BY MOUTH ONE TIME PER WEEK      Omega-3 Fatty Acids (fish oil) 1,000 mg Take 1,000 mg by mouth daily        ondansetron (Zofran ODT) 4 mg disintegrating tablet Take 1 tablet (4 mg total) by mouth every 8 (eight) hours as needed for nausea or vomiting 180 tablet 3    Prucalopride Succinate (Motegrity) 2 MG TABS Take 2 mg by mouth in the morning 90 tablet 3    Savella 50 MG TABS TAKE 1 TABLET BY MOUTH TWICE A DAY 60 tablet 0    valACYclovir (VALTREX) 1,000 mg tablet Take 2 tablets (2,000 mg total) by mouth 2 (two) times a day for 1 day (Patient not taking: Reported on 8/6/2020) 4 tablet 0     No current facility-administered medications for this visit  Allergies   Allergen Reactions    Morphine Chest Pain and Hives     Other reaction(s): chest pain    Percocet [Oxycodone-Acetaminophen] Hives    Domperidone     Ibuprofen      Due to gastritis       Review of Systems    Video Exam    There were no vitals filed for this visit  Physical Exam     I spent 10 minutes directly with the patient during this visit  [facility]      Name and Date of Birth:  Svitlana De La Torre 38 y o  1980 MRN: [id number]    Date of Visit: February 16, 2022    Reason for Visit:  follow-up for medication management    Subjective:  Patient reports she has been overall doing very well  Denies any depressive episodes since last visit  Reports sleep and appetite has been good  Reports energy level, concentration has been fine  Does not endorse anhedonia  Denies any anxiety  Reports still at times gets stressed about her daughter-in-law but able to cope with it well  Denies any suicidal thoughts or hopelessness  Reports compliant with the medication and denies any side effect  Denies any acute medical issues lately        Review Of Systems: Constitutional negative   ENT negative   Cardiovascular negative   Respiratory negative   Gastrointestinal negative   Genitourinary negative   Musculoskeletal negative   Integumentary negative   Neurological negative   Endocrine negative   Other Symptoms none       Alcohol/Substance Abuse:  Denies        Past Medical History:    Past Medical History:   Diagnosis Date    Acquired hallux valgus 03/07/2016    Acute non-recurrent maxillary sinusitis 01/27/2020    Anemia     Anxiety 10/16/2019    Candida vaginitis 04/25/2019    Colon polyp 2019    COVID-19 vaccine series completed     Moderna: 2nd dose 5/21/2021    Discoloration of skin of hand 03/20/2019    Duodenitis without bleeding     Dysphagia     Fibromyalgia     Gastritis     GERD (gastroesophageal reflux disease)     Hiatal hernia     Hx of colonoscopy     Insomnia     Iron deficiency anemia secondary to inadequate dietary iron intake 10/18/2019    Memory loss     Menorrhagia with regular cycle 11/20/2014    Moderate episode of recurrent major depressive disorder ([address] ) 10/16/2019    Obesity     Oral herpes     Sodium (Na) deficiency 10/19/2020    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 myelopathy or radiculopathy     Streptococcal pharyngitis 10/19/2020    Vaginal discharge 09/01/2018    Vitamin D deficiency      Past Medical History Pertinent Negatives:   Diagnosis Date Noted    Abnormal Pap smear of cervix 04/19/2018 2/2016-wnl; 11/2020-wnl, HRHPV neg    Allergic 08/23/2018    Clotting disorder ([address] ) 08/23/2018    Dementia ([address] ) 08/23/2018    Diverticulitis of colon 08/23/2018    Eating disorder 08/23/2018    History of transfusion 08/06/2020    HL (hearing loss) 08/23/2018    Infectious viral hepatitis 08/23/2018    Inflammatory bowel disease 08/23/2018    Osteoporosis 08/23/2018    Otitis media 08/23/2018    Scoliosis 08/23/2018    Shingles 08/23/2018    Substance abuse ([address] ) 08/23/2018    Urinary tract infection 08/23/2018    Visual impairment 08/23/2018     Past Surgical History:   Procedure Laterality Date    APPENDECTOMY      BREAST BIOPSY Right     pt unsure when or where- ? 6 yrs ago- benign    BREAST SURGERY Right 01/08/2015    lump removed from breast    [address] [place] [address] [address]      COLONOSCOPY      9/26/2012; 2019-benign polyp, repeat 5 years    DILATION AND CURETTAGE OF UTERUS      Resolved:1/29/2009    ENDOMETRIAL ABLATION N/A 8/13/2020    Procedure: ABLATION ENDOMETRIAL Helder Dye;  Surgeon: Dawna Aviles MD;  [facility];  Service: Gynecology    ESOPHAGOGASTRODUODENOSCOPY      Diagnostic ; 9/26/2012    HYSTEROSCOPY      Resolved:1/30/2009    OVARIAN CYST REMOVAL Right 2005    DE HYSTEROSCOPY,W/ENDO BX N/A 8/13/2020    Procedure: DILATATION AND CURETTAGE (D&C) WITH HYSTEROSCOPY;  Surgeon: Dawna Aviles MD;  [facility];  Service: Gynecology    WISDOM TOOTH EXTRACTION       Allergies   Allergen Reactions    Morphine Chest Pain and Hives     Other reaction(s): chest pain    Percocet [Oxycodone-Acetaminophen] Hives    Domperidone     Ibuprofen      Due to gastritis       Current Medications:       Current Outpatient Medications:     buPROPion (WELLBUTRIN XL) 300 mg 24 hr tablet, Take 1 tablet (300 mg total) by mouth daily, Disp: 90 tablet, Rfl: 1    clindamycin (CLINDAGEL) 1 % gel, Apply topically 2 (two) times a day, Disp: 30 g, Rfl: 2    Dexilant 60 MG capsule, TAKE 1 CAPSULE (60 MG TOTAL) BY MOUTH EVERY 12 (TWELVE) HOURS, Disp: 180 capsule, Rfl: 0    ergocalciferol (VITAMIN D2) 50,000 units, TAKE 1 CAPSULE BY MOUTH ONE TIME PER WEEK, Disp: , Rfl:     Omega-3 Fatty Acids (fish oil) 1,000 mg, Take 1,000 mg by mouth daily  , Disp: , Rfl:     ondansetron (Zofran ODT) 4 mg disintegrating tablet, Take 1 tablet (4 mg total) by mouth every 8 (eight) hours as needed for nausea or vomiting, Disp: 180 tablet, Rfl: 3    Prucalopride Succinate (Motegrity) 2 MG TABS, Take 2 mg by mouth in the morning, Disp: 90 tablet, Rfl: 3    Savella 50 MG TABS, TAKE 1 TABLET BY MOUTH TWICE A DAY, Disp: 60 tablet, Rfl: 0    valACYclovir (VALTREX) 1,000 mg tablet, Take 2 tablets (2,000 mg total) by mouth 2 (two) times a day for 1 day (Patient not taking: Reported on 8/6/2020), Disp: 4 tablet, Rfl: 0       History Review: The following portions of the patient's history were reviewed and updated as appropriate: allergies, current medications, past family history, past medical history, past social history, past surgical history and problem list          OBJECTIVE:     Vital signs in last 24 hours: There were no vitals filed for this visit      Mental Status Evaluation:    Appearance age appropriate, casually dressed   Behavior cooperative, calm   Speech normal rate, normal volume, normal pitch   Mood euthymic   Affect normal range and intensity, appropriate   Thought Processes organized, goal directed   Associations intact associations   Thought Content no overt delusions   Perceptual Disturbances: no auditory hallucinations, no visual hallucinations   Abnormal Thoughts  Risk Potential Suicidal ideation - None  Homicidal ideation - None  Potential for aggression - No   Orientation oriented to person, place, time/date and situation   Memory recent and remote memory grossly intact   Consciousness alert and awake   Attention Span Concentration Span attention span and concentration are age appropriate   Intellect appears to be of average intelligence   Insight intact   Judgement intact   Muscle Strength and  Gait unable to assess today due to virtual visit   Motor activity unable to assess today due to virtual visit   Language no difficulty naming common objects, no difficulty repeating a phrase   Fund of Knowledge adequate knowledge of current events  adequate fund of knowledge regarding past history  adequate fund of knowledge regarding vocabulary    Pain none   Pain Scale 0 Laboratory Results:   Most Recent Labs:   Lab Results   Component Value Date    WBC 6 03 12/04/2021    RBC 4 25 12/04/2021    HGB 12 2 12/04/2021    HCT 38 7 12/04/2021     (H) 12/04/2021    RDW 12 7 12/04/2021    NEUTROABS 3 50 12/04/2021    K 4 4 12/04/2021     12/04/2021    CO2 27 12/04/2021    BUN 8 12/04/2021    CREATININE 0 88 12/04/2021    CALCIUM 8 9 12/04/2021    AST 13 12/04/2021    ALT 32 12/04/2021    ALKPHOS 87 12/04/2021    CHOLESTEROL 218 (H) 11/20/2020    TRIG 115 11/20/2020    HDL 65 11/20/2020    LDLCALC 130 (H) 11/20/2020    [place] 150 08/10/2019    RGC4PDSTHQJC 1 910 11/20/2020    HCGQUANT <2 08/27/2018     I have personally reviewed all pertinent laboratory/tests results  Assessment/Plan:  Patient overall doing well  Depression in remission  No SI or HI  Plan is to continue with the current medication with no changes  She will follow-up with me in 3 months or sooner if needed  Educated about her medication in detail including benefit, risk, side effects, alternative, contraindication, dosage and frequency  She verbalized understanding agrees with the current plan  She was advised to call us if there is any concern and to call crisis or visit ER in case of any emergency  The patient agrees  Diagnoses and all orders for this visit:    MDD (recurrent major depressive disorder) in remission [facility])          Treatment Recommendations/Precautions:      Aware of 24 hour and weekend coverage for urgent situations accessed by calling [facility] main practice number    Risks/Benefits      Risks, Benefits And Possible Side Effects Of Medications:    Risks, benefits, and possible side effects of medications explained to [facility] and she verbalizes understanding and agreement for treatment  Risks of medications in pregnancy explained to [facility]  She verbalizes understanding and agrees to notify her doctor if she becomes pregnant      Controlled Medication Discussion:     Not applicable    Psychotherapy Provided:     Individual psychotherapy provided: Medications, treatment progress and treatment plan reviewed with [facility]  Medication education provided to [facility]  Supportive therapy provided  Crisis/safety plan discussed with [facility]  Treatment Plan;    Completed and signed during the session: Not applicable - Treatment Plan not due at this session    Artjose l Mcgrath MD 02/16/22  VIRTUAL VISIT DISCLAIMER    Davy Montenegro verbally agrees to participate in [employer]  Pt is aware that [employer] could be limited without vital signs or the ability to perform a full hands-on physical exam  Lianne Fine understands she or the provider may request at any time to terminate the video visit and request the patient to seek care or treatment in person

## 2022-02-17 ENCOUNTER — TELEPHONE (OUTPATIENT)
Dept: FAMILY MEDICINE CLINIC | Facility: CLINIC | Age: 42
End: 2022-02-17

## 2022-02-17 NOTE — TELEPHONE ENCOUNTER
Patient was schedule for a Virtual but has not met Neo Andino yet so I left voicemail to call and cancel appointment

## 2022-02-21 PROBLEM — G35 MULTIPLE SCLEROSIS (HCC): Status: ACTIVE | Noted: 2022-02-21

## 2022-02-22 ENCOUNTER — HOSPITAL ENCOUNTER (OUTPATIENT)
Dept: RADIOLOGY | Facility: HOSPITAL | Age: 42
Discharge: HOME | End: 2022-02-22
Payer: COMMERCIAL

## 2022-02-22 DIAGNOSIS — J45.20 MILD INTERMITTENT ASTHMATIC BRONCHITIS WITHOUT COMPLICATION: ICD-10-CM

## 2022-02-22 PROCEDURE — 71046 X-RAY EXAM CHEST 2 VIEWS: CPT

## 2022-02-28 ENCOUNTER — RA CDI HCC (OUTPATIENT)
Dept: OTHER | Facility: HOSPITAL | Age: 42
End: 2022-02-28

## 2022-03-05 ENCOUNTER — APPOINTMENT (OUTPATIENT)
Dept: LAB | Facility: HOSPITAL | Age: 42
End: 2022-03-05
Payer: COMMERCIAL

## 2022-03-05 DIAGNOSIS — D35.01 ADENOMA OF RIGHT ADRENAL GLAND: ICD-10-CM

## 2022-03-05 LAB — CORTIS AM PEAK SERPL-MCNC: 9.3 UG/DL (ref 4.2–22.4)

## 2022-03-05 PROCEDURE — 82533 TOTAL CORTISOL: CPT

## 2022-03-09 ENCOUNTER — OFFICE VISIT (OUTPATIENT)
Dept: FAMILY MEDICINE CLINIC | Facility: CLINIC | Age: 42
End: 2022-03-09
Payer: COMMERCIAL

## 2022-03-09 VITALS
HEIGHT: 65 IN | SYSTOLIC BLOOD PRESSURE: 116 MMHG | TEMPERATURE: 98.6 F | HEART RATE: 66 BPM | BODY MASS INDEX: 38.59 KG/M2 | OXYGEN SATURATION: 99 % | DIASTOLIC BLOOD PRESSURE: 70 MMHG | WEIGHT: 231.6 LBS

## 2022-03-09 DIAGNOSIS — Z12.31 VISIT FOR SCREENING MAMMOGRAM: ICD-10-CM

## 2022-03-09 DIAGNOSIS — Z00.00 ANNUAL PHYSICAL EXAM: Primary | ICD-10-CM

## 2022-03-09 PROCEDURE — 99396 PREV VISIT EST AGE 40-64: CPT | Performed by: INTERNAL MEDICINE

## 2022-03-09 RX ORDER — PANTOPRAZOLE SODIUM 20 MG/1
20 TABLET, DELAYED RELEASE ORAL DAILY
COMMUNITY
Start: 2022-01-31

## 2022-03-09 NOTE — PROGRESS NOTES
Assessment/Plan:           Problem List Items Addressed This Visit        Other    Visit for screening mammogram    Relevant Orders    Mammo screening bilateral w 3d & cad      Other Visit Diagnoses     Annual physical exam    -  Primary            Subjective:      Patient ID: May Lopez is a 38 y o  female  HPI  Patient history of chronic idiopathic gastroparesis constipation adrenal adenoma asthmatic bronchitis is here for annual physical   Serum cortisol level was normal however the lab studies are still pending  Patient has pending barium swallow test   Patient is due for mammogram   Patient reports her depression to be well controlled on current regimen  The following portions of the patient's history were reviewed and updated as appropriate: allergies, current medications, past family history, past medical history, past social history, past surgical history and problem list     Review of Systems      Objective:      /70 (BP Location: Left arm, Patient Position: Sitting, Cuff Size: Standard)   Pulse 66   Temp 98 6 °F (37 °C)   Ht 5' 5" (1 651 m)   Wt 105 kg (231 lb 9 6 oz)   SpO2 99%   BMI 38 54 kg/m²          Physical Exam  Constitutional:       General: She is not in acute distress  Appearance: She is well-developed  HENT:      Head: Normocephalic  Mouth/Throat:      Pharynx: No oropharyngeal exudate  Eyes:      General: No scleral icterus  Conjunctiva/sclera: Conjunctivae normal    Neck:      Thyroid: No thyromegaly  Vascular: No carotid bruit  Cardiovascular:      Rate and Rhythm: Normal rate and regular rhythm  Heart sounds: Normal heart sounds  No murmur heard  Pulmonary:      Effort: Pulmonary effort is normal  No respiratory distress  Breath sounds: Normal breath sounds  No wheezing or rales  Abdominal:      General: Bowel sounds are normal  There is no distension  Palpations: Abdomen is soft  Tenderness:  There is no abdominal tenderness  There is no guarding or rebound  Musculoskeletal:      Right lower leg: No edema  Left lower leg: No edema  Lymphadenopathy:      Cervical: No cervical adenopathy  Skin:     General: Skin is warm  Neurological:      Mental Status: She is alert and oriented to person, place, and time  Cranial Nerves: No cranial nerve deficit  Deep Tendon Reflexes: Reflexes are normal and symmetric  Psychiatric:         Mood and Affect: Mood normal          Behavior: Behavior normal          Thought Content: Thought content normal          Judgment: Judgment normal              BMI Counseling: There is no height or weight on file to calculate BMI  The BMI is above normal  Nutrition recommendations include decreasing portion sizes  Exercise recommendations include exercising 3-5 times per week  Rationale for BMI follow-up plan is due to patient being overweight or obese

## 2022-03-13 ENCOUNTER — HOSPITAL ENCOUNTER (EMERGENCY)
Facility: HOSPITAL | Age: 42
Discharge: HOME | End: 2022-03-13
Attending: EMERGENCY MEDICINE | Admitting: EMERGENCY MEDICINE
Payer: COMMERCIAL

## 2022-03-13 VITALS
WEIGHT: 231 LBS | RESPIRATION RATE: 18 BRPM | SYSTOLIC BLOOD PRESSURE: 118 MMHG | HEART RATE: 76 BPM | TEMPERATURE: 97.7 F | BODY MASS INDEX: 38.44 KG/M2 | DIASTOLIC BLOOD PRESSURE: 62 MMHG | OXYGEN SATURATION: 100 %

## 2022-03-13 DIAGNOSIS — M54.50 ACUTE LOW BACK PAIN: Primary | ICD-10-CM

## 2022-03-13 PROCEDURE — 99283 EMERGENCY DEPT VISIT LOW MDM: CPT

## 2022-03-13 PROCEDURE — 99284 EMERGENCY DEPT VISIT MOD MDM: CPT | Performed by: EMERGENCY MEDICINE

## 2022-03-13 RX ORDER — LIDOCAINE 50 MG/G
1 PATCH TOPICAL ONCE
Status: DISCONTINUED | OUTPATIENT
Start: 2022-03-13 | End: 2022-03-13 | Stop reason: HOSPADM

## 2022-03-13 RX ORDER — METHOCARBAMOL 500 MG/1
500 TABLET, FILM COATED ORAL ONCE
Status: COMPLETED | OUTPATIENT
Start: 2022-03-13 | End: 2022-03-13

## 2022-03-13 RX ORDER — DIAZEPAM 5 MG/1
5 TABLET ORAL ONCE
Status: COMPLETED | OUTPATIENT
Start: 2022-03-13 | End: 2022-03-13

## 2022-03-13 RX ORDER — KETOROLAC TROMETHAMINE 30 MG/ML
30 INJECTION, SOLUTION INTRAMUSCULAR; INTRAVENOUS ONCE
Status: DISCONTINUED | OUTPATIENT
Start: 2022-03-13 | End: 2022-03-13 | Stop reason: HOSPADM

## 2022-03-13 RX ORDER — METHYLPREDNISOLONE 4 MG/1
TABLET ORAL
Qty: 21 TABLET | Refills: 0 | Status: SHIPPED | OUTPATIENT
Start: 2022-03-13 | End: 2022-03-28

## 2022-03-13 RX ORDER — METHOCARBAMOL 500 MG/1
500 TABLET, FILM COATED ORAL 2 TIMES DAILY
Qty: 20 TABLET | Refills: 0 | Status: SHIPPED | OUTPATIENT
Start: 2022-03-13 | End: 2022-03-28 | Stop reason: SDUPTHER

## 2022-03-13 RX ADMIN — METHOCARBAMOL 500 MG: 500 TABLET ORAL at 08:36

## 2022-03-13 RX ADMIN — DEXAMETHASONE SODIUM PHOSPHATE 10 MG: 10 INJECTION, SOLUTION INTRAMUSCULAR; INTRAVENOUS at 08:37

## 2022-03-13 RX ADMIN — DIAZEPAM 5 MG: 5 TABLET ORAL at 10:08

## 2022-03-13 RX ADMIN — LIDOCAINE 1 PATCH: 50 PATCH CUTANEOUS at 10:09

## 2022-03-13 NOTE — ED PROVIDER NOTES
Pt Name: Vanessa Wilkinson  MRN: [id number]  [place] 1980  Age/Sex: 38 y o  female  Date of evaluation: 3/13/2022  PCP: Panda Yap MD    [address] [place] [address]    Chief Complaint   Patient presents with    Back Pain     Pt states that yesterday she was bending over to  her dog, when she stood back up pt felt back pain across lower back  pt denies numbness/tingling  HPI    Vinod Galvez presents to the Emergency Department complaining of low back pain  The pain is the worst when she tries to get up from the toilet  The pain started yesterday when she bent over too far to  her dog that weighs about 35 lbs  She has had pain in her lower back on both sides and down her legs as well  She has had sciatic pain in the past but never this back        HPI      Past Medical and Surgical History    Past Medical History:   Diagnosis Date    Acquired hallux valgus 03/07/2016    Acute non-recurrent maxillary sinusitis 01/27/2020    Anemia     Anxiety 10/16/2019    Candida vaginitis 04/25/2019    Colon polyp 2019    COVID-19 vaccine series completed     Moderna: 2nd dose 5/21/2021    Discoloration of skin of hand 03/20/2019    Duodenitis without bleeding     Dysphagia     Fibromyalgia     Gastritis     GERD (gastroesophageal reflux disease)     Hiatal hernia     Hx of colonoscopy     Insomnia     Iron deficiency anemia secondary to inadequate dietary iron intake 10/18/2019    Memory loss     Menorrhagia with regular cycle 11/20/2014    Moderate episode of recurrent major depressive disorder ([address] ) 10/16/2019    Obesity     Oral herpes     Sodium (Na) deficiency 10/19/2020    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 myelopathy or radiculopathy     Streptococcal pharyngitis 10/19/2020    Vaginal discharge 09/01/2018    Vitamin D deficiency        Past Surgical History:   Procedure Laterality Date    APPENDECTOMY      BREAST BIOPSY Right     pt unsure when or where- ? 6 yrs ago- benign    BREAST SURGERY Right 2015    lump removed from breast    [address]      2012; 2019-benign polyp, repeat 5 years    DILATION AND CURETTAGE OF UTERUS      Resolved:2009    ENDOMETRIAL ABLATION N/A 2020    Procedure: ABLATION ENDOMETRIAL [place] Pa;  Surgeon: Alexa Miranda MD;  [facility];  Service: Gynecology    ESOPHAGOGASTRODUODENOSCOPY      Diagnostic ; 2012    HYSTEROSCOPY      Resolved:2009    OVARIAN CYST REMOVAL Right 2005    TN HYSTEROSCOPY,W/ENDO BX N/A 2020    Procedure: DILATATION AND CURETTAGE (D&C) WITH HYSTEROSCOPY;  Surgeon: Alexa Miranda MD;  [facility];  Service: Gynecology    WISDOM TOOTH EXTRACTION         Family History   Problem Relation Age of Onset    Other Mother         uterine leiomyoma    Diabetes type II Mother         Mellitus    Hypothyroidism Mother     Colon cancer Father 54        Stage IV    Diabetes Father         Type II Mellitus    Hypertension Father     Anxiety disorder Brother     Depression Brother     Diabetes Brother         Mellitus    Asthma Brother     Hypertension Brother     Multiple sclerosis Sister     Asthma Sister     Hypothyroidism Sister     Hypertension Maternal Grandmother     Diabetes Maternal Grandmother     Other Maternal Grandmother         Vulvar cancer    Schizophrenia Maternal Grandfather     Hypertension Maternal Grandfather     Thyroid cancer Paternal Grandmother 80    Diabetes Paternal Grandfather 43         due to complications of DM    Breast cancer Cousin 46    Ovarian cancer Neg Hx        Social History     Tobacco Use    Smoking status: Never Smoker    Smokeless tobacco: Never Used    Tobacco comment: No passive smoke exposure   Vaping Use    Vaping Use: Never used   Substance Use Topics    Alcohol use: No    Drug use: No              Allergies    Allergies   Allergen Reactions    Morphine Chest Pain and Hives     Other reaction(s): chest pain    Percocet [Oxycodone-Acetaminophen] Hives    Domperidone     Ibuprofen      Due to gastritis       Home Medications    Prior to Admission medications    Medication Sig Start Date End Date Taking? Authorizing Provider   budesonide-formoterol (Symbicort) 160-4 5 mcg/act inhaler Inhale 2 puffs 2 (two) times a day Rinse mouth after use  2/21/22   Masood Bravo MD   buPROPion (WELLBUTRIN XL) 300 mg 24 hr tablet Take 1 tablet (300 mg total) by mouth daily 11/18/21 5/17/22  Paty Bautista MD   clindamycin (CLINDAGEL) 1 % gel Apply topically 2 (two) times a day 6/4/20   Jo Khanna MD   Dexilant 60 MG capsule TAKE 1 CAPSULE (60 MG TOTAL) BY MOUTH EVERY 12 (TWELVE) HOURS 12/18/20   Jo Khanna MD   ergocalciferol (VITAMIN D2) 50,000 units TAKE 1 CAPSULE BY MOUTH ONE TIME PER WEEK 6/29/20   Historical Provider, MD   Omega-3 Fatty Acids (fish oil) 1,000 mg Take 1,000 mg by mouth daily      Historical Provider, MD   ondansetron (Zofran ODT) 4 mg disintegrating tablet Take 1 tablet (4 mg total) by mouth every 8 (eight) hours as needed for nausea or vomiting  Patient not taking: Reported on 2/21/2022  12/29/21 3/29/22  Lynn Maloney MD   pantoprazole (PROTONIX) 20 mg tablet Take 20 mg by mouth daily 1/31/22   Historical Provider, MD   Prucalopride Succinate (Motegrity) 2 MG TABS Take 2 mg by mouth in the morning  Patient not taking: Reported on 2/21/2022 12/29/21   Lynn Maloney MD   Savella 50 MG TABS TAKE 1 TABLET BY MOUTH TWICE A DAY 6/28/21   JOAO Jarquin   valACYclovir (VALTREX) 1,000 mg tablet Take 2 tablets (2,000 mg total) by mouth 2 (two) times a day for 1 day  Patient not taking: Reported on 8/6/2020 12/9/19 8/6/20  Jo Khanna MD           Review of Systems    Review of Systems   Constitutional: Negative for chills and fever  HENT: Negative for ear pain and sore throat  Eyes: Negative for pain and visual disturbance     Respiratory: Negative for cough and shortness of breath  Cardiovascular: Negative for chest pain and palpitations  Gastrointestinal: Negative for abdominal pain and vomiting  Genitourinary: Negative for dysuria and hematuria  Musculoskeletal: Positive for back pain  Negative for arthralgias  Skin: Negative for color change and rash  Neurological: Negative for seizures and syncope  All other systems reviewed and are negative  Physical Exam      ED Triage Vitals   Temperature Pulse Respirations Blood Pressure SpO2   03/13/22 0805 03/13/22 0805 03/13/22 0805 03/13/22 0805 03/13/22 0805   97 7 °F (36 5 °C) 93 18 127/79 98 %      Temp Source Heart Rate Source Patient Position - Orthostatic VS BP Location FiO2 (%)   03/13/22 0805 03/13/22 0805 -- 03/13/22 1100 --   Oral Monitor  Right arm       Pain Score       03/13/22 0805       10 - Worst Possible Pain               Physical Exam  Vitals and nursing note reviewed  Constitutional:       General: She is not in acute distress  Appearance: She is well-developed  HENT:      Head: Normocephalic and atraumatic  Eyes:      Conjunctiva/sclera: Conjunctivae normal    Cardiovascular:      Rate and Rhythm: Normal rate and regular rhythm  Heart sounds: No murmur heard  Pulmonary:      Effort: Pulmonary effort is normal  No respiratory distress  Breath sounds: Normal breath sounds  Abdominal:      Palpations: Abdomen is soft  Tenderness: There is no abdominal tenderness  Musculoskeletal:      Cervical back: Neck supple  Lumbar back: Spasms present  No swelling, edema, deformity, signs of trauma, lacerations or bony tenderness  Decreased range of motion  Skin:     General: Skin is warm and dry  Neurological:      Mental Status: She is alert  Assessment and Plan    Vijaysalbador Odell is a 38 y o  female who presents with low back pain  Physical examination remarkable for same       Patient with acute non-traumatic lumbar back pain without neurological deficit  No indications for emergency imaging at this time  Will treat conservatively and have patient follow up with primary care provided for re-evaluation  Patient has been instructed to return to the Emergency Department with any acute changes in condition  [facility]    Diagnostic Results      Procedure    Procedures      ED Course of Care and Re-Assessments      Medications   methocarbamol (ROBAXIN) tablet 500 mg (500 mg Oral Given 3/13/22 0836)   dexamethasone oral liquid 10 mg 1 mL (10 mg Oral Given 3/13/22 0837)   diazepam (VALIUM) tablet 5 mg (5 mg Oral Given 3/13/22 1008)           FINAL IMPRESSION    Final diagnoses:   Acute low back pain         DISPOSITION/PLAN      Time reflects when diagnosis was documented in both MDM as applicable and the Disposition within this note     Time User Action Codes Description Comment    3/13/2022 10:57 AM Tono Toribio [M54 50] Acute low back pain       ED Disposition     ED Disposition Condition Date/Time Comment    Discharge Stable Sun Mar 13, 2022 10:57 AM May Lopez discharge to home/self care              Follow-up Information     Follow up With Specialties Details Why Contact Info Additional Via [place] MD Internal Medicine Schedule an appointment as soon as possible for a visit   Edgar   [address]  [phone]  [place] [address] Program Physical Therapy Schedule an appointment as soon as possible for a visit   [phone]            PATIENT REFERRED TO:    MD [address]  [address] [place] [address]  [address]  [phone]    Schedule an appointment as soon as possible for a visit       [facility]  [phone]  Schedule an appointment as soon as possible for a visit         DISCHARGE MEDICATIONS:    Discharge Medication List as of 3/13/2022 10:59 AM      START taking these medications    Details   methocarbamol (ROBAXIN) 500 mg tablet Take 1 tablet (500 mg total) by mouth 2 (two) times a day, Starting Sun 3/13/2022, Normal      methylPREDNISolone 4 MG tablet therapy pack Use as directed on package, Normal         CONTINUE these medications which have NOT CHANGED    Details   budesonide-formoterol (Symbicort) 160-4 5 mcg/act inhaler Inhale 2 puffs 2 (two) times a day Rinse mouth after use , Starting Mon 2/21/2022, Normal      buPROPion (WELLBUTRIN XL) 300 mg 24 hr tablet Take 1 tablet (300 mg total) by mouth daily, Starting Thu 11/18/2021, Until Tue 5/17/2022, Normal      clindamycin (CLINDAGEL) 1 % gel Apply topically 2 (two) times a day, Starting Thu 6/4/2020, Normal      Dexilant 60 MG capsule TAKE 1 CAPSULE (60 MG TOTAL) BY MOUTH EVERY 12 (TWELVE) HOURS, Normal      ergocalciferol (VITAMIN D2) 50,000 units TAKE 1 CAPSULE BY MOUTH ONE TIME PER WEEK, Historical Med      Omega-3 Fatty Acids (fish oil) 1,000 mg Take 1,000 mg by mouth daily  , Historical Med      ondansetron (Zofran ODT) 4 mg disintegrating tablet Take 1 tablet (4 mg total) by mouth every 8 (eight) hours as needed for nausea or vomiting, Starting Wed 12/29/2021, Until Tue 3/29/2022 at 2359, Normal      pantoprazole (PROTONIX) 20 mg tablet Take 20 mg by mouth daily, Starting Mon 1/31/2022, Historical Med      Prucalopride Succinate (Motegrity) 2 MG TABS Take 2 mg by mouth in the morning, Starting Wed 12/29/2021, Normal      Savella 50 MG TABS TAKE 1 TABLET BY MOUTH TWICE A DAY, Normal      valACYclovir (VALTREX) 1,000 mg tablet Take 2 tablets (2,000 mg total) by mouth 2 (two) times a day for 1 day, Starting Mon 12/9/2019, Until Thu 8/6/2020, Normal                      Francis Bulls, DO     Birtha Yohan David, DO  03/13/22 1541

## 2022-03-16 ENCOUNTER — HOSPITAL ENCOUNTER (OUTPATIENT)
Dept: RADIOLOGY | Facility: HOSPITAL | Age: 42
Discharge: HOME | End: 2022-03-16
Payer: COMMERCIAL

## 2022-03-16 ENCOUNTER — OFFICE VISIT (OUTPATIENT)
Dept: FAMILY MEDICINE CLINIC | Facility: CLINIC | Age: 42
End: 2022-03-16
Payer: COMMERCIAL

## 2022-03-16 VITALS
WEIGHT: 230.2 LBS | DIASTOLIC BLOOD PRESSURE: 80 MMHG | TEMPERATURE: 97.5 F | BODY MASS INDEX: 38.35 KG/M2 | OXYGEN SATURATION: 99 % | SYSTOLIC BLOOD PRESSURE: 112 MMHG | HEART RATE: 70 BPM | HEIGHT: 65 IN

## 2022-03-16 DIAGNOSIS — M54.41 ACUTE RIGHT-SIDED LOW BACK PAIN WITH RIGHT-SIDED SCIATICA: ICD-10-CM

## 2022-03-16 DIAGNOSIS — M54.41 ACUTE RIGHT-SIDED LOW BACK PAIN WITH RIGHT-SIDED SCIATICA: Primary | ICD-10-CM

## 2022-03-16 DIAGNOSIS — M62.838 MUSCLE SPASM: ICD-10-CM

## 2022-03-16 PROCEDURE — 72110 X-RAY EXAM L-2 SPINE 4/>VWS: CPT

## 2022-03-16 PROCEDURE — 1036F TOBACCO NON-USER: CPT | Performed by: INTERNAL MEDICINE

## 2022-03-16 PROCEDURE — 3008F BODY MASS INDEX DOCD: CPT | Performed by: INTERNAL MEDICINE

## 2022-03-16 PROCEDURE — 99213 OFFICE O/P EST LOW 20 MIN: CPT | Performed by: INTERNAL MEDICINE

## 2022-03-16 NOTE — PROGRESS NOTES
Assessment/Plan:           Problem List Items Addressed This Visit     None      Visit Diagnoses     Acute right-sided low back pain with right-sided sciatica    -  Primary    Relevant Orders    XR spine lumbar minimum 4 views non injury    Ambulatory Referral to Physical Therapy    Muscle spasm                Subjective:      Patient ID: Trudy Leach is a 38 y o  female  HPI  Patient is here for an acute visit to follow-up from recent ER visit with acute back pain picking up a dog  Patient was prescribed muscle relaxer and steroid and has made some improvement  She can get a a sitting position with some ease  Pain is mostly localized in the right side of her back bleeding on to the right leg  It is difficult for her to sit for prolonged period of time  She denies any bowel or bladder issue  She has been going to work  She takes her muscle relaxer only at night  She does not report any dyspepsia  Encouraged her to take Tylenol  Give it another couple of days  We may need to continue with p o  steroid and would consider any narcotic if she continues to get worse  X-ray would be ordered physical therapy is ordered patient encouraged to use warm compresses  The following portions of the patient's history were reviewed and updated as appropriate: allergies, current medications, past family history, past medical history, past social history, past surgical history and problem list     Review of Systems      Objective:      /80 (BP Location: Left arm, Patient Position: Sitting, Cuff Size: Standard)   Pulse 70   Temp 97 5 °F (36 4 °C)   Ht 5' 5" (1 651 m)   Wt 104 kg (230 lb 3 2 oz)   SpO2 99%   BMI 38 31 kg/m²          Physical Exam  Constitutional:       General: She is in acute distress  Musculoskeletal:      Comments: Discomfort radiating to the left side  Positive straight leg raise   Neurological:      Mental Status: She is alert  Motor: No weakness        Gait: Gait abnormal

## 2022-03-17 ENCOUNTER — TELEPHONE (OUTPATIENT)
Dept: PHYSICAL THERAPY | Facility: OTHER | Age: 42
End: 2022-03-17

## 2022-03-17 NOTE — TELEPHONE ENCOUNTER
Call placed to the patient per [facility] referral     After explanation of the program the patient is refusing at this time  Patient states she will think it over and call us back if she decides to proceed  Patient was provided with our ph# and business hrs  Patient appreciatve for the call  Referral Closed

## 2022-03-20 DIAGNOSIS — J45.20 MILD INTERMITTENT ASTHMATIC BRONCHITIS WITHOUT COMPLICATION: ICD-10-CM

## 2022-03-21 RX ORDER — BUDESONIDE AND FORMOTEROL FUMARATE DIHYDRATE 160; 4.5 UG/1; UG/1
AEROSOL RESPIRATORY (INHALATION)
Qty: 10.2 G | Refills: 1 | Status: SHIPPED | OUTPATIENT
Start: 2022-03-21

## 2022-03-28 ENCOUNTER — TELEPHONE (OUTPATIENT)
Dept: FAMILY MEDICINE CLINIC | Facility: CLINIC | Age: 42
End: 2022-03-28

## 2022-03-28 DIAGNOSIS — M54.41 ACUTE RIGHT-SIDED LOW BACK PAIN WITH RIGHT-SIDED SCIATICA: Primary | ICD-10-CM

## 2022-03-28 DIAGNOSIS — R19.8 PAINFUL DEFECATION: ICD-10-CM

## 2022-03-28 NOTE — TELEPHONE ENCOUNTER
----- Message from Masood Bravo MD sent at 3/28/2022  2:20 PM EDT -----  Could you please help with setting up an MRI for this patient with worsening back pain  I did explain to the patient that the was no x-rays seen

## 2022-03-28 NOTE — TELEPHONE ENCOUNTER
MRI scheduled   Date 4/13/22  Time 7:30pm  Location [facility]    Informed patient to not wear metal, jewelry, or metallic nail polish

## 2022-04-05 ENCOUNTER — TELEPHONE (OUTPATIENT)
Dept: FAMILY MEDICINE CLINIC | Facility: CLINIC | Age: 42
End: 2022-04-05

## 2022-04-05 NOTE — TELEPHONE ENCOUNTER
Call from [facility] states that a [id number] is needed for MRI  Phone number is [phone] tracking number [id number]  Pt is scheduled for MRI on 04/13/22 thank you

## 2022-04-05 NOTE — TELEPHONE ENCOUNTER
Please let patient know that I spoke with representative from her insurance and he denied her MRI    I left a message for patient to call back

## 2022-04-07 ENCOUNTER — TELEPHONE (OUTPATIENT)
Dept: FAMILY MEDICINE CLINIC | Facility: CLINIC | Age: 42
End: 2022-04-07

## 2022-04-07 NOTE — TELEPHONE ENCOUNTER
MRI Denied due to patient must complete 6 weeks of Physical Therapy and or 6 months of Home exercise program, and office notes/documentations      Pt Ordered 3/16/22  Please review  Thank you

## 2022-04-11 ENCOUNTER — TELEPHONE (OUTPATIENT)
Dept: PHYSICAL THERAPY | Facility: OTHER | Age: 42
End: 2022-04-11

## 2022-04-15 ENCOUNTER — TELEPHONE (OUTPATIENT)
Dept: PHYSICAL THERAPY | Facility: OTHER | Age: 42
End: 2022-04-15

## 2022-04-15 NOTE — TELEPHONE ENCOUNTER
Patient called [facility] in ref to a referral put in on 3/13 by the ED  However on 3/16 her PCP put in a direct referral to PT  Patient was given phone numbers for PT on [facility] to just be able to schedule  Patient was appreciative of call

## 2022-04-20 ENCOUNTER — EVALUATION (OUTPATIENT)
Dept: PHYSICAL THERAPY | Facility: CLINIC | Age: 42
End: 2022-04-20
Payer: COMMERCIAL

## 2022-04-20 DIAGNOSIS — M54.41 ACUTE RIGHT-SIDED LOW BACK PAIN WITH RIGHT-SIDED SCIATICA: Primary | ICD-10-CM

## 2022-04-20 PROCEDURE — 97110 THERAPEUTIC EXERCISES: CPT

## 2022-04-20 PROCEDURE — 97162 PT EVAL MOD COMPLEX 30 MIN: CPT

## 2022-04-20 NOTE — PROGRESS NOTES
PT Evaluation     Today's date: 2022  Patient name: Seema Romo  : 1980  MRN: [id number]  Referring provider: Marcia Mendoza MD  Dx:   Encounter Diagnosis     ICD-10-CM    1  Acute right-sided low back pain with right-sided sciatica  M54 41 Ambulatory Referral to Physical Therapy       Start Time: 0502  Stop Time: 0602  Total time in clinic (min): 60 minutes    Assessment  Assessment details: Pt is a 38 yof presenting to physical therapy with low back pain, signs and symptoms consistent with R lumbar radiculopathy  Pt displays L lumbar hypomobility and pain with lumbar extension  Pt also displays R hip abduction weakness  Pt's pain and weakness is limiting her from walking for long periods of time, bending over, and lifting objects  Pt will benefit from physical therapy twice a week for 6-8 weeks to improve strength and decrease pain  Impairments: abnormal or restricted ROM, activity intolerance, impaired physical strength, lacks appropriate home exercise program and pain with function    Symptom irritability: moderateUnderstanding of Dx/Px/POC: good   Prognosis: good    Goals  Short term goals (3-4 weeks)  1  Pt will display independence with understanding and performance of HEP to allow for carryover of plan of care at home  2  Pt will improve FOTO score from initial evaluation to show improvement in pain and function  3  Pt will increase R hip abduction strength to 4/5 to improve hip stability strength and decrease pain  4  Pt will have minimal pain with lumbar ROM in all planes  Long term goals (6-8 weeks)  1  Pt will score equal or better than projected score on FOTO to show improvement in pain and function  2  Pt will increase R hip abduction strength to 4+/5 to improve hip stability strength and decrease pain  3  Pt will have no pain with lumbar ROM in all planes  4  Pt will be able to walk for 45 minutes without pain     5  Pt will be able to bend to  30 pounds with minimal to no pain        Plan  Patient would benefit from: skilled physical therapy  Planned modality interventions: TENS, thermotherapy: hydrocollator packs, traction, ultrasound, cryotherapy and low level laser therapy  Planned therapy interventions: body mechanics training, therapeutic training, therapeutic exercise, therapeutic activities, stretching, strengthening, neuromuscular re-education, patient education, home exercise program, functional ROM exercises, flexibility, manual therapy, Ybarra taping, joint mobilization and balance  Frequency: 2x week  Duration in weeks: 8  Treatment plan discussed with: patient        Subjective Evaluation    History of Present Illness  Mechanism of injury: Pt bent down to [facility] dog on  and injured her back  She felt low back pain radiating into both of her legs and into her feet  Pt reported pain with going to the bathroom following the injury  She reports it's improved since then  Now pt reports she feels the pain on either side depending on the day/time, but never on both legs at the same time, radiating into the back of the thighs  Pt has pain with bending over and is unable to lift heavy items    Pain  Current pain ratin  At best pain rating: 3  At worst pain rating: 10  Quality: pulling  Relieving factors: medications and heat (icy hot, warm water)  Aggravating factors: standing and walking (bending over)  Progression: improved    Social Support    Employment status: working (standing/sitting/walking combo)  Patient Goals  Patient goals for therapy: decreased pain and increased strength (dishes, walking for longer periods of time (20 minutes), lifting laundry)          Objective     General Comments:      Lumbar Comments  Lumbar ROM  -Flexion: WNL no pain  -Extension: midline back pain, minimally limited  -R SB: WNL no pain  -L SB: L sided back pain with minimal limitation  Thoracic ROM  -R rot: WNL no pain  -L rot: WNL no pain    LE strength  -hip flexion: 4+/5 L, 4/5 R with pain  -knee ext: 5/5 bilat  -knee flex: 5/5 L, 4+/5 R  -DF: 5/5 bilat  -PF: 5/5 bilat  -great toe ext: 5/5 bilat  -hip abduct: 3+/5 R, 4/5 L  -hip ER: 4/5 R, 4+/5 L    Reflexes  -Patellar: 3+ L, 2+ R  -Achilles: 3+ R, 2+ L    Hip ROM  -Flexion:  WNL  -ER: WNL  -IR: WNL    Special tests  -Sciatic tension test: (+) R, (-) L  -SI compression: (-)  -SI gapping: (+)  -SLR: (+) R, (-) L    Palpation  -Lumbar PA and unilateral PA: L2 L3 painful to palpation with PA mobilizations and UPA L side; some pain with UPA L L5    Repeated motions  -extension: no change  -flexion: improved symptoms with lumbar extension             Precautions: GERD, gastroparesis, chronic constipation, adrenal adenoma, diaphragmatic hernia, BPPV, spondylosis of thoracic and lumbar region, hyperlipidemia, neck pain, pain of L hip joint, chronic midline t/s and l/s pain, decreased hearing bilateral ears, depression, anxiety, anemia      Manuals 4/20            Lumbar UPA L prn nv                                                   Neuro Re-Ed 4/20            TA iso nv            paloff press  nv            SLR nv            sidelying abduction nv            Fire hydrants nv                                      Ther Ex 4/20            Single knee to chest 1x10 each side; HEP            Pt edu NS            Lateral walks nv            Leg press nv            TM nv                                                   Ther Activity 4/20            deadlifts nv            Suitcase marches nv            Gait Training 4/20                                      Modalities 4/20

## 2022-04-27 ENCOUNTER — OFFICE VISIT (OUTPATIENT)
Dept: PHYSICAL THERAPY | Facility: CLINIC | Age: 42
End: 2022-04-27
Payer: COMMERCIAL

## 2022-04-27 ENCOUNTER — HOSPITAL ENCOUNTER (OUTPATIENT)
Dept: RADIOLOGY | Facility: HOSPITAL | Age: 42
Discharge: HOME | End: 2022-04-27
Attending: INTERNAL MEDICINE
Payer: COMMERCIAL

## 2022-04-27 DIAGNOSIS — M54.41 ACUTE RIGHT-SIDED LOW BACK PAIN WITH RIGHT-SIDED SCIATICA: Primary | ICD-10-CM

## 2022-04-27 DIAGNOSIS — K21.9 GASTROESOPHAGEAL REFLUX DISEASE WITHOUT ESOPHAGITIS: ICD-10-CM

## 2022-04-27 PROCEDURE — 97112 NEUROMUSCULAR REEDUCATION: CPT

## 2022-04-27 PROCEDURE — 97110 THERAPEUTIC EXERCISES: CPT

## 2022-04-27 PROCEDURE — 74220 X-RAY XM ESOPHAGUS 1CNTRST: CPT

## 2022-04-27 PROCEDURE — 97530 THERAPEUTIC ACTIVITIES: CPT

## 2022-04-27 NOTE — PROGRESS NOTES
Daily Note     Today's date: 2022  Patient name: Kwesi Diaz  : 1980  MRN: [id number]  Referring provider: Delmar Flores MD  Dx:   Encounter Diagnosis     ICD-10-CM    1  Acute right-sided low back pain with right-sided sciatica  M54 41        Start Time: 1705  Stop Time: 1746  Total time in clinic (min): 41 minutes    Subjective: Pt reports she is "so-so" today  She reports she has not completed the exercise at home due to busy schedule  At the end of the session, pt reported muscle fatigue but mentioned her back wasn't bothering her too much  Objective: See treatment diary below      Assessment: Tolerated treatment well  Patient required initial cuing for TA isometrics and SLR; pt reported feeling muscle work after the first few repetitions of SLRs  Tactile cuing was provided for form during sidelying abduction  Verbal cuing was provided during lateral walks to control the eccentric portion of the motion and avoid external rotation compensation  Continue to progress pt as tolerated next visit  Plan: Continue per plan of care        Precautions: GERD, gastroparesis, chronic constipation, adrenal adenoma, diaphragmatic hernia, BPPV, spondylosis of thoracic and lumbar region, hyperlipidemia, neck pain, pain of L hip joint, chronic midline t/s and l/s pain, decreased hearing bilateral ears, depression, anxiety, anemia      Manuals            Lumbar UPA L prn nv                                                   Neuro Re-Ed            TA iso nv 1x10 5" hold           paloff press  nv            SLR nv 1x10 each side           sidelying abduction nv 2x10           Fire hydrants nv                                      Ther Ex            Single knee to chest 1x10 each side; HEP 1x10 each side           Pt edu NS            Lateral walks nv gtb 4x at [facility]           Leg press nv            TM nv 5' bike; TM nv                                                  Ther Activity  4/27           chasidy nv 2x10 8#           Kvng bailon 8# 2x28'           Gait Training 4/20 4/27                                     Modalities 4/20 4/27

## 2022-05-04 ENCOUNTER — APPOINTMENT (OUTPATIENT)
Dept: PHYSICAL THERAPY | Facility: CLINIC | Age: 42
End: 2022-05-04
Payer: COMMERCIAL

## 2022-05-06 ENCOUNTER — OFFICE VISIT (OUTPATIENT)
Dept: PHYSICAL THERAPY | Facility: CLINIC | Age: 42
End: 2022-05-06
Payer: COMMERCIAL

## 2022-05-06 DIAGNOSIS — M54.41 ACUTE RIGHT-SIDED LOW BACK PAIN WITH RIGHT-SIDED SCIATICA: Primary | ICD-10-CM

## 2022-05-06 PROCEDURE — 97110 THERAPEUTIC EXERCISES: CPT

## 2022-05-06 PROCEDURE — 97112 NEUROMUSCULAR REEDUCATION: CPT

## 2022-05-06 PROCEDURE — 97530 THERAPEUTIC ACTIVITIES: CPT

## 2022-05-06 NOTE — PROGRESS NOTES
Daily Note     Today's date: 2022  Patient name: Mihir Escobedo  : 1980  MRN: [id number]  Referring provider: Angeles Emerson MD  Dx:   Encounter Diagnosis     ICD-10-CM    1  Acute right-sided low back pain with right-sided sciatica  M54 41        Start Time: 1427  Stop Time: 1506  Total time in clinic (min): 39 minutes    Subjective: Pt reported she has no pain on arrival to therapy  Pt reports she has noticed improvement with speed in navigating stairs and pain  However, she knows she still has limitations in terms of bending over to pick things up and carrying things  Objective: See treatment diary below      Assessment: Tolerated treatment well  Patient required initial cuing for paloff press  Following cuing, form for prone birddogs and fire hydrants were good with no adverse symptoms  Deadlifts were performed with improved form compared to previous session; however, with improved form, pt fatigued quicker due to feeling of weakness in her back  Due to this, repetitions were decreased and sets were increased  Continue to progress pt as tolerated next visit  Plan: Continue per plan of care        Precautions: GERD, gastroparesis, chronic constipation, adrenal adenoma, diaphragmatic hernia, BPPV, spondylosis of thoracic and lumbar region, hyperlipidemia, neck pain, pain of L hip joint, chronic midline t/s and l/s pain, decreased hearing bilateral ears, depression, anxiety, anemia      Manuals  5/6          Lumbar UPA L prn nv                                                   Neuro Re-Ed  5/6          TA iso nv 1x10 5" hold           paloff press  nv  9# 1x15 each side          SLR nv 1x10 each side           sidelying abduction nv 2x10           Fire hydrants nv  1x15 each side          supermans   nv          Prone birddogs   2x10          Ther Ex  5/6          Single knee to chest 1x10 each side; HEP 1x10 each side           Pt edu NS            Lateral walks nv gtb 4x at mirror           Leg press nv  75# 1x10, 85# 1x10          TM nv 5' bike; TM nv 6' bike; TM nv                                                 Ther Activity 4/20 4/27 5/6          deadlifts nv 2x10 8# 8# 3x5          Front press with walking   8# 5x 39'          Suitcase joyce nv 8# 2x28' 18# 3x28' walking suitcase; 1x28' 18# suitcase joyce          Gait Training 4/20 4/27 5/6                                    Modalities 4/20 4/27 5/6

## 2022-05-09 ENCOUNTER — APPOINTMENT (OUTPATIENT)
Dept: PHYSICAL THERAPY | Facility: CLINIC | Age: 42
End: 2022-05-09
Payer: COMMERCIAL

## 2022-05-11 ENCOUNTER — APPOINTMENT (OUTPATIENT)
Dept: PHYSICAL THERAPY | Facility: CLINIC | Age: 42
End: 2022-05-11
Payer: COMMERCIAL

## 2022-05-18 ENCOUNTER — OFFICE VISIT (OUTPATIENT)
Dept: PHYSICAL THERAPY | Facility: CLINIC | Age: 42
End: 2022-05-18
Payer: COMMERCIAL

## 2022-05-18 ENCOUNTER — TELEMEDICINE (OUTPATIENT)
Dept: PSYCHIATRY | Facility: CLINIC | Age: 42
End: 2022-05-18
Payer: COMMERCIAL

## 2022-05-18 DIAGNOSIS — F32.1 CURRENT MODERATE EPISODE OF MAJOR DEPRESSIVE DISORDER WITHOUT PRIOR EPISODE (HCC): ICD-10-CM

## 2022-05-18 DIAGNOSIS — M54.41 ACUTE RIGHT-SIDED LOW BACK PAIN WITH RIGHT-SIDED SCIATICA: Primary | ICD-10-CM

## 2022-05-18 DIAGNOSIS — F41.1 GENERALIZED ANXIETY DISORDER: Primary | ICD-10-CM

## 2022-05-18 PROCEDURE — 97530 THERAPEUTIC ACTIVITIES: CPT

## 2022-05-18 PROCEDURE — 99214 OFFICE O/P EST MOD 30 MIN: CPT | Performed by: PSYCHIATRY & NEUROLOGY

## 2022-05-18 PROCEDURE — 97110 THERAPEUTIC EXERCISES: CPT

## 2022-05-18 PROCEDURE — 97112 NEUROMUSCULAR REEDUCATION: CPT

## 2022-05-18 RX ORDER — BUPROPION HYDROCHLORIDE 300 MG/1
300 TABLET ORAL DAILY
Qty: 90 TABLET | Refills: 1 | Status: SHIPPED | OUTPATIENT
Start: 2022-05-18 | End: 2022-08-09 | Stop reason: SDUPTHER

## 2022-05-18 NOTE — PROGRESS NOTES
Daily Note     Today's date: 2022  Patient name: Jeff Trent  : 1980  MRN: [id number]  Referring provider: Senaida Mcburney, MD  Dx:   Encounter Diagnosis     ICD-10-CM    1  Acute right-sided low back pain with right-sided sciatica  M54 41        Start Time: 1530  Stop Time: 1618  Total time in clinic (min): 48 minutes  Subjective: Pt notes increased fatigue levels today - has been up since 3:30am today  Pt reports overall her (R)LB pain Sx are improving as well as her tolerance to acvitity (espcially stairs) but continues to experience LBP  Objective: See treatment diary below    Assessment:  Pt demonstrates good recall of exercises from previous visits + requires only initial cuing for additions today  Pt notes fatigue and appropriate challenge /c current exercise diary - provided pt education re: DOMS and progressing program as tolerated NV  Pt would benefit from continued PT  Plan: Cont /c PT POC  Progress as tolerated        Precautions: GERD, gastroparesis, chronic constipation, adrenal adenoma, diaphragmatic hernia, BPPV, spondylosis of thoracic and lumbar region, hyperlipidemia, neck pain, pain of L hip joint, chronic midline t/s and l/s pain, decreased hearing bilateral ears, depression, anxiety, anemia  Manuals          Lumbar UPA L prn nv                                                   Neuro Re-Ed          TA iso nv 1x10 5" hold           paloff press  nv  9# 1x15 each side 9R 5"x15ea         SLR nv 1x10 each side           sidelying abduction nv 2x10           Fire hydrants nv  1x15 each side 20ea         supermans   nv 3"x12         Prone birddogs   2x10 20ea         Ther Ex          Single knee to chest 1x10 each side; HEP 1x10 each side           Pt edu NS            Lateral walks nv gtb 4x at [facility]           Leg press nv  75# 1x10, 85# 1x10 S3 F6  85#x20  95#x20         TM nv 5' bike; TM nv 6' bike; TM nv 6' Ther Activity 4/20 4/27 5/6 05/18         deadlifts nv 2x10 8# 8# 3x5          Front press with walking   8# 5x 36' 8KB 30"x2 ea press, [facility]         Suitcase [facility] nv 8# 2x28' 18# 3x28' walking suitcase; 1x28' 18# suitcase marches 18KB 2' walk marching         Gait Training 4/20 4/27 5/6 05/18                                   Modalities 4/20 4/27 5/6 05/18                                       Neva Hayward, PTA

## 2022-05-18 NOTE — PSYCH
Virtual Regular Visit    Verification of patient location:    Patient is located in the following state in which I hold an active license [place]      Assessment/Plan:    Problem List Items Addressed This Visit        Other    Generalized anxiety disorder - Primary      Other Visit Diagnoses     Current moderate episode of major depressive disorder without prior episode ([facility] [address] )              Goals addressed in session: Goal 1 and Goal 2          Reason for visit is   Chief Complaint   Patient presents with    Virtual Regular Visit        Encounter provider Zaid Her MD    Provider located at [address] [address]      Recent Visits  No visits were found meeting these conditions  Showing recent visits within past 7 days and meeting all other requirements  Today's Visits  Date Type Provider Dept   05/18/22 [facility]'s visits and meeting all other requirements  Future Appointments  No visits were found meeting these conditions  Showing future appointments within next 150 days and meeting all other requirements       The patient was identified by name and date of birth  Keena Campos was informed that this is a telemedicine visit and that the visit is being conducted throughEpic Embedded and patient was informed this is a secure, HIPAA-complaint platform  She agrees to proceed     My office door was closed  No one else was in the room  She acknowledged consent and understanding of privacy and security of the video platform  The patient has agreed to participate and understands they can discontinue the visit at any time  Patient is aware this is a billable service       Subjective  See below      HPI     Past Medical History:   Diagnosis Date    Acquired hallux valgus 03/07/2016    Acute non-recurrent maxillary sinusitis 01/27/2020    Anemia  Anxiety 10/16/2019    Candida vaginitis 04/25/2019    Colon polyp 2019    COVID-19 vaccine series completed     Moderna: 2nd dose 5/21/2021    Discoloration of skin of hand 03/20/2019    Duodenitis without bleeding     Dysphagia     Fibromyalgia     Gastritis     GERD (gastroesophageal reflux disease)     Hiatal hernia     Hx of colonoscopy     Insomnia     Iron deficiency anemia secondary to inadequate dietary iron intake 10/18/2019    Memory loss     Menorrhagia with regular cycle 11/20/2014    Moderate episode of recurrent major depressive disorder ([facility] [address] ) 10/16/2019    Obesity     Oral herpes     Sodium (Na) deficiency 10/19/2020    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 myelopathy or radiculopathy     Streptococcal pharyngitis 10/19/2020    Vaginal discharge 09/01/2018    Vitamin D deficiency        Past Surgical History:   Procedure Laterality Date    APPENDECTOMY      BREAST BIOPSY Right     pt unsure when or where- ? 6 yrs ago- benign    BREAST SURGERY Right 01/08/2015    lump removed from breast    [address] COLONOSCOPY      9/26/2012; 2019-benign polyp, repeat 5 years    DILATION AND CURETTAGE OF UTERUS      Resolved:1/29/2009    ENDOMETRIAL ABLATION N/A 8/13/2020    Procedure: ABLATION ENDOMETRIAL [address];  Surgeon: Wilfrido Chao MD;  [facility];  Service: Gynecology    ESOPHAGOGASTRODUODENOSCOPY      Diagnostic ; 9/26/2012    HYSTEROSCOPY      Resolved:1/30/2009    OVARIAN CYST REMOVAL Right 2005    [place] HYSTEROSCOPY,W/ENDO BX N/A 8/13/2020    Procedure: DILATATION AND CURETTAGE (D&C) WITH HYSTEROSCOPY;  Surgeon: Wilfrido Chao MD;  [facility];  Service: Gynecology    WISDOM TOOTH EXTRACTION         Current Outpatient Medications   Medication Sig Dispense Refill    buPROPion (WELLBUTRIN XL) 300 mg 24 hr tablet Take 1 tablet (300 mg total) by mouth daily 90 tablet 1    clindamycin (CLINDAGEL) 1 % gel Apply topically 2 (two) times a day 30 g 2    Dexilant 60 MG capsule TAKE 1 CAPSULE (60 MG TOTAL) BY MOUTH EVERY 12 (TWELVE) HOURS 180 capsule 0    ergocalciferol (VITAMIN D2) 50,000 units TAKE 1 CAPSULE BY MOUTH ONE TIME PER WEEK      methocarbamol (ROBAXIN) 500 mg tablet Take 1 tablet (500 mg total) by mouth 2 (two) times a day 20 tablet 0    Omega-3 Fatty Acids (fish oil) 1,000 mg Take 1,000 mg by mouth daily        ondansetron (Zofran ODT) 4 mg disintegrating tablet Take 1 tablet (4 mg total) by mouth every 8 (eight) hours as needed for nausea or vomiting (Patient not taking: Reported on 2/21/2022 ) 180 tablet 3    pantoprazole (PROTONIX) 20 mg tablet Take 20 mg by mouth daily      predniSONE 10 mg tablet 30 mg p o  day x3 days then 20 mg 1 p o  Q day x3 days then 10 mg p o day x3 days then 5 mg p o day x 3 days 30 tablet 0    Prucalopride Succinate (Motegrity) 2 MG TABS Take 2 mg by mouth in the morning (Patient not taking: Reported on 2/21/2022 ) 90 tablet 3    Savella 50 MG TABS TAKE 1 TABLET BY MOUTH TWICE A DAY 60 tablet 0    Symbicort 160-4 5 MCG/ACT inhaler INHALE 2 PUFFS BY MOUTH 2 TIMES A DAY RINSE MOUTH AFTER USE  10 2 g 1    valACYclovir (VALTREX) 1,000 mg tablet Take 2 tablets (2,000 mg total) by mouth 2 (two) times a day for 1 day (Patient not taking: Reported on 8/6/2020) 4 tablet 0     No current facility-administered medications for this visit  Allergies   Allergen Reactions    Morphine Chest Pain and Hives     Other reaction(s): chest pain    Percocet [Oxycodone-Acetaminophen] Hives    Domperidone     Ibuprofen      Due to gastritis       Review of Systems    Video Exam    There were no vitals filed for this visit      Physical Exam     I spent 20 minutes directly with the patient during this visit  [address] [place] [address]      Name and Date of Birth:  Lucas Youngblood 38 y o  1980 MRN: [id number]    Date of Visit: May 18, 2022    Reason for Visit:  Follow-up for medication management    Subjective: The patient reports she has been overall doing well  Denies any significant change since last visit  Denies any significant depression or anxiety  Reports she stills worries about her son who has been having difficulty in his relationship with his wife but the patient has been coping with the stress well  She also reports her recent review at work was not very good and that also made her feel upset but she handled the situation well  Denied any SI or HI  Reports sleep, appetite and energy level has been good  Reports good support from her   Her other son is overall doing well and expecting a child  The patient reports compliant with the medication and denies any side effect  Denies any medical concern        Review Of Systems:      Constitutional negative   ENT negative   Cardiovascular negative   Respiratory negative   Gastrointestinal negative   Genitourinary negative   Musculoskeletal negative   Integumentary negative   Neurological negative   Endocrine negative   Other Symptoms none       Alcohol/Substance Abuse:  Denies        Past Medical History:    Past Medical History:   Diagnosis Date    Acquired hallux valgus 03/07/2016    Acute non-recurrent maxillary sinusitis 01/27/2020    Anemia     Anxiety 10/16/2019    Candida vaginitis 04/25/2019    Colon polyp 2019    COVID-19 vaccine series completed     Moderna: 2nd dose 5/21/2021    Discoloration of skin of hand 03/20/2019    Duodenitis without bleeding     Dysphagia     Fibromyalgia     Gastritis     GERD (gastroesophageal reflux disease)     Hiatal hernia     Hx of colonoscopy     Insomnia     Iron deficiency anemia secondary to inadequate dietary iron intake 10/18/2019    Memory loss     Menorrhagia with regular cycle 11/20/2014    Moderate episode of recurrent major depressive disorder ([address] ) 10/16/2019    Obesity     Oral herpes     Sodium (Na) deficiency 10/19/2020    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 myelopathy or radiculopathy     Streptococcal pharyngitis 10/19/2020    Vaginal discharge 09/01/2018    Vitamin D deficiency      Past Medical History Pertinent Negatives:   Diagnosis Date Noted    Abnormal Pap smear of cervix 04/19/2018 2/2016-wnl; 11/2020-wnl, HRHPV neg    Allergic 08/23/2018    Clotting disorder ([facility] [address] ) 08/23/2018    Dementia ([facility] [address] ) 08/23/2018    Diverticulitis of colon 08/23/2018    Eating disorder 08/23/2018    History of transfusion 08/06/2020    HL (hearing loss) 08/23/2018    Infectious viral hepatitis 08/23/2018    Inflammatory bowel disease 08/23/2018    Osteoporosis 08/23/2018    Otitis media 08/23/2018    Scoliosis 08/23/2018    Shingles 08/23/2018    Substance abuse ([facility] [address] ) 08/23/2018    Urinary tract infection 08/23/2018    Visual impairment 08/23/2018     Past Surgical History:   Procedure Laterality Date    APPENDECTOMY      BREAST BIOPSY Right     pt unsure when or where- ? 6 yrs ago- benign    BREAST SURGERY Right 01/08/2015    lump removed from breast    [address] COLONOSCOPY      9/26/2012; 2019-benign polyp, repeat 5 years    DILATION AND CURETTAGE OF UTERUS      Resolved:1/29/2009    ENDOMETRIAL ABLATION N/A 8/13/2020    Procedure: ABLATION ENDOMETRIAL Darroll Amour;  Surgeon: Ronold Cowden, MD;  [facility];  Service: Gynecology    ESOPHAGOGASTRODUODENOSCOPY      Diagnostic ; 9/26/2012    HYSTEROSCOPY      Resolved:1/30/2009    OVARIAN CYST REMOVAL Right 2005    [place] HYSTEROSCOPY,W/ENDO BX N/A 8/13/2020    Procedure: DILATATION AND CURETTAGE (D&C) WITH HYSTEROSCOPY;  Surgeon: Ronold Cowden, MD;  [facility];  Service: Gynecology    WISDOM TOOTH EXTRACTION       Allergies   Allergen Reactions    Morphine Chest Pain and Hives     Other reaction(s): chest pain    Percocet [Oxycodone-Acetaminophen] Hives    Domperidone     Ibuprofen      Due to gastritis       Current Medications:       Current Outpatient Medications:     buPROPion (WELLBUTRIN XL) 300 mg 24 hr tablet, Take 1 tablet (300 mg total) by mouth daily, Disp: 90 tablet, Rfl: 1    clindamycin (CLINDAGEL) 1 % gel, Apply topically 2 (two) times a day, Disp: 30 g, Rfl: 2    Dexilant 60 MG capsule, TAKE 1 CAPSULE (60 MG TOTAL) BY MOUTH EVERY 12 (TWELVE) HOURS, Disp: 180 capsule, Rfl: 0    ergocalciferol (VITAMIN D2) 50,000 units, TAKE 1 CAPSULE BY MOUTH ONE TIME PER WEEK, Disp: , Rfl:     methocarbamol (ROBAXIN) 500 mg tablet, Take 1 tablet (500 mg total) by mouth 2 (two) times a day, Disp: 20 tablet, Rfl: 0    Omega-3 Fatty Acids (fish oil) 1,000 mg, Take 1,000 mg by mouth daily  , Disp: , Rfl:     ondansetron (Zofran ODT) 4 mg disintegrating tablet, Take 1 tablet (4 mg total) by mouth every 8 (eight) hours as needed for nausea or vomiting (Patient not taking: Reported on 2/21/2022 ), Disp: 180 tablet, Rfl: 3    pantoprazole (PROTONIX) 20 mg tablet, Take 20 mg by mouth daily, Disp: , Rfl:     predniSONE 10 mg tablet, 30 mg p o  day x3 days then 20 mg 1 p o  Q day x3 days then 10 mg p o day x3 days then 5 mg p o day x 3 days, Disp: 30 tablet, Rfl: 0    Prucalopride Succinate (Motegrity) 2 MG TABS, Take 2 mg by mouth in the morning (Patient not taking: Reported on 2/21/2022 ), Disp: 90 tablet, Rfl: 3    Savella 50 MG TABS, TAKE 1 TABLET BY MOUTH TWICE A DAY, Disp: 60 tablet, Rfl: 0    Symbicort 160-4 5 MCG/ACT inhaler, INHALE 2 PUFFS BY MOUTH 2 TIMES A DAY RINSE MOUTH AFTER USE , Disp: 10 2 g, Rfl: 1    valACYclovir (VALTREX) 1,000 mg tablet, Take 2 tablets (2,000 mg total) by mouth 2 (two) times a day for 1 day (Patient not taking: Reported on 8/6/2020), Disp: 4 tablet, Rfl: 0       History Review:  The following portions of the patient's history were reviewed and updated as appropriate: allergies, current medications, past family history, past medical history, past social history, past surgical history and problem list          OBJECTIVE:     Vital signs in last 24 hours: There were no vitals filed for this visit  Mental Status Evaluation:    Appearance age appropriate, casually dressed   Behavior cooperative, calm   Speech normal rate, normal volume, normal pitch   Mood normal   Affect normal range and intensity, appropriate   Thought Processes organized, goal directed   Associations intact associations   Thought Content no overt delusions   Perceptual Disturbances: no auditory hallucinations, no visual hallucinations   Abnormal Thoughts  Risk Potential Suicidal ideation - None  Homicidal ideation - None  Potential for aggression - No   Orientation oriented to person, place, time/date and situation   Memory recent and remote memory grossly intact   Consciousness alert and awake   Attention Span Concentration Span attention span and concentration are age appropriate   Intellect appears to be of average intelligence   Insight intact   Judgement intact   Muscle Strength and  Gait normal muscle strength and normal muscle tone, normal gait and normal balance   Motor activity no abnormal movements   Language No difficulty naming common object   Fund of Knowledge adequate knowledge of current events  adequate fund of knowledge regarding past history  adequate fund of knowledge regarding vocabulary    Pain none   Pain Scale 0       Laboratory Results: I have personally reviewed all pertinent laboratory/tests results  Assessment/Plan:  Patient overall has been doing well though there has been few stressors in her life  Manage it well  Plan is to continue with the current medication with no changes  She was advised to call me if there is any concern and to call crisis or visit nearby ER in case of any emergency  The patient agrees with the plan    Follow-up in 3 months or sooner if needed      Diagnoses and all orders for this visit:    Generalized anxiety disorder    Current moderate episode of major depressive disorder without prior episode ([facility] [address] )          Treatment Recommendations/Precautions:      Aware of 24 hour and weekend coverage for urgent situations accessed by calling [facility] main practice number    Risks/Benefits      Risks, Benefits And Possible Side Effects Of Medications:    Risks, benefits, and possible side effects of medications explained to [facility] and she verbalizes understanding and agreement for treatment  Controlled Medication Discussion:     Not applicable    Psychotherapy Provided:     Individual psychotherapy provided: No     Treatment Plan;    Completed and signed during the session: Not applicable - Treatment Plan not due at this session    Kodi Ansari MD 05/18/22  VIRTUAL VISIT DISCLAIMER    Mahendra Florez verbally agrees to participate in [employer]  Pt is aware that [employer] could be limited without vital signs or the ability to perform a full hands-on physical exam  Lianne Ramirez understands she or the provider may request at any time to terminate the video visit and request the patient to seek care or treatment in person

## 2022-05-20 ENCOUNTER — OFFICE VISIT (OUTPATIENT)
Dept: PHYSICAL THERAPY | Facility: CLINIC | Age: 42
End: 2022-05-20
Payer: COMMERCIAL

## 2022-05-20 DIAGNOSIS — M54.41 ACUTE RIGHT-SIDED LOW BACK PAIN WITH RIGHT-SIDED SCIATICA: Primary | ICD-10-CM

## 2022-05-20 PROCEDURE — 97112 NEUROMUSCULAR REEDUCATION: CPT

## 2022-05-20 PROCEDURE — 97530 THERAPEUTIC ACTIVITIES: CPT

## 2022-05-20 PROCEDURE — 97110 THERAPEUTIC EXERCISES: CPT

## 2022-05-20 NOTE — PROGRESS NOTES
Daily Note     Today's date: 2022  Patient name: Mino Jacob  : 1980  MRN: [id number]  Referring provider: Shantel Cobb MD  Dx:   Encounter Diagnosis     ICD-10-CM    1  Acute right-sided low back pain with right-sided sciatica  M54 41        Start Time: 1406  Stop Time: 1445  Total time in clinic (min): 39 minutes    Subjective: Pt reports she's doing well with no pain that's noticeable throughout the day  She only notices the pain when she is laying down at night and turning over in bed  She also notices she is able to  her kids/grandkids for short periods of time and do small loads of laundry carrying the basket  Objective: See treatment diary below      Assessment: Tolerated treatment well  Patient required initial cuing for bridges and SLR this session  Tactile cuing for SLR was provided to avoid lumbar lordosis and increase core engagement  Some R sided back discomfort was noted with SLR this session  SL knee to chest and prone press up was trialed to improve pain; no improvement was noted with either  TA isometrics were performed following with bolster; progress pt to perform exercise without bolster  Deadlifts flared up pt's radiating symptoms into the LLE; due to this, exercise was terminated  Continue to progress pt as tolerated next visit  Monitor LLE symptoms and test directional preference as needed  Plan: Continue per plan of care        Precautions: GERD, gastroparesis, chronic constipation, adrenal adenoma, diaphragmatic hernia, BPPV, spondylosis of thoracic and lumbar region, hyperlipidemia, neck pain, pain of L hip joint, chronic midline t/s and l/s pain, decreased hearing bilateral ears, depression, anxiety, anemia  Manuals         Lumbar UPA L prn nv                                                   Neuro Re-Ed         TA iso nv 1x10 5" hold   2x10        paloff press  nv  9# 1x15 each side 9R 5"x15ea 10R 1x15 each SLR nv 1x10 each side   15x each side        sidelying abduction nv 2x10           bridges     2x10        Fire hydrants nv  1x15 each side 20ea         supermans   nv 3"x12         Prone birddogs   2x10 20ea         Ther Ex 4/20 4/27 5/6 05/18 5/20        Single knee to chest 1x10 each side; HEP 1x10 each side           Pt edu NS            Lateral walks nv gtb 4x at [place]   gtb 4x at [facility] nv  75# 1x10, 85# 1x10 S3 F6  85#x20  95#x20         TM nv 5' bike; TM nv 6' bike; TM nv 6'  6'                                               Ther Activity 4/20 4/27 5/6 05/18 5/20        deadlifts nv 2x10 8# 8# 3x5  8# 2x10        Front press with walking   8# 5x 36' 8KB 30"x2 ea press, [facility]         Suitcase marches nv 8# 2x28' 18# 3x28' walking suitcase; 1x28' 18# suitcase joyce 18KB 2' walk marching 18# 2x30'        Gait Training 4/20 4/27 5/6 05/18 5/20                                  Modalities 4/20 4/27 5/6 05/18 5/20

## 2022-05-25 ENCOUNTER — APPOINTMENT (OUTPATIENT)
Dept: PHYSICAL THERAPY | Facility: CLINIC | Age: 42
End: 2022-05-25
Payer: COMMERCIAL

## 2022-05-27 ENCOUNTER — APPOINTMENT (OUTPATIENT)
Dept: PHYSICAL THERAPY | Facility: CLINIC | Age: 42
End: 2022-05-27
Payer: COMMERCIAL

## 2022-06-04 ENCOUNTER — APPOINTMENT (OUTPATIENT)
Dept: LAB | Facility: HOSPITAL | Age: 42
End: 2022-06-04
Payer: COMMERCIAL

## 2022-06-04 DIAGNOSIS — D50.8 IRON DEFICIENCY ANEMIA SECONDARY TO INADEQUATE DIETARY IRON INTAKE: ICD-10-CM

## 2022-06-04 DIAGNOSIS — D75.839 THROMBOCYTOSIS: ICD-10-CM

## 2022-06-04 LAB
ALBUMIN SERPL BCP-MCNC: 3.7 G/DL (ref 3.5–5)
ALP SERPL-CCNC: 77 U/L (ref 46–116)
ALT SERPL W P-5'-P-CCNC: 27 U/L (ref 12–78)
ANION GAP SERPL CALCULATED.3IONS-SCNC: 7 MMOL/L (ref 4–13)
AST SERPL W P-5'-P-CCNC: 14 U/L (ref 5–45)
BASOPHILS # BLD AUTO: 0.06 THOUSANDS/ΜL (ref 0–0.1)
BASOPHILS NFR BLD AUTO: 1 % (ref 0–1)
BILIRUB SERPL-MCNC: 0.55 MG/DL (ref 0.2–1)
BUN SERPL-MCNC: 7 MG/DL (ref 5–25)
CALCIUM SERPL-MCNC: 9 MG/DL (ref 8.3–10.1)
CHLORIDE SERPL-SCNC: 104 MMOL/L (ref 100–108)
CO2 SERPL-SCNC: 29 MMOL/L (ref 21–32)
CREAT SERPL-MCNC: 0.81 MG/DL (ref 0.6–1.3)
CRP SERPL QL: 3.1 MG/L
EOSINOPHIL # BLD AUTO: 0.1 THOUSAND/ΜL (ref 0–0.61)
EOSINOPHIL NFR BLD AUTO: 1 % (ref 0–6)
ERYTHROCYTE [DISTWIDTH] IN BLOOD BY AUTOMATED COUNT: 12.3 % (ref 11.6–15.1)
ERYTHROCYTE [SEDIMENTATION RATE] IN BLOOD: 11 MM/HOUR (ref 0–19)
FERRITIN SERPL-MCNC: 82 NG/ML (ref 8–388)
GFR SERPL CREATININE-BSD FRML MDRD: 88 ML/MIN/1.73SQ M
GLUCOSE P FAST SERPL-MCNC: 104 MG/DL (ref 65–99)
HCT VFR BLD AUTO: 37.7 % (ref 34.8–46.1)
HGB BLD-MCNC: 12.3 G/DL (ref 11.5–15.4)
IMM GRANULOCYTES # BLD AUTO: 0.05 THOUSAND/UL (ref 0–0.2)
IMM GRANULOCYTES NFR BLD AUTO: 1 % (ref 0–2)
IRON SATN MFR SERPL: 43 % (ref 15–50)
IRON SERPL-MCNC: 141 UG/DL (ref 50–170)
LYMPHOCYTES # BLD AUTO: 1.96 THOUSANDS/ΜL (ref 0.6–4.47)
LYMPHOCYTES NFR BLD AUTO: 27 % (ref 14–44)
MCH RBC QN AUTO: 30.1 PG (ref 26.8–34.3)
MCHC RBC AUTO-ENTMCNC: 32.6 G/DL (ref 31.4–37.4)
MCV RBC AUTO: 92 FL (ref 82–98)
MONOCYTES # BLD AUTO: 0.61 THOUSAND/ΜL (ref 0.17–1.22)
MONOCYTES NFR BLD AUTO: 8 % (ref 4–12)
NEUTROPHILS # BLD AUTO: 4.49 THOUSANDS/ΜL (ref 1.85–7.62)
NEUTS SEG NFR BLD AUTO: 62 % (ref 43–75)
NRBC BLD AUTO-RTO: 0 /100 WBCS
PLATELET # BLD AUTO: 343 THOUSANDS/UL (ref 149–390)
PMV BLD AUTO: 9.7 FL (ref 8.9–12.7)
POTASSIUM SERPL-SCNC: 3.9 MMOL/L (ref 3.5–5.3)
PROT SERPL-MCNC: 7.4 G/DL (ref 6.4–8.2)
RBC # BLD AUTO: 4.08 MILLION/UL (ref 3.81–5.12)
SODIUM SERPL-SCNC: 140 MMOL/L (ref 136–145)
TIBC SERPL-MCNC: 328 UG/DL (ref 250–450)
VIT B12 SERPL-MCNC: 391 PG/ML (ref 100–900)
WBC # BLD AUTO: 7.27 THOUSAND/UL (ref 4.31–10.16)

## 2022-06-04 PROCEDURE — 82728 ASSAY OF FERRITIN: CPT

## 2022-06-04 PROCEDURE — 83550 IRON BINDING TEST: CPT

## 2022-06-04 PROCEDURE — 83540 ASSAY OF IRON: CPT

## 2022-06-04 PROCEDURE — 85652 RBC SED RATE AUTOMATED: CPT

## 2022-06-04 PROCEDURE — 80053 COMPREHEN METABOLIC PANEL: CPT

## 2022-06-04 PROCEDURE — 82607 VITAMIN B-12: CPT

## 2022-06-04 PROCEDURE — 86140 C-REACTIVE PROTEIN: CPT

## 2022-06-04 PROCEDURE — 85025 COMPLETE CBC W/AUTO DIFF WBC: CPT

## 2022-06-04 PROCEDURE — 36415 COLL VENOUS BLD VENIPUNCTURE: CPT

## 2022-06-08 ENCOUNTER — OFFICE VISIT (OUTPATIENT)
Dept: HEMATOLOGY ONCOLOGY | Facility: CLINIC | Age: 42
End: 2022-06-08
Payer: COMMERCIAL

## 2022-06-08 VITALS
HEIGHT: 65 IN | TEMPERATURE: 98.7 F | DIASTOLIC BLOOD PRESSURE: 66 MMHG | WEIGHT: 236.6 LBS | SYSTOLIC BLOOD PRESSURE: 102 MMHG | HEART RATE: 65 BPM | BODY MASS INDEX: 39.42 KG/M2 | OXYGEN SATURATION: 99 % | RESPIRATION RATE: 18 BRPM

## 2022-06-08 DIAGNOSIS — D50.8 IRON DEFICIENCY ANEMIA SECONDARY TO INADEQUATE DIETARY IRON INTAKE: Primary | ICD-10-CM

## 2022-06-08 DIAGNOSIS — E53.8 B12 DEFICIENCY: ICD-10-CM

## 2022-06-08 DIAGNOSIS — E55.9 VITAMIN D DEFICIENCY: ICD-10-CM

## 2022-06-08 PROCEDURE — 99214 OFFICE O/P EST MOD 30 MIN: CPT | Performed by: NURSE PRACTITIONER

## 2022-06-08 PROCEDURE — 1036F TOBACCO NON-USER: CPT | Performed by: NURSE PRACTITIONER

## 2022-06-08 PROCEDURE — 3008F BODY MASS INDEX DOCD: CPT | Performed by: NURSE PRACTITIONER

## 2022-06-08 NOTE — PROGRESS NOTES
Hematology/Oncology Outpatient Follow-up  Henny Denton 38 y o  female 1980 [id number]    Date:  6/8/2022      Assessment and Plan:  1  Iron deficiency anemia secondary to inadequate dietary iron intake  Patient does not seem to be anemic or iron deficient at this point in time  Will continue to monitor her laboratory studies on a regular basis since she has history of malabsorption with her gastroparesis/chronic PPI use  Quest she follow up again with repeat labs in 6 months or sooner should the need arise     - CBC and differential; Future  - Comprehensive metabolic panel; Future  - C-reactive protein; Future  - Sedimentation rate, automated; Future  - Vitamin B12; Future  - Iron Panel (Includes Ferritin, Iron Sat%, Iron, and TIBC); Future  - Ferritin; Future  - Folate; Future    2  B12 deficiency  May be the reason for her worsening fatigue  Patient was advised to start sublingual over-the-counter vitamin B12 supplements minimum dose 500 mcg daily  Will repeat her vitamin B12 level before her next office visit  - Vitamin B12; Future    3  Vitamin D deficiency  Will check patient's vitamin-D level see if she needs high-dose vitamin-D  She mentions that she often feels more fatigued when her vitamin-D is low  Also recommended starting over-the-counter vitamin-D supplement 2000 units daily if she is not already doing so  - Vitamin D 25 hydroxy;  Future      HPI:  Maurilio Navas a 35 y o  female with a history of gastritis, gastroparesis and chronic anemia due to iron deficiency which was treated with iron IV on multiple occasions since 2016   She states that her menses have been heavy due to fibroids; typically lasting 7 days   She has established GI care and had upper and lower endoscopy done in July 2019  The colonoscopy showed large external and internal hemorrhoids and 1 polyp that was removed otherwise normal upper endoscopy was normal   She had a gastric emptying study done recently on 09/24/2019 which confirmed that she does in fact have gastroparesis      She stated that she was found to have hepatomegaly just recently on an ultrasound May2020   She also had an MRI of the abdomen on 07/16/2020 which showed hepatomegaly measuring 21 cm in greatest dimension   No other pathology was found   She had uterine ablation August 2020 which significantly improved her menstrual bleeding       Interval history:  Patient presents today for a follow-up visit accompanied by her   She had 2 additional doses of IV Venofer after her last office visit December 2021  She mentions today that she is feeling more fatigued than usual is wondering if her vitamin-D is low  Continues to have chronic nausea which may be due to her gastroparesis  Denies bleeding from any site other than her menstrual bleeding which she states is light/spotting since her ablation  Her most recent laboratory studies from 06/04/2022 showed normal CBC hemoglobin 12 3  Glucose 104 otherwise normal CMP  C-reactive protein is slightly elevated 3 1 with normal sed rate  She is not currently iron deficient iron saturation 43% with ferritin 82  Her vitamin B12 is lower than average 391  ROS: Review of Systems   Constitutional: Positive for fatigue  Negative for activity change, appetite change, chills, fever and unexpected weight change  HENT: Negative for congestion, mouth sores, nosebleeds, sore throat and trouble swallowing  Eyes: Negative  Respiratory: Negative for cough, chest tightness and shortness of breath  Cardiovascular: Negative for chest pain, palpitations and leg swelling  Gastrointestinal: Positive for nausea  Negative for abdominal distention, abdominal pain, blood in stool, constipation, diarrhea and vomiting  Genitourinary: Negative for difficulty urinating, dysuria, frequency, hematuria and urgency  Musculoskeletal: Negative for arthralgias, back pain, gait problem and joint swelling     Skin: Negative for color change, pallor and rash  Neurological: Positive for dizziness and numbness  Negative for weakness, light-headedness and headaches  Hematological: Negative for adenopathy  Does not bruise/bleed easily  Psychiatric/Behavioral: Negative for dysphoric mood and sleep disturbance  The patient is not nervous/anxious          Past Medical History:   Diagnosis Date    Acquired hallux valgus 03/07/2016    Acute non-recurrent maxillary sinusitis 01/27/2020    Anemia     Anxiety 10/16/2019    Candida vaginitis 04/25/2019    Colon polyp 2019    COVID-19 vaccine series completed     Moderna: 2nd dose 5/21/2021    Discoloration of skin of hand 03/20/2019    Duodenitis without bleeding     Dysphagia     Fibromyalgia     Gastritis     GERD (gastroesophageal reflux disease)     Hiatal hernia     Hx of colonoscopy     Insomnia     Iron deficiency anemia secondary to inadequate dietary iron intake 10/18/2019    Memory loss     Menorrhagia with regular cycle 11/20/2014    Moderate episode of recurrent major depressive disorder ([facility] [address] ) 10/16/2019    Obesity     Oral herpes     Sodium (Na) deficiency 10/19/2020    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 myelopathy or radiculopathy     Streptococcal pharyngitis 10/19/2020    Vaginal discharge 09/01/2018    Vitamin D deficiency        Past Surgical History:   Procedure Laterality Date    APPENDECTOMY      BREAST BIOPSY Right     pt unsure when or where- ? 6 yrs ago- benign    BREAST SURGERY Right 01/08/2015    lump removed from breast    [facility] 61 COLONOSCOPY      9/26/2012; 2019-benign polyp, repeat 5 years    DILATION AND CURETTAGE OF UTERUS      Resolved:1/29/2009    ENDOMETRIAL ABLATION N/A 8/13/2020    Procedure: ABLATION ENDOMETRIAL Coty Roe;  Surgeon: Luz Carlson MD;  Location: [facility];  Service: Gynecology    ESOPHAGOGASTRODUODENOSCOPY      Diagnostic ; 9/26/2012    HYSTEROSCOPY Resolved:1/30/2009    OVARIAN CYST REMOVAL Right 2005    DC HYSTEROSCOPY,W/ENDO BX N/A 8/13/2020    Procedure: DILATATION AND CURETTAGE (D&C) WITH HYSTEROSCOPY;  Surgeon: Murali Lopez MD;  Location: [facility];  Service: Gynecology    WISDOM TOOTH EXTRACTION         Social History     Socioeconomic History    Marital status: /Civil Union     Spouse name: Christiano Sharma Number of children: 4    Years of education: high school    Highest education level: None   Occupational History    Occupation: cash    Tobacco Use    Smoking status: Never Smoker    Smokeless tobacco: Never Used    Tobacco comment: No passive smoke exposure   Vaping Use    Vaping Use: Never used   Substance and Sexual Activity    Alcohol use: No    Drug use: No    Sexual activity: Yes     Partners: Male     Birth control/protection: Male Sterilization     Comment: life time partners:1   Other Topics Concern    None   Social History Narrative    Yarsani Uatsdin    Accepts blood products            Exercise: 0x/week    Calcium: 1 c almond milk daily; 1 cheese twice weekly, 1 yogurt 4x/week         caffeine occasionally     Social Determinants of Health     Financial Resource Strain: Not on file   Food Insecurity: Not on file   Transportation Needs: Not on file   Physical Activity: Not on file   Stress: Not on file   Social Connections: Not on file   Intimate Partner Violence: Not on file   Housing Stability: Not on file       Family History   Problem Relation Age of Onset    Other Mother         uterine leiomyoma    Diabetes type II Mother         Mellitus    Hypothyroidism Mother     Colon cancer Father 54        Stage IV    Diabetes Father         Type II Mellitus    Hypertension Father     Anxiety disorder Brother     Depression Brother     Diabetes Brother         Mellitus    Asthma Brother     Hypertension Brother     Multiple sclerosis Sister     Asthma Sister     Hypothyroidism Sister    [employer] Hypertension Maternal Grandmother     Diabetes Maternal Grandmother     Other Maternal Grandmother         Vulvar cancer    Schizophrenia Maternal Grandfather     Hypertension Maternal Grandfather     Thyroid cancer Paternal Grandmother 80    Diabetes Paternal Grandfather 43         due to complications of DM    Breast cancer Cousin 46    Ovarian cancer Neg Hx        Allergies   Allergen Reactions    Morphine Chest Pain and Hives     Other reaction(s): chest pain    Percocet [Oxycodone-Acetaminophen] Hives    Domperidone     Ibuprofen      Due to gastritis         Current Outpatient Medications:     buPROPion (WELLBUTRIN XL) 300 mg 24 hr tablet, Take 1 tablet (300 mg total) by mouth in the morning , Disp: 90 tablet, Rfl: 1    clindamycin (CLINDAGEL) 1 % gel, Apply topically 2 (two) times a day, Disp: 30 g, Rfl: 2    pantoprazole (PROTONIX) 20 mg tablet, Take 20 mg by mouth daily, Disp: , Rfl:     Savella 50 MG TABS, TAKE 1 TABLET BY MOUTH TWICE A DAY, Disp: 60 tablet, Rfl: 0    Dexilant 60 MG capsule, TAKE 1 CAPSULE (60 MG TOTAL) BY MOUTH EVERY 12 (TWELVE) HOURS (Patient not taking: Reported on 2022), Disp: 180 capsule, Rfl: 0    ergocalciferol (VITAMIN D2) 50,000 units, TAKE 1 CAPSULE BY MOUTH ONE TIME PER WEEK (Patient not taking: Reported on 2022), Disp: , Rfl:     methocarbamol (ROBAXIN) 500 mg tablet, Take 1 tablet (500 mg total) by mouth 2 (two) times a day (Patient not taking: No sig reported), Disp: 20 tablet, Rfl: 0    Omega-3 Fatty Acids (fish oil) 1,000 mg, Take 1,000 mg by mouth daily   (Patient not taking: Reported on 2022), Disp: , Rfl:     ondansetron (Zofran ODT) 4 mg disintegrating tablet, Take 1 tablet (4 mg total) by mouth every 8 (eight) hours as needed for nausea or vomiting (Patient not taking: Reported on 2022 ), Disp: 180 tablet, Rfl: 3    predniSONE 10 mg tablet, 30 mg p o  day x3 days then 20 mg 1 p o   Q day x3 days then 10 mg p o day x3 days then 5 mg p o day x 3 days (Patient not taking: No sig reported), Disp: 30 tablet, Rfl: 0    Prucalopride Succinate (Motegrity) 2 MG TABS, Take 2 mg by mouth in the morning (Patient not taking: No sig reported), Disp: 90 tablet, Rfl: 3    Symbicort 160-4 5 MCG/ACT inhaler, INHALE 2 PUFFS BY MOUTH 2 TIMES A DAY RINSE MOUTH AFTER USE  (Patient not taking: No sig reported), Disp: 10 2 g, Rfl: 1    valACYclovir (VALTREX) 1,000 mg tablet, Take 2 tablets (2,000 mg total) by mouth 2 (two) times a day for 1 day (Patient not taking: Reported on 8/6/2020), Disp: 4 tablet, Rfl: 0      Physical Exam:  /66 (BP Location: Left arm, Patient Position: Sitting, Cuff Size: Large)   Pulse 65   Temp 98 7 °F (37 1 °C) (Tympanic)   Resp 18   Ht 5' 5" (1 651 m)   Wt 107 kg (236 lb 9 6 oz)   LMP 05/22/2022 (Exact Date)   SpO2 99%   BMI 39 37 kg/m²     Physical Exam  Vitals reviewed  Constitutional:       General: She is not in acute distress  Appearance: She is well-developed  She is obese  She is not diaphoretic  HENT:      Head: Normocephalic and atraumatic  Mouth/Throat:      Pharynx: No oropharyngeal exudate  Eyes:      General: No scleral icterus  Conjunctiva/sclera: Conjunctivae normal       Pupils: Pupils are equal, round, and reactive to light  Neck:      Thyroid: No thyromegaly  Cardiovascular:      Rate and Rhythm: Normal rate and regular rhythm  Heart sounds: Normal heart sounds  No murmur heard  Pulmonary:      Effort: Pulmonary effort is normal  No respiratory distress  Breath sounds: Normal breath sounds  Abdominal:      General: Bowel sounds are normal  There is no distension  Palpations: Abdomen is soft  Tenderness: There is no abdominal tenderness  Musculoskeletal:         General: Normal range of motion  Cervical back: Normal range of motion and neck supple  Lymphadenopathy:      Cervical: No cervical adenopathy     Skin:     General: Skin is warm and dry       Coloration: Skin is pale  Findings: No erythema or rash  Neurological:      Mental Status: She is alert and oriented to person, place, and time  Psychiatric:         Mood and Affect: Affect is blunt  Behavior: Behavior normal          Thought Content: Thought content normal          Judgment: Judgment normal            Labs:  Lab Results   Component Value Date    WBC 7 27 06/04/2022    HGB 12 3 06/04/2022    HCT 37 7 06/04/2022    MCV 92 06/04/2022     06/04/2022     Lab Results   Component Value Date    K 3 9 06/04/2022     06/04/2022    CO2 29 06/04/2022    BUN 7 06/04/2022    CREATININE 0 81 06/04/2022    GLUF 104 (H) 06/04/2022    CALCIUM 9 0 06/04/2022    AST 14 06/04/2022    ALT 27 06/04/2022    ALKPHOS 77 06/04/2022    EGFR 88 06/04/2022       Patient voiced understanding and agreement in the above discussion  Aware to contact our office with questions/symptoms in the interim  This note has been generated by voice recognition software system  Therefore, there may be spelling, grammar, and or syntax errors  Please contact if questions arise

## 2022-07-13 ENCOUNTER — TELEPHONE (OUTPATIENT)
Dept: HEMATOLOGY ONCOLOGY | Facility: CLINIC | Age: 42
End: 2022-07-13

## 2022-08-02 NOTE — TELEPHONE ENCOUNTER
ED EAR CERUMEN REMOVAL    Date/Time: 8/2/2022 11:30 AM  Performed by: Supriya Rey CMA  Authorized by: Brian Ballesteros MD     Consent:     Consent obtained:  Verbal    Consent given by:  Patient    Risks, benefits, and alternatives were discussed: yes      Risks discussed:  Bleeding, infection, pain, dizziness, incomplete removal and TM perforation    Alternatives discussed:  Observation  Universal protocol:     Procedure explained and questions answered to patient or proxy's satisfaction: yes      Relevant documents present and verified: yes      Test results available: no      Imaging studies available: no      Required blood products, implants, devices, and special equipment available: yes      Site/side marked: yes      Immediately prior to procedure, a time out was called: yes      Patient identity confirmed:  Verbally with patient  Procedure details:     Location:  L ear and R ear    Procedure type: irrigation      Procedure outcomes: cerumen removed    Post-procedure details:     Inspection:  Ear canal clear    Hearing quality:  Improved    Procedure completion:  Tolerated well, no immediate complications           Spoke to patient and she will deal with the discomfort until her procedure  She doesn't want to try anymore new pills

## 2022-08-05 ENCOUNTER — HOSPITAL ENCOUNTER (OUTPATIENT)
Dept: MAMMOGRAPHY | Facility: CLINIC | Age: 42
Discharge: HOME | End: 2022-08-05
Payer: COMMERCIAL

## 2022-08-05 VITALS — HEIGHT: 65 IN | WEIGHT: 236 LBS | BODY MASS INDEX: 39.32 KG/M2

## 2022-08-05 DIAGNOSIS — Z12.31 ENCOUNTER FOR SCREENING MAMMOGRAM FOR MALIGNANT NEOPLASM OF BREAST: ICD-10-CM

## 2022-08-05 PROCEDURE — 77067 SCR MAMMO BI INCL CAD: CPT

## 2022-08-05 PROCEDURE — 77063 BREAST TOMOSYNTHESIS BI: CPT

## 2022-08-09 ENCOUNTER — TELEMEDICINE (OUTPATIENT)
Dept: PSYCHIATRY | Facility: CLINIC | Age: 42
End: 2022-08-09
Payer: COMMERCIAL

## 2022-08-09 DIAGNOSIS — F32.1 CURRENT MODERATE EPISODE OF MAJOR DEPRESSIVE DISORDER WITHOUT PRIOR EPISODE (HCC): ICD-10-CM

## 2022-08-09 PROCEDURE — 99214 OFFICE O/P EST MOD 30 MIN: CPT | Performed by: PSYCHIATRY & NEUROLOGY

## 2022-08-09 RX ORDER — BUPROPION HYDROCHLORIDE 300 MG/1
300 TABLET ORAL DAILY
Qty: 90 TABLET | Refills: 1 | Status: SHIPPED | OUTPATIENT
Start: 2022-08-09 | End: 2023-02-05

## 2022-08-09 NOTE — PSYCH
Virtual Regular Visit    Verification of patient location:    Patient is located in the following state in which I hold an active license [place]      Assessment/Plan:    Problem List Items Addressed This Visit    None     Visit Diagnoses     Current moderate episode of major depressive disorder without prior episode ([address] )        Relevant Medications    buPROPion (WELLBUTRIN XL) 300 mg 24 hr tablet          Goals addressed in session: Goal 1 and Goal 2          Reason for visit is   Chief Complaint   Patient presents with    Virtual Regular Visit        Encounter provider Ivan Akers MD    Provider located at [address]      Recent Visits  No visits were found meeting these conditions  Showing recent visits within past 7 days and meeting all other requirements  Today's Visits  Date Type Provider Dept   08/09/22 Telemedicine Ivan Akers MD [facility] 70 today's visits and meeting all other requirements  Future Appointments  No visits were found meeting these conditions  Showing future appointments within next 150 days and meeting all other requirements       The patient was identified by name and date of birth  Kalia Up was informed that this is a telemedicine visit and that the visit is being conducted throughEpic Embedded and patient was informed this is a secure, HIPAA-complaint platform  She agrees to proceed     My office door was closed  No one else was in the room  She acknowledged consent and understanding of privacy and security of the video platform  The patient has agreed to participate and understands they can discontinue the visit at any time  Patient is aware this is a billable service       Subjective   See below      HPI     Past Medical History:   Diagnosis Date    Acquired hallux valgus 03/07/2016    Acute non-recurrent maxillary sinusitis 01/27/2020    Anemia     Anxiety 10/16/2019    Candida vaginitis 04/25/2019    Colon polyp 2019    COVID-19 vaccine series completed     Moderna: 2nd dose 5/21/2021    Discoloration of skin of hand 03/20/2019    Duodenitis without bleeding     Dysphagia     Fibromyalgia     Gastritis     GERD (gastroesophageal reflux disease)     Hiatal hernia     Hx of colonoscopy     Insomnia     Iron deficiency anemia secondary to inadequate dietary iron intake 10/18/2019    Memory loss     Menorrhagia with regular cycle 11/20/2014    Moderate episode of recurrent major depressive disorder ([facility] [address] ) 10/16/2019    Obesity     Oral herpes     Sodium (Na) deficiency 10/19/2020    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 myelopathy or radiculopathy     Streptococcal pharyngitis 10/19/2020    Vaginal discharge 09/01/2018    Vitamin D deficiency        Past Surgical History:   Procedure Laterality Date    APPENDECTOMY      BREAST BIOPSY Right     pt unsure when or where- ? 6 yrs ago- benign    BREAST SURGERY Right 01/08/2015    lump removed from breast    [address] COLONOSCOPY      9/26/2012; 2019-benign polyp, repeat 5 years    DILATION AND CURETTAGE OF UTERUS      Resolved:1/29/2009    ENDOMETRIAL ABLATION N/A 8/13/2020    Procedure: ABLATION ENDOMETRIAL Pervis Ducking;  Surgeon: Joshua Royal MD;  [facility];  Service: Gynecology    ESOPHAGOGASTRODUODENOSCOPY      Diagnostic ; 9/26/2012    HYSTEROSCOPY      Resolved:1/30/2009    OVARIAN CYST REMOVAL Right 2005    [place] HYSTEROSCOPY,W/ENDO BX N/A 8/13/2020    Procedure: DILATATION AND CURETTAGE (D&C) WITH HYSTEROSCOPY;  Surgeon: Joshua Royal MD;  [facility];  Service: Gynecology    WISDOM TOOTH EXTRACTION         Current Outpatient Medications   Medication Sig Dispense Refill    buPROPion (WELLBUTRIN XL) 300 mg 24 hr tablet Take 1 tablet (300 mg total) by mouth daily 90 tablet 1    clindamycin (CLINDAGEL) 1 % gel Apply topically 2 (two) times a day 30 g 2    Dexilant 60 MG capsule TAKE 1 CAPSULE (60 MG TOTAL) BY MOUTH EVERY 12 (TWELVE) HOURS (Patient not taking: Reported on 6/8/2022) 180 capsule 0    ergocalciferol (VITAMIN D2) 50,000 units TAKE 1 CAPSULE BY MOUTH ONE TIME PER WEEK (Patient not taking: Reported on 6/8/2022)      methocarbamol (ROBAXIN) 500 mg tablet Take 1 tablet (500 mg total) by mouth 2 (two) times a day (Patient not taking: No sig reported) 20 tablet 0    Omega-3 Fatty Acids (fish oil) 1,000 mg Take 1,000 mg by mouth daily   (Patient not taking: Reported on 6/8/2022)      ondansetron (Zofran ODT) 4 mg disintegrating tablet Take 1 tablet (4 mg total) by mouth every 8 (eight) hours as needed for nausea or vomiting (Patient not taking: Reported on 2/21/2022 ) 180 tablet 3    pantoprazole (PROTONIX) 20 mg tablet Take 20 mg by mouth daily      predniSONE 10 mg tablet 30 mg p o  day x3 days then 20 mg 1 p o  Q day x3 days then 10 mg p o day x3 days then 5 mg p o day x 3 days (Patient not taking: No sig reported) 30 tablet 0    Prucalopride Succinate (Motegrity) 2 MG TABS Take 2 mg by mouth in the morning (Patient not taking: No sig reported) 90 tablet 3    Savella 50 MG TABS TAKE 1 TABLET BY MOUTH TWICE A DAY 60 tablet 0    Symbicort 160-4 5 MCG/ACT inhaler INHALE 2 PUFFS BY MOUTH 2 TIMES A DAY RINSE MOUTH AFTER USE  (Patient not taking: No sig reported) 10 2 g 1    valACYclovir (VALTREX) 1,000 mg tablet Take 2 tablets (2,000 mg total) by mouth 2 (two) times a day for 1 day (Patient not taking: Reported on 8/6/2020) 4 tablet 0     No current facility-administered medications for this visit          Allergies   Allergen Reactions    Morphine Chest Pain and Hives     Other reaction(s): chest pain    Percocet [Oxycodone-Acetaminophen] Hives    Domperidone     Ibuprofen      Due to gastritis       Review of Systems    Video Exam    There were no vitals filed for this visit     Physical Exam     I spent 20 minutes directly with the patient during this visit    [facility]      Name and Date of Birth:  Smoot Nancy 38 y o  1980 MRN: [id number]    Date of Visit: August 9, 2022    Reason for Visit:  Follow-up for medication management    Subjective:  Patient reports she has been doing well  She reports relationship between her son and daughter-in-law is getting much better and that has made her feel more calm  She denies any depression or anxiety nowadays  Reports work has been very busy as 1 of the co-worker quit and they had to do extra hours  Reports due to which his sleep has been affected as she goes to work early in the morning  The patient was encouraged to go to bed early so that she can have it least 8 hours of sleep at night  Patient reports appetite has been okay  Denies any concern about energy level or concentration  Does not endorse anhedonia  No somatic complaints reported  Denies any SI or HI  Reports compliant with Wellbutrin and denies any side effect        Review Of Systems:      Constitutional negative   ENT negative   Cardiovascular negative   Respiratory negative   Gastrointestinal negative   Genitourinary negative   Musculoskeletal negative   Integumentary negative   Neurological negative   Endocrine negative   Other Symptoms none       Alcohol/Substance Abuse:  Denies        Past Medical History:    Past Medical History:   Diagnosis Date    Acquired hallux valgus 03/07/2016    Acute non-recurrent maxillary sinusitis 01/27/2020    Anemia     Anxiety 10/16/2019    Candida vaginitis 04/25/2019    Colon polyp 2019    COVID-19 vaccine series completed     Moderna: 2nd dose 5/21/2021    Discoloration of skin of hand 03/20/2019    Duodenitis without bleeding     Dysphagia     Fibromyalgia     Gastritis     GERD (gastroesophageal reflux disease)     Hiatal hernia     Hx of colonoscopy     Insomnia     Iron deficiency anemia secondary to inadequate dietary iron intake 10/18/2019    Memory loss     Menorrhagia with regular cycle 11/20/2014    Moderate episode of recurrent major depressive disorder ([facility] [address] ) 10/16/2019    Obesity     Oral herpes     Sodium (Na) deficiency 10/19/2020    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 myelopathy or radiculopathy     Streptococcal pharyngitis 10/19/2020    Vaginal discharge 09/01/2018    Vitamin D deficiency         Past Surgical History:   Procedure Laterality Date    APPENDECTOMY      BREAST BIOPSY Right     pt unsure when or where- ? 6 yrs ago- benign    BREAST SURGERY Right 01/08/2015    lump removed from breast    [address] COLONOSCOPY      9/26/2012; 2019-benign polyp, repeat 5 years    DILATION AND CURETTAGE OF UTERUS      Resolved:1/29/2009    ENDOMETRIAL ABLATION N/A 8/13/2020    Procedure: ABLATION ENDOMETRIAL [place]on C[place];  Surgeon: Andrea Church MD;  [facility];  Service: Gynecology    ESOPHAGOGASTRODUODENOSCOPY      Diagnostic ; 9/26/2012    HYSTEROSCOPY      Resolved:1/30/2009    OVARIAN CYST REMOVAL Right 2005    [place] HYSTEROSCOPY,[place]/ENDO BX N/A 8/13/2020    Procedure: DILATATION AND CURETTAGE (D&C) WITH HYSTEROSCOPY;  Surgeon: Andrea Church MD;  [facility];  Service: Gynecology    WISDOM TOOTH EXTRACTION       Allergies   Allergen Reactions    Morphine Chest Pain and Hives     Other reaction(s): chest pain    Percocet [Oxycodone-Acetaminophen] Hives    Domperidone     Ibuprofen      Due to gastritis       Current Medications:       Current Outpatient Medications:     buPROPion (WELLBUTRIN XL) 300 mg 24 hr tablet, Take 1 tablet (300 mg total) by mouth daily, Disp: 90 tablet, Rfl: 1    clindamycin (CLINDAGEL) 1 % gel, Apply topically 2 (two) times a day, Disp: 30 g, Rfl: 2    Dexilant 60 MG capsule, TAKE 1 CAPSULE (60 MG TOTAL) BY MOUTH EVERY 12 (TWELVE) HOURS (Patient not taking: Reported on 6/8/2022), Disp: 180 capsule, Rfl: 0    ergocalciferol (VITAMIN D2) 50,000 units, TAKE 1 CAPSULE BY MOUTH ONE TIME PER WEEK (Patient not taking: Reported on 6/8/2022), Disp: , Rfl:     methocarbamol (ROBAXIN) 500 mg tablet, Take 1 tablet (500 mg total) by mouth 2 (two) times a day (Patient not taking: No sig reported), Disp: 20 tablet, Rfl: 0    Omega-3 Fatty Acids (fish oil) 1,000 mg, Take 1,000 mg by mouth daily   (Patient not taking: Reported on 6/8/2022), Disp: , Rfl:     ondansetron (Zofran ODT) 4 mg disintegrating tablet, Take 1 tablet (4 mg total) by mouth every 8 (eight) hours as needed for nausea or vomiting (Patient not taking: Reported on 2/21/2022 ), Disp: 180 tablet, Rfl: 3    pantoprazole (PROTONIX) 20 mg tablet, Take 20 mg by mouth daily, Disp: , Rfl:     predniSONE 10 mg tablet, 30 mg p o  day x3 days then 20 mg 1 p o  Q day x3 days then 10 mg p o day x3 days then 5 mg p o day x 3 days (Patient not taking: No sig reported), Disp: 30 tablet, Rfl: 0    Prucalopride Succinate (Motegrity) 2 MG TABS, Take 2 mg by mouth in the morning (Patient not taking: No sig reported), Disp: 90 tablet, Rfl: 3    Savella 50 MG TABS, TAKE 1 TABLET BY MOUTH TWICE A DAY, Disp: 60 tablet, Rfl: 0    Symbicort 160-4 5 MCG/ACT inhaler, INHALE 2 PUFFS BY MOUTH 2 TIMES A DAY RINSE MOUTH AFTER USE  (Patient not taking: No sig reported), Disp: 10 2 g, Rfl: 1    valACYclovir (VALTREX) 1,000 mg tablet, Take 2 tablets (2,000 mg total) by mouth 2 (two) times a day for 1 day (Patient not taking: Reported on 8/6/2020), Disp: 4 tablet, Rfl: 0       History Review: The following portions of the patient's history were reviewed and updated as appropriate: allergies, current medications, past family history, past medical history, past social history, past surgical history and problem list          OBJECTIVE:     Vital signs in last 24 hours:     There were no vitals filed for this visit      Mental Status Evaluation:    Appearance age appropriate, casually dressed   Behavior cooperative, calm   Speech normal rate, normal volume, normal pitch   Mood normal   Affect normal range and intensity, appropriate   Thought Processes organized, goal directed   Associations intact associations   Thought Content no overt delusions   Perceptual Disturbances: no auditory hallucinations, no visual hallucinations   Abnormal Thoughts  Risk Potential Suicidal ideation - None  Homicidal ideation - None  Potential for aggression - No   Orientation oriented to person, place, time/date and situation   Memory recent and remote memory grossly intact   Consciousness alert and awake   Attention Span Concentration Span attention span and concentration are age appropriate   Intellect appears to be of average intelligence   Insight intact   Judgement intact   Muscle Strength and  Gait unable to assess today due to virtual visit   Motor activity unable to assess today due to virtual visit   Language no difficulty naming common objects, no difficulty repeating a phrase   Fund of Knowledge adequate knowledge of current events  adequate fund of knowledge regarding past history  adequate fund of knowledge regarding vocabulary    Pain none   Pain Scale 0       Laboratory Results:   Most Recent Labs:   Lab Results   Component Value Date    WBC 7 27 06/04/2022    RBC 4 08 06/04/2022    HGB 12 3 06/04/2022    HCT 37 7 06/04/2022     06/04/2022    RDW 12 3 06/04/2022    NEUTROABS 4 49 06/04/2022    K 3 9 06/04/2022     06/04/2022    CO2 29 06/04/2022    BUN 7 06/04/2022    CREATININE 0 81 06/04/2022    CALCIUM 9 0 06/04/2022    AST 14 06/04/2022    ALT 27 06/04/2022    ALKPHOS 77 06/04/2022    CHOLESTEROL 218 (H) 11/20/2020    TRIG 115 11/20/2020    HDL 65 11/20/2020    LDLCALC 130 (H) 11/20/2020    [place] 150 08/10/2019    [id number] 1 910 11/20/2020    HCGQUANT <2 08/27/2018     I have personally reviewed all pertinent laboratory/tests results  Assessment/Plan:  The patient overall has been doing well  Denies any concern  Plan is to continue with the current medication with no changes  The patient will follow-up with me in 6 months or sooner if needed  She was educated about her medication in detail including benefit, risk, side effects, alternative, contraindication, dosage and frequency  She verbalized understanding agrees with the current plan  The patient advised to call us if there is any concern and to call crisis or visit nearby ER in case of any emergency  The patient agrees  Diagnoses and all orders for this visit:    Current moderate episode of major depressive disorder without prior episode (HCC)  -     buPROPion (WELLBUTRIN XL) 300 mg 24 hr tablet; Take 1 tablet (300 mg total) by mouth daily          Treatment Recommendations/Precautions:      Aware of 24 hour and weekend coverage for urgent situations accessed by calling [facility] main practice number    Risks/Benefits      Risks, Benefits And Possible Side Effects Of Medications:    Risks, benefits, and possible side effects of medications explained to [facility] and she verbalizes understanding and agreement for treatment  Risks of medications in pregnancy explained to [facility]  She verbalizes understanding and agrees to notify her doctor if she becomes pregnant  Controlled Medication Discussion:     Not applicable    Psychotherapy Provided:     Individual psychotherapy provided: Medications, treatment progress and treatment plan reviewed with [facility]  Medication education provided to [facility]  Supportive therapy provided  Crisis/safety plan discussed with [facility]       Treatment Plan;    Completed and signed during the session: Not applicable - Treatment Plan not due at this session    Carter Brito MD 08/09/22

## 2022-08-09 NOTE — RESULT ENCOUNTER NOTE
Good morning Lianne,  I did notice that you reviewed your mammogram results  There are no concerning findings on your mammogram   Recommend repeat screening in 1 year  Please let us know if you have any questions    Have a great day,   Lisa MCLEAN, covering for Dr Karen Miller

## 2022-09-12 ENCOUNTER — OFFICE VISIT (OUTPATIENT)
Dept: FAMILY MEDICINE CLINIC | Facility: CLINIC | Age: 42
End: 2022-09-12
Payer: COMMERCIAL

## 2022-09-12 ENCOUNTER — TELEPHONE (OUTPATIENT)
Dept: FAMILY MEDICINE CLINIC | Facility: CLINIC | Age: 42
End: 2022-09-12

## 2022-09-12 VITALS
DIASTOLIC BLOOD PRESSURE: 80 MMHG | SYSTOLIC BLOOD PRESSURE: 130 MMHG | WEIGHT: 238 LBS | HEART RATE: 74 BPM | BODY MASS INDEX: 39.65 KG/M2 | OXYGEN SATURATION: 98 % | TEMPERATURE: 96.7 F | HEIGHT: 65 IN

## 2022-09-12 DIAGNOSIS — K59.04 CHRONIC IDIOPATHIC CONSTIPATION: ICD-10-CM

## 2022-09-12 DIAGNOSIS — Z13.29 THYROID DISORDER SCREENING: ICD-10-CM

## 2022-09-12 DIAGNOSIS — K31.84 GASTROPARESIS: ICD-10-CM

## 2022-09-12 DIAGNOSIS — Z11.59 NEED FOR HEPATITIS C SCREENING TEST: ICD-10-CM

## 2022-09-12 DIAGNOSIS — M54.41 CHRONIC RIGHT-SIDED LOW BACK PAIN WITH RIGHT-SIDED SCIATICA: ICD-10-CM

## 2022-09-12 DIAGNOSIS — R11.2 NAUSEA AND VOMITING, UNSPECIFIED VOMITING TYPE: Primary | ICD-10-CM

## 2022-09-12 DIAGNOSIS — G89.29 CHRONIC RIGHT-SIDED LOW BACK PAIN WITH RIGHT-SIDED SCIATICA: ICD-10-CM

## 2022-09-12 DIAGNOSIS — E78.5 HYPERLIPIDEMIA, UNSPECIFIED HYPERLIPIDEMIA TYPE: ICD-10-CM

## 2022-09-12 PROCEDURE — 99214 OFFICE O/P EST MOD 30 MIN: CPT | Performed by: INTERNAL MEDICINE

## 2022-09-12 RX ORDER — PANTOPRAZOLE SODIUM 40 MG/1
40 TABLET, DELAYED RELEASE ORAL DAILY
Qty: 30 TABLET | Refills: 3 | Status: SHIPPED | OUTPATIENT
Start: 2022-09-12

## 2022-09-12 RX ORDER — PANTOPRAZOLE SODIUM 40 MG/1
40 TABLET, DELAYED RELEASE ORAL DAILY
COMMUNITY
Start: 2022-08-12 | End: 2022-09-12 | Stop reason: SDUPTHER

## 2022-09-12 NOTE — PROGRESS NOTES
Assessment/Plan:           Problem List Items Addressed This Visit        Digestive    Gastroparesis    Relevant Medications    pantoprazole (PROTONIX) 40 mg tablet    Other Relevant Orders    NM gastric emptying    Chronic idiopathic constipation       Other    Hyperlipidemia    Relevant Orders    Lipid panel      Other Visit Diagnoses     Nausea and vomiting, unspecified vomiting type    -  Primary    Relevant Medications    pantoprazole (PROTONIX) 40 mg tablet    Thyroid disorder screening        Relevant Orders    TSH, 3rd generation    Chronic right-sided low back pain with right-sided sciatica            see discussion above  Patient was start cutting back on Savella  Protonix will be called in  Lab studies ordered    Subjective:      Patient ID: Kadeem Kaminski is a 38 y o  female  HPI   Patient is here for an acute visit complaining of nausea and vomiting going on for several months now  This could happen before eating or after eating  Symptoms associated with dizziness  Denies any headaches or vision changes happening consistently  She reports no trauma  She reports no focal weakness  Is ongoing back pain for which an MRI was ordered but patient has not carried out  I would suggest to do so  She is not using any NSAIDs  She ran out of her PPI and would like a refill  Patient had an EGD that showed gastroparesis  Patient is also found to be B12 deficient  This could be because of gastroparesis  Patient could not tolerate Motegrity  Patient is also noted to be on [address] [facility]  She has been on this medication for at least 5 years for fibromyalgia  This a possibility this may be contributing to her symptoms  S tested that she will talk to her rheumatologist to cut back on the medication  Unfortunately patient had no luck with Cymbalta Lyrica gabapentin in the past from body aches and pains  She has been screen with sleep study which has been negative      The following portions of the patient's history were reviewed and updated as appropriate: allergies, current medications, past medical history, past social history, past surgical history and problem list     Review of Systems      Objective:      /80   Pulse 74   Temp (!) 96 7 °F (35 9 °C)   Ht 5' 5" (1 651 m)   Wt 108 kg (238 lb)   SpO2 98%   BMI 39 61 kg/m²          Physical Exam  HENT:      Head:      Comments: Narrow oropharynx  Cardiovascular:      Rate and Rhythm: Normal rate and regular rhythm  Pulmonary:      Effort: Pulmonary effort is normal  No respiratory distress  Chest:      Chest wall: No tenderness  Abdominal:      Tenderness: There is no abdominal tenderness  Musculoskeletal:      Right lower leg: No edema  Left lower leg: No edema  Skin:     Coloration: Skin is not pale  Neurological:      Mental Status: She is alert     Psychiatric:         Mood and Affect: Mood normal          Behavior: Behavior normal  Samples Given: Akl[place], [place]

## 2022-09-13 ENCOUNTER — APPOINTMENT (OUTPATIENT)
Dept: LAB | Facility: HOSPITAL | Age: 42
End: 2022-09-13
Payer: COMMERCIAL

## 2022-09-13 DIAGNOSIS — Z11.59 NEED FOR HEPATITIS C SCREENING TEST: ICD-10-CM

## 2022-09-13 DIAGNOSIS — E55.9 VITAMIN D DEFICIENCY: ICD-10-CM

## 2022-09-13 LAB
25(OH)D3 SERPL-MCNC: 25.1 NG/ML (ref 30–100)
CHOLEST SERPL-MCNC: 202 MG/DL
HCV AB SER QL: NORMAL
HDLC SERPL-MCNC: 61 MG/DL
LDLC SERPL CALC-MCNC: 125 MG/DL (ref 0–100)
NONHDLC SERPL-MCNC: 141 MG/DL
TRIGL SERPL-MCNC: 79 MG/DL
TSH SERPL DL<=0.05 MIU/L-ACNC: 1.13 UIU/ML (ref 0.45–4.5)

## 2022-09-13 PROCEDURE — 86803 HEPATITIS C AB TEST: CPT

## 2022-09-13 PROCEDURE — 84443 ASSAY THYROID STIM HORMONE: CPT | Performed by: INTERNAL MEDICINE

## 2022-09-13 PROCEDURE — 82306 VITAMIN D 25 HYDROXY: CPT

## 2022-09-13 PROCEDURE — 80061 LIPID PANEL: CPT | Performed by: INTERNAL MEDICINE

## 2022-09-13 PROCEDURE — 36415 COLL VENOUS BLD VENIPUNCTURE: CPT | Performed by: INTERNAL MEDICINE

## 2022-09-14 ENCOUNTER — TELEPHONE (OUTPATIENT)
Dept: HEMATOLOGY ONCOLOGY | Facility: CLINIC | Age: 42
End: 2022-09-14

## 2022-09-14 DIAGNOSIS — D75.839 THROMBOCYTOSIS: Primary | ICD-10-CM

## 2022-09-14 DIAGNOSIS — E55.9 VITAMIN D DEFICIENCY: ICD-10-CM

## 2022-09-14 RX ORDER — ERGOCALCIFEROL 1.25 MG/1
50000 CAPSULE ORAL WEEKLY
Qty: 8 CAPSULE | Refills: 0 | Status: SHIPPED | OUTPATIENT
Start: 2022-09-14

## 2022-10-03 ENCOUNTER — HOSPITAL ENCOUNTER (OUTPATIENT)
Dept: RADIOLOGY | Facility: HOSPITAL | Age: 42
Discharge: HOME | End: 2022-10-03
Payer: COMMERCIAL

## 2022-10-03 DIAGNOSIS — K31.84 GASTROPARESIS: ICD-10-CM

## 2022-10-03 PROCEDURE — A9541 TC99M SULFUR COLLOID: HCPCS

## 2022-10-03 PROCEDURE — 78264 GASTRIC EMPTYING IMG STUDY: CPT

## 2022-10-03 PROCEDURE — G1004 CDSM NDSC: HCPCS

## 2022-10-05 ENCOUNTER — OFFICE VISIT (OUTPATIENT)
Dept: FAMILY MEDICINE CLINIC | Facility: CLINIC | Age: 42
End: 2022-10-05
Payer: COMMERCIAL

## 2022-10-05 VITALS
WEIGHT: 237.8 LBS | BODY MASS INDEX: 39.62 KG/M2 | TEMPERATURE: 97.8 F | SYSTOLIC BLOOD PRESSURE: 118 MMHG | OXYGEN SATURATION: 100 % | HEART RATE: 63 BPM | HEIGHT: 65 IN | DIASTOLIC BLOOD PRESSURE: 76 MMHG

## 2022-10-05 DIAGNOSIS — K31.84 GASTROPARESIS: ICD-10-CM

## 2022-10-05 DIAGNOSIS — R06.81 APNEA: Primary | ICD-10-CM

## 2022-10-05 DIAGNOSIS — R40.0 DAYTIME SOMNOLENCE: ICD-10-CM

## 2022-10-05 DIAGNOSIS — E78.5 HYPERLIPIDEMIA, UNSPECIFIED HYPERLIPIDEMIA TYPE: ICD-10-CM

## 2022-10-05 PROCEDURE — 99214 OFFICE O/P EST MOD 30 MIN: CPT | Performed by: INTERNAL MEDICINE

## 2022-10-05 NOTE — PROGRESS NOTES
Assessment/Plan:           Problem List Items Addressed This Visit        Digestive    Gastroparesis       Other    Hyperlipidemia      Other Visit Diagnoses     Apnea    -  Primary    Relevant Orders    Ambulatory Referral to Sleep Medicine    Daytime somnolence        Relevant Orders    Ambulatory Referral to Sleep Medicine        patient follow-up with GI for further recommendation for gastroparesis  Patient be given information about gastroparetic diet  Patient would discuss with rheumatology to cut back on [address]  Patient will be referred to sleep medicine for sleep apnea and or narcolepsy  Subjective:      Patient ID: Renae Alonso is a 38 y o  female  HPI  Patient is here to follow-up on recent lab studies as well as gastric emptying study  Gastroparesis as was suspected on last EGD still persists that with improvement from the time before  Patient could not tolerate Motegrity  She recalls being prescribed pregnant in the past and could not tolerated  Patient is not diabetic  She is on [address] is a possibility could be adding to her symptoms  We may try cutting back on the medication a single response  Patient follows rheumatology fibromyalgia  Lately has been flaring up  Feels quite tired during the day  I assessed  who accompanies her today and he does testify that she snores  She has sleep study several years ago which was unremarkable  The studies not available for review  She has gained weight since then  LDL was also elevated  Diet modification discussed       The following portions of the patient's history were reviewed and updated as appropriate: allergies, current medications, past medical history, past social history, past surgical history and problem list     Review of Systems      Objective:      /76 (BP Location: Left arm, Patient Position: Sitting, Cuff Size: Large)   Pulse 63   Temp 97 8 °F (36 6 °C) (Tympanic)   Ht 5' 5" (1 651 m)   Wt 108 kg (237 lb 12 8 oz)   SpO2 100%   BMI 39 57 kg/m²          Physical Exam  HENT:      Mouth/Throat:      Comments: Narrow oropharynx  Neurological:      Mental Status: She is alert

## 2022-10-11 ENCOUNTER — OFFICE VISIT (OUTPATIENT)
Dept: SLEEP CENTER | Facility: CLINIC | Age: 42
End: 2022-10-11
Payer: COMMERCIAL

## 2022-10-11 VITALS
WEIGHT: 238 LBS | HEART RATE: 74 BPM | SYSTOLIC BLOOD PRESSURE: 126 MMHG | HEIGHT: 65 IN | BODY MASS INDEX: 39.65 KG/M2 | DIASTOLIC BLOOD PRESSURE: 79 MMHG

## 2022-10-11 DIAGNOSIS — R29.818 SUSPECTED SLEEP APNEA: Primary | ICD-10-CM

## 2022-10-11 DIAGNOSIS — R40.0 DAYTIME SOMNOLENCE: ICD-10-CM

## 2022-10-11 PROCEDURE — 99204 OFFICE O/P NEW MOD 45 MIN: CPT | Performed by: PHYSICIAN ASSISTANT

## 2022-10-11 NOTE — ASSESSMENT & PLAN NOTE
Patient is experiencing excessive sleepiness during the day  She does have some difficulty in sleeping due to back pain  She is currently working with her PCP in order to resolve this issue  A sleep study was ordered today in order to assess for possible sleep apnea  Patient denies any difficulty with drowsy driving  She states that if she feels extremely sleepy she will not drive  We did discuss that driving drowsy could cause harm or death to herself or others  Patient is aware of this and will not drive if she is feeling drowsy or would pull over if needed

## 2022-10-11 NOTE — PATIENT INSTRUCTIONS
Please get your sleep study done as soon as possible  If it is found that you have sleep apnea, a CPAP will be ordered for you, and we will follow up with you 31-91 days after you start treatment  If your sleep study is normal, still return for follow-up in order to reassess your daytime sleepiness with a possible MSLT study  It is very important to avoid driving while drowsy, this can be very dangerous or even cause serious injury or death  If sleepy, it is not safe to get behind the wheel  If you are driving and feels sleepy, it is very important to pull over right away  Even losing control of the car for a split second can be deadly  If you feel you cannot control when sleepiness occurs and cannot prevented, it is important to not drive at all until this improves  Please let me know if you experience this as it is very important  Nursing Support:  When: Monday through Friday 7A-5PM except holidays  Where: Our direct line is [phone]  If you are having a true emergency please call 911  In the event that the line is busy or it is after hours please leave a voice message and we will return your call  Please speak clearly, leaving your full name, birth date, best number to reach you and the reason for your call  Medication refills: We will need the name of the medication, the dosage, the ordering provider, whether you get a 30 or 90 day refill, and the pharmacy name and address  Medications will be ordered by the provider only  Nurses cannot call in prescriptions  Please allow 7 days for medication refills  Physician requested updates: If your provider requested that you call with an update after starting medication, please be ready to provide us the medication and dosage, what time you take your medication, the time you attempt to fall asleep, time you fall asleep, when you wake up, and what time you get out of bed  Sleep Study Results:  We will contact you with sleep study results and/or next steps after the physician has reviewed your testing

## 2022-10-11 NOTE — PROGRESS NOTES
Consultation - [place] 8, 1980, MRN: [id number]    10/11/2022        Reason for Consult / Principal Problem:    Suspected Obstructive Sleep Apnea  Obesity       Thank you for the opportunity of participating in the evaluation and care of this patient in the Sleep Clinic at [facility]  Subjective:     HPI: Deepika Sampson is a 38y o  year old obese female who presents upon referral from her PCP for evaluation of suspected obstructive sleep apnea  Patient states she has been seeing her primary care doctor for ongoing chronic back pain  She states the back pain causes her to have disrupted sleep  When discussing her sleep with her primary care doctor, she advised her a sleep evaluation would be a good idea to rule out sleep apnea due the fact that she is snoring loudly, gained weight and has excessive daytime sleepiness  Patient states she has no difficulty in falling sleep, but will wake up throughout the night due to back pain and to urinate  Patient states these are very brief awakenings where she will either go to the bathroom or get comfortable and is able to fall asleep again quickly  Her  is with her today who notes that she snores loudly, gasps, chokes and occasionally stops breathing in her sleep  She did have a diagnostic sleep study with MSLT performed in 2012  Sleep study did not show sleep apnea or narcolepsy as she did not have REM sleep on any of her naps  However, the patient states for brief period time she was treated for hypersomnolence with a medication to help keep her awake, she is unable to remember the name of the medication  She states she stopped taking the medication years ago and never followed up in regards to her sleep until today  Patient is willing to have a new sleep study and treat with CPAP if needed        Comorbid conditions:  GERD, chronic back pain, depression      Review of Systems      Genitourinary need to urinate more than twice a night   Cardiology Frequent chest pain or angina,    Gastrointestinal none   Neurology frequent headaches, numbness/tingling of an extremity, forgetfulness, poor concentration or confusion, , difficulty with memory and balance problems   Constitutional fatigue   Integumentary none   Psychiatry anxiety   Musculoskeletal back pain   Pulmonary snoring and difficulty breathing when lying flat    ENT none   Endocrine none   Hematological none     Following with PCP for chest pain, PCP thinks it's GERD  Regular headaches, doesn't wake with headache  Poor concentration for a few years     Sleep Study Results:  2/28/2012 polysomnogram with MSLT was performed  Sleep study did not show sleep apnea or narcolepsy as she did not have REM sleep on any of her naps  CPAP Equipment:  Never used or needed  Employment:  [employer], 8 hour shift with overtime, daylight hours only, no CDL    Sleep Schedule:       Bedtime:  8-10 p m  Latency:  Within minutes       Wakeup time:  5 a m  workdays, days off 6-8 a m  without an alarm     Awakenings:       Frequency:  1-3 times       Causes:  Back pain or urination       Duration:  Few minutes, use the bathroom or get comfortable     Daytime Sleepiness / Inappropriate Sleep:       Most severe:  Late morning and afternoons       Naps :  3 times a week on workdays, would nap daily if she had the time       Time:  Early evening 3 p m  - 6p m  Duration:  1-4 hours, will set an alarm to not sleep too long       Inappropriate drowsiness / sleep: will doze off if comfortable position     Snoring: Snores loudly per      Apnea: Witnessed per , choking and gasping     Change in Weight:  Gained 42 pounds over last 10 years     Restless Leg Syndrome:  No clinical symptoms consistent with this diagnosis     Other Complaints:  No reports of sleep walking   +sleep talking   Denies sleep paralysis or hallucinations surrounding sleep  Denies waking up with headaches, bruxism, and dry mouth  Social History:      Caffeine:  12 oz coffee in the morning        Tobacco:   reports that she has never smoked  She has never used smokeless tobacco      E-cig/Vaping:    E-Cigarette/Vaping   • E-Cigarette Use Never User       E-Cigarette/Vaping Substances   • Nicotine No    • THC No    • CBD No    • Flavoring No    • Other No    • Unknown No          Alcohol:   reports no history of alcohol use  Drugs:   reports no history of drug use  The review of systems and following portions of the patient's history were reviewed and updated as appropriate: allergies, current medications, past family history, past medical history, past social history, past surgical history and problem list         Objective:       Vitals:    10/11/22 1316   BP: 126/79   BP Location: Left arm   Patient Position: Sitting   Cuff Size: Large   Pulse: 74   Weight: 108 kg (238 lb)   Height: 5' 5" (1 651 m)     Body mass index is 39 61 kg/m²  Neck Circumference: 16  [place] Sleepiness Scale:  Total score: 14      Current Outpatient Medications:   •  buPROPion (WELLBUTRIN XL) 300 mg 24 hr tablet, Take 1 tablet (300 mg total) by mouth daily, Disp: 90 tablet, Rfl: 1  •  clindamycin (CLINDAGEL) 1 % gel, Apply topically 2 (two) times a day, Disp: 30 g, Rfl: 2  •  ergocalciferol (VITAMIN D2) 50,000 units, Take 1 capsule (50,000 Units total) by mouth once a week, Disp: 8 capsule, Rfl: 0  •  pantoprazole (PROTONIX) 40 mg tablet, Take 1 tablet (40 mg total) by mouth daily, Disp: 30 tablet, Rfl: 3  •  Savella 50 MG TABS, TAKE 1 TABLET BY MOUTH TWICE A DAY, Disp: 60 tablet, Rfl: 0  •  Omega-3 Fatty Acids (fish oil) 1,000 mg, Take 1,000 mg by mouth daily   (Patient not taking: No sig reported), Disp: , Rfl:   •  ondansetron (Zofran ODT) 4 mg disintegrating tablet, Take 1 tablet (4 mg total) by mouth every 8 (eight) hours as needed for nausea or vomiting (Patient not taking: Reported on 2/21/2022 ), Disp: 180 tablet, Rfl: 3  •  Prucalopride Succinate (Motegrity) 2 MG TABS, Take 2 mg by mouth in the morning (Patient not taking: No sig reported), Disp: 90 tablet, Rfl: 3    Physical Exam  General Appearance:   Alert, cooperative, no distress, appears stated age, obese     Head:   Normocephalic, without obvious abnormality, atraumatic     Eyes:   PERRL, conjunctiva/corneas clear          Nose:  Nares normal, septum midline, mucosa normal, no drainage or sinus tenderness           Throat:  Lips, teeth and gums normal; tongue large in size with scalloping and midline in position; mucosa moist, uvula long, tonsils +2, Mallampati class 3      Neck:  Supple, symmetrical, trachea midline, no adenopathy; no thyromegaly noted, no carotid bruit or JVD     Lungs:      Clear to auscultation bilaterally, respirations unlabored     Heart:   Regular rate and rhythm, S1 and S2 normal, no murmur, rub or gallop       Extremities:  Extremities normal, atraumatic, no cyanosis or edema       Skin:  Skin color, texture, turgor normal, no rashes or lesions       Neurologic:  No focal deficits noted  ASSESSMENT / PLAN     1  Suspected sleep apnea  Assessment & Plan: It is likely the patient has undiagnosed and untreated obstructive sleep apnea due to her symptoms of snoring, choking, gasping, excessive daytime sleepiness and obesity  A split night study was ordered at Dr Edmund Sweet recommendation  If it is found that she does not have sleep apnea, we would consider ordering a polysomnogram with MSLT to re-evaluate for possible narcolepsy  If she is found have sleep apnea, we would order CPAP and follow-up 31-91 days after starting treatment  Orders:  -     Ambulatory Referral to Sleep Medicine  -     Split Study; Future; Expected date: 10/11/2022    2  Daytime somnolence  Assessment & Plan:  Patient is experiencing excessive sleepiness during the day    She does have some difficulty in sleeping due to back pain  She is currently working with her PCP in order to resolve this issue  A sleep study was ordered today in order to assess for possible sleep apnea  Patient denies any difficulty with drowsy driving  She states that if she feels extremely sleepy she will not drive  We did discuss that driving drowsy could cause harm or death to herself or others  Patient is aware of this and will not drive if she is feeling drowsy or would pull over if needed  Orders:  -     Ambulatory Referral to Sleep Medicine  -     Split Study; Future; Expected date: 10/11/2022         Counseling / Coordination of Care  Total clinic time spent today 60 minutes in chart review, face-to-face time spent with the patient, coordination of care and documentation  A description of the counseling / coordination of care:     diagnostic results, instructions for management, risk factor reductions, prognosis, patient and family education, impressions, risks and benefits of treatment options and importance of compliance with treatment    The following instructions have been given to the patient today:    Patient Instructions   Please get your sleep study done as soon as possible  If it is found that you have sleep apnea, a CPAP will be ordered for you, and we will follow up with you 31-91 days after you start treatment  If your sleep study is normal, still return for follow-up in order to reassess your daytime sleepiness with a possible MSLT study  It is very important to avoid driving while drowsy, this can be very dangerous or even cause serious injury or death  If sleepy, it is not safe to get behind the wheel  If you are driving and feels sleepy, it is very important to pull over right away  Even losing control of the car for a split second can be deadly  If you feel you cannot control when sleepiness occurs and cannot prevented, it is important to not drive at all until this improves    Please let me know if you experience this as it is very important  Nursing Support:  When: Monday through Friday 7A-5PM except holidays  Where: Our direct line is [phone]  If you are having a true emergency please call 911  In the event that the line is busy or it is after hours please leave a voice message and we will return your call  Please speak clearly, leaving your full name, birth date, best number to reach you and the reason for your call  Medication refills: We will need the name of the medication, the dosage, the ordering provider, whether you get a 30 or 90 day refill, and the pharmacy name and address  Medications will be ordered by the provider only  Nurses cannot call in prescriptions  Please allow 7 days for medication refills  Physician requested updates: If your provider requested that you call with an update after starting medication, please be ready to provide us the medication and dosage, what time you take your medication, the time you attempt to fall asleep, time you fall asleep, when you wake up, and what time you get out of bed  Sleep Study Results: We will contact you with sleep study results and/or next steps after the physician has reviewed your testing               CARIDAD Jackson 13

## 2022-10-11 NOTE — ASSESSMENT & PLAN NOTE
It is likely the patient has undiagnosed and untreated obstructive sleep apnea due to her symptoms of snoring, choking, gasping, excessive daytime sleepiness and obesity  A split night study was ordered at Dr Maxim Solis recommendation  If it is found that she does not have sleep apnea, we would consider ordering a polysomnogram with MSLT to re-evaluate for possible narcolepsy  If she is found have sleep apnea, we would order CPAP and follow-up 31-91 days after starting treatment

## 2022-10-14 DIAGNOSIS — M54.41 CHRONIC RIGHT-SIDED LOW BACK PAIN WITH RIGHT-SIDED SCIATICA: Primary | ICD-10-CM

## 2022-10-14 DIAGNOSIS — G89.29 CHRONIC RIGHT-SIDED LOW BACK PAIN WITH RIGHT-SIDED SCIATICA: Primary | ICD-10-CM

## 2022-10-14 RX ORDER — TRAMADOL HYDROCHLORIDE 50 MG/1
50 TABLET ORAL 2 TIMES DAILY PRN
Qty: 30 TABLET | Refills: 0 | Status: SHIPPED | OUTPATIENT
Start: 2022-10-14 | End: 2022-12-05

## 2022-10-17 ENCOUNTER — TELEPHONE (OUTPATIENT)
Dept: SLEEP CENTER | Facility: CLINIC | Age: 42
End: 2022-10-17

## 2022-10-17 DIAGNOSIS — G47.33 OSA (OBSTRUCTIVE SLEEP APNEA): Primary | ICD-10-CM

## 2022-10-18 ENCOUNTER — TELEPHONE (OUTPATIENT)
Dept: SLEEP CENTER | Facility: CLINIC | Age: 42
End: 2022-10-18

## 2022-10-18 NOTE — TELEPHONE ENCOUNTER
----- Message from Liam Lucero MD sent at 10/18/2022  1:02 PM EDT -----  Approved    ----- Message -----  From: Lorenzo Negrete  Sent: 10/18/2022  10:35 AM EDT  To: Sleep [facility] Provider    This Home sleep study needs approval      If approved please sign and return to clerical pool  If denied please include reasons why  Also provide alternative testing if warranted  Please sign and return to clerical pool

## 2022-10-27 ENCOUNTER — TELEPHONE (OUTPATIENT)
Dept: FAMILY MEDICINE CLINIC | Facility: CLINIC | Age: 42
End: 2022-10-27

## 2022-11-09 ENCOUNTER — TELEMEDICINE (OUTPATIENT)
Dept: PSYCHIATRY | Facility: CLINIC | Age: 42
End: 2022-11-09

## 2022-11-09 DIAGNOSIS — F32.1 CURRENT MODERATE EPISODE OF MAJOR DEPRESSIVE DISORDER WITHOUT PRIOR EPISODE (HCC): ICD-10-CM

## 2022-11-09 RX ORDER — BUPROPION HYDROCHLORIDE 300 MG/1
300 TABLET ORAL DAILY
Qty: 90 TABLET | Refills: 1 | Status: SHIPPED | OUTPATIENT
Start: 2022-11-09 | End: 2023-05-08

## 2022-11-09 NOTE — PSYCH
Virtual Regular Visit    Verification of patient location:    Patient is located in the following state in which I hold an active license [place]      Assessment/Plan:    Problem List Items Addressed This Visit    None     Visit Diagnoses     Current moderate episode of major depressive disorder without prior episode ([facility] [address] )              Goals addressed in session: Goal 1          Reason for visit is   Chief Complaint   Patient presents with   • Virtual Regular Visit        Encounter provider Zamzam Cardenas MD    Provider located at [address] [address]      Recent Visits  No visits were found meeting these conditions  Showing recent visits within past 7 days and meeting all other requirements  Today's Visits  Date Type Provider Dept   11/09/22 Telemedicine Zamzam Cardenas MD [facility] 72 today's visits and meeting all other requirements  Future Appointments  No visits were found meeting these conditions  Showing future appointments within next 150 days and meeting all other requirements       The patient was identified by name and date of birth  Sanjay Pemberton was informed that this is a telemedicine visit and that [facility]  She agrees to proceed     My office door was closed  No one else was in the room  She acknowledged consent and understanding of privacy and security of the video platform  The patient has agreed to participate and understands they can discontinue the visit at any time  Patient is aware this is a billable service       Subjective  See below      HPI     Past Medical History:   Diagnosis Date   • Acquired hallux valgus 03/07/2016   • Acute non-recurrent maxillary sinusitis 01/27/2020   • Anemia    • Anxiety 10/16/2019   • Arthritis    • Candida vaginitis 04/25/2019   • Colon polyp 2019   • COVID-19 vaccine series completed     Moderna: 2nd dose 5/21/2021   • Depression    • Discoloration of skin of hand 03/20/2019   • Duodenitis without bleeding    • Dysphagia    • Fibromyalgia    • Gastritis    • GERD (gastroesophageal reflux disease)    • Hiatal hernia    • Hx of colonoscopy    • Insomnia    • Iron deficiency anemia secondary to inadequate dietary iron intake 10/18/2019   • Memory loss    • Menorrhagia with regular cycle 11/20/2014   • Moderate episode of recurrent major depressive disorder ([address] ) 10/16/2019   • Obesity    • Oral herpes    • Sodium (Na) deficiency 10/19/2020   • 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 myelopathy or radiculopathy    • Streptococcal pharyngitis 10/19/2020   • Vaginal discharge 09/01/2018   • Vitamin D deficiency        Past Surgical History:   Procedure Laterality Date   • APPENDECTOMY     • BREAST BIOPSY Right     pt unsure when or where- ? 6 yrs ago- benign   • BREAST SURGERY Right 01/08/2015    lump removed from breast    • [address]  [facility]   • CHOLECYSTECTOMY     • COLONOSCOPY      9/26/2012; 2019-benign polyp, repeat 5 years   • DILATION AND CURETTAGE OF UTERUS      Resolved:1/29/2009   • ENDOMETRIAL ABLATION N/A 8/13/2020    Procedure: ABLATION ENDOMETRIAL Francis Calvert;  Surgeon: Manda Cruz MD;  [facility];  Service: Gynecology   • ESOPHAGOGASTRODUODENOSCOPY      Diagnostic ; 9/26/2012   • HYSTEROSCOPY      Resolved:1/30/2009   • OVARIAN CYST REMOVAL Right 2005   • [place] HYSTEROSCOPY,W/ENDO BX N/A 8/13/2020    Procedure: DILATATION AND CURETTAGE (D&C) WITH HYSTEROSCOPY;  Surgeon: Manda Cruz MD;  [facility];  Service: Gynecology   • WISDOM TOOTH EXTRACTION         Current Outpatient Medications   Medication Sig Dispense Refill   • buPROPion (WELLBUTRIN XL) 300 mg 24 hr tablet Take 1 tablet (300 mg total) by mouth daily 90 tablet 1   • clindamycin (CLINDAGEL) 1 % gel Apply topically 2 (two) times a day 30 g 2   • ergocalciferol (VITAMIN D2) 50,000 units Take 1 capsule (50,000 Units total) by mouth once a week 8 capsule 0   • Omega-3 Fatty Acids (fish oil) 1,000 mg Take 1,000 mg by mouth daily   (Patient not taking: No sig reported)     • ondansetron (Zofran ODT) 4 mg disintegrating tablet Take 1 tablet (4 mg total) by mouth every 8 (eight) hours as needed for nausea or vomiting (Patient not taking: Reported on 2/21/2022 ) 180 tablet 3   • pantoprazole (PROTONIX) 40 mg tablet Take 1 tablet (40 mg total) by mouth daily 30 tablet 3   • Prucalopride Succinate (Motegrity) 2 MG TABS Take 2 mg by mouth in the morning (Patient not taking: No sig reported) 90 tablet 3   • Savella 50 MG TABS TAKE 1 TABLET BY MOUTH TWICE A DAY 60 tablet 0   • traMADol (Ultram) 50 mg tablet Take 1 tablet (50 mg total) by mouth 2 (two) times a day as needed for moderate pain or severe pain 30 tablet 0     No current facility-administered medications for this visit  Allergies   Allergen Reactions   • Morphine Chest Pain and Hives     Other reaction(s): chest pain   • Percocet [Oxycodone-Acetaminophen] Hives   • Domperidone    • Ibuprofen      Due to gastritis       Review of Systems    Video Exam    There were no vitals filed for this visit  Physical Exam     Visit Time    Visit Start Time:  9:04 a m  Visit Stop Time:  9:25 a m  Total Visit Duration: 21 minutes    MEDICATION MANAGEMENT NOTE        [facility]      Name and Date of Birth:  Mahendra Florez 38 y o  1980 MRN: [id number]    Date of Visit: November 9, 2022    Reason for Visit:  Follow-up for medication management    Subjective: The patient reports she overall has been doing well  Reports mood has been mostly stable but around her periods she feel more anxious than usual   Though denies any depression  The patient though did reports anxiety being not too bad  She reports her father cancer has relapsed    He had colon cancer 16 year back but after surgery and other treatment it was in remission till now  Patient reported that the doctors treating him stated it is terminal and prognosis is not good  Patient father though is undergoing chemotherapy at this time but has been feeling very weak  The patient reports she is coping with it well  Providing support to her parents  She also reports her work is going fine  It is not as busy as it was few months back  She reports sleep has been okay  Had seen her primary care recently and is recommended sleep study  She reports appetite, energy level has been okay  Reports compliant with Wellbutrin and denies any side effect  Denies any other concern today        Review Of Systems:      Constitutional negative   ENT negative   Cardiovascular negative   Respiratory negative   Gastrointestinal negative   Genitourinary negative   Musculoskeletal negative   Integumentary negative   Neurological negative   Endocrine negative   Other Symptoms none       Alcohol/Substance Abuse:  Denies        Past Medical History:    Past Medical History:   Diagnosis Date   • Acquired hallux valgus 03/07/2016   • Acute non-recurrent maxillary sinusitis 01/27/2020   • Anemia    • Anxiety 10/16/2019   • Arthritis    • Candida vaginitis 04/25/2019   • Colon polyp 2019   • COVID-19 vaccine series completed     Moderna: 2nd dose 5/21/2021   • Depression    • Discoloration of skin of hand 03/20/2019   • Duodenitis without bleeding    • Dysphagia    • Fibromyalgia    • Gastritis    • GERD (gastroesophageal reflux disease)    • Hiatal hernia    • Hx of colonoscopy    • Insomnia    • Iron deficiency anemia secondary to inadequate dietary iron intake 10/18/2019   • Memory loss    • Menorrhagia with regular cycle 11/20/2014   • Moderate episode of recurrent major depressive disorder ([facility] [address] ) 10/16/2019   • Obesity    • Oral herpes    • Sodium (Na) deficiency 10/19/2020   • 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 myelopathy or radiculopathy    • Streptococcal pharyngitis 10/19/2020 • Vaginal discharge 09/01/2018   • Vitamin D deficiency         Past Surgical History:   Procedure Laterality Date   • APPENDECTOMY     • BREAST BIOPSY Right     pt unsure when or where- ? 6 yrs ago- benign   • BREAST SURGERY Right 01/08/2015    lump removed from breast    • Akash Bazan   • CHOLECYSTECTOMY     • COLONOSCOPY      9/26/2012; 2019-benign polyp, repeat 5 years   • DILATION AND CURETTAGE OF UTERUS      Resolved:1/29/2009   • ENDOMETRIAL ABLATION N/A 8/13/2020    Procedure: ABLATION ENDOMETRIAL [place];  Surgeon: Josh Vann MD;  [facility];  Service: Gynecology   • ESOPHAGOGASTRODUODENOSCOPY      Diagnostic ; 9/26/2012   • HYSTEROSCOPY      Resolved:1/30/2009   • OVARIAN CYST REMOVAL Right 2005   • [place] HYSTEROSCOPY,W/ENDO BX N/A 8/13/2020    Procedure: DILATATION AND CURETTAGE (D&C) WITH HYSTEROSCOPY;  Surgeon: Josh Vann MD;  [facility];  Service: Gynecology   • WISDOM TOOTH EXTRACTION       Allergies   Allergen Reactions   • Morphine Chest Pain and Hives     Other reaction(s): chest pain   • Percocet [Oxycodone-Acetaminophen] Hives   • Domperidone    • Ibuprofen      Due to gastritis       Current Medications:       Current Outpatient Medications:   •  buPROPion (WELLBUTRIN XL) 300 mg 24 hr tablet, Take 1 tablet (300 mg total) by mouth daily, Disp: 90 tablet, Rfl: 1  •  clindamycin (CLINDAGEL) 1 % gel, Apply topically 2 (two) times a day, Disp: 30 g, Rfl: 2  •  ergocalciferol (VITAMIN D2) 50,000 units, Take 1 capsule (50,000 Units total) by mouth once a week, Disp: 8 capsule, Rfl: 0  •  Omega-3 Fatty Acids (fish oil) 1,000 mg, Take 1,000 mg by mouth daily   (Patient not taking: No sig reported), Disp: , Rfl:   •  ondansetron (Zofran ODT) 4 mg disintegrating tablet, Take 1 tablet (4 mg total) by mouth every 8 (eight) hours as needed for nausea or vomiting (Patient not taking: Reported on 2/21/2022 ), Disp: 180 tablet, Rfl: 3  •  pantoprazole (PROTONIX) 40 mg tablet, Take 1 tablet (40 mg total) by mouth daily, Disp: 30 tablet, Rfl: 3  •  Prucalopride Succinate (Motegrity) 2 MG TABS, Take 2 mg by mouth in the morning (Patient not taking: No sig reported), Disp: 90 tablet, Rfl: 3  •  Savella 50 MG TABS, TAKE 1 TABLET BY MOUTH TWICE A DAY, Disp: 60 tablet, Rfl: 0  •  traMADol (Ultram) 50 mg tablet, Take 1 tablet (50 mg total) by mouth 2 (two) times a day as needed for moderate pain or severe pain, Disp: 30 tablet, Rfl: 0       History Review: The following portions of the patient's history were reviewed and updated as appropriate: allergies, current medications, past family history, past medical history, past social history, past surgical history and problem list          OBJECTIVE:     Vital signs in last 24 hours: There were no vitals filed for this visit      Mental Status Evaluation:    Appearance age appropriate, casually dressed   Behavior cooperative, calm   Speech normal rate, normal volume, normal pitch   Mood euthymic   Affect normal range and intensity, appropriate   Thought Processes organized, goal directed   Associations intact associations   Thought Content no overt delusions   Perceptual Disturbances: no auditory hallucinations, no visual hallucinations   Abnormal Thoughts  Risk Potential Suicidal ideation - None  Homicidal ideation - None  Potential for aggression - No   Orientation oriented to person, place, time/date and situation   Memory recent and remote memory grossly intact   Consciousness alert and awake   Attention Span Concentration Span attention span and concentration are age appropriate   Intellect appears to be of average intelligence   Insight intact   Judgement intact   Muscle Strength and  Gait unable to assess today due to virtual visit   Motor activity unable to assess today due to virtual visit   Language no difficulty naming common objects, no difficulty repeating a phrase   Fund of Knowledge adequate knowledge of current events  adequate fund of knowledge regarding past history  adequate fund of knowledge regarding vocabulary    Pain none   Pain Scale 0       Laboratory Results:   Most Recent Labs:   Lab Results   Component Value Date    WBC 7 27 06/04/2022    RBC 4 08 06/04/2022    HGB 12 3 06/04/2022    HCT 37 7 06/04/2022     06/04/2022    RDW 12 3 06/04/2022    NEUTROABS 4 49 06/04/2022    K 3 9 06/04/2022     06/04/2022    CO2 29 06/04/2022    BUN 7 06/04/2022    CREATININE 0 81 06/04/2022    CALCIUM 9 0 06/04/2022    AST 14 06/04/2022    ALT 27 06/04/2022    ALKPHOS 77 06/04/2022    CHOLESTEROL 202 (H) 09/13/2022    TRIG 79 09/13/2022    HDL 61 09/13/2022    LDLCALC 125 (H) 09/13/2022    [place] 141 09/13/2022    [id number] 1 127 09/13/2022    HCGQUANT <2 08/27/2018     I have personally reviewed all pertinent laboratory/tests results  Assessment/Plan:  The patient overall has been doing well except for mildly anxious specially around her menstrual period  Also has been worried about her father but reports she has been coping with it well  Plan at this time is to continue with the current medication with no changes  She will follow-up with me in 3 months or sooner if needed  She was educated about her medication in detail and advised to call me if there is any concern and to call crisis or visit nearby ER in case of any emergency  The patient verbalized understanding agrees with the plan        Diagnoses and all orders for this visit:    Current moderate episode of major depressive disorder without prior episode [facility])          Treatment Recommendations/Precautions:      Aware of 24 hour and weekend coverage for urgent situations accessed by calling [facility] main practice number    Risks/Benefits      Risks, Benefits And Possible Side Effects Of Medications:    Risks, benefits, and possible side effects of medications explained to [facility] FOR COGNITIVE DISORDERS and she verbalizes understanding and agreement for treatment  Controlled Medication Discussion:     Not applicable    Psychotherapy Provided:     Individual psychotherapy provided: Counseling was provided during the session today for 10 minutes  Medications, treatment progress and treatment plan reviewed with [facility] FOR COGNITIVE DISORDERS  Medication education provided to [facility] FOR COGNITIVE DISORDERS  Supportive therapy provided  Crisis/safety plan discussed with [facility] COGNITIVE DISORDERS       Treatment Plan;    Completed and signed during the session: Not applicable - Treatment Plan not due at this session    Ayanna Delgado MD 11/09/22

## 2022-11-10 DIAGNOSIS — R11.2 NAUSEA AND VOMITING, UNSPECIFIED VOMITING TYPE: ICD-10-CM

## 2022-11-10 RX ORDER — PANTOPRAZOLE SODIUM 40 MG/1
40 TABLET, DELAYED RELEASE ORAL DAILY
Qty: 30 TABLET | Refills: 0 | Status: SHIPPED | OUTPATIENT
Start: 2022-11-10

## 2022-11-14 ENCOUNTER — TELEPHONE (OUTPATIENT)
Dept: FAMILY MEDICINE CLINIC | Facility: CLINIC | Age: 42
End: 2022-11-14

## 2022-11-14 NOTE — TELEPHONE ENCOUNTER
Ani form [facility]'s Referral Dept called and stated that the patient needs to have a peer-to peer for the MRI lumbar spine    Please call CHRIS at [phone] and use tracking #[id number] or if there are any other questions regarding this referral Ani can be reached at [phone]

## 2022-11-21 ENCOUNTER — HOSPITAL ENCOUNTER (EMERGENCY)
Facility: HOSPITAL | Age: 42
Discharge: LEFT BEFORE DISCHARGE | End: 2022-11-22

## 2022-11-21 VITALS
DIASTOLIC BLOOD PRESSURE: 79 MMHG | SYSTOLIC BLOOD PRESSURE: 141 MMHG | WEIGHT: 241.84 LBS | HEART RATE: 70 BPM | BODY MASS INDEX: 40.25 KG/M2 | TEMPERATURE: 97.2 F | RESPIRATION RATE: 20 BRPM | OXYGEN SATURATION: 98 %

## 2022-11-21 LAB
ATRIAL RATE: 68 BPM
P AXIS: 37 DEGREES
PR INTERVAL: 116 MS
QRS AXIS: 75 DEGREES
QRSD INTERVAL: 86 MS
QT INTERVAL: 398 MS
QTC INTERVAL: 423 MS
T WAVE AXIS: 68 DEGREES
VENTRICULAR RATE: 68 BPM

## 2022-11-25 ENCOUNTER — APPOINTMENT (OUTPATIENT)
Dept: LAB | Facility: CLINIC | Age: 42
End: 2022-11-25

## 2022-11-25 DIAGNOSIS — E53.8 B12 DEFICIENCY: ICD-10-CM

## 2022-11-25 DIAGNOSIS — D50.8 IRON DEFICIENCY ANEMIA SECONDARY TO INADEQUATE DIETARY IRON INTAKE: ICD-10-CM

## 2022-11-25 LAB
ALBUMIN SERPL BCP-MCNC: 3.7 G/DL (ref 3.5–5)
ALP SERPL-CCNC: 64 U/L (ref 46–116)
ALT SERPL W P-5'-P-CCNC: 32 U/L (ref 12–78)
ANION GAP SERPL CALCULATED.3IONS-SCNC: 4 MMOL/L (ref 4–13)
AST SERPL W P-5'-P-CCNC: 21 U/L (ref 5–45)
BASOPHILS # BLD AUTO: 0.05 THOUSANDS/ÂΜL (ref 0–0.1)
BASOPHILS NFR BLD AUTO: 1 % (ref 0–1)
BILIRUB SERPL-MCNC: 0.48 MG/DL (ref 0.2–1)
BUN SERPL-MCNC: 12 MG/DL (ref 5–25)
CALCIUM SERPL-MCNC: 9.5 MG/DL (ref 8.3–10.1)
CHLORIDE SERPL-SCNC: 105 MMOL/L (ref 96–108)
CO2 SERPL-SCNC: 28 MMOL/L (ref 21–32)
CREAT SERPL-MCNC: 0.71 MG/DL (ref 0.6–1.3)
CRP SERPL QL: <3 MG/L
EOSINOPHIL # BLD AUTO: 0.09 THOUSAND/ÂΜL (ref 0–0.61)
EOSINOPHIL NFR BLD AUTO: 1 % (ref 0–6)
ERYTHROCYTE [DISTWIDTH] IN BLOOD BY AUTOMATED COUNT: 12.8 % (ref 11.6–15.1)
ERYTHROCYTE [SEDIMENTATION RATE] IN BLOOD: 14 MM/HOUR (ref 0–19)
FERRITIN SERPL-MCNC: 73 NG/ML (ref 8–388)
FOLATE SERPL-MCNC: 19.4 NG/ML (ref 3.1–17.5)
GFR SERPL CREATININE-BSD FRML MDRD: 103 ML/MIN/1.73SQ M
GLUCOSE P FAST SERPL-MCNC: 91 MG/DL (ref 65–99)
HCT VFR BLD AUTO: 37 % (ref 34.8–46.1)
HGB BLD-MCNC: 11.7 G/DL (ref 11.5–15.4)
IMM GRANULOCYTES # BLD AUTO: 0.06 THOUSAND/UL (ref 0–0.2)
IMM GRANULOCYTES NFR BLD AUTO: 1 % (ref 0–2)
IRON SATN MFR SERPL: 31 % (ref 15–50)
IRON SERPL-MCNC: 112 UG/DL (ref 50–170)
LYMPHOCYTES # BLD AUTO: 2.08 THOUSANDS/ÂΜL (ref 0.6–4.47)
LYMPHOCYTES NFR BLD AUTO: 27 % (ref 14–44)
MCH RBC QN AUTO: 28.9 PG (ref 26.8–34.3)
MCHC RBC AUTO-ENTMCNC: 31.6 G/DL (ref 31.4–37.4)
MCV RBC AUTO: 91 FL (ref 82–98)
MONOCYTES # BLD AUTO: 0.78 THOUSAND/ÂΜL (ref 0.17–1.22)
MONOCYTES NFR BLD AUTO: 10 % (ref 4–12)
NEUTROPHILS # BLD AUTO: 4.55 THOUSANDS/ÂΜL (ref 1.85–7.62)
NEUTS SEG NFR BLD AUTO: 60 % (ref 43–75)
NRBC BLD AUTO-RTO: 0 /100 WBCS
PLATELET # BLD AUTO: 359 THOUSANDS/UL (ref 149–390)
PMV BLD AUTO: 10.2 FL (ref 8.9–12.7)
POTASSIUM SERPL-SCNC: 3.9 MMOL/L (ref 3.5–5.3)
PROT SERPL-MCNC: 7.3 G/DL (ref 6.4–8.4)
RBC # BLD AUTO: 4.05 MILLION/UL (ref 3.81–5.12)
SODIUM SERPL-SCNC: 137 MMOL/L (ref 135–147)
TIBC SERPL-MCNC: 356 UG/DL (ref 250–450)
VIT B12 SERPL-MCNC: 325 PG/ML (ref 100–900)
WBC # BLD AUTO: 7.61 THOUSAND/UL (ref 4.31–10.16)

## 2022-12-05 ENCOUNTER — ANNUAL EXAM (OUTPATIENT)
Dept: OBGYN CLINIC | Facility: CLINIC | Age: 42
End: 2022-12-05

## 2022-12-05 VITALS
BODY MASS INDEX: 40.08 KG/M2 | WEIGHT: 240.6 LBS | SYSTOLIC BLOOD PRESSURE: 118 MMHG | HEIGHT: 65 IN | DIASTOLIC BLOOD PRESSURE: 76 MMHG

## 2022-12-05 DIAGNOSIS — Z72.3 INADEQUATE EXERCISE: ICD-10-CM

## 2022-12-05 DIAGNOSIS — Z12.31 VISIT FOR SCREENING MAMMOGRAM: ICD-10-CM

## 2022-12-05 DIAGNOSIS — Z01.419 ENCOUNTER FOR ANNUAL ROUTINE GYNECOLOGICAL EXAMINATION: Primary | ICD-10-CM

## 2022-12-05 PROBLEM — D50.8 IRON DEFICIENCY ANEMIA SECONDARY TO INADEQUATE DIETARY IRON INTAKE: Status: RESOLVED | Noted: 2021-12-08 | Resolved: 2022-12-05

## 2022-12-05 PROBLEM — H81.10 BENIGN PAROXYSMAL POSITIONAL VERTIGO: Status: RESOLVED | Noted: 2017-03-08 | Resolved: 2022-12-05

## 2022-12-05 NOTE — PROGRESS NOTES
Pt is a 38 y o  [id number] with Patient's last menstrual period was 2022 (exact date)  using VL for [facility] presents for preventive care  She notes the same partner since her last STI evaluation  In her lifetime she has been involved with 1 partner   Safe sexual practices (monogomy) are followed consistently  · She does  feel safe in the relationship  She does feel safe in her home  · Her calcium intake encompasses milk (cow, goat, almond, cashew, soy, etc), cheese and yogurt for a total of 4-5 servings daily on average  She does not take additional Vitamin D (MVI or supplement)  · She exercises 0 times per week  · Her menses occur every 28 Days, last 5-6 days and require regular pad every 24 hours  Menstrual History:  OB History        4    Para   4    Term   3       1    AB        Living   4       SAB        IAB        Ectopic        Multiple        Live Births               Obstetric Comments   1  FT   2  FT C/S--face presentation  3  PT   4  FT     Menarche: 6  Menses: 28//regular pad once daily (s/p ablation)            Menarche age: 4  Patient's last menstrual period was 2022 (exact date)  ·      · She has never recieved an HPV vaccine and does not desire to begin or continue the HPV vaccination series    · tobacco use : does not use tobacco              · Colonoscopy: 2019-colon polyps, repeat age 37  · Mammogram: 2022, wnl, repeat rx for 1 year given  · Pap: 11/3/2020-wnl, HRHPV neg, repeat     Past Medical History:   Diagnosis Date   • Acne 2015   • Acquired hallux valgus 2016   • Acute non-recurrent maxillary sinusitis 2020   • Anemia    • Anxiety 10/16/2019   • Arthritis    • Benign paroxysmal positional vertigo 2017   • Candida vaginitis 2019   • Colon polyp 2019   • COVID-19 vaccine series completed     Moderna: 2nd dose 2021   • Depression    • Discoloration of skin of hand 2019   • Duodenitis without bleeding    • Dysphagia    • Fibromyalgia    • Gastritis    • GERD (gastroesophageal reflux disease)    • Hiatal hernia    • Hx of colonoscopy    • Insomnia    • Iron deficiency anemia secondary to inadequate dietary iron intake 10/18/2019   • Memory loss    • Menorrhagia with regular cycle 2014   • Moderate episode of recurrent major depressive disorder ([facility] [address] ) 10/16/2019   • Obesity    • Oral herpes    • Ovarian cyst 2013    Small dermoid on right ovary   • Pap smear for cervical cancer screening     2016-wnl; 2020-wnl, HRHPV neg   • Sodium (Na) deficiency 10/19/2020   • 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 myelopathy or radiculopathy    • Streptococcal pharyngitis 10/19/2020   • Vaginal discharge 2018   • Vitamin D deficiency        Past Surgical History:   Procedure Laterality Date   • APPENDECTOMY     • BREAST BIOPSY Right     pt unsure when or where- ? 6 yrs ago- benign   • BREAST SURGERY Right 2015    lump removed from breast    • Akash Bazan   • CHOLECYSTECTOMY     • COLONOSCOPY      2012; 2019-benign polyp, repeat 5 years   • DILATION AND CURETTAGE OF UTERUS      Resolved:2009   • ENDOMETRIAL ABLATION N/A 2020    Procedure: ABLATION ENDOMETRIAL Scharlene Settler;  Surgeon: Jocelynn Garrett MD;  [facility];  Service: Gynecology   • ESOPHAGOGASTRODUODENOSCOPY      Diagnostic ; 2012   • HYSTEROSCOPY      Resolved:2009   • OVARIAN CYST REMOVAL Right    • [place] HYSTEROSCOPY,W/ENDO BX N/A 2020    Procedure: DILATATION AND CURETTAGE (D&C) WITH HYSTEROSCOPY;  Surgeon: Jocelynn Garrett MD;  [facility];  Service: Gynecology   • WISDOM TOOTH EXTRACTION         OB History    Para Term  AB Living   4 4 3 1   4   SAB IAB Ectopic Multiple Live Births                  # Outcome Date GA Lbr Joe/2nd Weight Sex Delivery Anes PTL Lv   4             3 Term            2 Term            1 Term               Obstetric Comments   1  FT    2  FT C/S--face presentation   3  PT    4   FT       Menarche: 6   Menses: 28/5/regular pad once daily (s/p ablation)            Current Outpatient Medications:   •  buPROPion (WELLBUTRIN XL) 300 mg 24 hr tablet, Take 1 tablet (300 mg total) by mouth daily, Disp: 90 tablet, Rfl: 1  •  clindamycin (CLINDAGEL) 1 % gel, Apply topically 2 (two) times a day, Disp: 30 g, Rfl: 2  •  pantoprazole (PROTONIX) 40 mg tablet, Take 1 tablet (40 mg total) by mouth daily, Disp: 30 tablet, Rfl: 0    Allergies   Allergen Reactions   • Morphine Chest Pain and Hives     Other reaction(s): chest pain   • Percocet [Oxycodone-Acetaminophen] Hives   • Domperidone    • Ibuprofen      Due to gastritis       Social History     Socioeconomic History   • Marital status: /Civil Union     Spouse name: Orion   • Number of children: 4   • Years of education: high school   • Highest education level: None   Occupational History   • Occupation: cash    Tobacco Use   • Smoking status: Never   • Smokeless tobacco: Never   • Tobacco comments:     No passive smoke exposure   Vaping Use   • Vaping Use: Never used   Substance and Sexual Activity   • Alcohol use: No   • Drug use: Never   • Sexual activity: Yes     Partners: Male     Birth control/protection: Male Sterilization     Comment: life time partners:1   Other Topics Concern   • None   Social History Narrative    [employer]    Accepts blood products            Exercise: 0x/week due to fibormyalgia    Calcium: 1 c almond milk daily; 1 cow milk daily,  1 cheese daily, 1 yogurt 4x/week         caffeine occasionally     Social Determinants of Health     Financial Resource Strain: Not on file   Food Insecurity: Not on file   Transportation Needs: Not on file   Physical Activity: Not on file   Stress: Not on file   Social Connections: Not on file   Intimate Partner Violence: Not on file   Housing Stability: Not on file       Family History   Problem Relation Age of Onset   • Other Mother         uterine leiomyoma   • Diabetes type II Mother         Mellitus   • Hypothyroidism Mother    • Colon cancer Father 54        Stage IV   • Diabetes Father         Type II Mellitus   • Hypertension Father    • Multiple sclerosis Sister    • Asthma Sister    • Hypothyroidism Sister    • Anxiety disorder Brother    • Depression Brother    • Diabetes Brother         Mellitus   • Asthma Brother    • Hypertension Brother    • Hypertension Maternal Grandmother    • Diabetes Maternal Grandmother    • Other Maternal Grandmother         Vulvar cancer   • Vaginal cancer Maternal Grandmother    • Schizophrenia Maternal Grandfather    • Hypertension Maternal Grandfather    • Thyroid cancer Paternal Grandmother 80   • Diabetes Paternal Grandfather 43         due to complications of DM   • Breast cancer Cousin 46   • Ovarian cancer Neg Hx        Blood pressure 118/76, height 5' 5" (1 651 m), weight 109 kg (240 lb 9 6 oz), last menstrual period 2022, not currently breastfeeding  and Body mass index is 40 04 kg/m²  Physical Exam  Constitutional:       General: She is not in acute distress  Appearance: Normal appearance  She is well-developed and well-nourished  She is obese  She is not ill-appearing  HENT:      Head: Normocephalic and atraumatic  Eyes:      Extraocular Movements: Extraocular movements intact and EOM normal       Conjunctiva/sclera: Conjunctivae normal    Neck:      Thyroid: No thyromegaly  Trachea: No tracheal deviation  Cardiovascular:      Rate and Rhythm: Normal rate and regular rhythm  Heart sounds: Normal heart sounds  Pulmonary:      Effort: Pulmonary effort is normal  No respiratory distress  Breath sounds: Normal breath sounds  No stridor  No wheezing or rales  Abdominal:      General: Bowel sounds are normal  There is no distension  Palpations: Abdomen is soft  There is no mass  Tenderness:  There is no abdominal tenderness  There is no guarding or rebound  Hernia: No hernia is present  Musculoskeletal:         General: No tenderness or edema  Normal range of motion  Cervical back: Normal range of motion and neck supple  Lymphadenopathy:      Cervical: No cervical adenopathy  Skin:     General: Skin is warm  Findings: No erythema or rash  Neurological:      Mental Status: She is alert and oriented to person, place, and time  Psychiatric:         Mood and Affect: Mood and affect and mood normal          Behavior: Behavior normal          Thought Content: Thought content normal          Judgment: Judgment normal          Breasts: breasts appear normal, no suspicious masses, no skin or nipple changes or axillary nodes, symmetric fibrous changes in both upper outer quadrants  vulva: normal external genitalia for age and no lesions, masses, epithelial changes, or exudate  vagina: color pink and rugae  well formed rugae  cervix: parous and no lesions   uterus: NSSC, AF, NT, mobile  adnexa: no masses or tenderness      A/P:  Pt is a 38 y o  [id number] with      Deborahvu Debora was seen today for gynecologic exam     Diagnoses and all orders for this visit:    Encounter for annual routine gynecological examination  -pap up to date  -pt advised to contact her GI physician as she will be due for updated colonoscopy in 2023  Visit for screening mammogram  -     Mammo screening bilateral w 3d & cad; Future    Inadequate exercise  Patient advised recommendation of exercise 5 times per week for 30 minutes  BMI 40 0-44 9, adult [facility])  Patient advised recommendation of BMI to be between 19-25

## 2022-12-19 ENCOUNTER — TELEPHONE (OUTPATIENT)
Dept: PAIN MEDICINE | Facility: MEDICAL CENTER | Age: 42
End: 2022-12-19

## 2022-12-19 NOTE — TELEPHONE ENCOUNTER
Caller: Bubba Carranza    Doctor: Dr [facility]    Reason for call: Please send new patient paperwork to address on file      Call back#: [phone]

## 2022-12-22 ENCOUNTER — OFFICE VISIT (OUTPATIENT)
Dept: FAMILY MEDICINE CLINIC | Facility: CLINIC | Age: 42
End: 2022-12-22

## 2022-12-22 VITALS
OXYGEN SATURATION: 98 % | WEIGHT: 246.2 LBS | DIASTOLIC BLOOD PRESSURE: 68 MMHG | BODY MASS INDEX: 41.02 KG/M2 | HEIGHT: 65 IN | TEMPERATURE: 98.5 F | HEART RATE: 67 BPM | SYSTOLIC BLOOD PRESSURE: 128 MMHG

## 2022-12-22 DIAGNOSIS — H53.8 BLURRY VISION: ICD-10-CM

## 2022-12-22 DIAGNOSIS — R27.0 ATAXIA: ICD-10-CM

## 2022-12-22 DIAGNOSIS — M54.12 CERVICAL RADICULOPATHY: Primary | ICD-10-CM

## 2022-12-22 DIAGNOSIS — R42 DIZZINESS: ICD-10-CM

## 2022-12-22 DIAGNOSIS — R93.89 ABNORMAL CAT SCAN: ICD-10-CM

## 2022-12-22 DIAGNOSIS — R51.9 WORSENING HEADACHES: ICD-10-CM

## 2022-12-22 RX ORDER — CYCLOBENZAPRINE HCL 5 MG
5 TABLET ORAL 3 TIMES DAILY PRN
Qty: 30 TABLET | Refills: 1 | Status: SHIPPED | OUTPATIENT
Start: 2022-12-22

## 2022-12-22 RX ORDER — PREDNISONE 20 MG/1
40 TABLET ORAL DAILY
Qty: 10 TABLET | Refills: 0 | Status: SHIPPED | OUTPATIENT
Start: 2022-12-22 | End: 2022-12-27

## 2022-12-23 ENCOUNTER — OFFICE VISIT (OUTPATIENT)
Dept: HEMATOLOGY ONCOLOGY | Facility: CLINIC | Age: 42
End: 2022-12-23

## 2022-12-23 ENCOUNTER — HOSPITAL ENCOUNTER (OUTPATIENT)
Dept: RADIOLOGY | Facility: HOSPITAL | Age: 42
Discharge: HOME | End: 2022-12-23

## 2022-12-23 VITALS
HEART RATE: 64 BPM | BODY MASS INDEX: 40.82 KG/M2 | DIASTOLIC BLOOD PRESSURE: 68 MMHG | OXYGEN SATURATION: 99 % | RESPIRATION RATE: 18 BRPM | WEIGHT: 245 LBS | SYSTOLIC BLOOD PRESSURE: 118 MMHG | HEIGHT: 65 IN | TEMPERATURE: 97.8 F

## 2022-12-23 DIAGNOSIS — E53.8 B12 DEFICIENCY: ICD-10-CM

## 2022-12-23 DIAGNOSIS — M54.12 CERVICAL RADICULOPATHY: ICD-10-CM

## 2022-12-23 DIAGNOSIS — D50.8 IRON DEFICIENCY ANEMIA SECONDARY TO INADEQUATE DIETARY IRON INTAKE: Primary | ICD-10-CM

## 2022-12-23 NOTE — PROGRESS NOTES
Hematology/Oncology Outpatient Follow-up  Meagan Citizen 38 y o  female 1980 [id number]    Date:  12/23/2022      Assessment and Plan:  1  Iron deficiency anemia secondary to inadequate dietary iron intake  She has not anemic or iron deficient according to her most recent laboratory studies  Her iron deficiency improved after her uterine ablation  We will continue to monitor her laboratory studies on a regular basis and she may still have some malabsorption with her gastroparesis/PPI use  She follow-up again with repeat labs in 6 months or sooner should the need arise     - CBC and differential; Future  - Comprehensive metabolic panel; Future  - C-reactive protein; Future  - Sedimentation rate, automated; Future  - Vitamin B12; Future  - Iron Panel (Includes Ferritin, Iron Sat%, Iron, and TIBC); Future  - Ferritin; Future    2  B12 deficiency  Patient has not been taking vitamin B12 supplements  Her most recent vitamin B12 continues to be lower than average 325  Advised her to start B12 supplements at least a few times a week if not daily  Prescription sent to her local pharmacy  - Vitamin B12; Future  - cyanocobalamin (VITAMIN B-12) 1000 MCG tablet; Take 1 tablet (1,000 mcg total) by mouth daily  Dispense: 90 tablet; Refill: 3    HPI:  Shanelle Silvia a 38 y  o  female with a history of gastritis, gastroparesis and chronic anemia due to iron deficiency which was treated with iron IV on multiple occasions since 2016   She states that her menses have been heavy due to fibroids; typically lasting 7 days   She has established GI care and had upper and lower endoscopy done in July 2019  The colonoscopy showed large external and internal hemorrhoids and 1 polyp that was removed otherwise normal upper endoscopy was normal   She had a gastric emptying study done recently on 09/24/2019 which confirmed that she does in fact have gastroparesis      She stated that she was found to have hepatomegaly just recently on an Kaylie Casanova also had an MRI of the abdomen on 07/16/2020 which showed hepatomegaly measuring 21 cm in greatest dimension   No other pathology was found   She had uterine ablation August 2020 which significantly improved her menstrual bleeding      Interval history:  Patient presents today for a follow-up visit  She states that she continues to have believes it is multifactorial including stress and not sleeping well  Is under a lot of stress due to her job, caring for her father-in-law who has dementia and is also concerned about her not doing well with stage IV cancer  She states that she continues to have back and now neck issues which are affecting her quality of life and sleep  Reports that she has tried multiple therapies/treatments for her chronic back pain which has not been effective  She is getting additional work-up done by her primary care team   Seen by them recently after she noticed numbness to her torso/arms after awakening from laying on her back  Be going for sleep study in the future  She denies any obvious bleeding from any site  Menstrual bleeding has been very light since her uterine ablation  Her most recent laboratory studies from 11/25/2022 showed white cells and platelets, anemic H&H low normal 11 7/37, MCV 91  CMP normal   Inflammatory markers are not elevated  He is not iron deficient iron saturation 31%, ferritin 183  Folate is appropriate  Vitamin B12 continues to be lower than average 325  ROS: Review of Systems   Constitutional: Positive for fatigue and unexpected weight change (increase)  Negative for activity change, appetite change, chills and fever  HENT: Negative for congestion, mouth sores, nosebleeds, sore throat and trouble swallowing  Eyes: Negative  Respiratory: Negative for cough, chest tightness and shortness of breath  Cardiovascular: Negative for chest pain, palpitations and leg swelling     Gastrointestinal: Positive for constipation  Negative for abdominal distention, abdominal pain, blood in stool, diarrhea, nausea and vomiting  Genitourinary: Negative for difficulty urinating, dysuria, frequency, hematuria and urgency  Musculoskeletal: Positive for arthralgias, back pain and neck pain  Negative for gait problem and joint swelling  Skin: Negative for color change, pallor and rash  Neurological: Positive for dizziness, numbness and headaches  Negative for weakness and light-headedness  Hematological: Negative for adenopathy  Does not bruise/bleed easily  Psychiatric/Behavioral: Positive for sleep disturbance  Negative for dysphoric mood         Past Medical History:   Diagnosis Date   • Acne 04/23/2015   • Acquired hallux valgus 03/07/2016   • Acute non-recurrent maxillary sinusitis 01/27/2020   • Anemia    • Anxiety 10/16/2019   • Arthritis    • Benign paroxysmal positional vertigo 03/08/2017   • Candida vaginitis 04/25/2019   • Colon polyp 2019   • COVID-19 vaccine series completed     Moderna: 2nd dose 5/21/2021   • Depression    • Discoloration of skin of hand 03/20/2019   • Duodenitis without bleeding    • Dysphagia    • Fibromyalgia    • Gastritis    • GERD (gastroesophageal reflux disease)    • Hiatal hernia    • Hx of colonoscopy    • Insomnia    • Iron deficiency anemia secondary to inadequate dietary iron intake 10/18/2019   • Memory loss    • Menorrhagia with regular cycle 11/20/2014   • Moderate episode of recurrent [facility] disorder ([facility] ) 10/16/2019   • Obesity    • Oral herpes    • Ovarian cyst 06/11/2013    Small dermoid on right ovary   • Pap smear for cervical cancer screening     2/2016-wnl; 11/2020-wnl, HRHPV neg   • Sodium (Na) deficiency 10/19/2020   • 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 myelopathy or radiculopathy    • Streptococcal pharyngitis 10/19/2020   • Vaginal discharge 09/01/2018   • Vitamin D deficiency        Past Surgical History:   Procedure Laterality Date   • APPENDECTOMY • BREAST BIOPSY Right     pt unsure when or where- ? 6 yrs ago- benign   • BREAST SURGERY Right 01/08/2015    lump removed from breast    • Akash Bazan   • CHOLECYSTECTOMY     • COLONOSCOPY      9/26/2012; 2019-benign polyp, repeat 5 years   • DILATION AND CURETTAGE OF UTERUS      Resolved:1/29/2009   • ENDOMETRIAL ABLATION N/A 8/13/2020    Procedure: ABLATION ENDOMETRIAL Dano Speck;  Surgeon: James Hester MD;  Location: [facility];  Service: Gynecology   • ESOPHAGOGASTRODUODENOSCOPY      Diagnostic ; 9/26/2012   • HYSTEROSCOPY      Resolved:1/30/2009   • OVARIAN CYST REMOVAL Right 2005   • IL HYSTEROSCOPY,W/ENDO BX N/A 8/13/2020    Procedure: DILATATION AND CURETTAGE (D&C) WITH HYSTEROSCOPY;  Surgeon: James Hester MD;  Location: [facility];  Service: Gynecology   • WISDOM TOOTH EXTRACTION         Social History     Socioeconomic History   • Marital status: /Civil Union     Spouse name: Orion   • Number of children: 4   • Years of education: high school   • Highest education level: None   Occupational History   • Occupation: cash    Tobacco Use   • Smoking status: Never   • Smokeless tobacco: Never   • Tobacco comments:     No passive smoke exposure   Vaping Use   • Vaping Use: Never used   Substance and Sexual Activity   • Alcohol use: No   • Drug use: Never   • Sexual activity: Yes     Partners: Male     Birth control/protection: Male Sterilization     Comment: life time partners:1   Other Topics Concern   • None   Social History Narrative    [employer]    Accepts blood products            Exercise: 0x/week due to fibormyalgia    Calcium: 1 c almond milk daily; 1 cow milk daily,  1 cheese daily, 1 yogurt 4x/week         caffeine occasionally     Social Determinants of Health     Financial Resource Strain: Not on file   Food Insecurity: Not on file   Transportation Needs: Not on file   Physical Activity: Not on file   Stress: Not on file   Social Connections: Not on file Intimate Partner Violence: Not on file   Housing Stability: Not on file       Family History   Problem Relation Age of Onset   • Other Mother         uterine leiomyoma   • Diabetes type II Mother         Mellitus   • Hypothyroidism Mother    • Colon cancer Father 54        Stage IV   • Diabetes Father         Type II Mellitus   • Hypertension Father    • Multiple sclerosis Sister    • Asthma Sister    • Hypothyroidism Sister    • Anxiety disorder Brother    • Depression Brother    • Diabetes Brother         Mellitus   • Asthma Brother    • Hypertension Brother    • Hypertension Maternal Grandmother    • Diabetes Maternal Grandmother    • Other Maternal Grandmother         Vulvar cancer   • Vaginal cancer Maternal Grandmother    • Schizophrenia Maternal Grandfather    • Hypertension Maternal Grandfather    • Thyroid cancer Paternal Grandmother 80   • Diabetes Paternal Grandfather 43         due to complications of DM   • Breast cancer Cousin 46   • Ovarian cancer Neg Hx        Allergies   Allergen Reactions   • Morphine Chest Pain and Hives     Other reaction(s): chest pain   • Percocet [Oxycodone-Acetaminophen] Hives   • Domperidone    • Ibuprofen      Due to gastritis         Current Outpatient Medications:   •  buPROPion (WELLBUTRIN XL) 300 mg 24 hr tablet, Take 1 tablet (300 mg total) by mouth daily, Disp: 90 tablet, Rfl: 1  •  clindamycin (CLINDAGEL) 1 % gel, Apply topically 2 (two) times a day, Disp: 30 g, Rfl: 2  •  cyanocobalamin (VITAMIN B-12) 1000 MCG tablet, Take 1 tablet (1,000 mcg total) by mouth daily, Disp: 90 tablet, Rfl: 3  •  cyclobenzaprine (FLEXERIL) 5 mg tablet, Take 1 tablet (5 mg total) by mouth 3 (three) times a day as needed for muscle spasms, Disp: 30 tablet, Rfl: 1  •  pantoprazole (PROTONIX) 40 mg tablet, Take 1 tablet (40 mg total) by mouth daily, Disp: 30 tablet, Rfl: 0  •  predniSONE 20 mg tablet, Take 2 tablets (40 mg total) by mouth daily for 5 days, Disp: 10 tablet, Rfl: 0      Physical Exam:  /68 (BP Location: Left arm, Patient Position: Sitting, Cuff Size: Large)   Pulse 64   Temp 97 8 °F (36 6 °C) (Tympanic)   Resp 18   Ht 5' 5" (1 651 m)   Wt 111 kg (245 lb)   LMP 12/07/2022 (Within Months)   SpO2 99%   BMI 40 77 kg/m²     Physical Exam  Vitals reviewed  Constitutional:       General: She is not in acute distress  Appearance: She is well-developed  She is obese  She is not diaphoretic  HENT:      Head: Normocephalic and atraumatic  Mouth/Throat:      Pharynx: No oropharyngeal exudate  Eyes:      General: No scleral icterus  Conjunctiva/sclera: Conjunctivae normal       Pupils: Pupils are equal, round, and reactive to light  Neck:      Thyroid: No thyromegaly  Cardiovascular:      Rate and Rhythm: Normal rate and regular rhythm  Heart sounds: Normal heart sounds  No murmur heard  Pulmonary:      Effort: Pulmonary effort is normal  No respiratory distress  Breath sounds: Normal breath sounds  Abdominal:      General: Bowel sounds are normal  There is no distension  Palpations: Abdomen is soft  Tenderness: There is no abdominal tenderness  Musculoskeletal:         General: Normal range of motion  Cervical back: Normal range of motion and neck supple  Lymphadenopathy:      Cervical: No cervical adenopathy  Skin:     General: Skin is warm and dry  Coloration: Skin is pale  Findings: No erythema or rash  Neurological:      Mental Status: She is alert and oriented to person, place, and time  Psychiatric:         Mood and Affect: Affect is blunt  Behavior: Behavior normal          Thought Content:  Thought content normal          Judgment: Judgment normal            Labs:  Lab Results   Component Value Date    WBC 7 61 11/25/2022    HGB 11 7 11/25/2022    HCT 37 0 11/25/2022    MCV 91 11/25/2022     11/25/2022     Lab Results   Component Value Date    K 3 9 11/25/2022     11/25/2022 CO2 28 11/25/2022    BUN 12 11/25/2022    CREATININE 0 71 11/25/2022    GLUF 91 11/25/2022    CALCIUM 9 5 11/25/2022    AST 21 11/25/2022    ALT 32 11/25/2022    ALKPHOS 64 11/25/2022    EGFR 103 11/25/2022       Patient voiced understanding and agreement in the above discussion  Aware to contact our office with questions/symptoms in the interim  This note has been generated by voice recognition software system  Therefore, there may be spelling, grammar, and or syntax errors  Please contact if questions arise

## 2022-12-27 ENCOUNTER — TELEPHONE (OUTPATIENT)
Dept: FAMILY MEDICINE CLINIC | Facility: CLINIC | Age: 42
End: 2022-12-27

## 2022-12-27 DIAGNOSIS — R51.9 WORSENING HEADACHES: Primary | ICD-10-CM

## 2022-12-27 NOTE — PROGRESS NOTES
Assessment/Plan:    68-year-old woman with: cervical radiculopathy headaches with blurry vision ataxia intermittently  History of abnormal CT along with dizziness  We will check x-rays refer to ophthalmology give steroid burst along with muscle relaxer and refer to physical therapy we will check MRI of the brain as well discussed what he can return parameters and follow-up with Dr Allison Cormier in 2 to 3 weeks advised patient to call back if symptoms worsen acutely before then    No problem-specific Assessment & Plan notes found for this encounter  Diagnoses and all orders for this visit:    Cervical radiculopathy  -     XR spine cervical complete 4 or 5 vw non injury; Future  -     Ambulatory Referral to Physical Therapy; Future  -     predniSONE 20 mg tablet; Take 2 tablets (40 mg total) by mouth daily for 5 days  -     cyclobenzaprine (FLEXERIL) 5 mg tablet; Take 1 tablet (5 mg total) by mouth 3 (three) times a day as needed for muscle spasms    Abnormal CAT scan    Ataxia  -     MRI brain w wo contrast; Future    Dizziness  -     MRI brain w wo contrast; Future    Worsening headaches  -     MRI brain w wo contrast; Future  -     Ambulatory Referral to Ophthalmology; Future    Blurry vision  -     MRI brain w wo contrast; Future  -     Ambulatory Referral to Ophthalmology; Future          Subjective:     Chief Complaint   Patient presents with   • Back Pain   • Blurred Vision     Symptoms started    • Neck Pain     Symptoms started three days ago  Patient ID: Edgardo Borja is a 38 y o  female  Patient is a 68-year-old woman who presents for follow-up on cervical radiculopathy headaches with blurry vision ataxia intermittently  History of abnormal CT along with dizziness no fevers chills nausea vomiting tolerating p o  intake patient mitts her symptoms are not improving and she needs additional help    Back Pain  Associated symptoms include headaches, numbness and weakness     Neck Pain   Associated symptoms include headaches, numbness and weakness  The following portions of the patient's history were reviewed and updated as appropriate: allergies, current medications, past family history, past medical history, past social history, past surgical history and problem list     Review of Systems   Constitutional: Negative  HENT: Negative  Eyes: Positive for visual disturbance  Respiratory: Negative  Cardiovascular: Negative  Gastrointestinal: Negative  Endocrine: Negative  Genitourinary: Negative  Musculoskeletal: Positive for back pain and neck pain  Allergic/Immunologic: Negative  Neurological: Positive for dizziness, weakness, numbness and headaches  Hematological: Negative  Psychiatric/Behavioral: Negative  All other systems reviewed and are negative  Objective:      /68 (BP Location: Left arm, Patient Position: Sitting, Cuff Size: Standard)   Pulse 67   Temp 98 5 °F (36 9 °C) (Tympanic)   Ht 5' 5" (1 651 m)   Wt 112 kg (246 lb 3 2 oz)   LMP 12/07/2022 (Within Months)   SpO2 98%   BMI 40 97 kg/m²          Physical Exam  Constitutional:       Appearance: She is well-developed  HENT:      Head: Atraumatic  Right Ear: External ear normal       Left Ear: External ear normal    Eyes:      Conjunctiva/sclera: Conjunctivae normal       Pupils: Pupils are equal, round, and reactive to light  Cardiovascular:      Rate and Rhythm: Normal rate and regular rhythm  Heart sounds: Normal heart sounds  Pulmonary:      Effort: Pulmonary effort is normal  No respiratory distress  Breath sounds: Normal breath sounds  Abdominal:      General: There is no distension  Palpations: Abdomen is soft  Tenderness: There is no abdominal tenderness  There is no guarding or rebound  Musculoskeletal:         General: Normal range of motion  Cervical back: Normal range of motion  Skin:     General: Skin is warm and dry     Neurological:      Mental Status: She is alert and oriented to person, place, and time  Cranial Nerves: No cranial nerve deficit  Psychiatric:         Behavior: Behavior normal          Thought Content:  Thought content normal          Judgment: Judgment normal

## 2022-12-27 NOTE — TELEPHONE ENCOUNTER
----- Message from Wiliam Collet sent at 12/23/2022  3:51 PM EST -----  Regarding: Another Doctor for eye referral please  Contact: [phone]  Please review patient message      ----- Message -----  From: Rg Reagan  Sent: 12/23/2022  12:10 PM EST  To: [facility]  Subject: Another Doctor for eye referral please           Good afternoon Dr Valentino Harlem and medical office staff    hope everyone's day is running well  I will be needing another eye doctor referral please    I called Dr Marguerite Lindsay office and they told me that I was not a patient there and dr Cathy Santos was not receiving any new patients  I thought it was weird that the  told me that because I know I went there beginning of the year    I was not going to argue with her with her so I am just asking if another doctor may be suggested  Please let me know  Thanks much  Great weekend everyone  God bless always

## 2022-12-27 NOTE — TELEPHONE ENCOUNTER
Please call patient to discuss that she should check with her insurance to see which ophthalmologist are in network and we can refer to whomever she would like

## 2022-12-27 NOTE — RESULT ENCOUNTER NOTE
Please call patient    Please call patient to discuss that x-ray shows slight worsening of disc disease at C5-6 with straightening of the cervical lordosis no acute fractures or dislocations

## 2023-01-08 ENCOUNTER — HOSPITAL ENCOUNTER (OUTPATIENT)
Dept: SLEEP CENTER | Facility: CLINIC | Age: 43
Discharge: HOME | End: 2023-01-08

## 2023-01-08 DIAGNOSIS — G47.33 OSA (OBSTRUCTIVE SLEEP APNEA): ICD-10-CM

## 2023-01-09 ENCOUNTER — HOSPITAL ENCOUNTER (OUTPATIENT)
Dept: MRI IMAGING | Facility: HOSPITAL | Age: 43
Discharge: HOME | End: 2023-01-09

## 2023-01-09 DIAGNOSIS — R51.9 WORSENING HEADACHES: ICD-10-CM

## 2023-01-09 DIAGNOSIS — H53.8 BLURRY VISION: ICD-10-CM

## 2023-01-09 DIAGNOSIS — R42 DIZZINESS: ICD-10-CM

## 2023-01-09 DIAGNOSIS — R27.0 ATAXIA: ICD-10-CM

## 2023-01-09 RX ADMIN — GADOBUTROL 8 ML: 604.72 INJECTION INTRAVENOUS at 21:05

## 2023-01-09 NOTE — PROGRESS NOTES
Home Sleep Study Documentation    HOME STUDY DEVICE: Noxturnal yes                                           Miranda G3 no      Pre-Sleep Home Study:    Set-up and instructions performed by:  ASHTYN Acosta    Technician performed demonstration for Patient: yes    Return demonstration performed by Patient: yes    Written instructions provided to Patient: yes    Patient signed consent form: yes        Post-Sleep Home Study:    Additional comments by Patient: No sleep diary available    Home Sleep Study Failed:no:    Failure reason: N/A    Reported or Detected: N/A    Scored by: ASHTYN Reddy

## 2023-01-13 ENCOUNTER — TELEPHONE (OUTPATIENT)
Dept: FAMILY MEDICINE CLINIC | Facility: CLINIC | Age: 43
End: 2023-01-13

## 2023-01-16 ENCOUNTER — CONSULT (OUTPATIENT)
Dept: PAIN MEDICINE | Facility: CLINIC | Age: 43
End: 2023-01-16

## 2023-01-16 VITALS
BODY MASS INDEX: 40.15 KG/M2 | OXYGEN SATURATION: 100 % | HEART RATE: 64 BPM | SYSTOLIC BLOOD PRESSURE: 116 MMHG | HEIGHT: 65 IN | DIASTOLIC BLOOD PRESSURE: 94 MMHG | WEIGHT: 241 LBS

## 2023-01-16 DIAGNOSIS — M54.12 CERVICAL RADICULOPATHY: ICD-10-CM

## 2023-01-16 DIAGNOSIS — M47.816 LUMBAR SPONDYLOSIS: Primary | ICD-10-CM

## 2023-01-16 NOTE — PROGRESS NOTES
Assessment  1  Lumbar spondylosis    2  Cervical radiculopathy        Plan  Meagan Citiminor is a 37 y o  F with a history of lumbar facetogenic pain and bilateral cervical radicular symptoms  She states she has had many years of lumbar, thoracic and cervical pain issues  Has tried physical therapy, injections over the years without benefit  Medication trials have also been unsuccessful  She cannot take NSAIDs due to history of gastritis  Most recently she did see Dr Mary Begum of [address] in 2019 and states she had no relief with thoracic medial branch nerve blocks  She also had trialed nortriptyline at that time without benefit  I reviewed the patient's cervical and lumbar MRIs  Cervical MRI from 5/21/2019 showed multilevel degenerative disc disease worst at C4-5 and C5-6 with mild to moderate left foraminal stenosis  Given that she has tried numerous conservative therapies to date I did offer her a cervical epidural steroid injection trial, however patient deferred further interventions  Patient does note that she summary work-up with complete numbness in the bilateral arms around [place], which resolved after 30 minutes to 1 hour  I do not think that her symptoms of complete bilateral upper extremity numbness correlate with her MRI findings, however she has exhausted treatments to date that a epidural trial is warranted  I left it open that patient may call anytime to schedule cervical epidural steroid injection  Her lumbar MRI from 7/15/2019 showed no significant findings other than mild degenerative facet arthrosis  I did offer her diagnostic bilateral L3-5 medial branch nerve blocks, but she again deferred interventions at this time  We left it at that she should continue with conservative treatments, including NSAID sparing medication trials, continued physical and home exercise therapy    She may contact the office if she wishes to pursue with cervical epidural steroid injection or lumbar medial branch nerve blocks  [place] Prescription Drug Monitoring Program report was reviewed and was appropriate       My impressions and treatment recommendations were discussed in detail with the patient who verbalized understanding and had no further questions  Discharge instructions were provided  I personally saw and examined the patient and I agree with the above discussed plan of care  No orders of the defined types were placed in this encounter  No orders of the defined types were placed in this encounter  History of Present Illness    Javier Cagle is a 37 y o  female with a past medical history significant for GERD, gastritis, gastroparesis, OLI, chronic neck thoracic and lumbar back pain, myofascial pain, generalized anxiety disorder, morbid obesity  She presents for initial consultation regarding her chronic neck and back pain issues  The current pain started without any inciting event, has been present for greater than 8 years  Patient notes that the pain affects her "whole spine"  Intensity of pain is rated as moderate to severe, current pain rated as a "15" out of 10 on the pain scale  Pain is rated as constant or 100% of the time  Pain is described as burning, numbing, dull/aching and affects the upper and lower extremities  Patient ambulates without assistive device  Pain is worsened with pressure position, laying down, bending, walking, exercise, relaxation  No noted ameliorating factors  Patient notes past pain treatments including nerve block, nerve injection, physical therapy demonstrated no relief  Patient's social history is negative for tobacco, alcohol, marijuana, drug use  I have personally reviewed and/or updated the patient's past medical history, past surgical history, family history, social history, current medications, allergies, and vital signs today       Review of Systems   Constitutional: Positive for unexpected weight change (weight gain)  Negative for chills and fever  HENT: Negative for ear pain and sore throat  Eyes: Positive for visual disturbance  Negative for pain  Respiratory: Negative for cough and shortness of breath  Cardiovascular: Negative for chest pain and palpitations  Gastrointestinal: Positive for nausea  Negative for abdominal pain and vomiting  Genitourinary: Negative for dysuria and hematuria  Musculoskeletal: Positive for arthralgias, back pain, neck pain and neck stiffness  Skin: Negative for color change and rash  Neurological: Positive for dizziness, weakness, numbness and headaches  Negative for seizures and syncope  All other systems reviewed and are negative        Patient Active Problem List   Diagnosis   • Allergic rhinitis   • Diaphragmatic hernia   • Gastroesophageal reflux disease without esophagitis   • Gastroparesis   • Encounter for annual routine gynecological examination   • Encounter for well adult exam with abnormal findings   • Dietary calcium deficiency   • Inadequate exercise   • Adrenal adenoma   • Breast lump   • Fibroid uterus   • Hemorrhoids   • Impaired fasting glucose   • Hyperlipidemia   • Abnormal mammogram   • Nonalcoholic fatty liver disease   • Premenstrual tension syndrome   • Other chest pain   • Pain of left hip joint   • Neck pain   • Chronic midline thoracic back pain   • Chronic midline low back pain without sciatica   • Decreased hearing of both ears   • 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 myelopathy or radiculopathy   • Abnormal CAT scan   • Colonic thickening   • Family history of colon cancer   • Other headache syndrome   • Family history of multiple sclerosis   • Positive depression screening   • Facet degeneration of lumbar region   • Mild episode of recurrent major depressive disorder (HCC)   • Generalized anxiety disorder   • RUQ pain   • BMI 40 0-44 9, adult (HCC)   • Chronic idiopathic constipation   • Abnormal LFTs   • Visit for screening mammogram   • Myofascial muscle pain   • Spondylosis without myelopathy or radiculopathy, lumbar region   • Suspected sleep apnea   • Daytime somnolence   • Cervical radiculopathy   • Ataxia   • Dizziness   • Worsening headaches   • Blurry vision   • OLI (obstructive sleep apnea)       Past Medical History:   Diagnosis Date   • Acne 04/23/2015   • Acquired hallux valgus 03/07/2016   • Acute non-recurrent maxillary sinusitis 01/27/2020   • Anemia    • Anxiety 10/16/2019   • Arthritis    • Benign paroxysmal positional vertigo 03/08/2017   • Candida vaginitis 04/25/2019   • Colon polyp 2019   • COVID-19 vaccine series completed     Moderna: 2nd dose 5/21/2021   • Depression    • Discoloration of skin of hand 03/20/2019   • Duodenitis without bleeding    • Dysphagia    • Fibromyalgia    • Gastritis    • GERD (gastroesophageal reflux disease)    • Hiatal hernia    • Hx of colonoscopy    • Insomnia    • Iron deficiency anemia secondary to inadequate dietary iron intake 10/18/2019   • Memory loss    • Menorrhagia with regular cycle 11/20/2014   • Moderate episode of recurrent major depressive disorder ([facility] [address] ) 10/16/2019   • Obesity    • Oral herpes    • Ovarian cyst 06/11/2013    Small dermoid on right ovary   • Pap smear for cervical cancer screening     2/2016-wnl; 11/2020-wnl, HRHPV neg   • Sodium (Na) deficiency 10/19/2020   • 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 myelopathy or radiculopathy    • Streptococcal pharyngitis 10/19/2020   • Vaginal discharge 09/01/2018   • Vitamin D deficiency        Past Surgical History:   Procedure Laterality Date   • APPENDECTOMY     • BREAST BIOPSY Right     pt unsure when or where- ? 6 yrs ago- benign   • BREAST SURGERY Right 01/08/2015    lump removed from breast    • Akash Bazan   • CHOLECYSTECTOMY     • COLONOSCOPY      9/26/2012; 2019-benign polyp, repeat 5 years   • DILATION AND CURETTAGE OF UTERUS      Resolved:1/29/2009   • ENDOMETRIAL ABLATION N/A 8/13/2020    Procedure: ABLATION Brenton Murdock;  Surgeon: Michael Chester MD;  Location: [facility];  Service: Gynecology   • ESOPHAGOGASTRODUODENOSCOPY      Diagnostic ; 2012   • HYSTEROSCOPY      Resolved:2009   • OVARIAN CYST REMOVAL Right    • NC HYSTEROSCOPY BX ENDOMETRIUM&/POLYPC W/WO D&C N/A 2020    Procedure: DILATATION AND CURETTAGE (D&C) WITH HYSTEROSCOPY;  Surgeon: Michael Chester MD;  Location: [facility];  Service: Gynecology   • WISDOM TOOTH EXTRACTION         Family History   Problem Relation Age of Onset   • Other Mother         uterine leiomyoma   • Diabetes type II Mother         Mellitus   • Hypothyroidism Mother    • Colon cancer Father 54        Stage IV   • Diabetes Father         Type II Mellitus   • Hypertension Father    • Multiple sclerosis Sister    • Asthma Sister    • Hypothyroidism Sister    • Anxiety disorder Brother    • Depression Brother    • Diabetes Brother         Mellitus   • Asthma Brother    • Hypertension Brother    • Hypertension Maternal Grandmother    • Diabetes Maternal Grandmother    • Other Maternal Grandmother         Vulvar cancer   • Vaginal cancer Maternal Grandmother    • Schizophrenia Maternal Grandfather    • Hypertension Maternal Grandfather    • Thyroid cancer Paternal Grandmother 80   • Diabetes Paternal Grandfather 43         due to complications of DM   • Breast cancer Cousin 46   • Ovarian cancer Neg Hx        Social History     Occupational History   • Occupation: cash    Tobacco Use   • Smoking status: Never   • Smokeless tobacco: Never   • Tobacco comments:     No passive smoke exposure   Vaping Use   • Vaping Use: Never used   Substance and Sexual Activity   • Alcohol use: No   • Drug use: Never   • Sexual activity: Yes     Partners: Male     Birth control/protection: Male Sterilization     Comment: life time partners:1       Current Outpatient Medications on File Prior to Visit   Medication Sig   • buPROPion (WELLBUTRIN XL) 300 mg 24 hr tablet Take 1 tablet (300 mg total) by mouth daily   • clindamycin (CLINDAGEL) 1 % gel Apply topically 2 (two) times a day   • cyanocobalamin (VITAMIN B-12) 1000 MCG tablet Take 1 tablet (1,000 mcg total) by mouth daily   • cyclobenzaprine (FLEXERIL) 5 mg tablet Take 1 tablet (5 mg total) by mouth 3 (three) times a day as needed for muscle spasms   • pantoprazole (PROTONIX) 40 mg tablet Take 1 tablet (40 mg total) by mouth daily     No current facility-administered medications on file prior to visit  Allergies   Allergen Reactions   • Morphine Chest Pain and Hives     Other reaction(s): chest pain   • Percocet [Oxycodone-Acetaminophen] Hives   • Domperidone    • Ibuprofen      Due to gastritis       Physical Exam    /94   Pulse 64   Ht 5' 5" (1 651 m)   Wt 109 kg (241 lb)   SpO2 100%   BMI 40 10 kg/m²     Constitutional: normal, well developed, well nourished, alert, in no distress and non-toxic and no overt pain behavior  Eyes: anicteric  HEENT: grossly intact  Neck: supple, symmetric, trachea midline and no masses   Pulmonary:even and unlabored  Cardiovascular:No edema or pitting edema present  Skin:Normal without rashes or lesions and well hydrated  Psychiatric:Mood and affect appropriate  Neurologic:Cranial Nerves II-XII grossly intact  Moving all 4 extremities grossly  Motor strength is grossly intact in bilateral upper and lower extremities  Sensation is equal and intact bilaterally  Negative Spurling sign  Musculoskeletal: Cervical and lumbar paraspinals tender to palpation  Pain elicited with cervical and lumbar facetogenic maneuvers (worse with extension, sidebending)    Imaging    Cervical MRI 5/21/19:  MRI CERVICAL SPINE WITHOUT CONTRAST     INDICATION: Patient has chronic neck pain    Follow-up of abnormal x-ray findings      COMPARISON:  Cervical spine study from March 19, 2019, MRI of the cervical spine from December 30, 2015      TECHNIQUE:  Sagittal T1, sagittal T2, sagittal inversion recovery, axial T2, axial  2D merge     IMAGE QUALITY:  Diagnostic     FINDINGS:     ALIGNMENT:  Mild reversal and straightening of the cervical lordosis, which may be secondary to muscle spasm or possibly positional   Vertebral bodies are maintained in stature  Mild endplate degenerative signal and anterior marginal osteophyte   formation at C5      MARROW SIGNAL:  Mild endplate degenerative changes at C5-C6  No focal suspicious marrow signal abnormality      CERVICAL AND VISUALIZED THORACIC CORD:  Normal signal within the visualized cord      PREVERTEBRAL AND PARASPINAL SOFT TISSUES:  Normal      VISUALIZED POSTERIOR FOSSA:  The visualized posterior fossa demonstrates no abnormal signal      CERVICAL DISC SPACES:  In comparison to the prior MRI study, there is interval disc space narrowing at C5-C6  There is also suggestion of mild disc space narrowing at C4-C5 with anterior and posterior marginal osteophytosis      C2-C3:  Normal      C3-C4:  Normal      C4-C5: Mild anterior and posterior marginal osteophytosis with left uncovertebral and facet hypertrophy resulting in mild to moderate left foraminal stenosis, correlate for C5 radiculopathy      C5-C6:  Disc space narrowing, endplate degenerative signal, anterior and posterior osteophytosis and left uncovertebral hypertrophy resulting in mild left foraminal stenosis  The right neural foramina is patent  The minimal disc bulge at this level   does not result in significant spinal canal or foraminal narrowing      C6-C7:  Tiny central annular fissure and protrusion without spinal canal or foraminal stenosis      C7-T1:  Normal      UPPER THORACIC DISC SPACES:  Normal      IMPRESSION:     In comparison to the prior MRI study of December 30, 2015, there is interval progression of degenerative discogenic disease and osseous spondylosis at C4-C5 and C5-C6 without spinal canal stenosis or cord signal abnormality    There is left foraminal   stenosis at C4-C5 and C5-C6  Correlate for ipsilateral C5 and C6 radiculopathy          Lumbar MRI 7/15/19:  MRI LUMBAR SPINE WITHOUT CONTRAST     INDICATION: :  38-year-old female, right-sided back pain     COMPARISON:  3/19/2019 x-rays     TECHNIQUE:  Sagittal T1, sagittal T2, sagittal inversion recovery, axial T1 and axial T2, coronal T2     IMAGE QUALITY:  Diagnostic     FINDINGS:     VERTEBRAL BODIES:  Normal alignment of the lumbar spine  No spondylolysis or spondylolisthesis  No scoliosis  No compression fracture  Normal marrow signal is identified within the visualized bony structures  No discrete marrow lesion      SACRUM:  Normal signal within the sacrum   No evidence of insufficiency or stress fracture      DISTAL CORD AND CONUS:  Normal size and signal within the distal cord and conus        PARASPINAL SOFT TISSUES:  Trace free fluid in the pelvic cul-de-sac, likely physiologic      LOWER THORACIC DISC SPACES: Moderate degenerative disc disease T11-12, no stenosis      LUMBAR DISC SPACES:     L1-L2:  Normal      L2-L3:  Normal      L3-L4:  Normal disc, mild degenerative facet arthrosis, no stenosis     L4-L5:  Normal disc, mild degenerative facet arthrosis, no stenosis     L5-S1:  Normal disc, mild degenerative facet arthrosis, no stenosis     IMPRESSION:  Moderate degenerative disc disease, no stenosis T11-12     Multilevel degenerative lumbar facet arthrosis     No evidence of disc herniation, central canal or foraminal stenosis     Trace pelvic free fluid, likely physiologic

## 2023-01-17 ENCOUNTER — OFFICE VISIT (OUTPATIENT)
Dept: PSYCHIATRY | Facility: CLINIC | Age: 43
End: 2023-01-17

## 2023-01-17 ENCOUNTER — TELEPHONE (OUTPATIENT)
Dept: FAMILY MEDICINE CLINIC | Facility: CLINIC | Age: 43
End: 2023-01-17

## 2023-01-17 ENCOUNTER — TELEPHONE (OUTPATIENT)
Dept: PSYCHIATRY | Facility: CLINIC | Age: 43
End: 2023-01-17

## 2023-01-17 DIAGNOSIS — G47.33 OSA (OBSTRUCTIVE SLEEP APNEA): Primary | ICD-10-CM

## 2023-01-17 DIAGNOSIS — F41.1 GENERALIZED ANXIETY DISORDER: ICD-10-CM

## 2023-01-17 DIAGNOSIS — F32.1 CURRENT MODERATE EPISODE OF MAJOR DEPRESSIVE DISORDER WITHOUT PRIOR EPISODE (HCC): Primary | ICD-10-CM

## 2023-01-17 RX ORDER — BUPROPION HYDROCHLORIDE 150 MG/1
150 TABLET ORAL DAILY
Qty: 30 TABLET | Refills: 1 | Status: SHIPPED | OUTPATIENT
Start: 2023-01-17 | End: 2023-03-18

## 2023-01-17 RX ORDER — HYDROXYZINE HYDROCHLORIDE 10 MG/1
25 TABLET, FILM COATED ORAL 2 TIMES DAILY PRN
Qty: 60 TABLET | Refills: 0 | Status: SHIPPED | OUTPATIENT
Start: 2023-01-17 | End: 2023-01-17

## 2023-01-17 NOTE — PSYCH
MEDICATION MANAGEMENT NOTE        [facility]      Name and Date of Birth:  Niurka Chowdary 37 y o  1980 MRN: [id number]    Date of Visit: January 17, 2023    Reason for Visit: Follow-up for medication management    Subjective: Patient reports she has not been doing well lately  Reports has been depressed  Reports multiple stressors including her father who has colon cancer and very bad prognosis at this time due to metastasis  She also reports her son is undergoing divorce as his wife left him  Patient also reports a lot of stress at work  Notes mood has been very low  Denies to any suicidal thoughts  reports anxiety also at times can be high  Reports appetite can be up and down  Reports sleep has not been good  Recently patient underwent sleep study and it looks from her notes possible obstructive sleep apnea  Patient denies any other concern  Supportive psychotherapy provided during the meeting  She denies any mood swings or any symptoms of alecia or hypomania  Does not endorse any psychotic symptoms  Compliant with Wellbutrin and open to changes in the medication dose        Review Of Systems:      Constitutional negative   ENT negative   Cardiovascular negative   Respiratory negative   Gastrointestinal negative   Genitourinary negative   Musculoskeletal negative   Integumentary negative   Neurological negative   Endocrine negative   Other Symptoms none       Alcohol/Substance Abuse: Denies        Past Medical History:    Past Medical History:   Diagnosis Date   • Acne 04/23/2015   • Acquired hallux valgus 03/07/2016   • Acute non-recurrent maxillary sinusitis 01/27/2020   • Anemia    • Anxiety 10/16/2019   • Arthritis    • Benign paroxysmal positional vertigo 03/08/2017   • Candida vaginitis 04/25/2019   • Colon polyp 2019   • COVID-19 vaccine series completed     Moderna: 2nd dose 5/21/2021   • Depression    • Discoloration of skin of hand 03/20/2019   • Duodenitis without bleeding    • Dysphagia    • Fibromyalgia    • Gastritis    • GERD (gastroesophageal reflux disease)    • Hiatal hernia    • Hx of colonoscopy    • Insomnia    • Iron deficiency anemia secondary to inadequate dietary iron intake 10/18/2019   • Memory loss    • Menorrhagia with regular cycle 11/20/2014   • Moderate episode of recurrent major depressive disorder ([facility] [address] ) 10/16/2019   • Obesity    • Oral herpes    • Ovarian cyst 06/11/2013    Small dermoid on right ovary   • Pap smear for cervical cancer screening     2/2016-wnl; 11/2020-wnl, HRHPV neg   • Sodium (Na) deficiency 10/19/2020   • 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 myelopathy or radiculopathy    • Streptococcal pharyngitis 10/19/2020   • Vaginal discharge 09/01/2018   • Vitamin D deficiency         Past Surgical History:   Procedure Laterality Date   • APPENDECTOMY     • BREAST BIOPSY Right     pt unsure when or where- ? 6 yrs ago- benign   • BREAST SURGERY Right 01/08/2015    lump removed from breast    • Akash Bazan   • CHOLECYSTECTOMY     • COLONOSCOPY      9/26/2012; 2019-benign polyp, repeat 5 years   • DILATION AND CURETTAGE OF UTERUS      Resolved:1/29/2009   • ENDOMETRIAL ABLATION N/A 8/13/2020    Procedure: ABLATION ENDOMETRIAL Nyla Pretzel;  Surgeon: Iris Devlin MD;  Location: [facility];  Service: Gynecology   • ESOPHAGOGASTRODUODENOSCOPY      Diagnostic ; 9/26/2012   • HYSTEROSCOPY      Resolved:1/30/2009   • OVARIAN CYST REMOVAL Right 2005   • HI HYSTEROSCOPY BX ENDOMETRIUM&/POLYPC W/WO D&C N/A 8/13/2020    Procedure: DILATATION AND CURETTAGE (D&C) WITH HYSTEROSCOPY;  Surgeon: Iris Devlin MD;  Location: [facility];  Service: Gynecology   • WISDOM TOOTH EXTRACTION       Allergies   Allergen Reactions   • Morphine Chest Pain and Hives     Other reaction(s): chest pain   • Percocet [Oxycodone-Acetaminophen] Hives   • Domperidone    • Ibuprofen      Due to gastritis       Current Medications: Current Outpatient Medications:   •  buPROPion (Wellbutrin XL) 150 mg 24 hr tablet, Take 1 tablet (150 mg total) by mouth daily In addition to 300 mg daily, Disp: 30 tablet, Rfl: 1  •  hydrOXYzine HCL (ATARAX) 10 mg tablet, Take 2 5 tablets (25 mg total) by mouth 2 (two) times a day as needed for anxiety, Disp: 60 tablet, Rfl: 0  •  buPROPion (WELLBUTRIN XL) 300 mg 24 hr tablet, Take 1 tablet (300 mg total) by mouth daily, Disp: 90 tablet, Rfl: 1  •  clindamycin (CLINDAGEL) 1 % gel, Apply topically 2 (two) times a day, Disp: 30 g, Rfl: 2  •  cyanocobalamin (VITAMIN B-12) 1000 MCG tablet, Take 1 tablet (1,000 mcg total) by mouth daily, Disp: 90 tablet, Rfl: 3  •  cyclobenzaprine (FLEXERIL) 5 mg tablet, Take 1 tablet (5 mg total) by mouth 3 (three) times a day as needed for muscle spasms, Disp: 30 tablet, Rfl: 1  •  pantoprazole (PROTONIX) 40 mg tablet, Take 1 tablet (40 mg total) by mouth daily, Disp: 30 tablet, Rfl: 0       History Review: The following portions of the patient's history were reviewed and updated as appropriate: allergies, current medications, past family history, past medical history, past social history, past surgical history and problem list          OBJECTIVE:     Vital signs in last 24 hours: There were no vitals filed for this visit      Mental Status Evaluation:    Appearance age appropriate, casually dressed   Behavior cooperative, calm   Speech normal rate, normal volume, normal pitch   Mood depressed, anxious   Affect normal range and intensity, appropriate   Thought Processes organized, goal directed   Associations intact associations   Thought Content no overt delusions   Perceptual Disturbances: no auditory hallucinations, no visual hallucinations   Abnormal Thoughts  Risk Potential Suicidal ideation - None  Homicidal ideation - None  Potential for aggression - No   Orientation oriented to person, place, time/date and situation   Memory recent and remote memory grossly intact Consciousness alert and awake   Attention Span Concentration Span attention span and concentration are age appropriate   Intellect appears to be of average intelligence   Insight intact   Judgement intact   Muscle Strength and  Gait normal gait and normal balance   Motor activity no abnormal movements   Language no difficulty naming common objects, no difficulty repeating a phrase   Fund of Knowledge adequate knowledge of current events  adequate fund of knowledge regarding past history  adequate fund of knowledge regarding vocabulary    Pain none   Pain Scale 0       Laboratory Results:   Most Recent Labs:   Lab Results   Component Value Date    WBC 7 61 11/25/2022    RBC 4 05 11/25/2022    HGB 11 7 11/25/2022    HCT 37 0 11/25/2022     11/25/2022    RDW 12 8 11/25/2022    NEUTROABS 4 55 11/25/2022    K 3 9 11/25/2022     11/25/2022    CO2 28 11/25/2022    BUN 12 11/25/2022    CREATININE 0 71 11/25/2022    CALCIUM 9 5 11/25/2022    AST 21 11/25/2022    ALT 32 11/25/2022    ALKPHOS 64 11/25/2022    CHOLESTEROL 202 (H) 09/13/2022    TRIG 79 09/13/2022    HDL 61 09/13/2022    LDLCALC 125 (H) 09/13/2022    [place] 141 09/13/2022    [id number] 1 127 09/13/2022    HCGQUANT <2 08/27/2018     I have personally reviewed all pertinent laboratory/tests results  Assessment/Plan: Patient struggling with depression due to stressors mentioned above  Occasional anxiety  No SI or HI  The plan is to increase the dose of Wellbutrin XL to 450 mg daily for depression  I will also add Atarax 10 mg twice a day as needed for anxiety  And sleep  The patient educated about the medication in detail and advised to call us if there is any  concern and to call crisis or visit nearby ER in case of any emergency  She especially educated about risk of sleepiness and tired on Atarax and to take only when needed  The patient verbalized understanding and agreement with the current plan    She will follow up with me in 3 to 4 weeks or sooner if needed  Diagnoses and all orders for this visit:    Current moderate episode of major depressive disorder without prior episode (HCC)  -     buPROPion (Wellbutrin XL) 150 mg 24 hr tablet; Take 1 tablet (150 mg total) by mouth daily In addition to 300 mg daily    Generalized anxiety disorder  -     hydrOXYzine HCL (ATARAX) 10 mg tablet; Take 2 5 tablets (25 mg total) by mouth 2 (two) times a day as needed for anxiety          Treatment Recommendations/Precautions:      Aware of 24 hour and weekend coverage for urgent situations accessed by calling [facility] main practice number    Risks/Benefits      Risks, Benefits And Possible Side Effects Of Medications:    Risks, benefits, and possible side effects of medications explained to [facility] and she verbalizes understanding and agreement for treatment  Controlled Medication Discussion:     Not applicable    Psychotherapy Provided:     Individual psychotherapy provided: Counseling was provided during the session today for 10 minutes  Medications, treatment progress and treatment plan reviewed with [facility]  Medication changes discussed with [facility]  Medication education provided to [facility]  Reassurance and supportive therapy provided  Crisis/safety plan discussed with [facility]       Treatment Plan;    Completed and signed during the session: Not applicable - Treatment Plan not due at this session    Maranda Benavides MD 01/17/23

## 2023-01-18 ENCOUNTER — TELEPHONE (OUTPATIENT)
Dept: PSYCHIATRY | Facility: CLINIC | Age: 43
End: 2023-01-18

## 2023-01-18 NOTE — TELEPHONE ENCOUNTER
During april recent visit this week, she mentioned that rhett would be faxing disability claim forms      Received today will place on providers desk to complete/fax

## 2023-01-19 ENCOUNTER — TELEPHONE (OUTPATIENT)
Dept: SLEEP CENTER | Facility: CLINIC | Age: 43
End: 2023-01-19

## 2023-01-19 NOTE — TELEPHONE ENCOUNTER
----- Message from Matheus Mcarthur PA-C sent at 1/17/2023  8:14 AM EST -----  This test is consistent with moderate obstructive sleep apnea  Respiratory events were worse in the supine position  Treatment is recommended for obstructive sleep apnea, auto-CPAP at the range of 5-15 cm h20 is recommended  CPAP ordered   Follow up in 31-91 days

## 2023-01-19 NOTE — TELEPHONE ENCOUNTER
Spoke to the patient advised sleep study results and scheduled DME set up and compliance appointments   RX and clinicals sent to [facility]

## 2023-01-20 LAB
DME PARACHUTE DELIVERY DATE REQUESTED: NORMAL
DME PARACHUTE DELIVERY NOTE: NORMAL
DME PARACHUTE ITEM DESCRIPTION: NORMAL
DME PARACHUTE ORDER STATUS: NORMAL
DME PARACHUTE SUPPLIER NAME: NORMAL
DME PARACHUTE SUPPLIER PHONE: NORMAL

## 2023-01-25 ENCOUNTER — OFFICE VISIT (OUTPATIENT)
Dept: FAMILY MEDICINE CLINIC | Facility: CLINIC | Age: 43
End: 2023-01-25

## 2023-01-25 VITALS
WEIGHT: 243.6 LBS | HEART RATE: 66 BPM | OXYGEN SATURATION: 99 % | HEIGHT: 65 IN | SYSTOLIC BLOOD PRESSURE: 118 MMHG | BODY MASS INDEX: 40.59 KG/M2 | DIASTOLIC BLOOD PRESSURE: 76 MMHG | TEMPERATURE: 96.2 F | RESPIRATION RATE: 16 BRPM

## 2023-01-25 DIAGNOSIS — G89.29 CHRONIC RIGHT-SIDED LOW BACK PAIN WITH RIGHT-SIDED SCIATICA: Primary | ICD-10-CM

## 2023-01-25 DIAGNOSIS — R39.198 DECREASED URINE STREAM: ICD-10-CM

## 2023-01-25 DIAGNOSIS — M54.41 CHRONIC RIGHT-SIDED LOW BACK PAIN WITH RIGHT-SIDED SCIATICA: Primary | ICD-10-CM

## 2023-01-25 DIAGNOSIS — G89.29 CHRONIC LEFT FLANK PAIN: ICD-10-CM

## 2023-01-25 DIAGNOSIS — R10.9 CHRONIC LEFT FLANK PAIN: ICD-10-CM

## 2023-01-25 NOTE — PROGRESS NOTES
Assessment/Plan:           Problem List Items Addressed This Visit    None  Visit Diagnoses     Chronic right-sided low back pain with right-sided sciatica    -  Primary    Relevant Orders    MRI lumbar spine wo contrast    Decreased urine stream        Relevant Orders    US kidney and bladder with pvr    UA w Reflex to Microscopic w Reflex to Culture -Lab Collect    Chronic left flank pain        Relevant Orders    US kidney and bladder with pvr    UA w Reflex to Microscopic w Reflex to Culture -Lab Collect        She may continue using muscle relaxer as well as over-the-counter NSAID  Urine test will be ordered as above  Kidney and bladder ultrasound  We will try ordering an MRI again    Subjective:      Patient ID: Nima Eller is a 37 y o  female  HPI   Patient is here for an acute visit complaining of left side flank discomfort  Is been going on for several days and is getting worse now becoming sharp stabbing  Radiate down to the front  Is any dysuria frequency however has been noticing her urine stream to be weaker  Abdominal ultrasound 2021 did not show any renal abnormality  MRI of the abdomen 2020 did not show any kidney abnormality  Right adrenal adenoma was noted  Patient does not mention any pelvic organ prolapse  Negative history of kidney stones  Has been having low back pain for several months now  Her MRI has been denied twice  She has seen pain management and given epidural in the past she did not help and does not want to pursue any further  She had had physical therapy  I will try ordering an MRI again  Her x-ray had shown mild degenerative disc disease at L5-S1  No acute compression collapse of the vertebra      Facet degenerative changes at L4-5 and L5-S1       The following portions of the patient's history were reviewed and updated as appropriate: allergies, current medications, past medical history, past social history, past surgical history and problem list     Review of Systems      Objective:      /76 (BP Location: Left arm, Patient Position: Sitting, Cuff Size: Large)   Pulse 66   Temp (!) 96 2 °F (35 7 °C) (Tympanic)   Resp 16   Ht 5' 5" (1 651 m)   Wt 110 kg (243 lb 9 6 oz)   SpO2 99%   BMI 40 54 kg/m²          Physical Exam  Abdominal:      Tenderness: There is no right CVA tenderness or left CVA tenderness  Comments: Negative flank tenderness on palpation however patient feels uncomfortable on moving side to side   Musculoskeletal:      Comments: Spinal muscle spasm   Neurological:      Mental Status: She is alert

## 2023-01-26 ENCOUNTER — HOSPITAL ENCOUNTER (OUTPATIENT)
Dept: ULTRASOUND IMAGING | Facility: HOSPITAL | Age: 43
Discharge: HOME | End: 2023-01-26

## 2023-01-26 DIAGNOSIS — G89.29 CHRONIC LEFT FLANK PAIN: ICD-10-CM

## 2023-01-26 DIAGNOSIS — R10.9 CHRONIC LEFT FLANK PAIN: ICD-10-CM

## 2023-01-26 DIAGNOSIS — R39.198 DECREASED URINE STREAM: ICD-10-CM

## 2023-01-28 ENCOUNTER — APPOINTMENT (OUTPATIENT)
Dept: LAB | Facility: HOSPITAL | Age: 43
End: 2023-01-28

## 2023-01-28 DIAGNOSIS — R39.198 DECREASED URINE STREAM: ICD-10-CM

## 2023-01-28 DIAGNOSIS — R10.9 CHRONIC LEFT FLANK PAIN: ICD-10-CM

## 2023-01-28 DIAGNOSIS — G89.29 CHRONIC LEFT FLANK PAIN: ICD-10-CM

## 2023-01-28 LAB
BACTERIA UR QL AUTO: ABNORMAL /HPF
BILIRUB UR QL STRIP: NEGATIVE
CLARITY UR: ABNORMAL
COLOR UR: YELLOW
GLUCOSE UR STRIP-MCNC: NEGATIVE MG/DL
HGB UR QL STRIP.AUTO: NEGATIVE
KETONES UR STRIP-MCNC: NEGATIVE MG/DL
LEUKOCYTE ESTERASE UR QL STRIP: ABNORMAL
MUCOUS THREADS UR QL AUTO: ABNORMAL
NITRITE UR QL STRIP: NEGATIVE
NON-SQ EPI CELLS URNS QL MICRO: ABNORMAL /HPF
PH UR STRIP.AUTO: 6 [PH]
PROT UR STRIP-MCNC: NEGATIVE MG/DL
RBC #/AREA URNS AUTO: ABNORMAL /HPF
SP GR UR STRIP.AUTO: >=1.03 (ref 1–1.03)
UROBILINOGEN UR QL STRIP.AUTO: 0.2 E.U./DL
WBC #/AREA URNS AUTO: ABNORMAL /HPF

## 2023-01-29 LAB
BACTERIA UR CULT: ABNORMAL
BACTERIA UR CULT: ABNORMAL

## 2023-01-30 ENCOUNTER — TELEPHONE (OUTPATIENT)
Dept: FAMILY MEDICINE CLINIC | Facility: CLINIC | Age: 43
End: 2023-01-30

## 2023-01-30 NOTE — TELEPHONE ENCOUNTER
----- Message from Monty Smith MD sent at 1/30/2023  7:51 AM EST -----  Urine shows contamination of the urine  is kidney and bladder ultrasound scheduled?   I see MRI of the spine scheduled

## 2023-01-30 NOTE — TELEPHONE ENCOUNTER
Pt informed on urine results, pt had ultrasound of kidney/bladder 1/26/23 results aren't available yet

## 2023-01-31 ENCOUNTER — TELEPHONE (OUTPATIENT)
Dept: FAMILY MEDICINE CLINIC | Facility: CLINIC | Age: 43
End: 2023-01-31

## 2023-01-31 NOTE — TELEPHONE ENCOUNTER
Patient called and would like to know since her urine came back abnormal, does she need to be placed on any medication?   Please advise

## 2023-02-01 DIAGNOSIS — R11.2 NAUSEA AND VOMITING, UNSPECIFIED VOMITING TYPE: ICD-10-CM

## 2023-02-02 RX ORDER — PANTOPRAZOLE SODIUM 40 MG/1
40 TABLET, DELAYED RELEASE ORAL DAILY
Qty: 30 TABLET | Refills: 0 | Status: SHIPPED | OUTPATIENT
Start: 2023-02-02

## 2023-02-08 LAB
DME PARACHUTE DELIVERY DATE ACTUAL: NORMAL
DME PARACHUTE DELIVERY DATE REQUESTED: NORMAL
DME PARACHUTE DELIVERY NOTE: NORMAL
DME PARACHUTE ITEM DESCRIPTION: NORMAL
DME PARACHUTE ORDER STATUS: NORMAL
DME PARACHUTE SUPPLIER NAME: NORMAL
DME PARACHUTE SUPPLIER PHONE: NORMAL

## 2023-02-09 ENCOUNTER — OFFICE VISIT (OUTPATIENT)
Dept: PSYCHIATRY | Facility: CLINIC | Age: 43
End: 2023-02-09

## 2023-02-09 DIAGNOSIS — F41.1 GENERALIZED ANXIETY DISORDER: Primary | ICD-10-CM

## 2023-02-09 DIAGNOSIS — F32.1 CURRENT MODERATE EPISODE OF MAJOR DEPRESSIVE DISORDER WITHOUT PRIOR EPISODE (HCC): ICD-10-CM

## 2023-02-09 DIAGNOSIS — F41.1 GENERALIZED ANXIETY DISORDER: ICD-10-CM

## 2023-02-09 RX ORDER — BUPROPION HYDROCHLORIDE 150 MG/1
150 TABLET ORAL DAILY
Qty: 90 TABLET | Refills: 1 | Status: SHIPPED | OUTPATIENT
Start: 2023-02-09 | End: 2023-04-10

## 2023-02-09 RX ORDER — HYDROXYZINE HYDROCHLORIDE 10 MG/1
10 TABLET, FILM COATED ORAL 2 TIMES DAILY PRN
Qty: 180 TABLET | Refills: 1 | Status: SHIPPED | OUTPATIENT
Start: 2023-02-09

## 2023-02-22 ENCOUNTER — OFFICE VISIT (OUTPATIENT)
Dept: FAMILY MEDICINE CLINIC | Facility: CLINIC | Age: 43
End: 2023-02-22

## 2023-02-22 VITALS
SYSTOLIC BLOOD PRESSURE: 116 MMHG | HEART RATE: 60 BPM | BODY MASS INDEX: 40.82 KG/M2 | WEIGHT: 245 LBS | HEIGHT: 65 IN | DIASTOLIC BLOOD PRESSURE: 82 MMHG | RESPIRATION RATE: 16 BRPM | OXYGEN SATURATION: 99 % | TEMPERATURE: 97.2 F

## 2023-02-22 DIAGNOSIS — M70.61 GREATER TROCHANTERIC BURSITIS OF RIGHT HIP: ICD-10-CM

## 2023-02-22 DIAGNOSIS — G89.29 HIP PAIN, CHRONIC, RIGHT: Primary | ICD-10-CM

## 2023-02-22 DIAGNOSIS — M25.551 HIP PAIN, CHRONIC, RIGHT: Primary | ICD-10-CM

## 2023-02-22 NOTE — PROGRESS NOTES
Assessment/Plan:           Problem List Items Addressed This Visit    None  Visit Diagnoses     Hip pain, chronic, right    -  Primary    Relevant Orders    XR hip/pelv 2-3 vws right if performed    XR spine lumbar minimum 4 views non injury    Greater trochanteric bursitis of right hip        Relevant Orders    XR spine lumbar minimum 4 views non injury        See discussion above stretch exercises NSAID with food for brief period of time as tolerated  X-rays ordered  Subjective:      Patient ID: Vanessa Wilkinson is a 37 y o  female  HPI  Patient is here for an acute visit complaining of right hip discomfort has been going on for almost a year now  Lately is becoming more aggravated  Is more uncomfortable to sleep on that side  No other injuries reported no fevers and chills  Exam is consistent with greater enteric bursitis  Stretching signs explained x-ray would be ordered  I would also do a back x-ray  MRI was not approved despite many attempts getting it approved by her insurance company  I encouraged the patient to follow-up with pain management as well as rheumatologist for further assistance  Patient is interested in returning to physical therapy after she returns from her upcoming trip  The following portions of the patient's history were reviewed and updated as appropriate: allergies, current medications, past family history, past medical history, past social history, past surgical history and problem list     Review of Systems      Objective:      /82 (BP Location: Left arm, Patient Position: Sitting, Cuff Size: Large)   Pulse 60   Temp (!) 97 2 °F (36 2 °C) (Tympanic)   Resp 16   Ht 5' 5" (1 651 m)   Wt 111 kg (245 lb)   SpO2 99%   BMI 40 77 kg/m²          Physical Exam  Musculoskeletal:         General: Tenderness (Tenderness right greater trochanter bursa     Mild internal and external rotation tenderness ) and deformity present

## 2023-02-23 ENCOUNTER — HOSPITAL ENCOUNTER (OUTPATIENT)
Dept: RADIOLOGY | Facility: HOSPITAL | Age: 43
Discharge: HOME | End: 2023-02-23

## 2023-02-23 DIAGNOSIS — M25.551 HIP PAIN, CHRONIC, RIGHT: ICD-10-CM

## 2023-02-23 DIAGNOSIS — G89.29 HIP PAIN, CHRONIC, RIGHT: ICD-10-CM

## 2023-02-23 DIAGNOSIS — M70.61 GREATER TROCHANTERIC BURSITIS OF RIGHT HIP: ICD-10-CM

## 2023-03-24 ENCOUNTER — TELEPHONE (OUTPATIENT)
Dept: HEMATOLOGY ONCOLOGY | Facility: CLINIC | Age: 43
End: 2023-03-24

## 2023-03-24 NOTE — TELEPHONE ENCOUNTER
Called pt and left a message letting her know I needed to reschedule her appt with Estuardo Staton for in July and if the new date and time does not work to please give us a call back

## 2023-03-28 ENCOUNTER — HOSPITAL ENCOUNTER (EMERGENCY)
Facility: HOSPITAL | Age: 43
Discharge: HOME | End: 2023-03-28
Attending: EMERGENCY MEDICINE

## 2023-03-28 VITALS
BODY MASS INDEX: 40.36 KG/M2 | HEART RATE: 63 BPM | OXYGEN SATURATION: 100 % | SYSTOLIC BLOOD PRESSURE: 140 MMHG | WEIGHT: 242.51 LBS | TEMPERATURE: 98.2 F | DIASTOLIC BLOOD PRESSURE: 62 MMHG | RESPIRATION RATE: 18 BRPM

## 2023-03-28 DIAGNOSIS — R10.9 ABDOMINAL CRAMPS: Primary | ICD-10-CM

## 2023-03-28 DIAGNOSIS — R19.7 DIARRHEA: ICD-10-CM

## 2023-03-28 DIAGNOSIS — Z78.9 HISTORY OF RECENT TRAVEL: ICD-10-CM

## 2023-03-28 DIAGNOSIS — E87.6 ACUTE HYPOKALEMIA: ICD-10-CM

## 2023-03-28 LAB
ALBUMIN SERPL BCP-MCNC: 4.2 G/DL (ref 3.5–5)
ALP SERPL-CCNC: 67 U/L (ref 34–104)
ALT SERPL W P-5'-P-CCNC: 46 U/L (ref 7–52)
ANION GAP SERPL CALCULATED.3IONS-SCNC: 6 MMOL/L (ref 4–13)
AST SERPL W P-5'-P-CCNC: 20 U/L (ref 13–39)
BASOPHILS # BLD AUTO: 0.03 THOUSANDS/ÂΜL (ref 0–0.1)
BASOPHILS NFR BLD AUTO: 1 % (ref 0–1)
BILIRUB SERPL-MCNC: 0.39 MG/DL (ref 0.2–1)
BUN SERPL-MCNC: 8 MG/DL (ref 5–25)
CALCIUM SERPL-MCNC: 9 MG/DL (ref 8.4–10.2)
CHLORIDE SERPL-SCNC: 105 MMOL/L (ref 96–108)
CO2 SERPL-SCNC: 28 MMOL/L (ref 21–32)
CREAT SERPL-MCNC: 0.72 MG/DL (ref 0.6–1.3)
EOSINOPHIL # BLD AUTO: 0.15 THOUSAND/ÂΜL (ref 0–0.61)
EOSINOPHIL NFR BLD AUTO: 3 % (ref 0–6)
ERYTHROCYTE [DISTWIDTH] IN BLOOD BY AUTOMATED COUNT: 12.9 % (ref 11.6–15.1)
GFR SERPL CREATININE-BSD FRML MDRD: 101 ML/MIN/1.73SQ M
GLUCOSE SERPL-MCNC: 89 MG/DL (ref 65–140)
HCT VFR BLD AUTO: 35.3 % (ref 34.8–46.1)
HGB BLD-MCNC: 11.7 G/DL (ref 11.5–15.4)
IMM GRANULOCYTES # BLD AUTO: 0.02 THOUSAND/UL (ref 0–0.2)
IMM GRANULOCYTES NFR BLD AUTO: 0 % (ref 0–2)
LIPASE SERPL-CCNC: 32 U/L (ref 11–82)
LYMPHOCYTES # BLD AUTO: 1.92 THOUSANDS/ÂΜL (ref 0.6–4.47)
LYMPHOCYTES NFR BLD AUTO: 35 % (ref 14–44)
MCH RBC QN AUTO: 29.5 PG (ref 26.8–34.3)
MCHC RBC AUTO-ENTMCNC: 33.1 G/DL (ref 31.4–37.4)
MCV RBC AUTO: 89 FL (ref 82–98)
MONOCYTES # BLD AUTO: 0.63 THOUSAND/ÂΜL (ref 0.17–1.22)
MONOCYTES NFR BLD AUTO: 11 % (ref 4–12)
NEUTROPHILS # BLD AUTO: 2.81 THOUSANDS/ÂΜL (ref 1.85–7.62)
NEUTS SEG NFR BLD AUTO: 50 % (ref 43–75)
NRBC BLD AUTO-RTO: 0 /100 WBCS
PLATELET # BLD AUTO: 301 THOUSANDS/UL (ref 149–390)
PMV BLD AUTO: 9.6 FL (ref 8.9–12.7)
POTASSIUM SERPL-SCNC: 3.1 MMOL/L (ref 3.5–5.3)
PROT SERPL-MCNC: 7.1 G/DL (ref 6.4–8.4)
RBC # BLD AUTO: 3.96 MILLION/UL (ref 3.81–5.12)
SODIUM SERPL-SCNC: 139 MMOL/L (ref 135–147)
WBC # BLD AUTO: 5.56 THOUSAND/UL (ref 4.31–10.16)

## 2023-03-28 RX ORDER — KETOROLAC TROMETHAMINE 30 MG/ML
15 INJECTION, SOLUTION INTRAMUSCULAR; INTRAVENOUS ONCE
Status: COMPLETED | OUTPATIENT
Start: 2023-03-28 | End: 2023-03-28

## 2023-03-28 RX ORDER — DICYCLOMINE HCL 20 MG
20 TABLET ORAL 2 TIMES DAILY
Qty: 20 TABLET | Refills: 0 | Status: SHIPPED | OUTPATIENT
Start: 2023-03-28

## 2023-03-28 RX ORDER — POTASSIUM CHLORIDE 20 MEQ/1
40 TABLET, EXTENDED RELEASE ORAL ONCE
Status: COMPLETED | OUTPATIENT
Start: 2023-03-28 | End: 2023-03-28

## 2023-03-28 RX ADMIN — SODIUM CHLORIDE 1000 ML: 0.9 INJECTION, SOLUTION INTRAVENOUS at 14:44

## 2023-03-28 RX ADMIN — KETOROLAC TROMETHAMINE 15 MG: 30 INJECTION, SOLUTION INTRAMUSCULAR; INTRAVENOUS at 14:41

## 2023-03-28 RX ADMIN — POTASSIUM CHLORIDE 40 MEQ: 1500 TABLET, EXTENDED RELEASE ORAL at 16:09

## 2023-03-28 NOTE — ED PROVIDER NOTES
History  Chief Complaint   Patient presents with   • Diarrhea     Pt reports abdominal cramping and diarrhea for 9 days  Pt returned 3 days ago from a mission trip in [facility] for 1 month  37 y o  F p/w abd cramps x 9 days  Returned 3 days ago from [facility] for 1 month  Upper abd cramps  Associated with diarrhea  Soft to watery, nonbloody  Pt reports her adult children who went with her to [facility] also had similar symptoms, however their resolved after 5 days  Reports vaccinated in 2015 for Typhoid/Dengue/Yellow Fever  States she drank bottled water while there  Denies F/C, HA, myalgias, rash, N/V  History provided by:  Patient   used: No    Diarrhea  Associated symptoms: abdominal pain    Associated symptoms: no fever, no headaches, no myalgias and no vomiting        Prior to Admission Medications   Prescriptions Last Dose Informant Patient Reported? Taking?    buPROPion (WELLBUTRIN XL) 150 mg 24 hr tablet   No No   Sig: TAKE 1 TABLET (150 MG TOTAL) BY MOUTH DAILY IN ADDITION  MG DAILY   buPROPion (WELLBUTRIN XL) 300 mg 24 hr tablet   No No   Sig: Take 1 tablet (300 mg total) by mouth daily   clindamycin (CLINDAGEL) 1 % gel   No No   Sig: Apply topically 2 (two) times a day   cyanocobalamin (VITAMIN B-12) 1000 MCG tablet   No No   Sig: Take 1 tablet (1,000 mcg total) by mouth daily   Patient not taking: Reported on 1/25/2023   cyclobenzaprine (FLEXERIL) 5 mg tablet   No No   Sig: Take 1 tablet (5 mg total) by mouth 3 (three) times a day as needed for muscle spasms   Patient not taking: Reported on 1/18/2023   hydrOXYzine HCL (ATARAX) 10 mg tablet   No No   Sig: TAKE 1 TABLET (10 MG TOTAL) BY MOUTH 2 (TWO) TIMES A DAY AS NEEDED FOR ANXIETY   pantoprazole (PROTONIX) 40 mg tablet   No No   Sig: Take 1 tablet (40 mg total) by mouth daily      Facility-Administered Medications: None       Past Medical History:   Diagnosis Date   • Acne 04/23/2015   • Acquired hallux valgus 03/07/2016   • Acute non-recurrent maxillary sinusitis 01/27/2020   • Anemia    • Anxiety 10/16/2019   • Arthritis    • Benign paroxysmal positional vertigo 03/08/2017   • Candida vaginitis 04/25/2019   • Colon polyp 2019   • COVID-19 vaccine series completed     Moderna: 2nd dose 5/21/2021   • Depression    • Discoloration of skin of hand 03/20/2019   • Duodenitis without bleeding    • Dysphagia    • Fibromyalgia    • Gastritis    • GERD (gastroesophageal reflux disease)    • Hiatal hernia    • Hx of colonoscopy    • Insomnia    • Iron deficiency anemia secondary to inadequate dietary iron intake 10/18/2019   • Memory loss    • Menorrhagia with regular cycle 11/20/2014   • Moderate episode of recurrent major depressive disorder ([facility] [address] ) 10/16/2019   • Obesity    • Oral herpes    • Ovarian cyst 06/11/2013    Small dermoid on right ovary   • Pap smear for cervical cancer screening     2/2016-wnl; 11/2020-wnl, HRHPV neg   • Sodium (Na) deficiency 10/19/2020   • 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 myelopathy or radiculopathy    • Streptococcal pharyngitis 10/19/2020   • Vaginal discharge 09/01/2018   • Vitamin D deficiency        Past Surgical History:   Procedure Laterality Date   • APPENDECTOMY     • BREAST BIOPSY Right     pt unsure when or where- ? 6 yrs ago- benign   • BREAST SURGERY Right 01/08/2015    lump removed from breast    • Akash Bazan   • CHOLECYSTECTOMY     • COLONOSCOPY      9/26/2012; 2019-benign polyp, repeat 5 years   • DILATION AND CURETTAGE OF UTERUS      Resolved:1/29/2009   • ENDOMETRIAL ABLATION N/A 8/13/2020    Procedure: ABLATION ENDOMETRIAL Echo Real;  Surgeon: Do Blanchard MD;  Location: [facility];  Service: Gynecology   • ESOPHAGOGASTRODUODENOSCOPY      Diagnostic ; 9/26/2012   • HYSTEROSCOPY      Resolved:1/30/2009   • OVARIAN CYST REMOVAL Right 2005   • [place] HYSTEROSCOPY BX ENDOMETRIUM&/POLYPC W/WO D&C N/A 8/13/2020    Procedure: DILATATION AND CURETTAGE (D&C) WITH HYSTEROSCOPY;  Surgeon: Darien Catherine MD;  Location: [facility];  Service: Gynecology   • WISDOM TOOTH EXTRACTION         Family History   Problem Relation Age of Onset   • Other Mother         uterine leiomyoma   • Diabetes type II Mother         Mellitus   • Hypothyroidism Mother    • Colon cancer Father 54        Stage IV   • Diabetes Father         Type II Mellitus   • Hypertension Father    • Squamous cell carcinoma Father         Located waist down around kidneys, lowe back, tail bone, prostate and bladder  • Multiple sclerosis Sister    • Asthma Sister    • Hypothyroidism Sister    • Anxiety disorder Brother    • Depression Brother    • Diabetes Brother         Mellitus   • Asthma Brother    • Hypertension Brother    • Hypertension Maternal Grandmother    • Diabetes Maternal Grandmother    • Other Maternal Grandmother         Vulvar cancer   • Vaginal cancer Maternal Grandmother    • Schizophrenia Maternal Grandfather    • Hypertension Maternal Grandfather    • Thyroid cancer Paternal Grandmother 80   • Diabetes Paternal Grandfather 38         due to complications of DM   • Breast cancer Cousin 46   • Ovarian cancer Neg Hx      I have reviewed and agree with the history as documented  E-Cigarette/Vaping   • E-Cigarette Use Never User      E-Cigarette/Vaping Substances   • Nicotine No    • THC No    • CBD No    • Flavoring No    • Other No    • Unknown No      Social History     Tobacco Use   • Smoking status: Never   • Smokeless tobacco: Never   • Tobacco comments:     No passive smoke exposure   Vaping Use   • Vaping Use: Never used   Substance Use Topics   • Alcohol use: No   • Drug use: Never       Review of Systems   Constitutional: Negative for fever  Gastrointestinal: Positive for abdominal pain and diarrhea  Negative for blood in stool, nausea and vomiting  Musculoskeletal: Negative for myalgias  Skin: Negative for rash  Neurological: Negative for headaches     All other systems reviewed and are negative  Physical Exam  Physical Exam  Vitals and nursing note reviewed  Constitutional:       General: She is not in acute distress  Appearance: Normal appearance  She is well-developed  She is not ill-appearing, toxic-appearing or diaphoretic  HENT:      Head: Normocephalic and atraumatic  Eyes:      General: No scleral icterus  Neck:      Vascular: No JVD  Trachea: Trachea normal    Cardiovascular:      Rate and Rhythm: Normal rate and regular rhythm  Heart sounds: Normal heart sounds  No murmur heard  No friction rub  Pulmonary:      Effort: Pulmonary effort is normal  No accessory muscle usage or respiratory distress  Breath sounds: Normal breath sounds  No stridor  No wheezing, rhonchi or rales  Abdominal:      General: There is no distension  Palpations: Abdomen is soft  Abdomen is not rigid  There is no mass  Tenderness: There is no abdominal tenderness  There is no guarding or rebound  Negative signs include Gambino's sign and McBurney's sign  Musculoskeletal:      Cervical back: Normal range of motion  Skin:     General: Skin is warm and dry  Coloration: Skin is not pale  Findings: No rash  Neurological:      Mental Status: She is alert  GCS: GCS eye subscore is 4  GCS verbal subscore is 5  GCS motor subscore is 6     Psychiatric:         Behavior: Behavior normal          Vital Signs  ED Triage Vitals   Temperature Pulse Respirations Blood Pressure SpO2   03/28/23 1405 03/28/23 1405 03/28/23 1405 03/28/23 1405 03/28/23 1405   98 2 °F (36 8 °C) 62 18 145/99 100 %      Temp Source Heart Rate Source Patient Position - Orthostatic VS BP Location FiO2 (%)   03/28/23 1405 03/28/23 1405 03/28/23 1611 03/28/23 1611 --   Oral Monitor Sitting Right arm       Pain Score       03/28/23 1405       5           Vitals:    03/28/23 1405 03/28/23 1611 03/28/23 1652   BP: 145/99 137/88 140/62   Pulse: 62 66 63   Patient Position - Orthostatic VS:  Sitting Sitting         Visual Acuity      ED Medications  Medications   sodium chloride 0 9 % bolus 1,000 mL (0 mL Intravenous Stopped 3/28/23 1557)   ketorolac (TORADOL) injection 15 mg (15 mg Intravenous Given 3/28/23 1441)   potassium chloride (K-DUR,KLOR-CON) CR tablet 40 mEq (40 mEq Oral Given 3/28/23 1609)       Diagnostic Studies  Results Reviewed     Procedure Component Value Units Date/Time    Comprehensive metabolic panel [[id number]]  (Abnormal) Collected: 03/28/23 1439    Lab Status: Final result Specimen: Blood from Arm, Left Updated: 03/28/23 1510     Sodium 139 mmol/L      Potassium 3 1 mmol/L      Chloride 105 mmol/L      CO2 28 mmol/L      ANION GAP 6 mmol/L      BUN 8 mg/dL      Creatinine 0 72 mg/dL      Glucose 89 mg/dL      Calcium 9 0 mg/dL      AST 20 U/L      ALT 46 U/L      Alkaline Phosphatase 67 U/L      Total Protein 7 1 g/dL      Albumin 4 2 g/dL      Total Bilirubin 0 39 mg/dL      eGFR 101 ml/min/1 73sq m     Narrative:      Wilder guidelines for Chronic Kidney Disease (CKD):   •  Stage 1 with normal or high GFR (GFR > 90 mL/min/1 73 square meters)  •  Stage 2 Mild CKD (GFR = 60-89 mL/min/1 73 square meters)  •  Stage 3A Moderate CKD (GFR = 45-59 mL/min/1 73 square meters)  •  Stage 3B Moderate CKD (GFR = 30-44 mL/min/1 73 square meters)  •  Stage 4 Severe CKD (GFR = 15-29 mL/min/1 73 square meters)  •  Stage 5 End Stage CKD (GFR <15 mL/min/1 73 square meters)  Note: GFR calculation is accurate only with a steady state creatinine    Lipase [[id number]]  (Normal) Collected: 03/28/23 1439    Lab Status: Final result Specimen: Blood from Arm, Left Updated: 03/28/23 1510     Lipase 32 u/L     CBC and differential [[id number]] Collected: 03/28/23 1439    Lab Status: Final result Specimen: Blood from Arm, Left Updated: 03/28/23 1452     WBC 5 56 Thousand/uL      RBC 3 96 Million/uL      Hemoglobin 11 7 g/dL      Hematocrit 35 3 %      MCV 89 fL MCH 29 5 pg      MCHC 33 1 g/dL      RDW 12 9 %      MPV 9 6 fL      Platelets 956 Thousands/uL      nRBC 0 /100 WBCs      Neutrophils Relative 50 %      Immat GRANS % 0 %      Lymphocytes Relative 35 %      Monocytes Relative 11 %      Eosinophils Relative 3 %      Basophils Relative 1 %      Neutrophils Absolute 2 81 Thousands/µL      Immature Grans Absolute 0 02 Thousand/uL      Lymphocytes Absolute 1 92 Thousands/µL      Monocytes Absolute 0 63 Thousand/µL      Eosinophils Absolute 0 15 Thousand/µL      Basophils Absolute 0 03 Thousands/µL     Stool Enteric Bacterial Panel by PCR [[id number]]     Lab Status: No result Specimen: Stool     Giardia antigen [[id number]]     Lab Status: No result Specimen: Stool     Cryptosporidium Smear [[id number]]     Lab Status: No result Specimen: Stool     Ova and parasite examination [[id number]]     Lab Status: No result Specimen: Stool                  No orders to display              Procedures  Procedures         ED Course  ED Course as of 03/28/23 1716   e Mar 28, 2023   1426 Tiger Text sent to infectious diseases  1434 Discussed case with Dr Patric Hashimoto (ID) who recommends stool bacterial pcr panel, giardia stool antigen, stool O and P, and cryptosporidium smear, and have pt f/u with PCP    [id number] Updated pt on discussed with ID doctor and plan to f/u with PCP for stool study results  1453 WBC: 5 56  Normal   1511 Potassium(!): 3 1  Low  Will give K-dur  1513 Updated pt on labs and plan for [address]  1636 Pt unable to provide stool sample  Will send with stool kit and have pt f/u with PCP  SBIRT 20yo+    Flowsheet Row Most Recent Value   SBIRT (23 yo +)    In order to provide better care to our patients, we are screening all of our patients for alcohol and drug use  Would it be okay to ask you these screening questions?  No Filed at: 03/28/2023 1455                    Medical Decision Making  Abd cramps/diarrhea after recent international travel - Will check CMP to r/o electrolyte abnormality/YIMI/biliary dysfunction, send stools studies, and consult infectious disease for further recommendations  Amount and/or Complexity of Data Reviewed  Labs: ordered  Decision-making details documented in ED Course  Risk  Prescription drug management  Disposition  Final diagnoses:   Abdominal cramps   Diarrhea   History of recent travel   Acute hypokalemia     Time reflects when diagnosis was documented in both MDM as applicable and the Disposition within this note     Time User Action Codes Description Comment    3/28/2023  2:39 PM [address] [R10 9] Abdominal cramps     3/28/2023  2:39 PM [address] [R19 7] Diarrhea     3/28/2023  2:39 PM [address] [Z78 9] History of recent travel     3/28/2023  4:47 PM [address] [E87 6] Acute hypokalemia       ED Disposition     ED Disposition   Discharge    Condition   Stable    Date/Time   Tue Mar 28, 2023  4:39 PM    [address],[facility]  [address] discharge to home/self care  Follow-up Information     Follow up With Specialties Details Why Contact Info    Kei Jordan MD Internal Medicine Schedule an appointment as soon as possible for a visit  For follow up of stool studies [address]  [phone]            Discharge Medication List as of 3/28/2023  4:39 PM      START taking these medications    Details   dicyclomine (BENTYL) 20 mg tablet Take 1 tablet (20 mg total) by mouth 2 (two) times a day, Starting Tue 3/28/2023, Normal         CONTINUE these medications which have NOT CHANGED    Details   !! buPROPion (WELLBUTRIN XL) 150 mg 24 hr tablet TAKE 1 TABLET (150 MG TOTAL) BY MOUTH DAILY IN ADDITION  MG DAILY, Starting Thu 2/9/2023, Until Mon 4/10/2023, Normal      !!  buPROPion (WELLBUTRIN XL) 300 mg 24 hr tablet Take 1 tablet (300 mg total) by mouth daily, Starting Wed 11/9/2022, Until Mon 5/8/2023, Normal clindamycin (CLINDAGEL) 1 % gel Apply topically 2 (two) times a day, Starting Thu 6/4/2020, Normal      cyanocobalamin (VITAMIN B-12) 1000 MCG tablet Take 1 tablet (1,000 mcg total) by mouth daily, Starting Fri 12/23/2022, Normal      cyclobenzaprine (FLEXERIL) 5 mg tablet Take 1 tablet (5 mg total) by mouth 3 (three) times a day as needed for muscle spasms, Starting Thu 12/22/2022, Normal      hydrOXYzine HCL (ATARAX) 10 mg tablet TAKE 1 TABLET (10 MG TOTAL) BY MOUTH 2 (TWO) TIMES A DAY AS NEEDED FOR ANXIETY, Starting Thu 2/9/2023, Normal      pantoprazole (PROTONIX) 40 mg tablet Take 1 tablet (40 mg total) by mouth daily, Starting Thu 2/2/2023, Normal       !! - Potential duplicate medications found  Please discuss with provider  Outpatient Discharge Orders   Stool Enteric Bacterial Panel by PCR   Standing Status: Future Standing Exp  Date: 03/28/24     Ova and Parasite(X3) with Giardia   Standing Status: Future Standing Exp  Date: 03/28/24     Cryptosporidium Smear   Standing Status: Future Standing Exp   Date: 03/28/24       PDMP Review       Value Time User    PDMP Reviewed  Yes 1/16/2023  4:46 PM Will Candida Martin MD          ED Provider  Electronically Signed by           Ej [address], DO  03/28/23 6075

## 2023-03-31 ENCOUNTER — APPOINTMENT (OUTPATIENT)
Dept: LAB | Facility: CLINIC | Age: 43
End: 2023-03-31

## 2023-03-31 DIAGNOSIS — R19.7 DIARRHEA: ICD-10-CM

## 2023-03-31 DIAGNOSIS — Z78.9 HISTORY OF RECENT TRAVEL: ICD-10-CM

## 2023-04-03 ENCOUNTER — OFFICE VISIT (OUTPATIENT)
Dept: FAMILY MEDICINE CLINIC | Facility: CLINIC | Age: 43
End: 2023-04-03

## 2023-04-03 VITALS
RESPIRATION RATE: 18 BRPM | OXYGEN SATURATION: 98 % | HEART RATE: 69 BPM | SYSTOLIC BLOOD PRESSURE: 124 MMHG | BODY MASS INDEX: 39.99 KG/M2 | TEMPERATURE: 98 F | DIASTOLIC BLOOD PRESSURE: 86 MMHG | WEIGHT: 240 LBS | HEIGHT: 65 IN

## 2023-04-03 DIAGNOSIS — Z13.89 SCREENING FOR GENITOURINARY CONDITION: ICD-10-CM

## 2023-04-03 DIAGNOSIS — Z00.00 ANNUAL PHYSICAL EXAM: Primary | ICD-10-CM

## 2023-04-03 DIAGNOSIS — Z13.21 ENCOUNTER FOR VITAMIN DEFICIENCY SCREENING: ICD-10-CM

## 2023-04-03 DIAGNOSIS — E78.5 HYPERLIPIDEMIA, UNSPECIFIED HYPERLIPIDEMIA TYPE: ICD-10-CM

## 2023-04-03 DIAGNOSIS — Z13.29 THYROID DISORDER SCREENING: ICD-10-CM

## 2023-04-03 DIAGNOSIS — Z11.4 SCREENING FOR HIV (HUMAN IMMUNODEFICIENCY VIRUS): ICD-10-CM

## 2023-04-03 DIAGNOSIS — J45.20 MILD INTERMITTENT ASTHMA, UNSPECIFIED WHETHER COMPLICATED: ICD-10-CM

## 2023-04-03 LAB
CAMPYLOBACTER DNA SPEC NAA+PROBE: NORMAL
SALMONELLA DNA SPEC QL NAA+PROBE: NORMAL
SHIGA TOXIN STX GENE SPEC NAA+PROBE: NORMAL
SHIGELLA DNA SPEC QL NAA+PROBE: NORMAL

## 2023-04-03 RX ORDER — FLUTICASONE PROPIONATE 250 UG/1
1 POWDER, METERED RESPIRATORY (INHALATION) 2 TIMES DAILY
Qty: 60 BLISTER | Refills: 0 | Status: SHIPPED | OUTPATIENT
Start: 2023-04-03 | End: 2023-04-03

## 2023-04-03 RX ORDER — ALBUTEROL SULFATE 90 UG/1
2 AEROSOL, METERED RESPIRATORY (INHALATION) EVERY 6 HOURS PRN
Qty: 18 G | Refills: 5 | Status: SHIPPED | OUTPATIENT
Start: 2023-04-03

## 2023-04-03 RX ORDER — MOMETASONE FUROATE AND FORMOTEROL FUMARATE DIHYDRATE 200; 5 UG/1; UG/1
2 AEROSOL RESPIRATORY (INHALATION) 2 TIMES DAILY
Qty: 13 G | Refills: 1 | Status: SHIPPED | OUTPATIENT
Start: 2023-04-03

## 2023-04-03 NOTE — PROGRESS NOTES
[address] [facility] PRIMARY CARE [facility] ER    NAME: Bailee Other  AGE: 37 y o  SEX: female  : 1980     DATE: 4/3/2023     Assessment and Plan:     Problem List Items Addressed This Visit        Other    Hyperlipidemia    Relevant Orders    CBC and differential    Comprehensive metabolic panel    Lipid panel   Other Visit Diagnoses     Annual physical exam    -  Primary    Thyroid disorder screening        Relevant Orders    TSH, 3rd generation    Encounter for vitamin deficiency screening        Relevant Orders    Vitamin D Panel    Screening for genitourinary condition        Relevant Orders    UA (URINE) with reflex to Scope    Mild intermittent asthma, unspecified whether complicated        Relevant Medications    albuterol (Ventolin HFA) 90 mcg/act inhaler    mometasone-formoterol (Dulera) 200-5 MCG/ACT inhaler    Other Relevant Orders    Ambulatory Referral to Allergy          Immunizations and preventive care screenings were discussed with patient today  Appropriate education was printed on patient's after visit summary  Counseling:  · Had preventative screening  Patient has been having a cough for about a month to 2 months  She has 2 dogs in the house  She does report postnasal drip  I would advise her to start taking allergy over-the-counter  Would also renew her inhaler as she symptoms hears herself wheeze  She request allergist   Consult will be made  Colonoscopy 2019 showed large internal and external hemorrhoids and a polyp  Is asked to come back in 5 years  She would also make an appointment to see an  to get her vision checked  She would be due for mammogram soon  She is having significant pedal edema while she was coming back from her trip to [facility]  It did begin to go down as the days passed however last couple of days she is getting puffiness at the end of the day    We discussed differential diagnosis and I would encourage her to start using compression stocking watching salt intake and keeping her legs up at night especially when she sleeps  BMI Counseling: Body mass index is 39 94 kg/m²  The BMI is above normal  Exercise recommendations include exercising 3-5 times per week  Rationale for BMI follow-up plan is due to patient being overweight or obese  No follow-ups on file  Chief Complaint:     Chief Complaint   Patient presents with   • Physical Exam     Bmi follow up    • Cough   • Blurred Vision   • Leg Swelling      History of Present Illness:     Adult Annual Physical   Patient here for a comprehensive physical exam  The patient reports problems - As above  Diet and Physical Activity  · Diet/Nutrition: low calorie diet  · Exercise: walking  Depression Screening  PHQ-2/9 Depression Screening    Little interest or pleasure in doing things: 2 - more than half the days  Feeling down, depressed, or hopeless: 2 - more than half the days  Trouble falling or staying asleep, or sleeping too much: 2 - more than half the days  Feeling tired or having little energy: 0 - not at all  Poor appetite or overeatin - not at all  Feeling bad about yourself - or that you are a failure or have let yourself or your family down: 0 - not at all  Trouble concentrating on things, such as reading the newspaper or watching television: 0 - not at all  Moving or speaking so slowly that other people could have noticed  Or the opposite - being so fidgety or restless that you have been moving around a lot more than usual: 0 - not at all  Thoughts that you would be better off dead, or of hurting yourself in some way: 0 - not at all  PHQ-9 Score: 6   PHQ-9 Interpretation: Mild depression        General Health  · Sleep: Sleep apnea  · Hearing: normal - bilateral   · Vision: See above  · Dental: brushes teeth twice daily         /GYN Health  · Patient is: premenopausal  · Last menstrual period: Regular  · Contraceptive method:      Review of Systems:     Review of Systems   Past Medical History:     Past Medical History:   Diagnosis Date   • Acne 04/23/2015   • Acquired hallux valgus 03/07/2016   • Acute non-recurrent maxillary sinusitis 01/27/2020   • Anemia    • Anxiety 10/16/2019   • Arthritis    • Benign paroxysmal positional vertigo 03/08/2017   • Candida vaginitis 04/25/2019   • Colon polyp 2019   • COVID-19 vaccine series completed     Moderna: 2nd dose 5/21/2021   • Depression    • Discoloration of skin of hand 03/20/2019   • Duodenitis without bleeding    • Dysphagia    • Fibromyalgia    • Gastritis    • GERD (gastroesophageal reflux disease)    • Hiatal hernia    • Hx of colonoscopy    • Insomnia    • Iron deficiency anemia secondary to inadequate dietary iron intake 10/18/2019   • Memory loss    • Menorrhagia with regular cycle 11/20/2014   • Moderate episode of recurrent major depressive disorder ([facility] [address] ) 10/16/2019   • Obesity    • Oral herpes    • Ovarian cyst 06/11/2013    Small dermoid on right ovary   • Pap smear for cervical cancer screening     2/2016-wnl; 11/2020-wnl, HRHPV neg   • Sodium (Na) deficiency 10/19/2020   • 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 myelopathy or radiculopathy    • Streptococcal pharyngitis 10/19/2020   • Vaginal discharge 09/01/2018   • Vitamin D deficiency       Past Surgical History:     Past Surgical History:   Procedure Laterality Date   • APPENDECTOMY     • BREAST BIOPSY Right     pt unsure when or where- ? 6 yrs ago- benign   • BREAST SURGERY Right 01/08/2015    lump removed from breast    • Akash Bazan   • CHOLECYSTECTOMY     • COLONOSCOPY      9/26/2012; 2019-benign polyp, repeat 5 years   • DILATION AND CURETTAGE OF UTERUS      Resolved:1/29/2009   • ENDOMETRIAL ABLATION N/A 8/13/2020    Procedure: ABLATION ENDOMETRIAL Patricia Coates;  Surgeon: Steven Laird MD;  [facility]: [facility];  Service: Gynecology   • ESOPHAGOGASTRODUODENOSCOPY Diagnostic ; 9/26/2012   • HYSTEROSCOPY      Resolved:1/30/2009   • OVARIAN CYST REMOVAL Right 2005   • [place] HYSTEROSCOPY BX ENDOMETRIUM&/POLYPC W/WO D&C N/A 8/13/2020    Procedure: DILATATION AND CURETTAGE (D&C) WITH HYSTEROSCOPY;  Surgeon: Meg Matamoros MD;  Location: [facility];  Service: Gynecology   • WISDOM TOOTH EXTRACTION        Social History:     Social History     Socioeconomic History   • Marital status: /Civil Union     Sp[facility] name: [facility]   • Number of children: 4   • Years of education: high school   • Highest education level: None   Occupational History   • Occupation: cash    Tobacco Use   • Smoking status: Never   • Smokeless tobacco: Never   • Tobacco comments:     No passive smoke exposure   Vaping Use   • Vaping Use: Never used   Substance and Sexual Activity   • Alcohol use: No   • Drug use: Never   • Sexual activity: Yes     Partners: Male     Birth control/protection: Male Sterilization     Comment: life time partners:1   Other Topics Concern   • None   Social History Narrative    [employer]    Accepts blood products            Exercise: 0x/week due to fibormyalgia    Calcium: 1 c almond milk daily; 1 cow milk daily,  1 cheese daily, 1 yogurt 4x/week         caffeine occasionally     Social Determinants of Health     Financial Resource Strain: Not on file   Food Insecurity: Not on file   Transportation Needs: Not on file   Physical Activity: Not on file   Stress: Not on file   Social Connections: Not on file   Intimate Partner Violence: Not on file   Housing Stability: Not on file      Family History:     Family History   Problem Relation Age of Onset   • Other Mother         uterine leiomyoma   • Diabetes type II Mother         Mellitus   • Hypothyroidism Mother    • Colon cancer Father 54        Stage IV   • Diabetes Father         Type II Mellitus   • Hypertension Father    • Squamous cell carcinoma Father         Located waist down around kidneys, lowe back, tail bone, prostate and bladder  • Multiple sclerosis Sister    • Asthma Sister    • Hypothyroidism Sister    • Anxiety disorder Brother    • Depression Brother    • Diabetes Brother         Mellitus   • Asthma Brother    • Hypertension Brother    • Hypertension Maternal Grandmother    • Diabetes Maternal Grandmother    • Other Maternal Grandmother         Vulvar cancer   • Vaginal cancer Maternal Grandmother    • Schizophrenia Maternal Grandfather    • Hypertension Maternal Grandfather    • Thyroid cancer Paternal Grandmother 80   • Diabetes Paternal Grandfather 43         due to complications of DM   • Breast cancer Cousin 46   • Ovarian cancer Neg Hx       Current Medications:     Current Outpatient Medications   Medication Sig Dispense Refill   • albuterol (Ventolin HFA) 90 mcg/act inhaler Inhale 2 puffs every 6 (six) hours as needed for wheezing 18 g 5   • mometasone-formoterol (Dulera) 200-5 MCG/ACT inhaler Inhale 2 puffs 2 (two) times a day Rinse mouth after use   13 g 1   • buPROPion (WELLBUTRIN XL) 150 mg 24 hr tablet TAKE 1 TABLET (150 MG TOTAL) BY MOUTH DAILY IN ADDITION  MG DAILY 90 tablet 1   • buPROPion (WELLBUTRIN XL) 300 mg 24 hr tablet Take 1 tablet (300 mg total) by mouth daily 90 tablet 1   • clindamycin (CLINDAGEL) 1 % gel Apply topically 2 (two) times a day 30 g 2   • cyanocobalamin (VITAMIN B-12) 1000 MCG tablet Take 1 tablet (1,000 mcg total) by mouth daily (Patient not taking: Reported on 2023) 90 tablet 3   • cyclobenzaprine (FLEXERIL) 5 mg tablet Take 1 tablet (5 mg total) by mouth 3 (three) times a day as needed for muscle spasms (Patient not taking: Reported on 2023) 30 tablet 1   • dicyclomine (BENTYL) 20 mg tablet Take 1 tablet (20 mg total) by mouth 2 (two) times a day 20 tablet 0   • hydrOXYzine HCL (ATARAX) 10 mg tablet TAKE 1 TABLET (10 MG TOTAL) BY MOUTH 2 (TWO) TIMES A DAY AS NEEDED FOR ANXIETY 180 tablet 1   • pantoprazole (PROTONIX) 40 mg tablet Take 1 tablet "(40 mg total) by mouth daily 30 tablet 0     No current facility-administered medications for this visit  Allergies: Allergies   Allergen Reactions   • Morphine Chest Pain and Hives     Other reaction(s): chest pain   • Percocet [Oxycodone-Acetaminophen] Hives   • Domperidone    • Ibuprofen      Due to gastritis      Physical Exam:     /86   Pulse 69   Temp 98 °F (36 7 °C)   Resp 18   Ht 5' 5\" (1 651 m)   Wt 109 kg (240 lb)   LMP 03/10/2023 (Within Days)   SpO2 98%   BMI 39 94 kg/m²     Physical Exam  Constitutional:       General: She is not in acute distress  Appearance: She is not diaphoretic  Eyes:      General: No scleral icterus  Pupils: Pupils are equal, round, and reactive to light  Neck:      Thyroid: No thyromegaly  Vascular: No carotid bruit  Cardiovascular:      Rate and Rhythm: Normal rate and regular rhythm  Heart sounds: Normal heart sounds  No murmur heard  Pulmonary:      Effort: Pulmonary effort is normal  No respiratory distress  Breath sounds: Normal breath sounds  No stridor  No wheezing or rales  Abdominal:      General: Bowel sounds are normal  There is no distension  Palpations: Abdomen is soft  There is no mass  Tenderness: There is no abdominal tenderness  There is no guarding  Hernia: No hernia is present  Musculoskeletal:      Right lower leg: No edema  Left lower leg: No edema  Lymphadenopathy:      Cervical: No cervical adenopathy  Skin:     General: Skin is warm  Neurological:      Mental Status: She is alert and oriented to person, place, and time  Cranial Nerves: No cranial nerve deficit        Coordination: Coordination normal    Psychiatric:         Mood and Affect: Mood normal          Behavior: Behavior normal           Kylah Moon MD  [address]  "

## 2023-04-05 LAB
CRYPTOSP STL QL ACID FAST STN: NORMAL
I BELLI SPEC QL ACID FAST MOD KINY STN: NORMAL

## 2023-04-07 ENCOUNTER — APPOINTMENT (OUTPATIENT)
Dept: LAB | Facility: HOSPITAL | Age: 43
End: 2023-04-07

## 2023-04-07 LAB
ALBUMIN SERPL BCP-MCNC: 4.1 G/DL (ref 3.5–5)
ALP SERPL-CCNC: 55 U/L (ref 34–104)
ALT SERPL W P-5'-P-CCNC: 21 U/L (ref 7–52)
ANION GAP SERPL CALCULATED.3IONS-SCNC: 6 MMOL/L (ref 4–13)
AST SERPL W P-5'-P-CCNC: 14 U/L (ref 13–39)
BASOPHILS # BLD AUTO: 0.05 THOUSANDS/ÂΜL (ref 0–0.1)
BASOPHILS NFR BLD AUTO: 1 % (ref 0–1)
BILIRUB SERPL-MCNC: 0.43 MG/DL (ref 0.2–1)
BILIRUB UR QL STRIP: NEGATIVE
BUN SERPL-MCNC: 9 MG/DL (ref 5–25)
CALCIUM SERPL-MCNC: 8.9 MG/DL (ref 8.4–10.2)
CHLORIDE SERPL-SCNC: 106 MMOL/L (ref 96–108)
CHOLEST SERPL-MCNC: 183 MG/DL
CLARITY UR: CLEAR
CO2 SERPL-SCNC: 27 MMOL/L (ref 21–32)
COLOR UR: YELLOW
CREAT SERPL-MCNC: 0.71 MG/DL (ref 0.6–1.3)
EOSINOPHIL # BLD AUTO: 0.15 THOUSAND/ÂΜL (ref 0–0.61)
EOSINOPHIL NFR BLD AUTO: 2 % (ref 0–6)
ERYTHROCYTE [DISTWIDTH] IN BLOOD BY AUTOMATED COUNT: 13.1 % (ref 11.6–15.1)
GFR SERPL CREATININE-BSD FRML MDRD: 103 ML/MIN/1.73SQ M
GLUCOSE P FAST SERPL-MCNC: 97 MG/DL (ref 65–99)
GLUCOSE UR STRIP-MCNC: NEGATIVE MG/DL
HCT VFR BLD AUTO: 34.9 % (ref 34.8–46.1)
HDLC SERPL-MCNC: 50 MG/DL
HGB BLD-MCNC: 11.4 G/DL (ref 11.5–15.4)
HGB UR QL STRIP.AUTO: NEGATIVE
IMM GRANULOCYTES # BLD AUTO: 0.03 THOUSAND/UL (ref 0–0.2)
IMM GRANULOCYTES NFR BLD AUTO: 0 % (ref 0–2)
KETONES UR STRIP-MCNC: NEGATIVE MG/DL
LDLC SERPL CALC-MCNC: 115 MG/DL (ref 0–100)
LEUKOCYTE ESTERASE UR QL STRIP: NEGATIVE
LYMPHOCYTES # BLD AUTO: 2.18 THOUSANDS/ÂΜL (ref 0.6–4.47)
LYMPHOCYTES NFR BLD AUTO: 33 % (ref 14–44)
MCH RBC QN AUTO: 29.4 PG (ref 26.8–34.3)
MCHC RBC AUTO-ENTMCNC: 32.7 G/DL (ref 31.4–37.4)
MCV RBC AUTO: 90 FL (ref 82–98)
MONOCYTES # BLD AUTO: 0.68 THOUSAND/ÂΜL (ref 0.17–1.22)
MONOCYTES NFR BLD AUTO: 10 % (ref 4–12)
NEUTROPHILS # BLD AUTO: 3.6 THOUSANDS/ÂΜL (ref 1.85–7.62)
NEUTS SEG NFR BLD AUTO: 54 % (ref 43–75)
NITRITE UR QL STRIP: NEGATIVE
NONHDLC SERPL-MCNC: 133 MG/DL
NRBC BLD AUTO-RTO: 0 /100 WBCS
PH UR STRIP.AUTO: 6 [PH]
PLATELET # BLD AUTO: 298 THOUSANDS/UL (ref 149–390)
PMV BLD AUTO: 9.7 FL (ref 8.9–12.7)
POTASSIUM SERPL-SCNC: 3.6 MMOL/L (ref 3.5–5.3)
PROT SERPL-MCNC: 7 G/DL (ref 6.4–8.4)
PROT UR STRIP-MCNC: NEGATIVE MG/DL
RBC # BLD AUTO: 3.88 MILLION/UL (ref 3.81–5.12)
SODIUM SERPL-SCNC: 139 MMOL/L (ref 135–147)
SP GR UR STRIP.AUTO: 1.02 (ref 1–1.03)
TRIGL SERPL-MCNC: 89 MG/DL
TSH SERPL DL<=0.05 MIU/L-ACNC: 2.23 UIU/ML (ref 0.45–4.5)
UROBILINOGEN UR QL STRIP.AUTO: 0.2 E.U./DL
WBC # BLD AUTO: 6.69 THOUSAND/UL (ref 4.31–10.16)

## 2023-05-09 ENCOUNTER — OFFICE VISIT (OUTPATIENT)
Dept: PSYCHIATRY | Facility: CLINIC | Age: 43
End: 2023-05-09

## 2023-05-09 DIAGNOSIS — F32.1 CURRENT MODERATE EPISODE OF MAJOR DEPRESSIVE DISORDER WITHOUT PRIOR EPISODE (HCC): ICD-10-CM

## 2023-05-09 NOTE — PSYCH
MEDICATION MANAGEMENT NOTE        [facility]      Name and Date of Birth:  Ricyk Howard 37 y o  1980 MRN: [id number]    Date of Visit: May 9, 2023    Reason for Visit: Follow-up for medication management    Subjective: The patient reports her father passed away in February this year from colon cancer  She reports she has been grieving about it but denies any major depressive episode  She reports she can get very emotional at times thinking about him and can be tearful but denies any hopelessness or suicidal thoughts  Reports sleep and appetite has been fine  Reports [place] [facility] [place] [facility] [place] [place] and that also helped her coping with the loss of her father  She reports her mother moved to [place] to live with her sister there and it has been difficult  She reports she feels like losing both parents  Emotional support was provided throughout the meeting  Supportive psychotherapy provided  She reports her son has been back together with his wife but they still have some struggles in the relationship  She denies any other concern  Most of the appointment was spent in providing supportive psychotherapy and validating patient concerns  She currently also see individual therapist and reports it has been helpful  The patient currently takes Wellbutrin  mg daily rather than 450 mg daily  She reports she became extremely emotional on higher dose and since decreasing the dose has been better  Denied any other side effects  Denies any other concern        Review Of Systems:      Constitutional negative   ENT negative   Cardiovascular negative   Respiratory negative   Gastrointestinal negative   Genitourinary negative   Musculoskeletal negative   Integumentary negative   Neurological negative   Endocrine negative   Other Symptoms none       Alcohol/Substance Abuse: Denies        Past Medical History:    Past Medical History: Diagnosis Date   • Acne 04/23/2015   • Acquired hallux valgus 03/07/2016   • Acute non-recurrent maxillary sinusitis 01/27/2020   • Anemia    • Anxiety 10/16/2019   • Arthritis    • Benign paroxysmal positional vertigo 03/08/2017   • Candida vaginitis 04/25/2019   • Colon polyp 2019   • COVID-19 vaccine series completed     Moderna: 2nd dose 5/21/2021   • Depression    • Discoloration of skin of hand 03/20/2019   • Duodenitis without bleeding    • Dysphagia    • Fibromyalgia    • Gastritis    • GERD (gastroesophageal reflux disease)    • Hiatal hernia    • Hx of colonoscopy    • Insomnia    • Iron deficiency anemia secondary to inadequate dietary iron intake 10/18/2019   • Memory loss    • Menorrhagia with regular cycle 11/20/2014   • Moderate episode of recurrent major depressive disorder ([facility] [address] ) 10/16/2019   • Obesity    • Oral herpes    • Ovarian cyst 06/11/2013    Small dermoid on right ovary   • Pap smear for cervical cancer screening     2/2016-wnl; 11/2020-wnl, HRHPV neg   • Sodium (Na) deficiency 10/19/2020   • 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 myelopathy or radiculopathy    • Streptococcal pharyngitis 10/19/2020   • Vaginal discharge 09/01/2018   • Vitamin D deficiency         Past Surgical History:   Procedure Laterality Date   • APPENDECTOMY     • BREAST BIOPSY Right     pt unsure when or where- ? 6 yrs ago- benign   • BREAST SURGERY Right 01/08/2015    lump removed from breast    • [facility]   • CHOLECYSTECTOMY     • COLONOSCOPY      9/26/2012; 2019-benign polyp, repeat 5 years   • DILATION AND CURETTAGE OF UTERUS      Resolved:1/29/2009   • ENDOMETRIAL ABLATION N/A 8/13/2020    Procedure: ABLATION ENDOMETRIAL Roxana Cooper;  Surgeon: Gabriel Culp MD;  [facility];  Service: Gynecology   • ESOPHAGOGASTRODUODENOSCOPY      Diagnostic ; 9/26/2012   • HYSTEROSCOPY      Resolved:1/30/2009   • OVARIAN CYST REMOVAL Right 2005   • [place] HYSTEROSCOPY BX ENDOMETRIUM&/POLYPC W/WO D&C N/A 8/13/2020    Procedure: DILATATION AND CURETTAGE (D&C) WITH HYSTEROSCOPY;  Surgeon: Cata Lo MD;  [facility];  Service: Gynecology   • WISDOM TOOTH EXTRACTION       Allergies   Allergen Reactions   • Morphine Chest Pain and Hives     Other reaction(s): chest pain   • Percocet [Oxycodone-Acetaminophen] Hives   • Domperidone    • Ibuprofen      Due to gastritis       Current Medications:       Current Outpatient Medications:   •  albuterol (Ventolin HFA) 90 mcg/act inhaler, Inhale 2 puffs every 6 (six) hours as needed for wheezing, Disp: 18 g, Rfl: 5  •  buPROPion (WELLBUTRIN XL) 300 mg 24 hr tablet, TAKE 1 TABLET BY MOUTH EVERY DAY, Disp: 90 tablet, Rfl: 1  •  clindamycin (CLINDAGEL) 1 % gel, Apply topically 2 (two) times a day, Disp: 30 g, Rfl: 2  •  cyanocobalamin (VITAMIN B-12) 1000 MCG tablet, Take 1 tablet (1,000 mcg total) by mouth daily (Patient not taking: Reported on 1/25/2023), Disp: 90 tablet, Rfl: 3  •  cyclobenzaprine (FLEXERIL) 5 mg tablet, Take 1 tablet (5 mg total) by mouth 3 (three) times a day as needed for muscle spasms (Patient not taking: Reported on 1/18/2023), Disp: 30 tablet, Rfl: 1  •  dicyclomine (BENTYL) 20 mg tablet, Take 1 tablet (20 mg total) by mouth 2 (two) times a day, Disp: 20 tablet, Rfl: 0  •  fexofenadine (ALLEGRA) 180 MG tablet, Take 1 tablet (180 mg total) by mouth daily, Disp: , Rfl:   •  fluticasone (FLONASE) 50 mcg/act nasal spray, 2 sprays into each nostril daily, Disp: 18 2 mL, Rfl: 3  •  hydrOXYzine HCL (ATARAX) 10 mg tablet, TAKE 1 TABLET (10 MG TOTAL) BY MOUTH 2 (TWO) TIMES A DAY AS NEEDED FOR ANXIETY, Disp: 180 tablet, Rfl: 1  •  mometasone-formoterol (Dulera) 200-5 MCG/ACT inhaler, Inhale 2 puffs 2 (two) times a day Rinse mouth after use , Disp: 13 g, Rfl: 1  •  pantoprazole (PROTONIX) 40 mg tablet, Take 1 tablet (40 mg total) by mouth daily, Disp: 30 tablet, Rfl: 0       History Review:  The following portions of the patient's history were reviewed and updated as appropriate: allergies, current medications, past family history, past medical history, past social history, past surgical history and problem list          OBJECTIVE:     Vital signs in last 24 hours: There were no vitals filed for this visit      Mental Status Evaluation:    Appearance age appropriate, casually dressed   Behavior cooperative, calm   Speech normal rate, normal volume, normal pitch   Mood easily depressed   Affect normal range and intensity, appropriate   Thought Processes organized, goal directed   Associations intact associations   Thought Content no overt delusions   Perceptual Disturbances: no auditory hallucinations, no visual hallucinations   Abnormal Thoughts  Risk Potential Suicidal ideation - None  Homicidal ideation - None  Potential for aggression - No   Orientation oriented to person, place, time/date and situation   Memory recent and remote memory grossly intact   Consciousness alert and awake   Attention Span Concentration Span attention span and concentration are age appropriate   Intellect appears to be of average intelligence   Insight intact   Judgement intact   Muscle Strength and  Gait normal gait and normal balance   Motor activity no abnormal movements   Language no difficulty naming common objects, no difficulty repeating a phrase   Fund of Knowledge adequate knowledge of current events  adequate fund of knowledge regarding past history  adequate fund of knowledge regarding vocabulary    Pain none   Pain Scale 0       Laboratory Results:   Most Recent Labs:   Lab Results   Component Value Date    WBC 6 69 04/07/2023    RBC 3 88 04/07/2023    HGB 11 4 (L) 04/07/2023    HCT 34 9 04/07/2023     04/07/2023    RDW 13 1 04/07/2023    NEUTROABS 3 60 04/07/2023    SODIUM 139 04/07/2023    K 3 6 04/07/2023     04/07/2023    CO2 27 04/07/2023    BUN 9 04/07/2023    CREATININE 0 71 04/07/2023    CALCIUM 8 9 04/07/2023    AST 14 04/07/2023    ALT 21 04/07/2023 ALKPHOS 55 04/07/2023    TP 7 0 04/07/2023    TBILI 0 43 04/07/2023    CHOLESTEROL 183 04/07/2023    TRIG 89 04/07/2023    HDL 50 04/07/2023    LDLCALC 115 (H) 04/07/2023    [place] 133 04/07/2023    FNK2YHRVSXCV 2 235 04/07/2023    HCGQUANT <2 08/27/2018     I have personally reviewed all pertinent laboratory/tests results  Assessment/Plan: The patient reports occasionally gets emotional due to thinking about her dad's who passed away earlier this year  No major depressive episode  No SI or HI  Plan is to continue with the current medication which includes Wellbutrin  mg daily for depression and hydroxyzine as needed for anxiety  The dose and schedule as mentioned above  Patient educated about her medication in detail and advised to call me if there is any concern and to call crisis or visit nearby ER in case of any emergency  The patient verbalized understanding and agrees with the plan  She will follow up with me in 3 months or sooner if needed  Diagnoses and all orders for this visit:    Current moderate episode of major depressive disorder without prior episode [facility]          Treatment Recommendations/Precautions:    Aware of 24 hour and weekend coverage for urgent situations accessed by calling [facility] main practice number    Risks/Benefits      Risks, Benefits And Possible Side Effects Of Medications:    Risks, benefits, and possible side effects of medications explained to [facility] and she verbalizes understanding and agreement for treatment  Controlled Medication Discussion:     Not applicable    Psychotherapy Provided:     Individual psychotherapy provided: Yes  Counseling was provided during the session today for 16 minutes  Supportive psychotherapy provided  Medications, treatment progress and treatment plan reviewed with [facility]  Medication education provided to [facility]  Reassurance and supportive therapy provided     Crisis/safety plan discussed with Aj Daugherty       Treatment Plan;    Completed and signed during the session: Not applicable - Treatment Plan not due at this session    Yoel Bach MD 05/09/23

## 2023-05-19 ENCOUNTER — TELEPHONE (OUTPATIENT)
Dept: PSYCHIATRY | Facility: CLINIC | Age: 43
End: 2023-05-19

## 2023-06-03 DIAGNOSIS — J45.20 MILD INTERMITTENT ASTHMA, UNSPECIFIED WHETHER COMPLICATED: ICD-10-CM

## 2023-06-05 DIAGNOSIS — R11.2 NAUSEA AND VOMITING, UNSPECIFIED VOMITING TYPE: ICD-10-CM

## 2023-06-05 RX ORDER — MOMETASONE FUROATE AND FORMOTEROL FUMARATE DIHYDRATE 200; 5 UG/1; UG/1
AEROSOL RESPIRATORY (INHALATION)
Qty: 13 G | Refills: 1 | Status: SHIPPED | OUTPATIENT
Start: 2023-06-05

## 2023-06-06 RX ORDER — PANTOPRAZOLE SODIUM 40 MG/1
40 TABLET, DELAYED RELEASE ORAL DAILY
Qty: 30 TABLET | Refills: 0 | Status: SHIPPED | OUTPATIENT
Start: 2023-06-06

## 2023-06-13 ENCOUNTER — TELEPHONE (OUTPATIENT)
Dept: HEMATOLOGY ONCOLOGY | Facility: CLINIC | Age: 43
End: 2023-06-13

## 2023-06-13 NOTE — TELEPHONE ENCOUNTER
I spoke with Dianelys Phillips to schedule their consultation with Cancer Risk and Genetics  Scheduling Outcome: Patient is scheduled for an appointment on 11/29/2023  at 9:30AM with Ramone Griffin     Personal/Family History Related to Appointment:     Personal History of Cancer: Patient reports no personal history of cancer    Family History of Cancer: Patient reports family history of colon ca- father, 3 different [place] uncles  throat ca- [place] grandmother  vaginal ca- [place] grandmother  brain ca- [place] uncle  Is patient of [address] [place] [address]?: No    History of Genetic Testing:  Personal History of Genetic Testing: Patient report no personal history of Genetic Testing  Family History of Genetic Testing: Patient reports that no family members have had Genetic Testing  [facility]:  Is patient able to complete our family history questionnaire on a computer?:  Yes    Patient's preferred e-mail address: [email or web address]

## 2023-06-13 NOTE — TELEPHONE ENCOUNTER
Called patient and left a message letting her know that I schedule her nm bone scan in [place] and if the date and time is not good for her to call back central scheduling to reschedule that appt

## 2023-06-13 NOTE — TELEPHONE ENCOUNTER
I called Paola Andersen to schedule a new patient appointment with the Cancer Risk and Genetics Program       Outcome:   I left a voice message encouraging the patient to call the [facility] [place] at [phone] to schedule this appointment  A mychart message was also send with our contact information  Follow-up:   At this time the referral will be closed and we will wait to hear back from the patient regarding scheduling this appointment

## 2023-06-13 NOTE — TELEPHONE ENCOUNTER
[facility]   Patient Name  (First and Last) Kyara Alexander    Patient Date of Birth 1980   Patient's e-mail address             [email or web address]   Appointment date and time 11/29/2023 @9:30AM    Was patient advised that this questionnaire must be completed on a computer?  Yes

## 2023-06-17 ENCOUNTER — APPOINTMENT (OUTPATIENT)
Dept: LAB | Facility: HOSPITAL | Age: 43
End: 2023-06-17
Payer: COMMERCIAL

## 2023-06-17 DIAGNOSIS — D50.8 IRON DEFICIENCY ANEMIA SECONDARY TO INADEQUATE DIETARY IRON INTAKE: ICD-10-CM

## 2023-06-17 DIAGNOSIS — E53.8 B12 DEFICIENCY: ICD-10-CM

## 2023-06-17 LAB
ALBUMIN SERPL BCP-MCNC: 4.2 G/DL (ref 3.5–5)
ALP SERPL-CCNC: 55 U/L (ref 34–104)
ALT SERPL W P-5'-P-CCNC: 17 U/L (ref 7–52)
ANION GAP SERPL CALCULATED.3IONS-SCNC: 7 MMOL/L (ref 4–13)
AST SERPL W P-5'-P-CCNC: 13 U/L (ref 13–39)
BASOPHILS # BLD AUTO: 0.04 THOUSANDS/ÂΜL (ref 0–0.1)
BASOPHILS NFR BLD AUTO: 1 % (ref 0–1)
BILIRUB SERPL-MCNC: 0.59 MG/DL (ref 0.2–1)
BUN SERPL-MCNC: 11 MG/DL (ref 5–25)
CALCIUM SERPL-MCNC: 9 MG/DL (ref 8.4–10.2)
CHLORIDE SERPL-SCNC: 107 MMOL/L (ref 96–108)
CO2 SERPL-SCNC: 24 MMOL/L (ref 21–32)
CREAT SERPL-MCNC: 0.8 MG/DL (ref 0.6–1.3)
CRP SERPL QL: 4.3 MG/L
EOSINOPHIL # BLD AUTO: 0.11 THOUSAND/ÂΜL (ref 0–0.61)
EOSINOPHIL NFR BLD AUTO: 1 % (ref 0–6)
ERYTHROCYTE [DISTWIDTH] IN BLOOD BY AUTOMATED COUNT: 12.8 % (ref 11.6–15.1)
ERYTHROCYTE [SEDIMENTATION RATE] IN BLOOD: 8 MM/HOUR (ref 0–19)
FERRITIN SERPL-MCNC: 76 NG/ML (ref 11–307)
GFR SERPL CREATININE-BSD FRML MDRD: 89 ML/MIN/1.73SQ M
GLUCOSE P FAST SERPL-MCNC: 92 MG/DL (ref 65–99)
HCT VFR BLD AUTO: 35.5 % (ref 34.8–46.1)
HGB BLD-MCNC: 11.8 G/DL (ref 11.5–15.4)
IMM GRANULOCYTES # BLD AUTO: 0.04 THOUSAND/UL (ref 0–0.2)
IMM GRANULOCYTES NFR BLD AUTO: 1 % (ref 0–2)
IRON SATN MFR SERPL: 27 % (ref 15–50)
IRON SERPL-MCNC: 84 UG/DL (ref 50–170)
LYMPHOCYTES # BLD AUTO: 2.18 THOUSANDS/ÂΜL (ref 0.6–4.47)
LYMPHOCYTES NFR BLD AUTO: 26 % (ref 14–44)
MCH RBC QN AUTO: 29.6 PG (ref 26.8–34.3)
MCHC RBC AUTO-ENTMCNC: 33.2 G/DL (ref 31.4–37.4)
MCV RBC AUTO: 89 FL (ref 82–98)
MONOCYTES # BLD AUTO: 0.67 THOUSAND/ÂΜL (ref 0.17–1.22)
MONOCYTES NFR BLD AUTO: 8 % (ref 4–12)
NEUTROPHILS # BLD AUTO: 5.49 THOUSANDS/ÂΜL (ref 1.85–7.62)
NEUTS SEG NFR BLD AUTO: 63 % (ref 43–75)
NRBC BLD AUTO-RTO: 0 /100 WBCS
PLATELET # BLD AUTO: 337 THOUSANDS/UL (ref 149–390)
PMV BLD AUTO: 9.9 FL (ref 8.9–12.7)
POTASSIUM SERPL-SCNC: 4.3 MMOL/L (ref 3.5–5.3)
PROT SERPL-MCNC: 7.3 G/DL (ref 6.4–8.4)
RBC # BLD AUTO: 3.99 MILLION/UL (ref 3.81–5.12)
SODIUM SERPL-SCNC: 138 MMOL/L (ref 135–147)
TIBC SERPL-MCNC: 312 UG/DL (ref 250–450)
VIT B12 SERPL-MCNC: 210 PG/ML (ref 180–914)
WBC # BLD AUTO: 8.53 THOUSAND/UL (ref 4.31–10.16)

## 2023-06-17 PROCEDURE — 36415 COLL VENOUS BLD VENIPUNCTURE: CPT

## 2023-06-17 PROCEDURE — 82607 VITAMIN B-12: CPT

## 2023-06-17 PROCEDURE — 83550 IRON BINDING TEST: CPT

## 2023-06-17 PROCEDURE — 82728 ASSAY OF FERRITIN: CPT

## 2023-06-17 PROCEDURE — 85025 COMPLETE CBC W/AUTO DIFF WBC: CPT

## 2023-06-17 PROCEDURE — 86140 C-REACTIVE PROTEIN: CPT

## 2023-06-17 PROCEDURE — 85652 RBC SED RATE AUTOMATED: CPT

## 2023-06-17 PROCEDURE — 80053 COMPREHEN METABOLIC PANEL: CPT

## 2023-06-17 PROCEDURE — 83540 ASSAY OF IRON: CPT

## 2023-06-26 ENCOUNTER — HOSPITAL ENCOUNTER (OUTPATIENT)
Dept: NUCLEAR MEDICINE | Facility: HOSPITAL | Age: 43
Discharge: HOME | End: 2023-06-26
Payer: COMMERCIAL

## 2023-06-26 DIAGNOSIS — M89.8X9 BONE PAIN: ICD-10-CM

## 2023-06-26 DIAGNOSIS — Z80.0 FAMILY HISTORY OF COLON CANCER: ICD-10-CM

## 2023-06-26 PROCEDURE — 78306 BONE IMAGING WHOLE BODY: CPT

## 2023-06-26 PROCEDURE — G1004 CDSM NDSC: HCPCS

## 2023-06-26 PROCEDURE — A9503 TC99M MEDRONATE: HCPCS

## 2023-07-07 DIAGNOSIS — R11.2 NAUSEA AND VOMITING, UNSPECIFIED VOMITING TYPE: ICD-10-CM

## 2023-07-07 RX ORDER — PANTOPRAZOLE SODIUM 40 MG/1
TABLET, DELAYED RELEASE ORAL
Qty: 90 TABLET | Refills: 1 | Status: SHIPPED | OUTPATIENT
Start: 2023-07-07

## 2023-07-24 ENCOUNTER — OFFICE VISIT (OUTPATIENT)
Dept: HEMATOLOGY ONCOLOGY | Facility: CLINIC | Age: 43
End: 2023-07-24
Payer: COMMERCIAL

## 2023-07-24 VITALS
DIASTOLIC BLOOD PRESSURE: 66 MMHG | HEART RATE: 59 BPM | HEIGHT: 65 IN | OXYGEN SATURATION: 98 % | RESPIRATION RATE: 18 BRPM | BODY MASS INDEX: 39.65 KG/M2 | SYSTOLIC BLOOD PRESSURE: 128 MMHG | TEMPERATURE: 98.1 F | WEIGHT: 238 LBS

## 2023-07-24 DIAGNOSIS — Z80.0 FAMILY HISTORY OF COLON CANCER: ICD-10-CM

## 2023-07-24 DIAGNOSIS — E53.8 B12 DEFICIENCY: Primary | ICD-10-CM

## 2023-07-24 DIAGNOSIS — D50.8 IRON DEFICIENCY ANEMIA SECONDARY TO INADEQUATE DIETARY IRON INTAKE: ICD-10-CM

## 2023-07-24 PROCEDURE — 99214 OFFICE O/P EST MOD 30 MIN: CPT | Performed by: NURSE PRACTITIONER

## 2023-07-24 NOTE — PROGRESS NOTES
Hematology/Oncology Outpatient Follow-up  Luis Cardona 37 y.o. female 1980 [id number]    Date:  7/24/2023      Assessment and Plan:  1. B12 deficiency  Patient continues to be vitamin B12 deficient 210. Admits that she has not been taking her B12 supplements. Was given the option of resuming her oral supplements versus starting injections on a monthly basis. She would like to retrial taking supplements on a regular basis/daily. Recommended sublingual/B12 drops which may have better absorption minimum dose 500 mcg daily. Request she follow-up again with repeat labs in about 6 months from now or sooner should the need arise. - Vitamin B12; Future    2. Iron deficiency anemia secondary to inadequate dietary iron intake  Patient does not seem to be anemic or iron deficient at this point. Her hemoglobin seems to be in the low normal range 11.8 however her baseline hemoglobin tends to run high 11's to low 12's. We will continue to monitor. Etiology of her iron deficiency/nutritional deficiencies in the past was felt to be due to malabsorption from chronic PPI use versus gastroparesis. - Folate; Future  - Ferritin; Future  - Iron Panel (Includes Ferritin, Iron Sat%, Iron, and TIBC); Future  - Vitamin B12; Future  - Sedimentation rate, automated; Future  - C-reactive protein; Future  - Comprehensive metabolic panel; Future  - CBC and differential; Future    3. Family history of colon cancer  Paternal family history of cancer including her father and 3 paternal uncles. Scheduled to meet with oncology genetics 11/2023. She seems to be up-to-date with all her cancer screening exams including colonoscopy, mammography and gynecological exams. HPI:  Jeff Evans a 37 y. o. female with a history of gastritis, gastroparesis and chronic anemia due to iron deficiency which was treated with iron IV on multiple occasions since 2016.  She states that her menses have been heavy due to fibroids; typically lasting 7 days.  She has established GI care and had upper and lower endoscopy done in July 2019. The colonoscopy showed large external and internal hemorrhoids and 1 polyp that was removed otherwise normal upper endoscopy was normal.  She had a gastric emptying study done recently on 09/24/2019 which confirmed that she does in fact have gastroparesis.     She stated that she was found to have hepatomegaly just recently on an ultrasound May2020.  She also had an MRI of the abdomen on 07/16/2020 which showed hepatomegaly measuring 21 cm in greatest dimension.  No other pathology was found.  She had uterine ablation August 2020 which significantly improved her menstrual bleeding.     Interval history:  Patient presents today for follow-up visit accompanied by her . She continues to have chronic pain due to her arthritis/degenerative changes and fibromyalgia. She states that she has been noticing pulling sensation to her left back/flank area and has been urinating less. She did have ultrasound of the kidney and bladder earlier this year which did not show any significant findings in the left flank area. Did note known stable right adrenal adenoma. She denies any obvious bleeding from any site and denies heavy menstrual bleeding. Her most recent laboratory studies from 6/17/2023 showed normal CBC and CMP with hemoglobin in the low normal range 11.8. C-reactive protein is again elevated 4.3, sed rate is normal.  She does not seem to be iron deficient at this time iron saturation 27%, ferritin 76. However she continues to be vitamin B12 deficient 210. She had a nuclear bone scan done recently 6/26/2023:  FINDINGS:  Uptake in the bilateral shoulders, sternoclavicular joints, knees and ankles, compatible with degenerative changes. Degenerative changes are also present in the lower lumbar spine. There is no scintigraphic evidence of osseous metastasis.   IMPRESSION:  No scintigraphic evidence of osseous metastasis. ROS: Review of Systems   Constitutional: Positive for appetite change and fatigue. Negative for activity change, chills and fever. HENT: Negative for congestion, mouth sores, nosebleeds, sore throat and trouble swallowing. Eyes: Negative. Respiratory: Negative for cough, chest tightness and shortness of breath. Cardiovascular: Negative for chest pain, palpitations and leg swelling. Gastrointestinal: Negative for abdominal distention, abdominal pain, blood in stool, constipation, diarrhea, nausea and vomiting. Genitourinary: Positive for flank pain. Negative for difficulty urinating, dysuria, frequency, hematuria and urgency. Musculoskeletal: Positive for arthralgias, back pain and neck pain. Negative for gait problem and joint swelling. Skin: Negative for color change, pallor and rash. Neurological: Positive for numbness and headaches. Negative for dizziness, weakness and light-headedness. Hematological: Negative for adenopathy. Does not bruise/bleed easily. Psychiatric/Behavioral: Positive for sleep disturbance. Negative for dysphoric mood.        Past Medical History:   Diagnosis Date   • Acne 04/23/2015   • Acquired hallux valgus 03/07/2016   • Acute non-recurrent maxillary sinusitis 01/27/2020   • Anemia    • Anxiety 10/16/2019   • Arthritis    • Benign paroxysmal positional vertigo 03/08/2017   • Candida vaginitis 04/25/2019   • Colon polyp 2019   • COVID-19 vaccine series completed     Moderna: 2nd dose 5/21/2021   • Depression    • Discoloration of skin of hand 03/20/2019   • Duodenitis without bleeding    • Dysphagia    • Fibromyalgia    • Gastritis    • GERD (gastroesophageal reflux disease)    • Hiatal hernia    • Hx of colonoscopy    • Insomnia    • Iron deficiency anemia secondary to inadequate dietary iron intake 10/18/2019   • Memory loss    • Menorrhagia with regular cycle 11/20/2014   • Moderate episode of recurrent major depressive disorder ([address]) 10/16/2019   • Obesity    • Oral herpes    • Ovarian cyst 06/11/2013    Small dermoid on right ovary   • Pap smear for cervical cancer screening     2/2016-wnl; 11/2020-wnl, HRHPV neg   • Sodium (Na) deficiency 10/19/2020   • 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 myelopathy or radiculopathy    • Streptococcal pharyngitis 10/19/2020   • Vaginal discharge 09/01/2018   • Vitamin D deficiency        Past Surgical History:   Procedure Laterality Date   • APPENDECTOMY     • BREAST BIOPSY Right     pt unsure when or where- ? 6 yrs ago- benign   • BREAST SURGERY Right 01/08/2015    lump removed from breast    • [address]   • CHOLECYSTECTOMY     • COLONOSCOPY      9/26/2012; 2019-benign polyp, repeat 5 years   • DILATION AND CURETTAGE OF UTERUS      Resolved:1/29/2009   • ENDOMETRIAL ABLATION N/A 8/13/2020    Procedure: ABLATION ENDOMETRIAL Pratibha Flex;  Surgeon: Jacqueline Ramirez MD;  [facility];  Service: Gynecology   • ESOPHAGOGASTRODUODENOSCOPY      Diagnostic ; 9/26/2012   • HYSTEROSCOPY      Resolved:1/30/2009   • OVARIAN CYST REMOVAL Right 2005   • CT HYSTEROSCOPY BX ENDOMETRIUM&/POLYPC W/WO D&C N/A 8/13/2020    Procedure: DILATATION AND CURETTAGE (D&C) WITH HYSTEROSCOPY;  Surgeon: Jacqueline Ramirez MD;  Location[facility];  Service: Gynecology   • WISDOM TOOTH EXTRACTION         Social History     Socioeconomic History   • Marital status: /Civil Union     Spouse name: Orion   • Number of children: 4   • Years of education: high school   • Highest education level: None   Occupational History   • Occupation: cash    Tobacco Use   • Smoking status: Never     Passive exposure: Never   • Smokeless tobacco: Never   • Tobacco comments:     No passive smoke exposure   Vaping Use   • Vaping Use: Never used   Substance and Sexual Activity   • Alcohol use: No   • Drug use: Never   • Sexual activity: Yes     Partners: Male     Birth control/protection: Male Sterilization     Comment: life time partners:1 Other Topics Concern   • None   Social History Narrative    [employer]    Accepts blood products            Exercise: 0x/week due to fibormyalgia    Calcium: 1 c almond milk daily; 1 cow milk daily,  1 cheese daily, 1 yogurt 4x/week         caffeine occasionally        Do you have pets? dog Is pet allowed in bedroom? No    Are you a smoker? Never    Does anyone smoke in your home? No       Do you live with smokers? No    Travel [place] frequently? No   How many times a year? N/A      Social Determinants of Health     Financial Resource Strain: Low Risk  (11/3/2020)    Overall Financial Resource Strain (CARDIA)    • Difficulty of Paying Living Expenses: Not very hard   Food Insecurity: No Food Insecurity (11/3/2020)    Hunger Vital Sign    • Worried About Running Out of Food in the Last Year: Never true    • Ran Out of Food in the Last Year: Never true   Transportation Needs: No Transportation Needs (11/3/2020)    PRAPARE - Transportation    • Lack of Transportation (Medical): No    • Lack of Transportation (Non-Medical): No   Physical Activity: Inactive (10/19/2020)    Exercise Vital Sign    • Days of Exercise per Week: 0 days    • Minutes of Exercise per Session: 0 min   Stress: Stress Concern Present (10/19/2020)    [facility]    • Feeling of Stress : To some extent   Social Connections: Socially Integrated (10/19/2020)    Social Connection and Isolation Panel [NHANES]    • Frequency of Communication with Friends and Family: More than three times a week    • Frequency of Social Gatherings with Friends and Family: More than three times a week    • Attends Taoism Services: More than 4 times per year    • Active Member of Clubs or Organizations:  Yes    • Attends Club or Organization Meetings: More than 4 times per year    • Marital Status:    Intimate Partner Violence: Not At Risk (10/19/2020)    Humiliation, Afraid, Rape, and Kick questionnaire    • Fear of Current or Ex-Partner: No    • Emotionally Abused: No    • Physically Abused: No    • Sexually Abused: No   Housing Stability: Not on file       Family History   Problem Relation Age of Onset   • Other Mother         uterine leiomyoma   • Diabetes type II Mother         Mellitus   • Hypothyroidism Mother    • Colon cancer Father 54        Stage IV   • Diabetes Father         Type II Mellitus   • Hypertension Father    • Squamous cell carcinoma Father         Located waist down around kidneys, lowe back, tail bone, prostate and bladder. • Multiple sclerosis Sister    • Asthma Sister    • Hypothyroidism Sister    • Anxiety disorder Brother    • Depression Brother    • Diabetes Brother         Mellitus   • Asthma Brother    • Hypertension Brother    • Hypertension Maternal Grandmother    • Diabetes Maternal Grandmother    • Other Maternal Grandmother         Vulvar cancer   • Vaginal cancer Maternal Grandmother    • Schizophrenia Maternal Grandfather    • Hypertension Maternal Grandfather    • Thyroid cancer Paternal Grandmother 80   • Diabetes Paternal Grandfather 43         due to complications of DM   • Breast cancer Cousin 46   • Ovarian cancer Neg Hx        Allergies   Allergen Reactions   • Domperidone Other (See Comments)     Reaction unknown to patient.    • Morphine Chest Pain and Hives     Other reaction(s): chest pain   • Ibuprofen GI Intolerance     Due to gastritis   • Percocet [Oxycodone-Acetaminophen] Hives         Current Outpatient Medications:   •  buPROPion (WELLBUTRIN XL) 300 mg 24 hr tablet, TAKE 1 TABLET BY MOUTH EVERY DAY, Disp: 90 tablet, Rfl: 1  •  clindamycin (CLINDAGEL) 1 % gel, Apply topically 2 (two) times a day (Patient taking differently: Apply 1 Application topically if needed), Disp: 30 g, Rfl: 2  •  cyclobenzaprine (FLEXERIL) 5 mg tablet, Take 1 tablet (5 mg total) by mouth 3 (three) times a day as needed for muscle spasms, Disp: 30 tablet, Rfl: 1  • dicyclomine (BENTYL) 20 mg tablet, Take 1 tablet (20 mg total) by mouth 2 (two) times a day (Patient taking differently: Take 20 mg by mouth if needed), Disp: 20 tablet, Rfl: 0  •  fexofenadine (ALLEGRA) 180 MG tablet, Take 1 tablet (180 mg total) by mouth daily (Patient taking differently: Take 180 mg by mouth if needed), Disp: , Rfl:   •  fluticasone (FLONASE) 50 mcg/act nasal spray, SPRAY 2 SPRAYS INTO EACH NOSTRIL EVERY DAY (Patient taking differently: 1 spray into each nostril if needed), Disp: 48 mL, Rfl: 2  •  hydrOXYzine HCL (ATARAX) 10 mg tablet, TAKE 1 TABLET (10 MG TOTAL) BY MOUTH 2 (TWO) TIMES A DAY AS NEEDED FOR ANXIETY, Disp: 180 tablet, Rfl: 1  •  pantoprazole (PROTONIX) 40 mg tablet, TAKE 1 TABLET BY MOUTH EVERY DAY, Disp: 90 tablet, Rfl: 1  •  cyanocobalamin (VITAMIN B-12) 1000 MCG tablet, Take 1 tablet (1,000 mcg total) by mouth daily (Patient not taking: Reported on 7/24/2023), Disp: 90 tablet, Rfl: 3      Physical Exam:  /66 (BP Location: Left arm, Patient Position: Sitting, Cuff Size: Large)   Pulse 59   Temp 98.1 °F (36.7 °C) (Temporal)   Resp 18   Ht 5' 5" (1.651 m)   Wt 108 kg (238 lb)   SpO2 98%   BMI 39.61 kg/m²     Physical Exam  Vitals reviewed. Constitutional:       General: She is not in acute distress. Appearance: She is well-developed. She is obese. She is not diaphoretic. HENT:      Head: Normocephalic and atraumatic. Mouth/Throat:      Pharynx: No oropharyngeal exudate. Eyes:      General: No scleral icterus. Conjunctiva/sclera: Conjunctivae normal.      Pupils: Pupils are equal, round, and reactive to light. Neck:      Thyroid: No thyromegaly. Cardiovascular:      Rate and Rhythm: Normal rate and regular rhythm. Heart sounds: Normal heart sounds. No murmur heard. Pulmonary:      Effort: Pulmonary effort is normal. No respiratory distress. Breath sounds: Normal breath sounds.    Abdominal:      General: Bowel sounds are normal. There is no distension. Palpations: Abdomen is soft. Tenderness: There is no abdominal tenderness. Musculoskeletal:         General: Normal range of motion. Cervical back: Normal range of motion and neck supple. Lymphadenopathy:      Cervical: No cervical adenopathy. Skin:     General: Skin is warm and dry. Coloration: Skin is pale. Findings: No erythema or rash. Neurological:      Mental Status: She is alert and oriented to person, place, and time. Psychiatric:         Mood and Affect: Affect is blunt. Behavior: Behavior normal.         Thought Content: Thought content normal.         Judgment: Judgment normal.           Labs:  Lab Results   Component Value Date    WBC 8.53 06/17/2023    HGB 11.8 06/17/2023    HCT 35.5 06/17/2023    MCV 89 06/17/2023     06/17/2023        Patient voiced understanding and agreement in the above discussion. Aware to contact our office with questions/symptoms in the interim. This note has been generated by voice recognition software system. Therefore, there may be spelling, grammar, and or syntax errors. Please contact if questions arise.

## 2023-08-03 ENCOUNTER — OFFICE VISIT (OUTPATIENT)
Dept: PSYCHIATRY | Facility: CLINIC | Age: 43
End: 2023-08-03
Payer: COMMERCIAL

## 2023-08-03 DIAGNOSIS — F32.1 CURRENT MODERATE EPISODE OF MAJOR DEPRESSIVE DISORDER WITHOUT PRIOR EPISODE (HCC): ICD-10-CM

## 2023-08-03 PROCEDURE — 99214 OFFICE O/P EST MOD 30 MIN: CPT | Performed by: PSYCHIATRY & NEUROLOGY

## 2023-08-03 RX ORDER — BUPROPION HYDROCHLORIDE 300 MG/1
300 TABLET ORAL DAILY
Qty: 90 TABLET | Refills: 1 | Status: SHIPPED | OUTPATIENT
Start: 2023-08-03

## 2023-08-03 NOTE — PSYCH
MEDICATION MANAGEMENT NOTE        [facility]      Name and Date of Birth:  Ashleigh Rahman 37 y.o. 1980 MRN: [id number]    Date of Visit: August 3, 2023    Reason for Visit: Follow-up for medication management. Subjective: The patient reports she has been overall doing better nowadays. Reports depression has been significantly better. Reports does not get emotional as often as she was early this year. Feels anxiety has slightly increased as she is concerned about her job. She reports a lot of changes in people lately including sending their building in the [place] and worries that she might be laid off.   she denies any panic attack. Reports sleep has been good. Reports appetite, energy level has been okay. Denies any SI or HI. Reports compliant with the medication and denies any side effects. Reports taking hydroxyzine very rarely but compliant with Wellbutrin. Denies any other concerns today. Supportive psychotherapy was provided in regard to her concerns mentioned above.       Review Of Systems:      Constitutional negative   ENT negative   Cardiovascular negative   Respiratory negative   Gastrointestinal negative   Genitourinary negative   Musculoskeletal negative   Integumentary negative   Neurological negative   Endocrine negative   Other Symptoms none       Alcohol/Substance Abuse: Denies        Past Medical History:    Past Medical History:   Diagnosis Date   • Acne 04/23/2015   • Acquired hallux valgus 03/07/2016   • Acute non-recurrent maxillary sinusitis 01/27/2020   • Anemia    • Anxiety 10/16/2019   • Arthritis    • Benign paroxysmal positional vertigo 03/08/2017   • Candida vaginitis 04/25/2019   • Colon polyp 2019   • COVID-19 vaccine series completed     Moderna: 2nd dose 5/21/2021   • Depression    • Discoloration of skin of hand 03/20/2019   • Duodenitis without bleeding    • Dysphagia    • Fibromyalgia    • Gastritis    • GERD (gastroesophageal reflux disease)    • Hiatal hernia    • Hx of colonoscopy    • Insomnia    • Iron deficiency anemia secondary to inadequate dietary iron intake 10/18/2019   • Memory loss    • Menorrhagia with regular cycle 11/20/2014   • Moderate episode of recurrent major depressive disorder ([address]) 10/16/2019   • Obesity    • Oral herpes    • Ovarian cyst 06/11/2013    Small dermoid on right ovary   • Pap smear for cervical cancer screening     2/2016-wnl; 11/2020-wnl, HRHPV neg   • Sodium (Na) deficiency 10/19/2020   • 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 myelopathy or radiculopathy    • Streptococcal pharyngitis 10/19/2020   • Vaginal discharge 09/01/2018   • Vitamin D deficiency         Past Surgical History:   Procedure Laterality Date   • APPENDECTOMY     • BREAST BIOPSY Right     pt unsure when or where- ? 6 yrs ago- benign   • BREAST SURGERY Right 01/08/2015    lump removed from breast    • [address]   • CHOLECYSTECTOMY     • COLONOSCOPY      9/26/2012; 2019-benign polyp, repeat 5 years   • DILATION AND CURETTAGE OF UTERUS      Resolved:1/29/2009   • ENDOMETRIAL ABLATION N/A 8/13/2020    Procedure: ABLATION ENDOMETRIAL Ernestina Last;  Surgeon: Ovidio Michel MD;  Location: [facility];  Service: Gynecology   • ESOPHAGOGASTRODUODENOSCOPY      Diagnostic ; 9/26/2012   • HYSTEROSCOPY      Resolved:1/30/2009   • OVARIAN CYST REMOVAL Right 2005   • [place] HYSTEROSCOPY BX ENDOMETRIUM&/POLYPC W/WO D&C N/A 8/13/2020    Procedure: DILATATION AND CURETTAGE (D&C) WITH HYSTEROSCOPY;  Surgeon: Ovidio Michel MD;  [facility];  Service: Gynecology   • WISDOM TOOTH EXTRACTION       Allergies   Allergen Reactions   • Domperidone Other (See Comments)     Reaction unknown to patient.    • Morphine Chest Pain and Hives     Other reaction(s): chest pain   • Ibuprofen GI Intolerance     Due to gastritis   • Percocet [Oxycodone-Acetaminophen] Hives       Current Medications:       Current Outpatient Medications:   • buPROPion (WELLBUTRIN XL) 300 mg 24 hr tablet, Take 1 tablet (300 mg total) by mouth daily, Disp: 90 tablet, Rfl: 1  •  clindamycin (CLINDAGEL) 1 % gel, Apply topically 2 (two) times a day (Patient taking differently: Apply 1 Application topically if needed), Disp: 30 g, Rfl: 2  •  cyanocobalamin (VITAMIN B-12) 1000 MCG tablet, Take 1 tablet (1,000 mcg total) by mouth daily (Patient not taking: Reported on 7/24/2023), Disp: 90 tablet, Rfl: 3  •  cyclobenzaprine (FLEXERIL) 5 mg tablet, Take 1 tablet (5 mg total) by mouth 3 (three) times a day as needed for muscle spasms, Disp: 30 tablet, Rfl: 1  •  dicyclomine (BENTYL) 20 mg tablet, Take 1 tablet (20 mg total) by mouth 2 (two) times a day (Patient taking differently: Take 20 mg by mouth if needed), Disp: 20 tablet, Rfl: 0  •  fexofenadine (ALLEGRA) 180 MG tablet, Take 1 tablet (180 mg total) by mouth daily (Patient taking differently: Take 180 mg by mouth if needed), Disp: , Rfl:   •  fluticasone (FLONASE) 50 mcg/act nasal spray, SPRAY 2 SPRAYS INTO EACH NOSTRIL EVERY DAY (Patient taking differently: 1 spray into each nostril if needed), Disp: 48 mL, Rfl: 2  •  hydrOXYzine HCL (ATARAX) 10 mg tablet, TAKE 1 TABLET (10 MG TOTAL) BY MOUTH 2 (TWO) TIMES A DAY AS NEEDED FOR ANXIETY, Disp: 180 tablet, Rfl: 1  •  pantoprazole (PROTONIX) 40 mg tablet, TAKE 1 TABLET BY MOUTH EVERY DAY, Disp: 90 tablet, Rfl: 1       History Review: The following portions of the patient's history were reviewed and updated as appropriate: allergies, current medications, past family history, past medical history, past social history, past surgical history and problem list.         OBJECTIVE:     Vital signs in last 24 hours: There were no vitals filed for this visit.     Mental Status Evaluation:    Appearance age appropriate, casually dressed   Behavior cooperative, calm   Speech normal rate, normal volume, normal pitch   Mood mildly anxious   Affect normal range and intensity, appropriate Thought Processes organized, goal directed   Associations intact associations   Thought Content no overt delusions   Perceptual Disturbances: no auditory hallucinations, no visual hallucinations   Abnormal Thoughts  Risk Potential Suicidal ideation - None  Homicidal ideation - None  Potential for aggression - No   Orientation oriented to person, place, time/date and situation   Memory recent and remote memory grossly intact   Consciousness alert and awake   Attention Span Concentration Span attention span and concentration are age appropriate   Intellect appears to be of average intelligence   Insight intact   Judgement intact   Muscle Strength and  Gait normal gait and normal balance   Motor activity no abnormal movements   Language no difficulty naming common objects, no difficulty repeating a phrase   Fund of Knowledge adequate knowledge of current events  adequate fund of knowledge regarding past history  adequate fund of knowledge regarding vocabulary    Pain none   Pain Scale 0       Laboratory Results:   Most Recent Labs:   Lab Results   Component Value Date    WBC 8.53 06/17/2023    RBC 3.99 06/17/2023    HGB 11.8 06/17/2023    HCT 35.5 06/17/2023     06/17/2023    RDW 12.8 06/17/2023    NEUTROABS 5.49 06/17/2023    SODIUM 138 06/17/2023    K 4.3 06/17/2023     06/17/2023    CO2 24 06/17/2023    BUN 11 06/17/2023    CREATININE 0.80 06/17/2023    CALCIUM 9.0 06/17/2023    AST 13 06/17/2023    ALT 17 06/17/2023    ALKPHOS 55 06/17/2023    TP 7.3 06/17/2023    TBILI 0.59 06/17/2023    CHOLESTEROL 183 04/07/2023    TRIG 89 04/07/2023    HDL 50 04/07/2023    LDLCALC 115 (H) 04/07/2023    [address] 04/07/2023    [id number] 2.235 04/07/2023    HCGQUANT <2 08/27/2018     I have personally reviewed all pertinent laboratory/tests results. Assessment/Plan: Patient depression overall better though mild anxiety due to situational stressors. Denies any other concern.   Plan is to continue with the current medication with no changes. Patient will follow up with me in 3 months or sooner if needed. She was advised to call us if there is any concern and to call crisis or visit nearby ER in case of any emergency. The patient verbalized understanding and agrees with the plan. Diagnoses and all orders for this visit:    Current moderate episode of major depressive disorder without prior episode (HCC)  -     buPROPion (WELLBUTRIN XL) 300 mg 24 hr tablet; Take 1 tablet (300 mg total) by mouth daily          Treatment Recommendations/Precautions:      Aware of 24 hour and weekend coverage for urgent situations accessed by calling [facility] main practice number    Risks/Benefits      Risks, Benefits And Possible Side Effects Of Medications:    Risks, benefits, and possible side effects of medications explained to [facility] and she verbalizes understanding and agreement for treatment. Controlled Medication Discussion:     Not applicable    Psychotherapy Provided:     Individual psychotherapy provided: Counseling was provided during the session today for 10 minutes. Medications, treatment progress and treatment plan reviewed with [facility]. Medication education provided to [facility]. Reassurance and supportive therapy provided. Crisis/safety plan discussed with [facility].      Treatment Plan;    Completed and signed during the session: Yes - with Turner Alejandre MD 08/03/23

## 2023-08-03 NOTE — BH TREATMENT PLAN
TREATMENT PLAN (Medication Management Only)        [address]    Name and Date of Birth:  Matthew Patrick 37 y.o. 1980  Date of Treatment Plan: August 3, 2023  Diagnosis/Diagnoses:    1. Current moderate episode of major depressive disorder without prior episode [facility])      Strengths/Personal Resources for Self-Care: supportive family, supportive friends, ability to adapt to life changes, ability to communicate needs, ability to communicate well, ability to listen, ability to reason, ability to understand psychiatric illness. Area/Areas of need (in own words): anxiety, depression  1. Long Term Goal: Feel calmer. Target Date:6 months - 2/3/2024  Person/Persons responsible for completion of goal: Lisa Boss  2. Short Term Objective (s) - How will we reach this goal?:   A. Provider new recommended medication/dosage changes and/or continue medication(s): continue current medications as prescribed. B. N/A.  C. N/A. Target Date:6 months - 2/3/2024  Person/Persons Responsible for Completion of Goal: Lianne  Progress Towards Goals: continuing treatment  Treatment Modality: medication management every 6 months  Review due 180 days from date of this plan: 6 months - 2/3/2024  Expected length of service: ongoing treatment  My Physician/PA/NP and I have developed this plan together and I agree to work on the goals and objectives. I understand the treatment goals that were developed for my treatment.

## 2023-08-07 ENCOUNTER — HOSPITAL ENCOUNTER (OUTPATIENT)
Dept: MAMMOGRAPHY | Facility: MEDICAL CENTER | Age: 43
Discharge: HOME | End: 2023-08-07
Payer: COMMERCIAL

## 2023-08-07 VITALS — BODY MASS INDEX: 39.67 KG/M2 | HEIGHT: 65 IN | WEIGHT: 238.1 LBS

## 2023-08-07 DIAGNOSIS — Z12.31 VISIT FOR SCREENING MAMMOGRAM: ICD-10-CM

## 2023-08-07 PROCEDURE — 77063 BREAST TOMOSYNTHESIS BI: CPT

## 2023-08-07 PROCEDURE — 77067 SCR MAMMO BI INCL CAD: CPT

## 2023-08-22 ENCOUNTER — OFFICE VISIT (OUTPATIENT)
Dept: FAMILY MEDICINE CLINIC | Facility: CLINIC | Age: 43
End: 2023-08-22
Payer: COMMERCIAL

## 2023-08-22 VITALS
RESPIRATION RATE: 16 BRPM | HEART RATE: 64 BPM | DIASTOLIC BLOOD PRESSURE: 84 MMHG | SYSTOLIC BLOOD PRESSURE: 122 MMHG | BODY MASS INDEX: 39.99 KG/M2 | OXYGEN SATURATION: 98 % | TEMPERATURE: 97.6 F | WEIGHT: 240 LBS | HEIGHT: 65 IN

## 2023-08-22 DIAGNOSIS — M54.50 CHRONIC MIDLINE LOW BACK PAIN WITHOUT SCIATICA: ICD-10-CM

## 2023-08-22 DIAGNOSIS — G89.29 CHRONIC MIDLINE LOW BACK PAIN WITHOUT SCIATICA: ICD-10-CM

## 2023-08-22 DIAGNOSIS — R35.0 URINARY FREQUENCY: Primary | ICD-10-CM

## 2023-08-22 LAB
BACTERIA UR QL AUTO: ABNORMAL /HPF
BILIRUB UR QL STRIP: NEGATIVE
CLARITY UR: CLEAR
COLOR UR: ABNORMAL
GLUCOSE UR STRIP-MCNC: NEGATIVE MG/DL
HGB UR QL STRIP.AUTO: NEGATIVE
KETONES UR STRIP-MCNC: NEGATIVE MG/DL
LEUKOCYTE ESTERASE UR QL STRIP: ABNORMAL
NITRITE UR QL STRIP: NEGATIVE
NON-SQ EPI CELLS URNS QL MICRO: ABNORMAL /HPF
PH UR STRIP.AUTO: 7 [PH]
PROT UR STRIP-MCNC: NEGATIVE MG/DL
RBC #/AREA URNS AUTO: ABNORMAL /HPF
SP GR UR STRIP.AUTO: 1.01 (ref 1–1.03)
UROBILINOGEN UR STRIP-ACNC: <2 MG/DL
WBC #/AREA URNS AUTO: ABNORMAL /HPF

## 2023-08-22 PROCEDURE — 87086 URINE CULTURE/COLONY COUNT: CPT | Performed by: PHYSICIAN ASSISTANT

## 2023-08-22 PROCEDURE — 87147 CULTURE TYPE IMMUNOLOGIC: CPT | Performed by: PHYSICIAN ASSISTANT

## 2023-08-22 PROCEDURE — 99214 OFFICE O/P EST MOD 30 MIN: CPT | Performed by: PHYSICIAN ASSISTANT

## 2023-08-22 PROCEDURE — 81001 URINALYSIS AUTO W/SCOPE: CPT | Performed by: PHYSICIAN ASSISTANT

## 2023-08-22 NOTE — PROGRESS NOTES
Name: Stacy Noel      : 1980      MRN: [id number]  Encounter Provider: Donna Chester PA-C  Encounter Date: 2023   Encounter department: [address] [place] [address]     1. Urinary frequency  -     Ambulatory Referral to Urogynecology; Future    2. Chronic midline low back pain without sciatica  -     Ambulatory Referral to Physical Therapy; Future      Urine dip done here in the office does reveal leukocytes but no blood or nitrates. Will be sent for UA C&S  - I did recommend referral to urogynecology for her ongoing symptoms of urinary frequency/urgency  I did review her most recent ultrasound of her kidneys, bladder with no findings. - I did recommend physical therapy for her chronic low back pain. Over-the-counter Tylenol arthritis as needed as package directs  When sleeping on side put a pillow between your knees  - I did recommend follow-up with Dr. Jocelyn Ghotra if there is no improvement with conservative treatment or if she is experiencing any weakness of her lower extremities, loss of bladder bowel control or saddle paresthesias I did advise her that this is a medical emergency and she should be seen immediately. If our office is not available at the time she should go to the ER. M*[employer] software was used to dictate this note. It may contain errors with dictating incorrect words/spelling. Please contact provider directly for any questions. Subjective      Patient presents today for acute visit for evaluation of left-sided low back pain and increased urinary frequency. She denies any dysuria, fever, chills, nausea, vomiting, pain over her bladder. She denies any numbness or tingling or radiation of pain down her lower extremities. No loss of bladder bowel control. No saddle paresthesias. She states that she has had chronic back pain. She states years ago she did go to physical therapy. She does not continue the exercises at home.   She does have difficulty sleeping at night because of her back pain. She has had urinary symptoms off and on with no specific findings. She states that she was referred back to her gynecologist but there was an appointment for 6 months. She did not realize that there was a-year-old gynecologist in the [facility] system for her to see. Review of Systems   Genitourinary: Positive for frequency and urgency. Negative for difficulty urinating and dysuria. Musculoskeletal: Positive for back pain.        Current Outpatient Medications on File Prior to Visit   Medication Sig   • buPROPion (WELLBUTRIN XL) 300 mg 24 hr tablet Take 1 tablet (300 mg total) by mouth daily   • clindamycin (CLINDAGEL) 1 % gel Apply topically 2 (two) times a day (Patient taking differently: Apply 1 Application topically if needed)   • cyclobenzaprine (FLEXERIL) 5 mg tablet Take 1 tablet (5 mg total) by mouth 3 (three) times a day as needed for muscle spasms   • dicyclomine (BENTYL) 20 mg tablet Take 1 tablet (20 mg total) by mouth 2 (two) times a day (Patient taking differently: Take 20 mg by mouth if needed)   • fexofenadine (ALLEGRA) 180 MG tablet Take 1 tablet (180 mg total) by mouth daily (Patient taking differently: Take 180 mg by mouth if needed)   • fluticasone (FLONASE) 50 mcg/act nasal spray SPRAY 2 SPRAYS INTO EACH NOSTRIL EVERY DAY (Patient taking differently: 1 spray into each nostril if needed)   • hydrOXYzine HCL (ATARAX) 10 mg tablet TAKE 1 TABLET (10 MG TOTAL) BY MOUTH 2 (TWO) TIMES A DAY AS NEEDED FOR ANXIETY   • pantoprazole (PROTONIX) 40 mg tablet TAKE 1 TABLET BY MOUTH EVERY DAY   • cyanocobalamin (VITAMIN B-12) 1000 MCG tablet Take 1 tablet (1,000 mcg total) by mouth daily (Patient not taking: Reported on 7/24/2023)       Objective     /84 (BP Location: Left arm, Patient Position: Sitting, Cuff Size: Large)   Pulse 64   Temp 97.6 °F (36.4 °C) (Tympanic)   Resp 16   Ht 5' 5" (1.651 m)   Wt 109 kg (240 lb)   LMP 07/12/2023   SpO2 98%   BMI 39.94 kg/m²     Physical Exam  Vitals reviewed. Constitutional:       General: She is not in acute distress. Appearance: Normal appearance. She is not ill-appearing, toxic-appearing or diaphoretic. HENT:      Head: Normocephalic and atraumatic. Pulmonary:      Effort: Pulmonary effort is normal. No respiratory distress. Breath sounds: Normal breath sounds. No wheezing, rhonchi or rales. Abdominal:      General: Abdomen is flat. Bowel sounds are normal.      Tenderness: There is no abdominal tenderness. There is no right CVA tenderness or left CVA tenderness. Musculoskeletal:      Cervical back: Neck supple. Comments: Lumbar spine: She does have tenderness over the left SI joint. She does have pain with forward flexion. Neurological:      General: No focal deficit present. Mental Status: She is alert. Psychiatric:         Mood and Affect: Mood normal.         Behavior: Behavior normal.         Thought Content:  Thought content normal.         Judgment: Judgment normal.       Lisa Parkinson PA-C

## 2023-08-23 LAB
BACTERIA UR CULT: ABNORMAL
BACTERIA UR CULT: ABNORMAL

## 2023-08-24 ENCOUNTER — TELEPHONE (OUTPATIENT)
Dept: FAMILY MEDICINE CLINIC | Facility: CLINIC | Age: 43
End: 2023-08-24

## 2023-08-24 DIAGNOSIS — A49.1 GROUP B STREPTOCOCCAL INFECTION: Primary | ICD-10-CM

## 2023-08-24 LAB — BACTERIA UR CULT: NORMAL

## 2023-08-24 RX ORDER — AMOXICILLIN 500 MG/1
500 CAPSULE ORAL EVERY 8 HOURS SCHEDULED
Qty: 30 CAPSULE | Refills: 0 | Status: SHIPPED | OUTPATIENT
Start: 2023-08-24 | End: 2023-09-03

## 2023-08-24 NOTE — TELEPHONE ENCOUNTER
----- Message from Nick Pollard PA-C sent at 8/24/2023 12:38 PM EDT -----  Please let her know that her final urine culture did come back for group B strep. Most times this does not show up on UA dips in the office. She does need to be treated with amoxicillin. I will send a prescription to the pharmacy. If she does not notice any improvement with treatment or if symptoms increase she needs to reach out to us.   Thank you

## 2023-08-24 NOTE — RESULT ENCOUNTER NOTE
Please let her know that her final urine culture did come back for group B strep. Most times this does not show up on UA dips in the office. She does need to be treated with amoxicillin. I will send a prescription to the pharmacy. If she does not notice any improvement with treatment or if symptoms increase she needs to reach out to us.   Thank you

## 2023-09-06 NOTE — ANESTHESIA POSTPROCEDURE EVALUATION
Post-Op Assessment Note    CV Status:  Stable    Pain management: adequate     Mental Status:  Alert and awake   Hydration Status:  Euvolemic   PONV Controlled:  Controlled   Airway Patency:  Patent   Post Op Vitals Reviewed: Yes      Staff: Anesthesiologist           BP      Temp      Pulse     Resp      SpO2 [Time Spent: ___ minutes] : I have spent [unfilled] minutes of time on the encounter.

## 2023-09-08 DIAGNOSIS — R35.0 URINARY FREQUENCY: Primary | ICD-10-CM

## 2023-09-08 RX ORDER — NITROFURANTOIN 25; 75 MG/1; MG/1
100 CAPSULE ORAL 2 TIMES DAILY
Qty: 10 CAPSULE | Refills: 0 | Status: SHIPPED | OUTPATIENT
Start: 2023-09-08 | End: 2023-09-13

## 2023-09-09 ENCOUNTER — LAB (OUTPATIENT)
Dept: LAB | Facility: HOSPITAL | Age: 43
End: 2023-09-09
Payer: COMMERCIAL

## 2023-09-09 DIAGNOSIS — Z11.4 SCREENING FOR HIV (HUMAN IMMUNODEFICIENCY VIRUS): ICD-10-CM

## 2023-09-09 LAB
BILIRUB UR QL STRIP: NEGATIVE
CLARITY UR: CLEAR
COLOR UR: NORMAL
GLUCOSE UR STRIP-MCNC: NEGATIVE MG/DL
HGB UR QL STRIP.AUTO: NEGATIVE
KETONES UR STRIP-MCNC: NEGATIVE MG/DL
LEUKOCYTE ESTERASE UR QL STRIP: NEGATIVE
NITRITE UR QL STRIP: NEGATIVE
PH UR STRIP.AUTO: 6.5 [PH]
PROT UR STRIP-MCNC: NEGATIVE MG/DL
SP GR UR STRIP.AUTO: 1.02 (ref 1–1.03)
UROBILINOGEN UR STRIP-ACNC: <2 MG/DL

## 2023-09-09 PROCEDURE — 36415 COLL VENOUS BLD VENIPUNCTURE: CPT

## 2023-09-09 PROCEDURE — 81003 URINALYSIS AUTO W/O SCOPE: CPT

## 2023-09-09 PROCEDURE — 87389 HIV-1 AG W/HIV-1&-2 AB AG IA: CPT

## 2023-09-10 LAB
HIV 1+2 AB+HIV1 P24 AG SERPL QL IA: NORMAL
HIV 2 AB SERPL QL IA: NORMAL
HIV1 AB SERPL QL IA: NORMAL
HIV1 P24 AG SERPL QL IA: NORMAL

## 2023-09-18 ENCOUNTER — OFFICE VISIT (OUTPATIENT)
Dept: FAMILY MEDICINE CLINIC | Facility: CLINIC | Age: 43
End: 2023-09-18
Payer: COMMERCIAL

## 2023-09-18 VITALS
TEMPERATURE: 98.5 F | SYSTOLIC BLOOD PRESSURE: 112 MMHG | RESPIRATION RATE: 17 BRPM | BODY MASS INDEX: 39.65 KG/M2 | HEART RATE: 60 BPM | HEIGHT: 65 IN | WEIGHT: 238 LBS | OXYGEN SATURATION: 98 % | DIASTOLIC BLOOD PRESSURE: 70 MMHG

## 2023-09-18 DIAGNOSIS — B37.31 VAGINAL CANDIDIASIS: ICD-10-CM

## 2023-09-18 DIAGNOSIS — R35.0 URINARY FREQUENCY: Primary | ICD-10-CM

## 2023-09-18 PROCEDURE — 99214 OFFICE O/P EST MOD 30 MIN: CPT | Performed by: INTERNAL MEDICINE

## 2023-09-18 RX ORDER — FLUCONAZOLE 150 MG/1
150 TABLET ORAL ONCE
Qty: 1 TABLET | Refills: 0 | Status: SHIPPED | OUTPATIENT
Start: 2023-09-18 | End: 2023-09-18

## 2023-09-18 NOTE — PROGRESS NOTES
Assessment/Plan:    No problem-specific Assessment & Plan notes found for this encounter. Problem List Items Addressed This Visit        Other    Urinary frequency - Primary    Relevant Orders    US kidney and bladder with pvr   Other Visit Diagnoses     Vaginal candidiasis        Relevant Medications    fluconazole (DIFLUCAN) 150 mg tablet        Patient follow-up with a gynecologist.  I will prescribe her Diflucan. PVR will be ordered  Along with getting a bladder ultrasound. Subjective:      Patient ID: Stacy Noel is a 37 y.o. female. HPI  Patient is here for urinary frequency and urgency going on for about a month now. Denies any burning with micturition. Has not noticed any blood in the urine. Denies any fever chills back pain. No history of kidney stones. She does no complain of incomplete emptying. She does not report increased frequent intake. She has not started taking any new supplementation. She has not changed her toiletry. Denies any vaginal discharge. Most recent urinalysis was unremarkable. She is due for GYN exam soon. Some whitish discharge at introitus. Mild prolapse noted. PVR ill be ordered. Random blood sugar almost 2 hours postprandial was 138 patient is not known to be diabetic. Patient had 1  and 3 vaginal deliveries. The following portions of the patient's history were reviewed and updated as appropriate: allergies, current medications, past medical history, past social history, past surgical history and problem list.    Review of Systems      Objective:      /70   Pulse 60   Temp 98.5 °F (36.9 °C)   Resp 17   Ht 5' 5" (1.651 m)   Wt 108 kg (238 lb)   SpO2 98%   BMI 39.61 kg/m²          Physical Exam  Genitourinary:     Vagina: Vaginal discharge (White discharge. Mild anterior wall prolapse) present.

## 2023-09-25 ENCOUNTER — TELEPHONE (OUTPATIENT)
Dept: PSYCHIATRY | Facility: CLINIC | Age: 43
End: 2023-09-25

## 2023-09-25 NOTE — TELEPHONE ENCOUNTER
Pt on wait list for talk therapy. Tried contacting Pt to offer appt with Refugio Swenson, no answer. LVM for Pt to call back.

## 2023-10-16 ENCOUNTER — TELEPHONE (OUTPATIENT)
Dept: GENETICS | Facility: CLINIC | Age: 43
End: 2023-10-16

## 2023-10-16 NOTE — TELEPHONE ENCOUNTER
I called and left a message for patient to reschedule her cancelled appointment, patient noted she needed a need appt date from 11/29. She was encouraged to call our office at [phone].

## 2023-10-20 ENCOUNTER — TELEPHONE (OUTPATIENT)
Age: 43
End: 2023-10-20

## 2023-10-20 NOTE — TELEPHONE ENCOUNTER
Called and left  to schedule an appointment with Dr. Damaris Morse. Patient sent a request via [address] [place] [address].

## 2023-10-31 ENCOUNTER — OFFICE VISIT (OUTPATIENT)
Dept: GASTROENTEROLOGY | Facility: CLINIC | Age: 43
End: 2023-10-31
Payer: COMMERCIAL

## 2023-10-31 ENCOUNTER — TELEPHONE (OUTPATIENT)
Dept: GASTROENTEROLOGY | Facility: CLINIC | Age: 43
End: 2023-10-31

## 2023-10-31 VITALS
BODY MASS INDEX: 39.49 KG/M2 | SYSTOLIC BLOOD PRESSURE: 128 MMHG | WEIGHT: 237 LBS | HEART RATE: 68 BPM | DIASTOLIC BLOOD PRESSURE: 68 MMHG | HEIGHT: 65 IN

## 2023-10-31 DIAGNOSIS — R11.2 NAUSEA AND VOMITING IN ADULT: ICD-10-CM

## 2023-10-31 DIAGNOSIS — K31.84 GASTROPARESIS: ICD-10-CM

## 2023-10-31 DIAGNOSIS — R13.19 ESOPHAGEAL DYSPHAGIA: ICD-10-CM

## 2023-10-31 DIAGNOSIS — R10.13 EPIGASTRIC PAIN: ICD-10-CM

## 2023-10-31 DIAGNOSIS — K21.9 GASTROESOPHAGEAL REFLUX DISEASE, UNSPECIFIED WHETHER ESOPHAGITIS PRESENT: Primary | ICD-10-CM

## 2023-10-31 PROCEDURE — 99214 OFFICE O/P EST MOD 30 MIN: CPT | Performed by: PHYSICIAN ASSISTANT

## 2023-10-31 RX ORDER — FAMOTIDINE 40 MG/1
40 TABLET, FILM COATED ORAL
Qty: 30 TABLET | Refills: 3 | Status: SHIPPED | OUTPATIENT
Start: 2023-10-31

## 2023-10-31 NOTE — PATIENT INSTRUCTIONS
GERD (Gastroesophageal Reflux Disease)   AMBULATORY CARE:   Gastroesophageal reflux disease (GERD)  is reflux that happens more than 2 times a week for a few weeks. Reflux means acid and food in your stomach back up into your esophagus. GERD can cause other health problems over time if it is not treated. Common causes of GERD:  GERD often happens because the lower muscle (sphincter) of the esophagus does not close properly. The sphincter normally opens to let food into the stomach. It then closes to keep food and stomach acid in the stomach. If the sphincter does not close properly, stomach acid and food back up (reflux) into the esophagus. The following may increase your risk for GERD:  Certain foods such as spicy foods, chocolate, foods that contain caffeine, peppermint, and fried foods    Hiatal hernia    Certain medicines such as calcium channel blockers (used to treat high blood pressure), allergy medicines, sedatives, or antidepressants    Pregnancy, obesity, or scleroderma    Lying down after a meal    Drinking alcohol or smoking cigarettes    Signs and symptoms:   Heartburn (burning pain in your chest)    Pain after meals that spreads to your neck, jaw, or shoulder    Pain that gets better when you change positions    Bitter or acid taste in your mouth    A dry cough    Trouble swallowing or pain with swallowing    Hoarseness or a sore throat    Burping or hiccups    Feeling full soon after you start eating    Call your local emergency number (911 in the [address]) if:   You have severe chest pain and sudden trouble breathing. Seek care immediately if:   You have trouble breathing after you vomit. You have trouble swallowing, or pain with swallowing. Your bowel movements are black, bloody, or tarry-looking. Your vomit looks like coffee grounds or has blood in it. Call your doctor or gastroenterologist if:   You feel full and cannot burp or vomit.     You vomit large amounts, or you vomit often.    You are losing weight without trying. Your symptoms get worse or do not improve with treatment. You have questions or concerns about your condition or care. Treatment for GERD:   Medicines  are used to decrease stomach acid. Medicine may also be used to help your lower esophageal sphincter and stomach contract (tighten) more. Surgery  is done to wrap the upper part of the stomach around the esophageal sphincter. This will strengthen the sphincter and prevent reflux. Manage GERD:       Do not have foods or drinks that may increase heartburn. These include chocolate, peppermint, fried or fatty foods, drinks that contain caffeine, or carbonated drinks (soda). Other foods include spicy foods, onions, tomatoes, and tomato-based foods. Do not have foods or drinks that can irritate your esophagus, such as citrus fruits, juices, and alcohol. Do not eat large meals. When you eat a lot of food at one time, your stomach needs more acid to digest it. Eat 6 small meals each day instead of 3 large meals, and eat slowly. Do not eat meals 2 to 3 hours before bedtime. Elevate the head of your bed. Place 6-inch blocks under the head of your bed frame. You may also use more than one pillow under your head and shoulders while you sleep. Maintain a healthy weight. If you are overweight, weight loss may help relieve symptoms of GERD. Do not smoke. Smoking weakens the lower esophageal sphincter and increases the risk of GERD. Ask your healthcare provider for information if you currently smoke and need help to quit. E-cigarettes or smokeless tobacco still contain nicotine. Talk to your healthcare provider before you use these products. Do not put pressure on your abdomen. Pressure pushes acid up into your esophagus. Do not wear clothing that is tight around your waist. Do not bend over. Bend at the knees if you need to pick something up.   Follow up with your doctor or gastroenterologist as directed:  Write down your questions so you remember to ask them during your visits. © Copyright [employer] 2023 Information is for End User's use only and may not be sold, redistributed or otherwise used for commercial purposes. The above information is an  only. It is not intended as medical advice for individual conditions or treatments. Talk to your doctor, nurse or pharmacist before following any medical regimen to see if it is safe and effective for you.       Scheduled date of EGD(as of today):11/16/2023  Physician performing EGD:Dr Paulino  Location of EGD:[place]  Instructions reviewed with patient by:Alyssia  Clearances:  n/a

## 2023-10-31 NOTE — PROGRESS NOTES
[facility] - Outpatient Follow-up Note  Camella Kussmaul 37 y.o. female MRN: [id number]  Encounter: [id number]  ASSESSMENT AND PLAN:    1. Gastroesophageal reflux disease, unspecified whether esophagitis present  2. Nausea and vomiting in adult  3. Esophageal dysphagia  4. Epigastric pain  5. Gastroparesis  Patient with a history of gastroparesis with worsening GERD, nausea, esophageal dysphagia for few months. Her weight has been stable. We reviewed her last EGD from a few years ago, this did show some food in her stomach. At this time I recommend patient continue pantoprazole 40 mg once daily in the morning  Patient to start famotidine 40 mg once daily at bedtime  We discussed and I educated patient on the importance of GERD diet and lifestyle. We also discussed a gastroparesis diet, I recommended and encouraged smaller more frequent meals. She is to take small bites of food and carefully chew her food. I also recommended patient drink 80 ounces of water a day to help promote  bowel regularity  Will order EGD with biopsy to r/o esophagitis/ gastritis/ H pylori/ PUD. I told patient to be on a clear liquid diet for 24 hours /day prior to her endoscopy. She expressed understanding to this. She is up-to-date on colon cancer screening    - EGD; Future  - famotidine (PEPCID) 40 MG tablet; Take 1 tablet (40 mg total) by mouth daily at bedtime  Dispense: 30 tablet; Refill: 3    Patient was instructed to call the office with any questions, concerns, new/ worsening/ persisting GI symptoms. Advised patient go to the ER with any severe or worsening abdominal pain, fevers/ chills, intractable N/V, chest pain, SOB, dizziness, lightheadedness, feeling something stuck in esophagus that will not go down. Patient expressed understanding and is in agreement with treatment plan.      Will plan to follow up after diagnostic tests   __________________________________________________________    SUBJECTIVE: Patient is new to me. Patient with a past medical history of nonalcoholic fatty liver disease, hemorrhoids, gastroparesis, GERD, constipation, impaired fasting glucose, allergies, obstructive sleep apnea, history of depression presents to the office today for follow-up and to discuss some concerns. Patient was last seen in the office in December 2021 by Dr. Nhi Paul. Previous office note was reviewed. Patient presents to the office today with worsening "esophagus pain" and heartburn x 3 months. She notes longstanding history of GERD but symptoms worsening for the last 3 months. Symptoms would come and go, now constant. She describes burning epigastric and esophagus pain. No certain food triggers. She is trying to avoid spicy foods. She is taking pantoprazole 40 mg once daily, not really helping. She has been on this medication x few years. She tells me she has been on several PPIs in the past.   She does get occasional nausea, intermittent vomiting. She does occasionally feel solid foods get stuck or caught in esophagus after a swallow more recently. She is needing to drink lots of water in between bites. No issues swallowing liquids   She has alternating bowel habits at baseline. Notes she does not drink a lot of water   Her weight has been stable. Patient denies any fevers/ chills, unintentional weight loss, change in bowel habits, black or bloody stools,  odynophagia. Denies chest pain, SOB    Tobacco use- None  Alcohol use- None  NSAID use- None    Review of Systems   Constitutional:  Negative for chills and fever. HENT:  Negative for ear pain and sore throat. Eyes:  Negative for pain and visual disturbance. Respiratory:  Negative for cough and shortness of breath. Cardiovascular:  Negative for chest pain and palpitations. Gastrointestinal:  Positive for abdominal pain and nausea. Negative for vomiting. Genitourinary:  Positive for dysuria and frequency. Negative for hematuria. Musculoskeletal:  Positive for arthralgias and myalgias. Negative for back pain. Skin:  Negative for color change and rash. Neurological:  Positive for headaches. Negative for seizures and syncope. All other systems reviewed and are negative.          Historical Information   Past Medical History:   Diagnosis Date    Acne 04/23/2015    Acquired hallux valgus 03/07/2016    Acute non-recurrent maxillary sinusitis 01/27/2020    Anemia     Anxiety 10/16/2019    Arthritis     Benign paroxysmal positional vertigo 03/08/2017    Candida vaginitis 04/25/2019    Colon polyp 2019    COVID-19 vaccine series completed     Moderna: 2nd dose 5/21/2021    Depression     Discoloration of skin of hand 03/20/2019    Duodenitis without bleeding     Dysphagia     Fibromyalgia     Gastritis     GERD (gastroesophageal reflux disease)     Hiatal hernia     Hx of colonoscopy     Insomnia     Iron deficiency anemia secondary to inadequate dietary iron intake 10/18/2019    Memory loss     Menorrhagia with regular cycle 11/20/2014    Moderate episode of recurrent major depressive disorder ([address]) 10/16/2019    Obesity     Oral herpes     Ovarian cyst 06/11/2013    Small dermoid on right ovary    Pap smear for cervical cancer screening     2/2016-wnl; 11/2020-wnl, HRHPV neg    Sodium (Na) deficiency 10/19/2020    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 myelopathy or radiculopathy     Streptococcal pharyngitis 10/19/2020    Vaginal discharge 09/01/2018    Vitamin D deficiency      Past Surgical History:   Procedure Laterality Date    APPENDECTOMY      BREAST BIOPSY Right     pt unsure when or where- ? 6 yrs ago- benign    BREAST SURGERY Right 01/08/2015    lump removed from breast     [address] [facility]      9/26/2012; 2019-benign polyp, repeat 5 years    DILATION AND CURETTAGE OF UTERUS      Resolved:1/29/2009    ENDOMETRIAL ABLATION N/A 08/13/2020    Procedure: ABLATION ENDOMETRIAL Magnoa Sharon; Surgeon: Jaquan Wolfe MD;  [facility]: [facility];  Service: Gynecology    ESOPHAGOGASTRODUODENOSCOPY      Diagnostic ; 2012    HYSTEROSCOPY      Resolved:2009    OVARIAN CYST REMOVAL Right 2005    [place] HYSTEROSCOPY BX ENDOMETRIUM&/POLYPC W/WO D&C N/A 2020    Procedure: DILATATION AND CURETTAGE (D&C) WITH HYSTEROSCOPY;  Surgeon: Jaquan Wolfe MD;  [facility];  Service: Gynecology    WISDOM TOOTH EXTRACTION       Social History   Social History     Substance and Sexual Activity   Alcohol Use No     Social History     Substance and Sexual Activity   Drug Use Never     Social History     Tobacco Use   Smoking Status Never    Passive exposure: Never   Smokeless Tobacco Never   Tobacco Comments    No passive smoke exposure     Family History   Problem Relation Age of Onset    Other Mother         uterine leiomyoma    Diabetes type II Mother         Mellitus    Hypothyroidism Mother     Colon cancer Father 54        Stage IV    Diabetes Father         Type II Mellitus    Hypertension Father     Squamous cell carcinoma Father         Located waist down around kidneys, lowe back, tail bone, prostate and bladder.     Multiple sclerosis Sister     Asthma Sister     Hypothyroidism Sister     Hypertension Maternal Grandmother     Diabetes Maternal Grandmother     Other Maternal Grandmother         Vulvar cancer    Schizophrenia Maternal Grandfather     Hypertension Maternal Grandfather     Thyroid cancer Paternal Grandmother 80    Diabetes Paternal Grandfather 43         due to complications of DM    Anxiety disorder Brother     Depression Brother     Diabetes Brother         Mellitus    Asthma Brother     Hypertension Brother     Breast cancer Cousin 46    Ovarian cancer Neg Hx        Meds/Allergies       Current Outpatient Medications:     buPROPion (WELLBUTRIN XL) 300 mg 24 hr tablet    clindamycin (CLINDAGEL) 1 % gel    cyclobenzaprine (FLEXERIL) 5 mg tablet    dicyclomine (BENTYL) 20 mg tablet    fexofenadine (ALLEGRA) 180 MG tablet    fluticasone (FLONASE) 50 mcg/act nasal spray    hydrOXYzine HCL (ATARAX) 10 mg tablet    pantoprazole (PROTONIX) 40 mg tablet    cyanocobalamin (VITAMIN B-12) 1000 MCG tablet    Allergies   Allergen Reactions    Domperidone Other (See Comments)     Reaction unknown to patient. Morphine Chest Pain and Hives     Other reaction(s): chest pain    Ibuprofen GI Intolerance     Due to gastritis    Percocet [Oxycodone-Acetaminophen] Hives           Objective     Wt Readings from Last 3 Encounters:   10/31/23 108 kg (237 lb)   09/18/23 108 kg (238 lb)   08/22/23 109 kg (240 lb)     Temp Readings from Last 3 Encounters:   09/18/23 98.5 °F (36.9 °C)   08/22/23 97.6 °F (36.4 °C) (Tympanic)   07/24/23 98.1 °F (36.7 °C) (Temporal)     BP Readings from Last 3 Encounters:   10/31/23 128/68   09/18/23 112/70   08/22/23 122/84     Pulse Readings from Last 3 Encounters:   10/31/23 68   09/18/23 60   08/22/23 64          PHYSICAL EXAM:      Physical Exam  Vitals reviewed. Constitutional:       General: She is not in acute distress. Appearance: She is not toxic-appearing. HENT:      Head: Normocephalic and atraumatic. Eyes:      Extraocular Movements: Extraocular movements intact. Conjunctiva/sclera: Conjunctivae normal.   Cardiovascular:      Rate and Rhythm: Normal rate and regular rhythm. Pulmonary:      Effort: Pulmonary effort is normal. No respiratory distress. Breath sounds: Normal breath sounds. Abdominal:      General: Bowel sounds are normal.      Palpations: Abdomen is soft. Tenderness: There is no abdominal tenderness. Musculoskeletal:         General: No swelling or tenderness. Cervical back: Normal range of motion and neck supple. Skin:     General: Skin is warm and dry. Coloration: Skin is not jaundiced. Neurological:      General: No focal deficit present.       Mental Status: She is alert and oriented to person, place, and time. Mental status is at baseline. Psychiatric:         Mood and Affect: Mood normal.         Behavior: Behavior normal.         Thought Content: Thought content normal.       Lab Results:   Lab Results   Component Value Date    WBC 8.53 06/17/2023    HGB 11.8 06/17/2023    HCT 35.5 06/17/2023    MCV 89 06/17/2023     06/17/2023       Lab Results   Component Value Date    SODIUM 138 06/17/2023    K 4.3 06/17/2023     06/17/2023    CO2 24 06/17/2023    AGAP 7 06/17/2023    BUN 11 06/17/2023    CREATININE 0.80 06/17/2023    GLUC 89 03/28/2023    GLUF 92 06/17/2023    CALCIUM 9.0 06/17/2023    AST 13 06/17/2023    ALT 17 06/17/2023    ALKPHOS 55 06/17/2023    TP 7.3 06/17/2023    TBILI 0.59 06/17/2023    EGFR 89 06/17/2023       Prior Procedure Results/Reports   Last EGD reviewed done in September 2020 for nausea and showed food bolus in the body of the stomach, large quantity of fluid was seen in the stomach which made the exam difficult. The esophagus and duodenum was otherwise normal.  Stomach and duodenal biopsies were normal at this time. Last Colonoscopy reviewed done in August 2019 for abnormal CT scan, colonic thickening and showed large internal and external hemorrhoids, 1 polyp in the ascending colon which was removed, otherwise completely normal colonoscopy. Repeat colonoscopy was recommended in 5 years due to personal history of colon polyps  She did undergo a nuclear medicine gastric emptying scan in October 2022 which did show delayed rate of gastric emptying, T half-time 154 minutes. She did undergo a barium swallow study in April 2022, this was removed and grossly unremarkable.     Juanita  PA-JESI   Available on [employer]

## 2023-11-01 ENCOUNTER — TELEPHONE (OUTPATIENT)
Dept: GASTROENTEROLOGY | Facility: MEDICAL CENTER | Age: 43
End: 2023-11-01

## 2023-11-01 NOTE — TELEPHONE ENCOUNTER
LVM attempting to confirm upcoming procedure, informing that a  is needed and prep instructions sent via [address] [place] [address].

## 2023-11-02 ENCOUNTER — TELEPHONE (OUTPATIENT)
Dept: GASTROENTEROLOGY | Facility: CLINIC | Age: 43
End: 2023-11-02

## 2023-11-02 ENCOUNTER — TELEPHONE (OUTPATIENT)
Age: 43
End: 2023-11-02

## 2023-11-02 ENCOUNTER — ANESTHESIA EVENT (OUTPATIENT)
Dept: ANESTHESIOLOGY | Facility: HOSPITAL | Age: 43
End: 2023-11-02

## 2023-11-02 ENCOUNTER — ANESTHESIA (OUTPATIENT)
Dept: ANESTHESIOLOGY | Facility: HOSPITAL | Age: 43
End: 2023-11-02

## 2023-11-02 NOTE — TELEPHONE ENCOUNTER
Patients GI provider:  Alexandre Camara    Reason for call: Pt returning call. Next available appt  at [address] is on Feb 1 with Dr. Kwesi Alcantara. Patient declined offer. Patient states "the other hospitals are not going to work".  Patient states she will call back on 11/03/2023    Scheduled procedure/appointment date if applicable: Apt/procedure 2/7/2024

## 2023-11-03 NOTE — TELEPHONE ENCOUNTER
Scheduled date of EGD(as of today): 12/5/23    Physician performing EGD: Dr. Zhao Mercado    Location of EGD: [facility]

## 2023-11-16 ENCOUNTER — OFFICE VISIT (OUTPATIENT)
Dept: PSYCHIATRY | Facility: CLINIC | Age: 43
End: 2023-11-16
Payer: COMMERCIAL

## 2023-11-16 DIAGNOSIS — F32.1 CURRENT MODERATE EPISODE OF MAJOR DEPRESSIVE DISORDER WITHOUT PRIOR EPISODE (HCC): ICD-10-CM

## 2023-11-16 DIAGNOSIS — F41.1 GENERALIZED ANXIETY DISORDER: ICD-10-CM

## 2023-11-16 PROCEDURE — 99214 OFFICE O/P EST MOD 30 MIN: CPT | Performed by: PSYCHIATRY & NEUROLOGY

## 2023-11-16 RX ORDER — BUPROPION HYDROCHLORIDE 300 MG/1
300 TABLET ORAL DAILY
Qty: 90 TABLET | Refills: 1 | Status: SHIPPED | OUTPATIENT
Start: 2023-11-16

## 2023-11-16 NOTE — PSYCH
MEDICATION MANAGEMENT NOTE        ST. Baum      Name and Date of Birth:  Martha Route 37 y.o. 1980 MRN: [id number]    Date of Visit: November 16, 2023    Reason for Visit: Follow-up for medication management    Subjective: The patient reports she overall has been doing okay but has been dealing with a lot of stressors. Reports her job has been transferred to a different department at [employer] as they eliminated her old job. She also reported she has been given and noticed to cut down the trees in her yard within 30 days and it is costing around $5500 per tree. The patient reports she is coping with the stress of the world. Denies any significant depression. Reports occasional anxiety at times. Reports good support from her . Reports sleep and appetite, energy level has been okay. Does not endorse anhedonia. Denies any SI or HI. Compliant with the medication and denies any side effect. Supportive psychotherapy provided throughout the meeting. She also follows with psychotherapist in a private practice.       Review Of Systems:      Constitutional negative   ENT negative   Cardiovascular negative   Respiratory negative   Gastrointestinal negative   Genitourinary negative   Musculoskeletal negative   Integumentary negative   Neurological negative   Endocrine negative   Other Symptoms none       Alcohol/Substance Abuse: Denies        Past Medical History:    Past Medical History:   Diagnosis Date    Acne 04/23/2015    Acquired hallux valgus 03/07/2016    Acute non-recurrent maxillary sinusitis 01/27/2020    Anemia     Anxiety 10/16/2019    Arthritis     Benign paroxysmal positional vertigo 03/08/2017    Candida vaginitis 04/25/2019    Colon polyp 2019    COVID-19 vaccine series completed     Moderna: 2nd dose 5/21/2021    Depression     Discoloration of skin of hand 03/20/2019    Duodenitis without bleeding     Dysphagia     Fibromyalgia     Gastritis GERD (gastroesophageal reflux disease)     Hiatal hernia     Hx of colonoscopy     Insomnia     Iron deficiency anemia secondary to inadequate dietary iron intake 10/18/2019    Memory loss     Menorrhagia with regular cycle 11/20/2014    Moderate episode of recurrent major depressive disorder ([address]) 10/16/2019    Obesity     Oral herpes     Ovarian cyst 06/11/2013    Small dermoid on right ovary    Pap smear for cervical cancer screening     2/2016-wnl; 11/2020-wnl, HRHPV neg    Sodium (Na) deficiency 10/19/2020    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 myelopathy or radiculopathy     Streptococcal pharyngitis 10/19/2020    Vaginal discharge 09/01/2018    Vitamin D deficiency         Past Surgical History:   Procedure Laterality Date    APPENDECTOMY      BREAST BIOPSY Right     pt unsure when or where- ? 6 yrs ago- benign    BREAST SURGERY Right 01/08/2015    lump removed from breast     [address]      9/26/2012; 2019-benign polyp, repeat 5 years    DILATION AND CURETTAGE OF UTERUS      Resolved:1/29/2009    ENDOMETRIAL ABLATION N/A 08/13/2020    Procedure: ABLATION ENDOMETRIAL Pattricia Ratkaylenke;  Surgeon: Annabelle Cuellar MD;  [facility];  Service: Gynecology    ESOPHAGOGASTRODUODENOSCOPY      Diagnostic ; 9/26/2012    HYSTEROSCOPY      Resolved:1/30/2009    OVARIAN CYST REMOVAL Right 2005    [place] HYSTEROSCOPY BX ENDOMETRIUM&/POLYPC W/WO D&C N/A 08/13/2020    Procedure: DILATATION AND CURETTAGE (D&C) WITH HYSTEROSCOPY;  Surgeon: Annabelle Cuellar MD;  [facility];  Service: Gynecology    WISDOM TOOTH EXTRACTION       Allergies   Allergen Reactions    Domperidone Other (See Comments)     Reaction unknown to patient.     Morphine Chest Pain and Hives     Other reaction(s): chest pain    Ibuprofen GI Intolerance     Due to gastritis    Percocet [Oxycodone-Acetaminophen] Hives       Current Medications:       Current Outpatient Medications:     buPROPion [facility] XL) 300 mg 24 hr tablet, Take 1 tablet (300 mg total) by mouth daily, Disp: 90 tablet, Rfl: 1    clindamycin (CLINDAGEL) 1 % gel, Apply topically 2 (two) times a day (Patient taking differently: Apply 1 Application topically if needed), Disp: 30 g, Rfl: 2    cyanocobalamin (VITAMIN B-12) 1000 MCG tablet, Take 1 tablet (1,000 mcg total) by mouth daily (Patient not taking: Reported on 7/24/2023), Disp: 90 tablet, Rfl: 3    cyclobenzaprine (FLEXERIL) 5 mg tablet, Take 1 tablet (5 mg total) by mouth 3 (three) times a day as needed for muscle spasms, Disp: 30 tablet, Rfl: 1    dicyclomine (BENTYL) 20 mg tablet, Take 1 tablet (20 mg total) by mouth 2 (two) times a day (Patient taking differently: Take 20 mg by mouth if needed), Disp: 20 tablet, Rfl: 0    famotidine (PEPCID) 40 MG tablet, Take 1 tablet (40 mg total) by mouth daily at bedtime, Disp: 30 tablet, Rfl: 3    fexofenadine (ALLEGRA) 180 MG tablet, Take 1 tablet (180 mg total) by mouth daily (Patient taking differently: Take 180 mg by mouth if needed), Disp: , Rfl:     fluticasone (FLONASE) 50 mcg/act nasal spray, SPRAY 2 SPRAYS INTO EACH NOSTRIL EVERY DAY (Patient taking differently: 1 spray into each nostril if needed), Disp: 48 mL, Rfl: 2    hydrOXYzine HCL (ATARAX) 10 mg tablet, TAKE 1 TABLET (10 MG TOTAL) BY MOUTH 2 (TWO) TIMES A DAY AS NEEDED FOR ANXIETY, Disp: 180 tablet, Rfl: 1    pantoprazole (PROTONIX) 40 mg tablet, TAKE 1 TABLET BY MOUTH EVERY DAY, Disp: 90 tablet, Rfl: 1       History Review: The following portions of the patient's history were reviewed and updated as appropriate: allergies, current medications, past family history, past medical history, past social history, past surgical history, and problem list.         OBJECTIVE:     Vital signs in last 24 hours: There were no vitals filed for this visit.     Mental Status Evaluation:    Appearance age appropriate, casually dressed   Behavior cooperative, calm   Speech normal rate, normal volume, normal pitch   Mood normal   Affect normal range and intensity, appropriate   Thought Processes organized, goal directed   Associations intact associations   Thought Content no overt delusions   Perceptual Disturbances: no auditory hallucinations, no visual hallucinations   Abnormal Thoughts  Risk Potential Suicidal ideation - None  Homicidal ideation - None  Potential for aggression - No   Orientation oriented to person, place, time/date, and situation   Memory recent and remote memory grossly intact   Consciousness alert and awake   Attention Span Concentration Span attention span and concentration are age appropriate   Intellect appears to be of average intelligence   Insight intact   Judgement intact   Muscle Strength and  Gait normal gait and normal balance   Motor activity no abnormal movements   Language no difficulty naming common objects, no difficulty repeating a phrase   Fund of Knowledge adequate knowledge of current events  adequate fund of knowledge regarding past history  adequate fund of knowledge regarding vocabulary    Pain none   Pain Scale 0       Laboratory Results: Most Recent Labs:   Lab Results   Component Value Date    WBC 8.53 06/17/2023    RBC 3.99 06/17/2023    HGB 11.8 06/17/2023    HCT 35.5 06/17/2023     06/17/2023    RDW 12.8 06/17/2023    NEUTROABS 5.49 06/17/2023    SODIUM 138 06/17/2023    K 4.3 06/17/2023     06/17/2023    CO2 24 06/17/2023    BUN 11 06/17/2023    CREATININE 0.80 06/17/2023    CALCIUM 9.0 06/17/2023    AST 13 06/17/2023    ALT 17 06/17/2023    ALKPHOS 55 06/17/2023    TP 7.3 06/17/2023    TBILI 0.59 06/17/2023    CHOLESTEROL 183 04/07/2023    TRIG 89 04/07/2023    HDL 50 04/07/2023    LDLCALC 115 (H) 04/07/2023    [address] 04/07/2023    [id number] 2.235 04/07/2023    HCGQUANT <2 08/27/2018     I have personally reviewed all pertinent laboratory/tests results. Assessment/Plan: The patient overall doing well despite of the stressors mentioned. Plan is to continue with the current medication with no change. The patient will follow up with me in 3 months or sooner if needed. She was advised to call us if there is any concern and to call crisis or visit nearby ER in case of any emergency. Patient verbalized understanding and agrees with the plan. Diagnoses and all orders for this visit:    Current moderate episode of major depressive disorder without prior episode (HCC)  -     buPROPion (WELLBUTRIN XL) 300 mg 24 hr tablet; Take 1 tablet (300 mg total) by mouth daily    Generalized anxiety disorder          Treatment Recommendations/Precautions:      Aware of 24 hour and weekend coverage for urgent situations accessed by calling [facility] main practice number    Risks/Benefits      Risks, Benefits And Possible Side Effects Of Medications:    Risks, benefits, and possible side effects of medications explained to [facility] and she verbalizes understanding and agreement for treatment. Controlled Medication Discussion:     Not applicable    Psychotherapy Provided:     Individual psychotherapy provided: Counseling was provided during the session today for 10 minutes. Medications, treatment progress and treatment plan reviewed with [facility]. Medication education provided to [facility]. Reassurance and supportive therapy provided. Crisis/safety plan discussed with [facility].      Treatment Plan;    Completed and signed during the session: Not applicable - Treatment Plan not due at this session    Abilio Solis MD 11/16/23

## 2023-11-21 ENCOUNTER — OFFICE VISIT (OUTPATIENT)
Dept: FAMILY MEDICINE CLINIC | Facility: CLINIC | Age: 43
End: 2023-11-21
Payer: COMMERCIAL

## 2023-11-21 VITALS
SYSTOLIC BLOOD PRESSURE: 140 MMHG | HEART RATE: 60 BPM | BODY MASS INDEX: 38.71 KG/M2 | TEMPERATURE: 97.9 F | DIASTOLIC BLOOD PRESSURE: 86 MMHG | OXYGEN SATURATION: 99 % | WEIGHT: 232.6 LBS

## 2023-11-21 DIAGNOSIS — R10.11 RIGHT UPPER QUADRANT PAIN: Primary | ICD-10-CM

## 2023-11-21 PROCEDURE — 99213 OFFICE O/P EST LOW 20 MIN: CPT | Performed by: STUDENT IN AN ORGANIZED HEALTH CARE EDUCATION/TRAINING PROGRAM

## 2023-11-21 NOTE — PROGRESS NOTES
Name: Ángel Lebron      : 1980      MRN: [id number]  Encounter Provider: Yeimi Bergman MD  Encounter Date: 2023   Encounter department: [address] [place] [address]. Right upper quadrant pain  -     US right upper quadrant with liver dopplers; Future; Expected date: 2023      History of gallbladder removed many years ago. Exam noncontributory at today's visit. Ultrasound ordered to check region for pathology, will follow-up results with patient. Informed patient on elimination diet and to avoid triggering foods. Advised patient to follow-up with office if symptoms worsen or fail to improve. Subjective      Abdominal Pain  This is a new problem. The current episode started 1 to 4 weeks ago. The pain is located in the RUQ. The pain is at a severity of 7/10. The quality of the pain is dull. Pain radiation: around to back. Associated symptoms include anorexia and nausea. Pertinent negatives include no constipation, diarrhea, dysuria, flatus, hematochezia, hematuria, melena or vomiting. She has tried nothing for the symptoms. Her past medical history is significant for abdominal surgery (gallbladder surgery) and GERD. There is no history of colon cancer, Crohn's disease, gallstones or ulcerative colitis. Review of Systems   Gastrointestinal:  Positive for abdominal pain, anorexia and nausea. Negative for constipation, diarrhea, flatus, hematochezia, melena and vomiting. Genitourinary:  Negative for dysuria and hematuria.        Current Outpatient Medications on File Prior to Visit   Medication Sig   • buPROPion (WELLBUTRIN XL) 300 mg 24 hr tablet Take 1 tablet (300 mg total) by mouth daily   • clindamycin (CLINDAGEL) 1 % gel Apply topically 2 (two) times a day (Patient taking differently: Apply 1 Application topically if needed)   • famotidine (PEPCID) 40 MG tablet Take 1 tablet (40 mg total) by mouth daily at bedtime   • pantoprazole (PROTONIX) 40 mg tablet TAKE 1 TABLET BY MOUTH EVERY DAY   • cyanocobalamin (VITAMIN B-12) 1000 MCG tablet Take 1 tablet (1,000 mcg total) by mouth daily (Patient not taking: Reported on 7/24/2023)   • cyclobenzaprine (FLEXERIL) 5 mg tablet Take 1 tablet (5 mg total) by mouth 3 (three) times a day as needed for muscle spasms (Patient not taking: Reported on 11/21/2023)   • dicyclomine (BENTYL) 20 mg tablet Take 1 tablet (20 mg total) by mouth 2 (two) times a day (Patient not taking: Reported on 11/21/2023)   • fexofenadine (ALLEGRA) 180 MG tablet Take 1 tablet (180 mg total) by mouth daily (Patient not taking: Reported on 11/21/2023)   • fluticasone (FLONASE) 50 mcg/act nasal spray SPRAY 2 SPRAYS INTO EACH NOSTRIL EVERY DAY (Patient not taking: Reported on 11/21/2023)   • hydrOXYzine HCL (ATARAX) 10 mg tablet TAKE 1 TABLET (10 MG TOTAL) BY MOUTH 2 (TWO) TIMES A DAY AS NEEDED FOR ANXIETY (Patient not taking: Reported on 11/21/2023)       Objective     /86 (BP Location: Right arm, Patient Position: Sitting, Cuff Size: Large)   Pulse 60   Temp 97.9 °F (36.6 °C) (Temporal)   Wt 106 kg (232 lb 9.6 oz)   SpO2 99%   BMI 38.71 kg/m²     Physical Exam  Constitutional:       Appearance: Normal appearance. She is obese. HENT:      Head: Normocephalic and atraumatic. Cardiovascular:      Rate and Rhythm: Normal rate and regular rhythm. Heart sounds: Normal heart sounds. No murmur heard. Pulmonary:      Effort: Pulmonary effort is normal. No respiratory distress. Breath sounds: Normal breath sounds. No wheezing. Abdominal:      General: Abdomen is flat. Bowel sounds are normal. There is no distension. Palpations: Abdomen is soft. There is no mass. Tenderness: There is no abdominal tenderness. There is no right CVA tenderness, left CVA tenderness, guarding or rebound. Hernia: No hernia is present. Musculoskeletal:      Right lower leg: No edema. Left lower leg: No edema. Neurological:      Mental Status: She is alert.        Celina Concepcion MD

## 2023-11-27 ENCOUNTER — PATIENT MESSAGE (OUTPATIENT)
Dept: GASTROENTEROLOGY | Facility: MEDICAL CENTER | Age: 43
End: 2023-11-27

## 2023-11-28 ENCOUNTER — TELEPHONE (OUTPATIENT)
Dept: HEMATOLOGY ONCOLOGY | Facility: CLINIC | Age: 43
End: 2023-11-28

## 2023-11-29 ENCOUNTER — HOSPITAL ENCOUNTER (OUTPATIENT)
Dept: ULTRASOUND IMAGING | Facility: HOSPITAL | Age: 43
Discharge: HOME | End: 2023-11-29
Attending: STUDENT IN AN ORGANIZED HEALTH CARE EDUCATION/TRAINING PROGRAM
Payer: COMMERCIAL

## 2023-11-29 ENCOUNTER — RA CDI HCC (OUTPATIENT)
Dept: OTHER | Facility: HOSPITAL | Age: 43
End: 2023-11-29

## 2023-11-29 DIAGNOSIS — R10.11 RIGHT UPPER QUADRANT PAIN: ICD-10-CM

## 2023-11-29 PROCEDURE — 76705 ECHO EXAM OF ABDOMEN: CPT

## 2023-11-29 NOTE — PROGRESS NOTES
Mild episode of recurrent major depressive disorder ([address])  Noted 11/20/2019  [F33.0]    [address] coding opportunities          Chart Reviewed number of suggestions sent to Provider: 1     Patients Insurance        Commercial Insurance: Abdoulaye Bergman

## 2023-12-04 ENCOUNTER — TELEPHONE (OUTPATIENT)
Dept: GASTROENTEROLOGY | Facility: HOSPITAL | Age: 43
End: 2023-12-04

## 2023-12-04 RX ORDER — SODIUM CHLORIDE, SODIUM LACTATE, POTASSIUM CHLORIDE, CALCIUM CHLORIDE 600; 310; 30; 20 MG/100ML; MG/100ML; MG/100ML; MG/100ML
125 INJECTION, SOLUTION INTRAVENOUS CONTINUOUS
Status: CANCELLED | OUTPATIENT
Start: 2023-12-04

## 2023-12-05 ENCOUNTER — ANESTHESIA (OUTPATIENT)
Dept: GASTROENTEROLOGY | Facility: HOSPITAL | Age: 43
End: 2023-12-05

## 2023-12-05 ENCOUNTER — HOSPITAL ENCOUNTER (OUTPATIENT)
Dept: GASTROENTEROLOGY | Facility: HOSPITAL | Age: 43
Setting detail: OUTPATIENT SURGERY
Discharge: HOME | End: 2023-12-05
Payer: COMMERCIAL

## 2023-12-05 ENCOUNTER — ANESTHESIA EVENT (OUTPATIENT)
Dept: GASTROENTEROLOGY | Facility: HOSPITAL | Age: 43
End: 2023-12-05

## 2023-12-05 VITALS
TEMPERATURE: 97.9 F | BODY MASS INDEX: 39.32 KG/M2 | WEIGHT: 236 LBS | RESPIRATION RATE: 16 BRPM | HEART RATE: 67 BPM | OXYGEN SATURATION: 100 % | HEIGHT: 65 IN | SYSTOLIC BLOOD PRESSURE: 137 MMHG | DIASTOLIC BLOOD PRESSURE: 84 MMHG

## 2023-12-05 DIAGNOSIS — R13.19 ESOPHAGEAL DYSPHAGIA: ICD-10-CM

## 2023-12-05 DIAGNOSIS — R11.2 NAUSEA AND VOMITING IN ADULT: ICD-10-CM

## 2023-12-05 DIAGNOSIS — R11.2 NAUSEA AND VOMITING, UNSPECIFIED VOMITING TYPE: ICD-10-CM

## 2023-12-05 DIAGNOSIS — R10.13 EPIGASTRIC PAIN: ICD-10-CM

## 2023-12-05 DIAGNOSIS — K31.84 GASTROPARESIS: ICD-10-CM

## 2023-12-05 DIAGNOSIS — K21.9 GASTROESOPHAGEAL REFLUX DISEASE, UNSPECIFIED WHETHER ESOPHAGITIS PRESENT: ICD-10-CM

## 2023-12-05 LAB
EXT PREGNANCY TEST URINE: NEGATIVE
EXT. CONTROL: NORMAL

## 2023-12-05 PROCEDURE — 88305 TISSUE EXAM BY PATHOLOGIST: CPT | Performed by: PATHOLOGY

## 2023-12-05 PROCEDURE — 81025 URINE PREGNANCY TEST: CPT | Performed by: ANESTHESIOLOGY

## 2023-12-05 PROCEDURE — 43239 EGD BIOPSY SINGLE/MULTIPLE: CPT | Performed by: INTERNAL MEDICINE

## 2023-12-05 RX ORDER — LIDOCAINE HYDROCHLORIDE 10 MG/ML
INJECTION, SOLUTION EPIDURAL; INFILTRATION; INTRACAUDAL; PERINEURAL AS NEEDED
Status: DISCONTINUED | OUTPATIENT
Start: 2023-12-05 | End: 2023-12-05

## 2023-12-05 RX ORDER — PANTOPRAZOLE SODIUM 40 MG/1
40 TABLET, DELAYED RELEASE ORAL
Qty: 180 TABLET | Refills: 1 | Status: SHIPPED | OUTPATIENT
Start: 2023-12-05

## 2023-12-05 RX ORDER — PROPOFOL 10 MG/ML
INJECTION, EMULSION INTRAVENOUS AS NEEDED
Status: DISCONTINUED | OUTPATIENT
Start: 2023-12-05 | End: 2023-12-05

## 2023-12-05 RX ORDER — SODIUM CHLORIDE, SODIUM LACTATE, POTASSIUM CHLORIDE, CALCIUM CHLORIDE 600; 310; 30; 20 MG/100ML; MG/100ML; MG/100ML; MG/100ML
125 INJECTION, SOLUTION INTRAVENOUS CONTINUOUS
Status: DISCONTINUED | OUTPATIENT
Start: 2023-12-05 | End: 2023-12-09 | Stop reason: HOSPADM

## 2023-12-05 RX ADMIN — PROPOFOL 150 MG: 10 INJECTION, EMULSION INTRAVENOUS at 10:07

## 2023-12-05 RX ADMIN — LIDOCAINE HYDROCHLORIDE 50 MG: 10 INJECTION, SOLUTION EPIDURAL; INFILTRATION; INTRACAUDAL; PERINEURAL at 10:07

## 2023-12-05 RX ADMIN — PROPOFOL 30 MG: 10 INJECTION, EMULSION INTRAVENOUS at 10:10

## 2023-12-05 RX ADMIN — PROPOFOL 20 MG: 10 INJECTION, EMULSION INTRAVENOUS at 10:08

## 2023-12-05 RX ADMIN — PROPOFOL 50 MG: 10 INJECTION, EMULSION INTRAVENOUS at 10:12

## 2023-12-05 RX ADMIN — SODIUM CHLORIDE, SODIUM LACTATE, POTASSIUM CHLORIDE, AND CALCIUM CHLORIDE 125 ML/HR: .6; .31; .03; .02 INJECTION, SOLUTION INTRAVENOUS at 09:22

## 2023-12-05 NOTE — ANESTHESIA PREPROCEDURE EVALUATION
Procedure:  EGD    Relevant Problems   ANESTHESIA  old charts reviewed      CARDIO  unable to find '18 ECHO report   (+) Chronic midline thoracic back pain   (+) Hyperlipidemia   (+) Other chest pain      ENDO  morbid obesity      GI/HEPATIC  fatty liver   (+) Gastroesophageal reflux disease without esophagitis   (+) Nonalcoholic fatty liver disease      /RENAL (within normal limits)      GYN  ablation done       (-) Currently pregnant      MUSCULOSKELETAL   (+) Chronic midline low back pain without sciatica   (+) Chronic midline thoracic back pain   (+) Diaphragmatic hernia   (+) Facet degeneration of lumbar region   (+) 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 myelopathy or radiculopathy   (+) Spondylosis without myelopathy or radiculopathy, lumbar region      NEURO/PSYCH   (+) Chronic midline low back pain without sciatica   (+) Chronic midline thoracic back pain   (+) Generalized anxiety disorder   (+) Mild episode of recurrent major depressive disorder (HCC)   (+) Other headache syndrome   (+) Worsening headaches      PULMONARY   (+) OLI (obstructive sleep apnea)   (-) Smoking   (-) URI (upper respiratory infection)        Physical Exam    Airway    Mallampati score: II  TM Distance: >3 FB  Neck ROM: full     Dental       Cardiovascular  Cardiovascular exam normal    Pulmonary  Pulmonary exam normal     Other Findings  Fixed upper and lower teeth and in good repairpost-pubertal.      Anesthesia Plan  ASA Score- 2     Anesthesia Type- IV sedation with anesthesia with ASA Monitors. Additional Monitors:     Airway Plan:     Comment: Possible OLI. Plan Factors-Exercise tolerance (METS): >4 METS. Chart reviewed. EKG reviewed. Imaging results reviewed. Existing labs reviewed. Patient is not a current smoker. Patient not instructed to abstain from smoking on day of procedure. Patient did not smoke on day of surgery.     Obstructive sleep apnea risk education given perioperatively. Induction- intravenous. Postoperative Plan-     Informed Consent- Anesthetic plan and risks discussed with patient. I personally reviewed this patient with the CRNA. Discussed and agreed on the Anesthesia Plan with the CRNA. You Rizzo

## 2023-12-05 NOTE — H&P
H&P EXAM - Outpatient Endoscopy   Pollo Orlando 37 y.o. female MRN: [id number]    [address] [facility] [address]   Encounter: [id number]    History and Physical -  [facility]  Pollo Orlando 37 y.o. female MRN: [id number]                  HPI: Pollo Orlando is a 37y.o. year old female who presents for gastroparesis, nausea, dysphagia      REVIEW OF SYSTEMS: Per the HPI, and otherwise unremarkable.     Historical Information   Past Medical History:   Diagnosis Date    Acne 04/23/2015    Acquired hallux valgus 03/07/2016    Acute non-recurrent maxillary sinusitis 01/27/2020    Anemia     Anxiety 10/16/2019    Arthritis     Benign paroxysmal positional vertigo 03/08/2017    Candida vaginitis 04/25/2019    Colon polyp 2019    COVID-19 vaccine series completed     Moderna: 2nd dose 5/21/2021    Depression     Discoloration of skin of hand 03/20/2019    Duodenitis without bleeding     Dysphagia     Fibromyalgia     Gastritis     GERD (gastroesophageal reflux disease)     Hiatal hernia     Hx of colonoscopy     Insomnia     Iron deficiency anemia secondary to inadequate dietary iron intake 10/18/2019    Memory loss     Menorrhagia with regular cycle 11/20/2014    Moderate episode of recurrent major depressive disorder ([address]) 10/16/2019    Obesity     Oral herpes     Ovarian cyst 06/11/2013    Small dermoid on right ovary    Pap smear for cervical cancer screening     2/2016-wnl; 11/2020-wnl, HRHPV neg    Sodium (Na) deficiency 10/19/2020    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 myelopathy or radiculopathy     Streptococcal pharyngitis 10/19/2020    Vaginal discharge 09/01/2018    Vitamin D deficiency      Past Surgical History:   Procedure Laterality Date    APPENDECTOMY      BREAST BIOPSY Right     pt unsure when or where- ? 6 yrs ago- benign    BREAST SURGERY Right 01/08/2015    lump removed from breast     [address]      9/26/2012; 2019-benign polyp, repeat 5 years    DILATION AND CURETTAGE OF UTERUS      Resolved:2009    ENDOMETRIAL ABLATION N/A 2020    Procedure: ABLATION ENDOMETRIAL Gordon Conception;  Surgeon: Roberta Galeano MD;  Location: [facility];  Service: Gynecology    ESOPHAGOGASTRODUODENOSCOPY      Diagnostic ; 2012    HYSTEROSCOPY      Resolved:2009    OVARIAN CYST REMOVAL Right 2005    NH HYSTEROSCOPY BX ENDOMETRIUM&/POLYPC W/WO D&C N/A 2020    Procedure: DILATATION AND CURETTAGE (D&C) WITH HYSTEROSCOPY;  Surgeon: Roberta Galeano MD;  Location: [facility];  Service: Gynecology    WISDOM TOOTH EXTRACTION       Social History   Social History     Substance and Sexual Activity   Alcohol Use No     Social History     Substance and Sexual Activity   Drug Use Never     Social History     Tobacco Use   Smoking Status Never    Passive exposure: Never   Smokeless Tobacco Never   Tobacco Comments    No passive smoke exposure     Family History   Problem Relation Age of Onset    Other Mother         uterine leiomyoma    Diabetes type II Mother         Mellitus    Hypothyroidism Mother     Colon cancer Father 54        Stage IV    Diabetes Father         Type II Mellitus    Hypertension Father     Squamous cell carcinoma Father         Located waist down around kidneys, lowe back, tail bone, prostate and bladder.     Multiple sclerosis Sister     Asthma Sister     Hypothyroidism Sister     Hypertension Maternal Grandmother     Diabetes Maternal Grandmother     Other Maternal Grandmother         Vulvar cancer    Schizophrenia Maternal Grandfather     Hypertension Maternal Grandfather     Thyroid cancer Paternal Grandmother 80    Diabetes Paternal Grandfather 43         due to complications of DM    Anxiety disorder Brother     Depression Brother     Diabetes Brother         Mellitus    Asthma Brother     Hypertension Brother     Breast cancer Cousin 46    Ovarian cancer Neg Hx        Meds/Allergies     (Not in a hospital admission)      Allergies   Allergen Reactions    Domperidone Other (See Comments)     Reaction unknown to patient. Morphine Chest Pain and Hives     Other reaction(s): chest pain    Ibuprofen GI Intolerance     Due to gastritis    Percocet [Oxycodone-Acetaminophen] Hives       Objective     /75   Pulse 60   Temp 97.9 °F (36.6 °C) (Temporal)   Resp 12   Ht 5' 5" (1.651 m)   Wt 107 kg (236 lb)   LMP 11/11/2023 (Approximate)   SpO2 99%   BMI 39.27 kg/m²       PHYSICAL EXAM    Gen: NAD  CV: RRR  CHEST: Clear  ABD: soft, NT/ND  EXT: no edema      ASSESSMENT/PLAN:  This is a 37y.o. year old female here for egd, and she is stable and optimized for her procedure.

## 2023-12-05 NOTE — ANESTHESIA POSTPROCEDURE EVALUATION
Post-Op Assessment Note    CV Status:  Stable    Pain management: adequate       Mental Status:  Alert and awake   Hydration Status:  Euvolemic   PONV Controlled:  Controlled   Airway Patency:  Patent     Post Op Vitals Reviewed: Yes      Staff: CRNA               BP   134/75   Temp      Pulse 66   Resp 16   SpO2 100

## 2023-12-06 ENCOUNTER — HOSPITAL ENCOUNTER (OUTPATIENT)
Dept: ULTRASOUND IMAGING | Facility: HOSPITAL | Age: 43
Discharge: HOME | End: 2023-12-06
Payer: COMMERCIAL

## 2023-12-06 DIAGNOSIS — R35.0 URINARY FREQUENCY: ICD-10-CM

## 2023-12-06 PROCEDURE — 76770 US EXAM ABDO BACK WALL COMP: CPT

## 2023-12-08 PROCEDURE — 88305 TISSUE EXAM BY PATHOLOGIST: CPT | Performed by: PATHOLOGY

## 2023-12-13 NOTE — RESULT ENCOUNTER NOTE
No concerning finding to explain frequent urination on recent kidney ultrasound.   Also no significant postvoid residual.

## 2023-12-20 ENCOUNTER — OFFICE VISIT (OUTPATIENT)
Dept: FAMILY MEDICINE CLINIC | Facility: CLINIC | Age: 43
End: 2023-12-20
Payer: COMMERCIAL

## 2023-12-20 VITALS
BODY MASS INDEX: 39.32 KG/M2 | DIASTOLIC BLOOD PRESSURE: 86 MMHG | OXYGEN SATURATION: 99 % | RESPIRATION RATE: 18 BRPM | HEIGHT: 65 IN | WEIGHT: 236 LBS | SYSTOLIC BLOOD PRESSURE: 126 MMHG | HEART RATE: 72 BPM | TEMPERATURE: 98.1 F

## 2023-12-20 DIAGNOSIS — R05.3 PERSISTENT COUGH: Primary | ICD-10-CM

## 2023-12-20 DIAGNOSIS — F33.0 MAJOR DEPRESSIVE DISORDER, RECURRENT EPISODE, MILD (HCC): ICD-10-CM

## 2023-12-20 DIAGNOSIS — U07.1 COVID: ICD-10-CM

## 2023-12-20 LAB
SARS-COV-2 AG UPPER RESP QL IA: POSITIVE
VALID CONTROL: ABNORMAL

## 2023-12-20 PROCEDURE — 99213 OFFICE O/P EST LOW 20 MIN: CPT | Performed by: INTERNAL MEDICINE

## 2023-12-20 PROCEDURE — 87811 SARS-COV-2 COVID19 W/OPTIC: CPT | Performed by: INTERNAL MEDICINE

## 2023-12-20 RX ORDER — ALBUTEROL SULFATE 90 UG/1
2 AEROSOL, METERED RESPIRATORY (INHALATION) EVERY 6 HOURS PRN
Qty: 18 G | Refills: 0 | Status: SHIPPED | OUTPATIENT
Start: 2023-12-20

## 2023-12-20 RX ORDER — AMOXICILLIN AND CLAVULANATE POTASSIUM 875; 125 MG/1; MG/1
1 TABLET, FILM COATED ORAL EVERY 12 HOURS SCHEDULED
Qty: 20 TABLET | Refills: 0 | Status: SHIPPED | OUTPATIENT
Start: 2023-12-20 | End: 2023-12-30

## 2023-12-20 RX ORDER — BUDESONIDE AND FORMOTEROL FUMARATE DIHYDRATE 160; 4.5 UG/1; UG/1
2 AEROSOL RESPIRATORY (INHALATION) 2 TIMES DAILY
Qty: 10.2 G | Refills: 0 | Status: SHIPPED | OUTPATIENT
Start: 2023-12-20

## 2023-12-20 NOTE — LETTER
December 20, 2023     Patient: Lianne Tay  YOB: 1980  Date of Visit: 12/20/2023      To Whom it May Concern:    Lianne Tay is under my professional care. Lianne was seen in my office on 12/20/2023. Lianne may return to work 12/28/2023 after being evaluated.         If you have any questions or concerns, please don't hesitate to call.         Sincerely,          Karen Rodriguez MD

## 2023-12-20 NOTE — PROGRESS NOTES
Assessment/Plan:    Major depressive disorder, recurrent episode, mild (HCC)  Under care of psychiatry and maintained on Wellbutrin        Problem List Items Addressed This Visit       Major depressive disorder, recurrent episode, mild (HCC)     Under care of psychiatry and maintained on Wellbutrin           Other Visit Diagnoses       Persistent cough    -  Primary    Relevant Medications    amoxicillin-clavulanate (AUGMENTIN) 875-125 mg per tablet    budesonide-formoterol (SYMBICORT) 160-4.5 mcg/act inhaler    albuterol (Ventolin HFA) 90 mcg/act inhaler    Other Relevant Orders    XR chest pa & lateral    COVID        Relevant Medications    budesonide-formoterol (SYMBICORT) 160-4.5 mcg/act inhaler    albuterol (Ventolin HFA) 90 mcg/act inhaler         Patient will be started antibiotic as well as inhaler.  Her cough 7 days.  I will reevaluate her before releasing her back to work    Subjective:      Patient ID: Lianne Tay is a 43 y.o. female.    Cough  Associated symptoms include ear pain, headaches and a sore throat.   Sore Throat   Associated symptoms include coughing, ear pain and headaches.   Earache   Associated symptoms include coughing, headaches and a sore throat.   Headache    Patient is here for an acute visit complaining of a cough that has been going on for over  1 month now.  Started with nasal congestion sore throat and mild cough which is now becoming more prominent feeling shortness of breath and bringing up slightly discolored phlegm.  Denies fevers and chills body aches and pains.   Rapid COVID testing office was positive.  The following portions of the patient's history were reviewed and updated as appropriate: allergies, current medications, past medical history, past social history, and problem list.    Review of Systems   HENT:  Positive for ear pain and sore throat.    Respiratory:  Positive for cough.    Neurological:  Positive for headaches.         Objective:      /86   Pulse 72   " Temp 98.1 °F (36.7 °C)   Resp 18   Ht 5' 5\" (1.651 m)   Wt 107 kg (236 lb)   LMP 11/11/2023 (Approximate)   SpO2 99%   BMI 39.27 kg/m²          Physical Exam  Cardiovascular:      Rate and Rhythm: Normal rate and regular rhythm.   Pulmonary:      Effort: Pulmonary effort is normal. No respiratory distress.      Breath sounds: Normal breath sounds. No wheezing or rales.      Comments: Negative egophony  Neurological:      Mental Status: She is alert.                 "

## 2023-12-20 NOTE — LETTER
December 20, 2023     Patient: Lianne Tay  YOB: 1980  Date of Visit: 12/20/2023      To Whom it May Concern:    Lianne Tay is under my professional care. Lianne was seen in my office on 12/20/2023. Lianne may return to work on 12/28/2023 after being evaluated .      If you have any questions or concerns, please don't hesitate to call.         Sincerely,          Karen Rodriguez MD

## 2023-12-26 ENCOUNTER — DOCUMENTATION (OUTPATIENT)
Dept: FAMILY MEDICINE CLINIC | Facility: CLINIC | Age: 43
End: 2023-12-26

## 2023-12-26 NOTE — PROGRESS NOTES
Form Faxed to our office 12/2/23. Form was  filled out on 12/26/23; awaiting review, signature and jaime from Dr. Rodriguez.  Will send to her office ASAP.

## 2023-12-27 ENCOUNTER — OFFICE VISIT (OUTPATIENT)
Dept: FAMILY MEDICINE CLINIC | Facility: CLINIC | Age: 43
End: 2023-12-27
Payer: COMMERCIAL

## 2023-12-27 VITALS
TEMPERATURE: 98.2 F | DIASTOLIC BLOOD PRESSURE: 80 MMHG | BODY MASS INDEX: 39.49 KG/M2 | SYSTOLIC BLOOD PRESSURE: 122 MMHG | RESPIRATION RATE: 16 BRPM | WEIGHT: 237 LBS | OXYGEN SATURATION: 98 % | HEART RATE: 66 BPM | HEIGHT: 65 IN

## 2023-12-27 DIAGNOSIS — J98.01 COUGH DUE TO BRONCHOSPASM: ICD-10-CM

## 2023-12-27 DIAGNOSIS — U07.1 COVID: Primary | ICD-10-CM

## 2023-12-27 DIAGNOSIS — F33.0 MAJOR DEPRESSIVE DISORDER, RECURRENT EPISODE, MILD (HCC): ICD-10-CM

## 2023-12-27 PROCEDURE — 99213 OFFICE O/P EST LOW 20 MIN: CPT | Performed by: INTERNAL MEDICINE

## 2023-12-27 RX ORDER — PREDNISONE 10 MG/1
TABLET ORAL
Qty: 25 TABLET | Refills: 0 | Status: SHIPPED | OUTPATIENT
Start: 2023-12-27

## 2023-12-27 NOTE — PROGRESS NOTES
"Assessment/Plan:           Problem List Items Addressed This Visit       Major depressive disorder, recurrent episode, mild (HCC)     Other Visit Diagnoses       COVID    -  Primary    Cough due to bronchospasm        Relevant Medications    predniSONE 10 mg tablet         See discussion above.  Patient continue with antibiotic and inhalers.  Prednisone will be added.  3 days excuse.  Patient will update me by phone on Friday.  Subjective:      Patient ID: Lianne Tay is a 43 y.o. female.    Fatigue  Associated symptoms include fatigue and headaches.   Headache    Patient is here to follow-up on COVID that was detected last week.  She reports she is feeling somewhat better however has ongoing cough chest tightness.  She is still on antibiotic.  She confirmed that she has been using her inhalers without significant improvement.  I would excuse her for another 3 days because of ongoing cough per CDC guidelines.  She will go for chest x-ray on Monday.  She will update me about her progress on Friday.   The following portions of the patient's history were reviewed and updated as appropriate: allergies, current medications, past medical history, past social history, and problem list.    Review of Systems   Constitutional:  Positive for fatigue.   Neurological:  Positive for headaches.         Objective:      /80 (BP Location: Left arm, Patient Position: Sitting, Cuff Size: Large)   Pulse 66   Temp 98.2 °F (36.8 °C) (Tympanic)   Resp 16   Ht 5' 5\" (1.651 m)   Wt 108 kg (237 lb)   LMP 11/11/2023 (Approximate)   SpO2 98%   BMI 39.44 kg/m²          Physical Exam  Constitutional:       Appearance: Normal appearance.   Cardiovascular:      Heart sounds: Normal heart sounds. No murmur heard.  Pulmonary:      Effort: Pulmonary effort is normal. No respiratory distress.      Breath sounds: No wheezing or rales.      Comments: Paroxysmal cough  Neurological:      Mental Status: She is alert.                 " generalized weakness, diarrhea and SpO2 98% RA at home  covid positive 8 days ago

## 2023-12-29 DIAGNOSIS — U07.1 COVID: Primary | ICD-10-CM

## 2023-12-29 DIAGNOSIS — J98.01 COUGH DUE TO BRONCHOSPASM: ICD-10-CM

## 2023-12-30 ENCOUNTER — HOSPITAL ENCOUNTER (OUTPATIENT)
Dept: RADIOLOGY | Facility: HOSPITAL | Age: 43
Discharge: HOME | End: 2023-12-30
Payer: COMMERCIAL

## 2023-12-30 DIAGNOSIS — R05.3 PERSISTENT COUGH: ICD-10-CM

## 2023-12-30 PROCEDURE — 71046 X-RAY EXAM CHEST 2 VIEWS: CPT

## 2024-01-25 ENCOUNTER — APPOINTMENT (OUTPATIENT)
Dept: LAB | Facility: CLINIC | Age: 44
End: 2024-01-25
Payer: COMMERCIAL

## 2024-01-25 DIAGNOSIS — E53.8 B12 DEFICIENCY: ICD-10-CM

## 2024-01-25 DIAGNOSIS — D50.8 IRON DEFICIENCY ANEMIA SECONDARY TO INADEQUATE DIETARY IRON INTAKE: ICD-10-CM

## 2024-01-25 LAB
ALBUMIN SERPL BCP-MCNC: 4.1 G/DL (ref 3.5–5)
ALP SERPL-CCNC: 62 U/L (ref 34–104)
ALT SERPL W P-5'-P-CCNC: 21 U/L (ref 7–52)
ANION GAP SERPL CALCULATED.3IONS-SCNC: 10 MMOL/L
AST SERPL W P-5'-P-CCNC: 18 U/L (ref 13–39)
BASOPHILS # BLD AUTO: 0.05 THOUSANDS/ÂΜL (ref 0–0.1)
BASOPHILS NFR BLD AUTO: 1 % (ref 0–1)
BILIRUB SERPL-MCNC: 0.34 MG/DL (ref 0.2–1)
BUN SERPL-MCNC: 11 MG/DL (ref 5–25)
CALCIUM SERPL-MCNC: 9.3 MG/DL (ref 8.4–10.2)
CHLORIDE SERPL-SCNC: 104 MMOL/L (ref 96–108)
CO2 SERPL-SCNC: 27 MMOL/L (ref 21–32)
CREAT SERPL-MCNC: 0.77 MG/DL (ref 0.6–1.3)
CRP SERPL QL: 3.2 MG/L
EOSINOPHIL # BLD AUTO: 0.25 THOUSAND/ÂΜL (ref 0–0.61)
EOSINOPHIL NFR BLD AUTO: 4 % (ref 0–6)
ERYTHROCYTE [DISTWIDTH] IN BLOOD BY AUTOMATED COUNT: 13.2 % (ref 11.6–15.1)
ERYTHROCYTE [SEDIMENTATION RATE] IN BLOOD: 15 MM/HOUR (ref 0–19)
FERRITIN SERPL-MCNC: 61 NG/ML (ref 11–307)
FOLATE SERPL-MCNC: 12.3 NG/ML
GFR SERPL CREATININE-BSD FRML MDRD: 92 ML/MIN/1.73SQ M
GLUCOSE P FAST SERPL-MCNC: 90 MG/DL (ref 65–99)
HCT VFR BLD AUTO: 36.4 % (ref 34.8–46.1)
HGB BLD-MCNC: 11.7 G/DL (ref 11.5–15.4)
IMM GRANULOCYTES # BLD AUTO: 0.04 THOUSAND/UL (ref 0–0.2)
IMM GRANULOCYTES NFR BLD AUTO: 1 % (ref 0–2)
IRON SATN MFR SERPL: 15 % (ref 15–50)
IRON SERPL-MCNC: 49 UG/DL (ref 50–212)
LYMPHOCYTES # BLD AUTO: 1.73 THOUSANDS/ÂΜL (ref 0.6–4.47)
LYMPHOCYTES NFR BLD AUTO: 27 % (ref 14–44)
MCH RBC QN AUTO: 30.2 PG (ref 26.8–34.3)
MCHC RBC AUTO-ENTMCNC: 32.1 G/DL (ref 31.4–37.4)
MCV RBC AUTO: 94 FL (ref 82–98)
MONOCYTES # BLD AUTO: 0.68 THOUSAND/ÂΜL (ref 0.17–1.22)
MONOCYTES NFR BLD AUTO: 11 % (ref 4–12)
NEUTROPHILS # BLD AUTO: 3.63 THOUSANDS/ÂΜL (ref 1.85–7.62)
NEUTS SEG NFR BLD AUTO: 56 % (ref 43–75)
NRBC BLD AUTO-RTO: 0 /100 WBCS
PLATELET # BLD AUTO: 363 THOUSANDS/UL (ref 149–390)
PMV BLD AUTO: 10.1 FL (ref 8.9–12.7)
POTASSIUM SERPL-SCNC: 4 MMOL/L (ref 3.5–5.3)
PROT SERPL-MCNC: 7 G/DL (ref 6.4–8.4)
RBC # BLD AUTO: 3.87 MILLION/UL (ref 3.81–5.12)
SODIUM SERPL-SCNC: 141 MMOL/L (ref 135–147)
TIBC SERPL-MCNC: 333 UG/DL (ref 250–450)
UIBC SERPL-MCNC: 284 UG/DL (ref 155–355)
VIT B12 SERPL-MCNC: 263 PG/ML (ref 180–914)
WBC # BLD AUTO: 6.38 THOUSAND/UL (ref 4.31–10.16)

## 2024-01-25 PROCEDURE — 83550 IRON BINDING TEST: CPT

## 2024-01-25 PROCEDURE — 36415 COLL VENOUS BLD VENIPUNCTURE: CPT

## 2024-01-25 PROCEDURE — 82728 ASSAY OF FERRITIN: CPT

## 2024-01-25 PROCEDURE — 83540 ASSAY OF IRON: CPT

## 2024-01-25 PROCEDURE — 85025 COMPLETE CBC W/AUTO DIFF WBC: CPT

## 2024-01-25 PROCEDURE — 82607 VITAMIN B-12: CPT

## 2024-01-25 PROCEDURE — 82746 ASSAY OF FOLIC ACID SERUM: CPT

## 2024-01-25 PROCEDURE — 85652 RBC SED RATE AUTOMATED: CPT

## 2024-01-25 PROCEDURE — 80053 COMPREHEN METABOLIC PANEL: CPT

## 2024-01-25 PROCEDURE — 86140 C-REACTIVE PROTEIN: CPT

## 2024-02-14 ENCOUNTER — OFFICE VISIT (OUTPATIENT)
Dept: FAMILY MEDICINE CLINIC | Facility: CLINIC | Age: 44
End: 2024-02-14
Payer: COMMERCIAL

## 2024-02-14 VITALS
HEIGHT: 65 IN | WEIGHT: 237 LBS | BODY MASS INDEX: 39.49 KG/M2 | OXYGEN SATURATION: 98 % | RESPIRATION RATE: 16 BRPM | SYSTOLIC BLOOD PRESSURE: 124 MMHG | DIASTOLIC BLOOD PRESSURE: 82 MMHG | TEMPERATURE: 98.4 F | HEART RATE: 67 BPM

## 2024-02-14 DIAGNOSIS — M50.90 CERVICAL DISC DISEASE: ICD-10-CM

## 2024-02-14 DIAGNOSIS — G56.01 CARPAL TUNNEL SYNDROME OF RIGHT WRIST: ICD-10-CM

## 2024-02-14 DIAGNOSIS — E53.8 B12 DEFICIENCY: ICD-10-CM

## 2024-02-14 DIAGNOSIS — R20.0 NUMBNESS AND TINGLING IN BOTH HANDS: Primary | ICD-10-CM

## 2024-02-14 DIAGNOSIS — R20.2 NUMBNESS AND TINGLING IN BOTH HANDS: Primary | ICD-10-CM

## 2024-02-14 PROCEDURE — 99213 OFFICE O/P EST LOW 20 MIN: CPT | Performed by: INTERNAL MEDICINE

## 2024-02-14 NOTE — PROGRESS NOTES
"Assessment/Plan:           Problem List Items Addressed This Visit    None  Visit Diagnoses       Numbness and tingling in both hands    -  Primary    Relevant Orders    EMG 2 Limb Upper Extremity    Cervical disc disease        Carpal tunnel syndrome of right wrist        Relevant Orders    Ambulatory Referral to Orthopedic Surgery    B12 deficiency             Patient will was taught some stretching exercises to help with tennis elbow.  She was sleeping with carpal tunnel splints bilaterally.  She is also advised to use B12 as her levels were low last lab draw.  EMG will be ordered as patient does have significant cervical disc disease.  Hand orthopedic consult.  Subjective:      Patient ID: Lianne Tay is a 44 y.o. female.    HPI  Patient is here for acute visit complaining of bilateral fingers numbness and tingling.  This has been ongoing for several months and the last couple of weeks has become much worse.  She is dropping things from her hand.  The pain is more prominent at night when she wakes up with pain in her right temple and numbness and tingling in her hand.  She works at a computer.        following portions of the patient's history were reviewed and updated as appropriate: allergies, current medications, past family history, past medical history, past social history, past surgical history, and problem list.    Review of Systems      Objective:      /82 (BP Location: Left arm, Patient Position: Sitting, Cuff Size: Large)   Pulse 67   Temp 98.4 °F (36.9 °C) (Tympanic)   Resp 16   Ht 5' 5\" (1.651 m)   Wt 108 kg (237 lb)   SpO2 98%   BMI 39.44 kg/m²          Physical Exam  Musculoskeletal:      Comments: Positive Tinel's sign right wrist  Negative loss of thenar and hypothenar eminence  Good strength  Good range of motion of the neck   Neurological:      Mental Status: She is alert.                 "

## 2024-02-16 ENCOUNTER — HOSPITAL ENCOUNTER (OUTPATIENT)
Dept: NEUROLOGY | Facility: CLINIC | Age: 44
End: 2024-02-16
Payer: COMMERCIAL

## 2024-02-16 ENCOUNTER — TELEPHONE (OUTPATIENT)
Dept: FAMILY MEDICINE CLINIC | Facility: CLINIC | Age: 44
End: 2024-02-16

## 2024-02-16 DIAGNOSIS — R20.2 NUMBNESS AND TINGLING IN BOTH HANDS: ICD-10-CM

## 2024-02-16 DIAGNOSIS — R20.0 NUMBNESS AND TINGLING IN BOTH HANDS: ICD-10-CM

## 2024-02-16 PROBLEM — G56.03 CARPAL TUNNEL SYNDROME ON BOTH SIDES: Status: ACTIVE | Noted: 2024-02-16

## 2024-02-16 PROCEDURE — 95886 MUSC TEST DONE W/N TEST COMP: CPT | Performed by: PSYCHIATRY & NEUROLOGY

## 2024-02-16 PROCEDURE — 95911 NRV CNDJ TEST 9-10 STUDIES: CPT | Performed by: PSYCHIATRY & NEUROLOGY

## 2024-02-16 NOTE — TELEPHONE ENCOUNTER
----- Message from Karen Rodriguez MD sent at 2/16/2024  3:14 PM EST -----  Please let her know EMG supporting carpal tunnel syndrome.  She can follow-up with her orthopedic.  ----- Message -----  From: Alverto Hudson MD  Sent: 2/16/2024   3:11 PM EST  To: Karen Rodriguez MD

## 2024-02-19 ENCOUNTER — OFFICE VISIT (OUTPATIENT)
Dept: PSYCHIATRY | Facility: CLINIC | Age: 44
End: 2024-02-19
Payer: COMMERCIAL

## 2024-02-19 DIAGNOSIS — F33.0 MDD (MAJOR DEPRESSIVE DISORDER), RECURRENT EPISODE, MILD (HCC): ICD-10-CM

## 2024-02-19 DIAGNOSIS — F41.1 GENERALIZED ANXIETY DISORDER: ICD-10-CM

## 2024-02-19 PROCEDURE — 99214 OFFICE O/P EST MOD 30 MIN: CPT | Performed by: PSYCHIATRY & NEUROLOGY

## 2024-02-19 PROCEDURE — 90833 PSYTX W PT W E/M 30 MIN: CPT | Performed by: PSYCHIATRY & NEUROLOGY

## 2024-02-19 RX ORDER — BUPROPION HYDROCHLORIDE 300 MG/1
300 TABLET ORAL DAILY
Qty: 90 TABLET | Refills: 1 | Status: SHIPPED | OUTPATIENT
Start: 2024-02-19

## 2024-02-19 NOTE — BH TREATMENT PLAN
TREATMENT PLAN (Medication Management Only)        [facility]    Name and Date of Birth:  Lianne Tay 44 y.o. 1980  Date of Treatment Plan: February 19, 2024  Diagnosis/Diagnoses:    1. MDD (major depressive disorder), recurrent episode, mild (HCC)    2. Generalized anxiety disorder      Strengths/Personal Resources for Self-Care: supportive family, supportive friends, taking medications as prescribed, ability to adapt to life changes, ability to communicate needs, ability to communicate well, ability to listen, ability to reason, ability to understand psychiatric illness, average or above intelligence.  Area/Areas of need (in own words): anxiety, depression  1. Long Term Goal: Feel calmer.  Target Date:6 months - 8/19/2024  Person/Persons responsible for completion of goal: Lianne  2.  Short Term Objective (s) - How will we reach this goal?:   A. Provider new recommended medication/dosage changes and/or continue medication(s): continue current medications as prescribed.  B. N/A.  C. N/A.  Target Date:6 months - 8/19/2024  Person/Persons Responsible for Completion of Goal: Lianne  Progress Towards Goals: continuing treatment  Treatment Modality: medication management every 3 months  Review due 180 days from date of this plan: 6 months - 8/19/2024  Expected length of service: ongoing treatment  My Physician/PA/NP and I have developed this plan together and I agree to work on the goals and objectives. I understand the treatment goals that were developed for my treatment.

## 2024-02-19 NOTE — PSYCH
MEDICATION MANAGEMENT NOTE        [facility]      Name and Date of Birth:  Lianne Tay 44 y.o. 1980 MRN: [id number]    Date of Visit: February 19, 2024    Reason for Visit: Follow-up for medication management.    Subjective: The patient reports overall doing well.  Reports depression has been well controlled.  Anxiety also has been much better.  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  She reports she is still coping with it well.  Denies any hopelessness or suicidal thoughts.  Reports sleep, appetite, energy level has been fine.  Future oriented.  Anxiety overall well controlled and denies any panic attack.  Reports compliant with Wellbutrin and denies any side effect.  Reports needs Atarax very rarely.  Denies any other concerns.  Recently diagnosed with carpal tunnel syndrome on both hands.  Most of the appointment was spent on providing supportive psychotherapy to the patient in regard to the stressors mentioned above.  Denies any other concerns today.      Review Of Systems: Negative other than mentioned above.      Constitutional negative   ENT negative   Cardiovascular negative   Respiratory negative   Gastrointestinal negative   Genitourinary negative   Musculoskeletal negative   Integumentary negative   Neurological negative   Endocrine negative   Other Symptoms none       Current Rating Scores:     Current PHQ-9   PHQ-2/9 Depression Screening           Current EMILY-7 is .    Suicide/Homicide Risk Assessment:    Risk of Harm to Self:  The following ratings are based on assessment at the time of the interview and observation over the last 12 months  Historical Risk Factors include: history of depression, history of anxiety  Recent Specific Risk Factors include: diagnosis of depression,  diagnoses of anxiety  disorder  Protective Factors: no current suicidal ideation, access to mental health treatment, being a parent, being , compliant with medications, compliant with mental health treatment, connection to community, connection to own children, opportunities to contribute to community, opportunities to participate in community, strong relationships, supportive family, supportive friends  Weapons: none and no firearms. The following steps have been taken to ensure weapons are properly secured: not applicable  Based on today's assessment, Lianne presents the following risk of harm to self: none    Risk of Harm to Others:  The following ratings are based on assessment at the time of the interview  Based on today's assessment, Lianne presents the following risk of harm to others: none    The following interventions are recommended: contracts for safety at present - agrees to go to ED if feeling unsafe    Alcohol/Substance Abuse: Denies        Past Medical History:    Past Medical History:   Diagnosis Date    Acne 04/23/2015    Acquired hallux valgus 03/07/2016    Acute non-recurrent maxillary sinusitis 01/27/2020    Anemia     Anxiety 10/16/2019    Arthritis     Benign paroxysmal positional vertigo 03/08/2017    Candida vaginitis 04/25/2019    Colon polyp 2019    COVID-19 vaccine series completed     Moderna: 2nd dose 5/21/2021    Depression     Discoloration of skin of hand 03/20/2019    Duodenitis without bleeding     Dysphagia     Fibromyalgia     Gastritis     GERD (gastroesophageal reflux disease)     Hiatal hernia     Hx of colonoscopy     Insomnia     Iron deficiency anemia secondary to inadequate dietary iron intake 10/18/2019    Memory loss     Menorrhagia with regular cycle 11/20/2014    Moderate episode of recurrent major depressive disorder (HCC) 10/16/2019    Obesity     Oral herpes     Ovarian cyst 06/11/2013    Small dermoid on right ovary    Pap smear for cervical cancer screening     2/2016-wnl;  2020-wnl, HRHPV neg    Sodium (Na) deficiency 10/19/2020    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 myelopathy or radiculopathy     Streptococcal pharyngitis 10/19/2020    Vaginal discharge 2018    Vitamin D deficiency         Past Surgical History:   Procedure Laterality Date    APPENDECTOMY      BREAST BIOPSY Right     pt unsure when or where- ? 6 yrs ago- benign    BREAST SURGERY Right 2015    lump removed from breast      SECTION  1998    CHOLECYSTECTOMY      COLONOSCOPY      2012; 2019-benign polyp, repeat 5 years    DILATION AND CURETTAGE OF UTERUS      Resolved:2009    ENDOMETRIAL ABLATION N/A 2020    Procedure: ABLATION ENDOMETRIAL NOVASURE;  Surgeon: Brenna Peguero MD;  Location: [facility];  Service: Gynecology    ESOPHAGOGASTRODUODENOSCOPY      Diagnostic ; 2012    HYSTEROSCOPY      Resolved:2009    OVARIAN CYST REMOVAL Right     HI HYSTEROSCOPY BX ENDOMETRIUM&/POLYPC W/WO D&C N/A 2020    Procedure: DILATATION AND CURETTAGE (D&C) WITH HYSTEROSCOPY;  Surgeon: Brenna Peguero MD;  Location: [facility];  Service: Gynecology    WISDOM TOOTH EXTRACTION       Allergies   Allergen Reactions    Domperidone Other (See Comments)     Reaction unknown to patient.    Morphine Chest Pain and Hives     Other reaction(s): chest pain    Ibuprofen GI Intolerance     Due to gastritis    Percocet [Oxycodone-Acetaminophen] Hives       Current Medications:       Current Outpatient Medications:     buPROPion (WELLBUTRIN XL) 300 mg 24 hr tablet, Take 1 tablet (300 mg total) by mouth daily, Disp: 90 tablet, Rfl: 1    albuterol (Ventolin HFA) 90 mcg/act inhaler, Inhale 2 puffs every 6 (six) hours as needed for wheezing, Disp: 18 g, Rfl: 0    budesonide-formoterol (SYMBICORT) 160-4.5 mcg/act inhaler, Inhale 2 puffs 2 (two) times a day Rinse mouth after use., Disp: 10.2 g, Rfl: 0    clindamycin (CLINDAGEL) 1 % gel, Apply topically 2 (two) times a day (Patient taking  differently: Apply 1 Application topically if needed), Disp: 30 g, Rfl: 2    cyanocobalamin (VITAMIN B-12) 1000 MCG tablet, Take 1 tablet (1,000 mcg total) by mouth daily (Patient not taking: Reported on 7/24/2023), Disp: 90 tablet, Rfl: 3    cyclobenzaprine (FLEXERIL) 5 mg tablet, Take 1 tablet (5 mg total) by mouth 3 (three) times a day as needed for muscle spasms (Patient not taking: Reported on 11/21/2023), Disp: 30 tablet, Rfl: 1    dicyclomine (BENTYL) 20 mg tablet, Take 1 tablet (20 mg total) by mouth 2 (two) times a day (Patient not taking: Reported on 11/21/2023), Disp: 20 tablet, Rfl: 0    famotidine (PEPCID) 40 MG tablet, Take 1 tablet (40 mg total) by mouth daily at bedtime, Disp: 30 tablet, Rfl: 3    fexofenadine (ALLEGRA) 180 MG tablet, Take 1 tablet (180 mg total) by mouth daily (Patient not taking: Reported on 11/21/2023), Disp: , Rfl:     fluticasone (FLONASE) 50 mcg/act nasal spray, SPRAY 2 SPRAYS INTO EACH NOSTRIL EVERY DAY (Patient not taking: Reported on 11/21/2023), Disp: 48 mL, Rfl: 2    hydrOXYzine HCL (ATARAX) 10 mg tablet, TAKE 1 TABLET (10 MG TOTAL) BY MOUTH 2 (TWO) TIMES A DAY AS NEEDED FOR ANXIETY (Patient not taking: Reported on 11/21/2023), Disp: 180 tablet, Rfl: 1    pantoprazole (PROTONIX) 40 mg tablet, Take 1 tablet (40 mg total) by mouth 2 (two) times a day before meals, Disp: 180 tablet, Rfl: 1    predniSONE 10 mg tablet, 30 mg p.o. day x3 days then 20 mg 1 p.o. Q.day x3 days then 10 mg p.o.day x3 days then 5 mg p.o day x 3 days, Disp: 25 tablet, Rfl: 0       History Review: The following portions of the patient's history were reviewed and updated as appropriate: allergies, current medications, past family history, past medical history, past social history, past surgical history, and problem list.         OBJECTIVE:     Vital signs in last 24 hours:    There were no vitals filed for this visit.    Mental Status Evaluation:    Appearance Casually dressed good hygiene.   Behavior  cooperative, calm   Speech normal rate, normal volume, normal pitch   Mood normal   Affect normal range and intensity, appropriate   Thought Processes organized, goal directed   Associations intact associations   Thought Content no overt delusions   Perceptual Disturbances: no auditory hallucinations, no visual hallucinations   Abnormal Thoughts  Risk Potential Suicidal ideation - None  Homicidal ideation - None  Potential for aggression - No   Orientation oriented to person, place, time/date, and situation   Memory recent and remote memory grossly intact   Consciousness alert and awake   Attention Span Concentration Span attention span and concentration are age appropriate   Intellect appears to be of average intelligence   Insight intact   Judgement intact   Muscle Strength and  Gait normal gait and normal balance   Motor activity no abnormal movements   Language no difficulty naming common objects, no difficulty repeating a phrase   Fund of Knowledge adequate knowledge of current events  adequate fund of knowledge regarding past history  adequate fund of knowledge regarding vocabulary    Pain none   Pain Scale 0       Laboratory Results: Most Recent Labs:   Lab Results   Component Value Date    WBC 6.38 01/25/2024    RBC 3.87 01/25/2024    HGB 11.7 01/25/2024    HCT 36.4 01/25/2024     01/25/2024    RDW 13.2 01/25/2024    NEUTROABS 3.63 01/25/2024    SODIUM 141 01/25/2024    K 4.0 01/25/2024     01/25/2024    CO2 27 01/25/2024    BUN 11 01/25/2024    CREATININE 0.77 01/25/2024    CALCIUM 9.3 01/25/2024    AST 18 01/25/2024    ALT 21 01/25/2024    ALKPHOS 62 01/25/2024    TP 7.0 01/25/2024    TBILI 0.34 01/25/2024    CHOLESTEROL 183 04/07/2023    TRIG 89 04/07/2023    HDL 50 04/07/2023    LDLCALC 115 (H) 04/07/2023    NONHDLC 133 04/07/2023    HNY5PYNTPWGI 2.235 04/07/2023    HCGQUANT <2 08/27/2018     I have personally reviewed all pertinent laboratory/tests results.    Assessment/Plan: The patient  overall doing well despite of multiple stressors.  Supportive psychotherapy provided.  No SI or HI.  Depression and anxiety much better.  Plan is to continue with the current medication with no changes.  The patient will follow up with me in 3 months or sooner if needed.  She was educated about her medication in detail and advised to call us if there is any concern and to call crisis, 911 or visit nearby ER in case of any emergency.  Patient verbalized understanding and agrees with the plan.      Diagnoses and all orders for this visit:    MDD (major depressive disorder), recurrent episode, mild (HCC)  -     buPROPion (WELLBUTRIN XL) 300 mg 24 hr tablet; Take 1 tablet (300 mg total) by mouth daily    Generalized anxiety disorder          Treatment Recommendations/Precautions:      Aware of 24 hour and weekend coverage for urgent situations accessed by calling [facility] main practice number    Risks/Benefits      Risks, Benefits And Possible Side Effects Of Medications:    Risks, benefits, and possible side effects of medications explained to Lianne and she verbalizes understanding and agreement for treatment.    Controlled Medication Discussion:     Not applicable    Psychotherapy Provided: Supportive psychotherapy provided for 20 minutes.    Individual psychotherapy provided: Medications, treatment progress and treatment plan reviewed with Lianne.  Medication education provided to Lianne.  Reassurance and supportive therapy provided.   Crisis/safety plan discussed with Lianne.     Treatment Plan;    Completed and signed during the session: Yes - with Lianne  Visit Time    Visit Start Time: 3:03 PM  Visit Stop Time: 3:35 PM  Total Visit Duration:  32 minutes      Vera Morales MD 02/19/24

## 2024-02-21 PROBLEM — Z01.419 ENCOUNTER FOR ANNUAL ROUTINE GYNECOLOGICAL EXAMINATION: Status: RESOLVED | Noted: 2018-04-19 | Resolved: 2024-02-21

## 2024-03-05 ENCOUNTER — OFFICE VISIT (OUTPATIENT)
Dept: OBGYN CLINIC | Facility: CLINIC | Age: 44
End: 2024-03-05
Payer: COMMERCIAL

## 2024-03-05 VITALS
HEIGHT: 65 IN | WEIGHT: 237 LBS | SYSTOLIC BLOOD PRESSURE: 118 MMHG | DIASTOLIC BLOOD PRESSURE: 72 MMHG | BODY MASS INDEX: 39.49 KG/M2

## 2024-03-05 DIAGNOSIS — G56.01 CARPAL TUNNEL SYNDROME OF RIGHT WRIST: ICD-10-CM

## 2024-03-05 DIAGNOSIS — G56.03 BILATERAL CARPAL TUNNEL SYNDROME: Primary | ICD-10-CM

## 2024-03-05 PROCEDURE — 99203 OFFICE O/P NEW LOW 30 MIN: CPT | Performed by: SURGERY

## 2024-03-05 NOTE — PROGRESS NOTES
Assessment:    Bilateral carpal tunnel syndrome       Plan:    Initiate treatment with nighttime bracing.  Velcro cock up wrist braces dispensed in the office today.  Activities as tolerated.  Follow-up in 6 to 8 weeks for re-evaluation of symptoms.            Subjective:     HPI    Patient ID:  Lianne Tay is a right hand dominant 44 y.o. female presenting for evaluation of the bilateral hands.  According to the patient, she has a 5-year history of mainly numbness and tingling of all the fingers of the right greater than left hand with associated pain and subjective weakness to the point where she is unable to buckle objects and sometimes drops things.  Pain shoots usually in the volar forearm to the wrist sometimes more proximally to the bicep region.  There is no injury or trauma to the hands.  Symptoms have gotten progressively worse over this timeframe to where they are constant.  She has not had any treatment for it thus far.  She has no history of surgery to either hand or wrist.      The following portions of the patient's history were reviewed and updated as appropriate: allergies, current medications, past family history, past medical history, past social history, past surgical history, and problem list.    Review of Systems     Objective:    Imaging:  EMG B/L UE 2/16/2024  Impression: EMG and nerve conduction study of the arms reveals bilateral carpal tunnel syndrome with early signs of motor involvement but without denervation.     Physical Exam     General appearance:  NAD   Cardiac:  Regular rate  Lungs:  Unlabored breathing  Abdomen:  Non-distended    Orthopedic Examination:  Bilateral hands     Inspection: No open wounds or erythema.  No ecchymosis or swelling.  No muscle wasting.    Palpation: Nontender to palpation first dorsal extensor compartment and bony prominences of the hand and wrist    Range-of-motion: Normal wrist range of motion, full composite fist    Strength: 5/5 wrist flexion  extension, , intrinsics, thumb opposition, APB    Sensation: Intact to light touch median radial ulnar nerve distribution  Normal 2-point discrimination testing throughout bilaterally    Special Tests: Positive Tinel's bilateral wrists, positive Durkan's compression  Palpable radial pulse bilaterally

## 2024-03-20 ENCOUNTER — TELEPHONE (OUTPATIENT)
Dept: HEMATOLOGY ONCOLOGY | Facility: CLINIC | Age: 44
End: 2024-03-20

## 2024-03-20 ENCOUNTER — OFFICE VISIT (OUTPATIENT)
Dept: HEMATOLOGY ONCOLOGY | Facility: CLINIC | Age: 44
End: 2024-03-20
Payer: COMMERCIAL

## 2024-03-20 VITALS
TEMPERATURE: 97.9 F | SYSTOLIC BLOOD PRESSURE: 110 MMHG | HEART RATE: 76 BPM | HEIGHT: 65 IN | BODY MASS INDEX: 38.82 KG/M2 | DIASTOLIC BLOOD PRESSURE: 74 MMHG | WEIGHT: 233 LBS | OXYGEN SATURATION: 99 %

## 2024-03-20 DIAGNOSIS — E53.8 LOW SERUM VITAMIN B12: Primary | ICD-10-CM

## 2024-03-20 PROCEDURE — 99214 OFFICE O/P EST MOD 30 MIN: CPT | Performed by: PHYSICIAN ASSISTANT

## 2024-03-20 RX ORDER — CYANOCOBALAMIN 1000 UG/ML
1000 INJECTION, SOLUTION INTRAMUSCULAR; SUBCUTANEOUS ONCE
Status: CANCELLED | OUTPATIENT
Start: 2024-03-27

## 2024-03-20 NOTE — PROGRESS NOTES
Hematology/Oncology Outpatient Follow-up  Lianne Tay 44 y.o. female 1980 [id number]    Date:  3/20/2024      Assessment and Plan:  1. Low serum vitamin B12  B12 is less than 300  She states that she took sublingual B12 after her visit in August for about 2 months and then stopped.  She took liquid B12 for the last 2 months.  Reviewed based on her gastric paresis history and concern of malabsorption, sublingual or parenteral replacement would be preferred.  Due to her fatigue symptoms recommend B12 1000 mcg injection weekly x 4 followed by initiation of 1000 mcg sublingual, follow-up labs in 6 months.    - CBC and differential; Future  - Vitamin B12; Future  - Iron Panel (Includes Ferritin, Iron Sat%, Iron, and TIBC); Future     2.  History of iron deficiency  Her levels are normal at this time.  No need for additional oral or IV therapy    HPI:  44-year-old female presents for follow-up visit.  She has been followed for vitamin B12 and iron deficiency, previously following with JOAO Owusu.    She was last seen in July 2023.    She has history of gastritis, gastroparesis, chronic anemia, has required IV iron infusions in the past.  She had history of heavy menses related to fibroids with bleeding lasting 7 days.  She has had upper and lower GI scopes completed in July 2019 for which she had hemorrhoid.    She had confirmatory testing with emptying study to confirm her gastroparesis.    She states follow-up with GYN is up-to-date.    ROS: Review of Systems   Constitutional:  Positive for fatigue. Negative for appetite change, chills, fever and unexpected weight change.   Respiratory:  Negative for cough and shortness of breath.    Cardiovascular:  Negative for chest pain, palpitations and leg swelling.   Gastrointestinal:  Negative for abdominal pain, constipation, diarrhea, nausea and vomiting.   Genitourinary:  Negative for difficulty urinating, dysuria and hematuria.   Musculoskeletal:  Negative for  arthralgias.   Skin: Negative.    Neurological:  Negative for dizziness, weakness, light-headedness, numbness and headaches.   Hematological: Negative.    Psychiatric/Behavioral: Negative.         Past Medical History:   Diagnosis Date    Acne 2015    Acquired hallux valgus 2016    Acute non-recurrent maxillary sinusitis 2020    Anemia     Anxiety 10/16/2019    Arthritis     Benign paroxysmal positional vertigo 2017    Candida vaginitis 2019    Colon polyp 2019    COVID-19 vaccine series completed     Moderna: 2nd dose 2021    Depression     Discoloration of skin of hand 2019    Duodenitis without bleeding     Dysphagia     Fibromyalgia     Gastritis     GERD (gastroesophageal reflux disease)     Hiatal hernia     Hx of colonoscopy     Insomnia     Iron deficiency anemia secondary to inadequate dietary iron intake 10/18/2019    Memory loss     Menorrhagia with regular cycle 2014    Moderate episode of recurrent major depressive disorder (HCC) 10/16/2019    Obesity     Oral herpes     Ovarian cyst 2013    Small dermoid on right ovary    Pap smear for cervical cancer screening     2016-wnl; 2020-wnl, HRHPV neg    Sodium (Na) deficiency 10/19/2020    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 myelopathy or radiculopathy     Streptococcal pharyngitis 10/19/2020    Vaginal discharge 2018    Vitamin D deficiency        Past Surgical History:   Procedure Laterality Date    APPENDECTOMY      BREAST BIOPSY Right     pt unsure when or where- ? 6 yrs ago- benign    BREAST SURGERY Right 2015    lump removed from breast      SECTION  1998    CHOLECYSTECTOMY      COLONOSCOPY      2012; 2019-benign polyp, repeat 5 years    DILATION AND CURETTAGE OF UTERUS      Resolved:2009    ENDOMETRIAL ABLATION N/A 2020    Procedure: ABLATION ENDOMETRIAL NOVASURE;  Surgeon: Brenna Peguero MD;  Location: [facility];  Service: Gynecology     ESOPHAGOGASTRODUODENOSCOPY      Diagnostic ; 9/26/2012    HYSTEROSCOPY      Resolved:1/30/2009    OVARIAN CYST REMOVAL Right 2005    ND HYSTEROSCOPY BX ENDOMETRIUM&/POLYPC W/WO D&C N/A 08/13/2020    Procedure: DILATATION AND CURETTAGE (D&C) WITH HYSTEROSCOPY;  Surgeon: Brenna Peguero MD;  Location: [facility];  Service: Gynecology    WISDOM TOOTH EXTRACTION         Social History     Socioeconomic History    Marital status: /Civil Union     Spouse name: Orion    Number of children: 4    Years of education: high school    Highest education level: None   Occupational History    Occupation: cash    Tobacco Use    Smoking status: Never     Passive exposure: Never    Smokeless tobacco: Never    Tobacco comments:     No passive smoke exposure   Vaping Use    Vaping status: Never Used   Substance and Sexual Activity    Alcohol use: No    Drug use: Never    Sexual activity: Yes     Partners: Male     Birth control/protection: Male Sterilization     Comment: life time partners:1   Other Topics Concern    None   Social History Narrative    [employer]    Accepts blood products            Exercise: 0x/week due to fibormyalgia    Calcium: 1 c almond milk daily; 1 cow milk daily,  1 cheese daily, 1 yogurt 4x/week         caffeine occasionally        Do you have pets? dog Is pet allowed in bedroom?No    Are you a smoker? Never    Does anyone smoke in your home? No       Do you live with smokers? No    [place] frequently? No   How many times a year? N/A      Social Determinants of Health     Financial Resource Strain: Low Risk  (11/3/2020)    Overall Financial Resource Strain (CARDIA)     Difficulty of Paying Living Expenses: Not very hard   Food Insecurity: No Food Insecurity (11/3/2020)    Hunger Vital Sign     Worried About Running Out of Food in the Last Year: Never true     Ran Out of Food in the Last Year: Never true   Transportation Needs: No Transportation Needs (11/3/2020)    PRAPARE -  Transportation     Lack of Transportation (Medical): No     Lack of Transportation (Non-Medical): No   Physical Activity: Inactive (10/19/2020)    Exercise Vital Sign     Days of Exercise per Week: 0 days     Minutes of Exercise per Session: 0 min   Stress: Stress Concern Present (10/19/2020)    [employer] [employer] - Occupational Stress Questionnaire     Feeling of Stress : To some extent   Social Connections: Socially Integrated (10/19/2020)    Social Connection and Isolation Panel [NHANES]     Frequency of Communication with Friends and Family: More than three times a week     Frequency of Social Gatherings with Friends and Family: More than three times a week     Attends Latter-day Services: More than 4 times per year     Active Member of Clubs or Organizations: Yes     Attends Club or Organization Meetings: More than 4 times per year     Marital Status:    Intimate Partner Violence: Not At Risk (10/19/2020)    Humiliation, Afraid, Rape, and Kick questionnaire     Fear of Current or Ex-Partner: No     Emotionally Abused: No     Physically Abused: No     Sexually Abused: No   Housing Stability: Not on file       Family History   Problem Relation Age of Onset    Other Mother         uterine leiomyoma    Diabetes type II Mother         Mellitus    Hypothyroidism Mother     Colon cancer Father 55        Stage IV    Diabetes Father         Type II Mellitus    Hypertension Father     Squamous cell carcinoma Father         Located waist down around kidneys, lowe back, tail bone, prostate and bladder.    Multiple sclerosis Sister     Asthma Sister     Hypothyroidism Sister     Hypertension Maternal Grandmother     Diabetes Maternal Grandmother     Other Maternal Grandmother         Vulvar cancer    Schizophrenia Maternal Grandfather     Hypertension Maternal Grandfather     Thyroid cancer Paternal Grandmother 82    Diabetes Paternal Grandfather 42         due to complications of DM     Anxiety disorder Brother     Depression Brother     Diabetes Brother         Mellitus    Asthma Brother     Hypertension Brother     Breast cancer Cousin 52    Ovarian cancer Neg Hx        Allergies   Allergen Reactions    Domperidone Other (See Comments)     Reaction unknown to patient.    Morphine Chest Pain and Hives     Other reaction(s): chest pain    Ibuprofen GI Intolerance     Due to gastritis    Percocet [Oxycodone-Acetaminophen] Hives         Current Outpatient Medications:     albuterol (Ventolin HFA) 90 mcg/act inhaler, Inhale 2 puffs every 6 (six) hours as needed for wheezing, Disp: 18 g, Rfl: 0    budesonide-formoterol (SYMBICORT) 160-4.5 mcg/act inhaler, Inhale 2 puffs 2 (two) times a day Rinse mouth after use., Disp: 10.2 g, Rfl: 0    buPROPion (WELLBUTRIN XL) 300 mg 24 hr tablet, Take 1 tablet (300 mg total) by mouth daily, Disp: 90 tablet, Rfl: 1    famotidine (PEPCID) 40 MG tablet, Take 1 tablet (40 mg total) by mouth daily at bedtime, Disp: 30 tablet, Rfl: 3    pantoprazole (PROTONIX) 40 mg tablet, Take 1 tablet (40 mg total) by mouth 2 (two) times a day before meals, Disp: 180 tablet, Rfl: 1    clindamycin (CLINDAGEL) 1 % gel, Apply topically 2 (two) times a day (Patient not taking: Reported on 3/5/2024), Disp: 30 g, Rfl: 2    cyanocobalamin (VITAMIN B-12) 1000 MCG tablet, Take 1 tablet (1,000 mcg total) by mouth daily (Patient not taking: Reported on 7/24/2023), Disp: 90 tablet, Rfl: 3    cyclobenzaprine (FLEXERIL) 5 mg tablet, Take 1 tablet (5 mg total) by mouth 3 (three) times a day as needed for muscle spasms (Patient not taking: Reported on 11/21/2023), Disp: 30 tablet, Rfl: 1    dicyclomine (BENTYL) 20 mg tablet, Take 1 tablet (20 mg total) by mouth 2 (two) times a day (Patient not taking: Reported on 11/21/2023), Disp: 20 tablet, Rfl: 0    fexofenadine (ALLEGRA) 180 MG tablet, Take 1 tablet (180 mg total) by mouth daily (Patient not taking: Reported on 11/21/2023), Disp: , Rfl:      "fluticasone (FLONASE) 50 mcg/act nasal spray, SPRAY 2 SPRAYS INTO EACH NOSTRIL EVERY DAY (Patient not taking: Reported on 11/21/2023), Disp: 48 mL, Rfl: 2    hydrOXYzine HCL (ATARAX) 10 mg tablet, TAKE 1 TABLET (10 MG TOTAL) BY MOUTH 2 (TWO) TIMES A DAY AS NEEDED FOR ANXIETY (Patient not taking: Reported on 11/21/2023), Disp: 180 tablet, Rfl: 1    predniSONE 10 mg tablet, 30 mg p.o. day x3 days then 20 mg 1 p.o. Q.day x3 days then 10 mg p.o.day x3 days then 5 mg p.o day x 3 days (Patient not taking: Reported on 3/5/2024), Disp: 25 tablet, Rfl: 0      Physical Exam:  /74 (BP Location: Right arm, Patient Position: Sitting, Cuff Size: Large)   Pulse 76   Temp 97.9 °F (36.6 °C) (Temporal)   Ht 5' 5\" (1.651 m)   Wt 106 kg (233 lb)   SpO2 99%   BMI 38.77 kg/m²     Physical Exam  Constitutional:       General: She is not in acute distress.     Appearance: She is well-developed. She is obese. She is not ill-appearing.   HENT:      Head: Normocephalic and atraumatic.   Eyes:      General: No scleral icterus.     Conjunctiva/sclera: Conjunctivae normal.   Cardiovascular:      Rate and Rhythm: Normal rate and regular rhythm.      Heart sounds: Normal heart sounds. No murmur heard.  Pulmonary:      Effort: Pulmonary effort is normal. No respiratory distress.      Breath sounds: Normal breath sounds.   Abdominal:      Palpations: Abdomen is soft.      Tenderness: There is no abdominal tenderness.   Musculoskeletal:         General: No tenderness. Normal range of motion.      Cervical back: Normal range of motion and neck supple.      Right lower leg: No edema.      Left lower leg: No edema.   Lymphadenopathy:      Cervical: No cervical adenopathy.   Skin:     General: Skin is warm and dry.   Neurological:      Mental Status: She is alert and oriented to person, place, and time.      Cranial Nerves: No cranial nerve deficit.   Psychiatric:         Mood and Affect: Mood normal.         Behavior: Behavior normal. "       Labs:  Lab Results   Component Value Date    WBC 6.38 01/25/2024    HGB 11.7 01/25/2024    HCT 36.4 01/25/2024    MCV 94 01/25/2024     01/25/2024     I have spent 30 minutes with Patient and family today in which greater than 50% of this time was spent in counseling/coordination of care regarding Diagnostic results, Risks and benefits of tx options, Instructions for management, Patient and family education, Importance of tx compliance, Impressions, Documenting in the medical record, Reviewing / ordering tests, medicine, procedures  , and Obtaining or reviewing history       Patient voiced understanding and agreement in the above discussion. Aware to contact our office with questions/symptoms in the interim.     This note has been generated by voice recognition software system.  Therefore, there may be spelling, grammar, and or syntax errors. Please contact if questions arise.

## 2024-03-21 ENCOUNTER — PATIENT MESSAGE (OUTPATIENT)
Dept: HEMATOLOGY ONCOLOGY | Facility: CLINIC | Age: 44
End: 2024-03-21

## 2024-03-21 NOTE — TELEPHONE ENCOUNTER
Spoke to spouse, per spouse patient is currently working and doesn't come out till 415PM. My number was provided to return my call for scheduling.

## 2024-03-29 ENCOUNTER — HOSPITAL ENCOUNTER (OUTPATIENT)
Dept: INFUSION CENTER | Facility: CLINIC | Age: 44
End: 2024-03-29
Payer: COMMERCIAL

## 2024-03-29 DIAGNOSIS — E53.8 LOW SERUM VITAMIN B12: Primary | ICD-10-CM

## 2024-03-29 PROCEDURE — 96372 THER/PROPH/DIAG INJ SC/IM: CPT

## 2024-03-29 RX ORDER — CYANOCOBALAMIN 1000 UG/ML
1000 INJECTION, SOLUTION INTRAMUSCULAR; SUBCUTANEOUS ONCE
OUTPATIENT
Start: 2024-04-05

## 2024-03-29 RX ORDER — CYANOCOBALAMIN 1000 UG/ML
1000 INJECTION, SOLUTION INTRAMUSCULAR; SUBCUTANEOUS ONCE
Status: COMPLETED | OUTPATIENT
Start: 2024-03-29 | End: 2024-03-29

## 2024-03-29 RX ADMIN — CYANOCOBALAMIN 1000 MCG: 1000 INJECTION, SOLUTION INTRAMUSCULAR at 08:36

## 2024-03-29 NOTE — PLAN OF CARE
Problem: INFECTION - ADULT  Goal: Absence or prevention of progression during hospitalization  Description: INTERVENTIONS:  - Assess and monitor for signs and symptoms of infection  - Monitor lab/diagnostic results  - Monitor all insertion sites, i.e. indwelling lines, tubes, and drains  - Monitor endotracheal if appropriate and nasal secretions for changes in amount and color  - [place] appropriate cooling/warming therapies per order  - Administer medications as ordered  - Instruct and encourage patient and family to use good hand hygiene technique  - Identify and instruct in appropriate isolation precautions for identified infection/condition  Outcome: Progressing

## 2024-03-29 NOTE — PROGRESS NOTES
Patient tolerated her B12 injection in her left arm well without adverse reaction. She is aware of her next appointment on 4/5/24.

## 2024-04-05 ENCOUNTER — HOSPITAL ENCOUNTER (OUTPATIENT)
Dept: INFUSION CENTER | Facility: CLINIC | Age: 44
End: 2024-04-05
Payer: COMMERCIAL

## 2024-04-05 DIAGNOSIS — E53.8 LOW SERUM VITAMIN B12: Primary | ICD-10-CM

## 2024-04-05 RX ORDER — CYANOCOBALAMIN 1000 UG/ML
1000 INJECTION, SOLUTION INTRAMUSCULAR; SUBCUTANEOUS ONCE
Status: CANCELLED | OUTPATIENT
Start: 2024-04-12

## 2024-04-05 RX ORDER — CYANOCOBALAMIN 1000 UG/ML
1000 INJECTION, SOLUTION INTRAMUSCULAR; SUBCUTANEOUS ONCE
Status: COMPLETED | OUTPATIENT
Start: 2024-04-05 | End: 2024-04-05

## 2024-04-05 RX ADMIN — CYANOCOBALAMIN 1000 MCG: 1000 INJECTION, SOLUTION INTRAMUSCULAR at 15:28

## 2024-04-05 NOTE — PLAN OF CARE
Problem: INFECTION - ADULT  Goal: Absence or prevention of progression during hospitalization  Description: INTERVENTIONS:  - Assess and monitor for signs and symptoms of infection  - Monitor lab/diagnostic results  - Monitor all insertion sites, i.e. indwelling lines, tubes, and drains  - Monitor endotracheal if appropriate and nasal secretions for changes in amount and color  - [place] appropriate cooling/warming therapies per order  - Administer medications as ordered  - Instruct and encourage patient and family to use good hand hygiene technique  - Identify and instruct in appropriate isolation precautions for identified infection/condition  Outcome: Progressing

## 2024-04-05 NOTE — PROGRESS NOTES
Patient tolerated her B12 injection in her right arm well without adverse reaction. She yareli ware of her next injection on 4/12/24.

## 2024-04-09 ENCOUNTER — OFFICE VISIT (OUTPATIENT)
Dept: GASTROENTEROLOGY | Facility: CLINIC | Age: 44
End: 2024-04-09
Payer: COMMERCIAL

## 2024-04-09 VITALS
DIASTOLIC BLOOD PRESSURE: 70 MMHG | SYSTOLIC BLOOD PRESSURE: 118 MMHG | BODY MASS INDEX: 39.65 KG/M2 | WEIGHT: 238 LBS | TEMPERATURE: 98.5 F | HEIGHT: 65 IN

## 2024-04-09 DIAGNOSIS — Z80.0 FAMILY HISTORY OF COLON CANCER IN FATHER: ICD-10-CM

## 2024-04-09 DIAGNOSIS — K21.9 GASTROESOPHAGEAL REFLUX DISEASE WITHOUT ESOPHAGITIS: Primary | ICD-10-CM

## 2024-04-09 DIAGNOSIS — Z86.010 PERSONAL HISTORY OF COLONIC POLYPS: ICD-10-CM

## 2024-04-09 PROCEDURE — 99213 OFFICE O/P EST LOW 20 MIN: CPT | Performed by: PHYSICIAN ASSISTANT

## 2024-04-09 NOTE — PROGRESS NOTES
"[facility] - Outpatient Follow-up Note  Lianne Tay 44 y.o. female MRN: [id number]  Encounter: [id number]    ASSESSMENT AND PLAN:      1. Gastroesophageal reflux disease without esophagitis  We reviewed her recent EGD results.  Patient expressed understanding to results.  All questions answered.  She is overall feeling much better since last visit with addition of nighttime famotidine 40 mg.  No longer with nausea, vomiting, dysphagia.  She does continue with very intermittent right sided \"esophageal\" pain.  Also having rare intermittent left-sided chest discomfort.  She is following up with PCP tomorrow.  I recommend she discuss this intermittent left-sided chest discomfort/pain with PCP.    I did discuss with the patient that it is possible her right-sided \"esophageal\" pain could be flares of heartburn/acid reflux or possibly esophageal spasm.  I recommend that she continue with pantoprazole 40 mg once daily before breakfast and continue with famotidine 40 mg once daily at bedtime  Discussed that she can trial using Tums over-the-counter as needed and see if this improves her discomfort.  Also discussed with the patient the importance of an ongoing GERD diet/lifestyle.  I provided a handout in the office today.  We discussed avoiding fatty, greasy, spicy foods, limiting excess caffeine, chocolate, alcohol.  I did discuss with the patient we can certainly consider ordering an esophagram/barium swallow for further evaluation of ongoing, intermittent symptoms but patient declines further testing at this time.      She will discuss symptoms with family doctor/PCP tomorrow      3. Personal history of colonic polyps  4. Family history of colon cancer in father  Due for screening colonoscopy.  Patient is high risk.   Will order screening colonoscopy.     - polyethylene glycol (GOLYTELY) 4000 mL solution; Take 4,000 mL by mouth once for 1 dose Take as directed by office instructions prior to " "colonoscopy.  Dispense: 4000 mL; Refill: 0  - Colonoscopy; Future    I obtained informed consent from the patient the risk/benefits/alternatives of the procedure/s were discussed with the patient.  Risks included, but not limited to, infection, bleeding, perforation, injury to organs in the abdomen, missed lesion and incomplete procedure were discussed.  Patient was agreeable and electronic signature was obtained.      Patient was instructed to call the office with any questions, concerns, new/ worsening/ persisting GI symptoms. Advised patient go to the ER with any severe or worsening abdominal pain, fevers/ chills, intractable N/V, chest pain, SOB, dizziness, lightheadedness, feeling something stuck in esophagus that will not go down. Patient expressed understanding and is in agreement with treatment plan.     Will plan to follow up in 6 months time  __________________________________________________________    SUBJECTIVE:      Patient with a past medical history of nonalcoholic fatty liver disease, hemorrhoids, gastroparesis, GERD, constipation, impaired fasting glucose, allergies, obstructive sleep apnea, history of depression presents to the office today for follow-up.  Patient was last seen in the office by me 10/31/2023, previous office note was reviewed.  At last office visit I started patient on famotidine 40 mg once daily at bedtime in addition to pantoprazole 40 mg once daily in the morning.  I also ordered an EGD.  I recommended patient be on a clear liquid diet for 24 hours day prior to her EGD.  EGD was completed 12/5/2023, this was reviewed, this showed a small hiatal hernia otherwise normal-appearing esophagus, stomach, duodenum.  Esophageal biopsy was unremarkable.  Gastric biopsy was negative for H. pylori.  Duodenal biopsy was negative for celiac disease.  Following the EGD it was recommended patient increase her pantoprazole 40 mg to twice daily.    Patient continues with \"esophagus\"  right sided " pain. It comes and goes. When there, can last for a week. Can go months without the pain. She describes the pain as a pulling. No certain food triggers. Nothing makes the pain worse. Nothing makes the pain better.  Not related to exertion.  No longer with nausea or vomiting.   No longer with issues swallowing.   She has been taking pantoprazole 40 mg once daily in the AM and famotidine 40 mg once daily in the evening.   She has occasionally been having left-sided chest pain, notes she has an appointment with her family doctor tomorrow.  Her weight has been stable.   Patient denies any fevers/ chills, black or bloody stools,  odynophagia. Denies chest pain, SOB    Her father was diagnosed with colon cancer age 50     Review of Systems   Constitutional:  Negative for chills and fever.   HENT:  Negative for ear pain and sore throat.    Eyes:  Negative for pain and visual disturbance.   Respiratory:  Negative for cough and shortness of breath.    Cardiovascular:  Negative for chest pain and palpitations.   Gastrointestinal:  Negative for abdominal pain and vomiting.   Genitourinary:  Negative for dysuria and hematuria.   Musculoskeletal:  Negative for arthralgias and back pain.   Skin:  Negative for color change and rash.   Neurological:  Negative for seizures and syncope.   All other systems reviewed and are negative.         Historical Information   Past Medical History:   Diagnosis Date    Acne 04/23/2015    Acquired hallux valgus 03/07/2016    Acute non-recurrent maxillary sinusitis 01/27/2020    Anemia     Anxiety 10/16/2019    Arthritis     Benign paroxysmal positional vertigo 03/08/2017    Candida vaginitis 04/25/2019    Colon polyp 2019    COVID-19 vaccine series completed     Moderna: 2nd dose 5/21/2021    Depression     Discoloration of skin of hand 03/20/2019    Duodenitis without bleeding     Dysphagia     Fibromyalgia     Gastritis     GERD (gastroesophageal reflux disease)     Hiatal hernia     Hx of  colonoscopy     Insomnia     Iron deficiency anemia secondary to inadequate dietary iron intake 10/18/2019    Memory loss     Menorrhagia with regular cycle 2014    Moderate episode of recurrent major depressive disorder (HCC) 10/16/2019    Obesity     Oral herpes     Ovarian cyst 2013    Small dermoid on right ovary    Pap smear for cervical cancer screening     2016-wnl; 2020-wnl, HRHPV neg    Sodium (Na) deficiency 10/19/2020    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 myelopathy or radiculopathy     Streptococcal pharyngitis 10/19/2020    Vaginal discharge 2018    Vitamin D deficiency      Past Surgical History:   Procedure Laterality Date    APPENDECTOMY      BREAST BIOPSY Right     pt unsure when or where- ? 6 yrs ago- benign    BREAST SURGERY Right 2015    lump removed from breast      SECTION      CHOLECYSTECTOMY      COLONOSCOPY      2012; 2019-benign polyp, repeat 5 years    DILATION AND CURETTAGE OF UTERUS      Resolved:2009    ENDOMETRIAL ABLATION N/A 2020    Procedure: ABLATION ENDOMETRIAL NOVASURE;  Surgeon: Brenna Peguero MD;  Location: [facility];  Service: Gynecology    ESOPHAGOGASTRODUODENOSCOPY      Diagnostic ; 2012    HYSTEROSCOPY      Resolved:2009    OVARIAN CYST REMOVAL Right     ME HYSTEROSCOPY BX ENDOMETRIUM&/POLYPC W/WO D&C N/A 2020    Procedure: DILATATION AND CURETTAGE (D&C) WITH HYSTEROSCOPY;  Surgeon: Brenna Peguero MD;  Location: [facility];  Service: Gynecology    WISDOM TOOTH EXTRACTION       Social History   Social History     Substance and Sexual Activity   Alcohol Use No     Social History     Substance and Sexual Activity   Drug Use Never     Social History     Tobacco Use   Smoking Status Never    Passive exposure: Never   Smokeless Tobacco Never   Tobacco Comments    No passive smoke exposure     Family History   Problem Relation Age of Onset    Other Mother         uterine leiomyoma    Diabetes type II  Mother         Mellitus    Hypothyroidism Mother     Colon cancer Father 55        Stage IV    Diabetes Father         Type II Mellitus    Hypertension Father     Squamous cell carcinoma Father         Located waist down around kidneys, lowe back, tail bone, prostate and bladder.    Multiple sclerosis Sister     Asthma Sister     Hypothyroidism Sister     Hypertension Maternal Grandmother     Diabetes Maternal Grandmother     Other Maternal Grandmother         Vulvar cancer    Schizophrenia Maternal Grandfather     Hypertension Maternal Grandfather     Thyroid cancer Paternal Grandmother 82    Diabetes Paternal Grandfather 42         due to complications of DM    Anxiety disorder Brother     Depression Brother     Diabetes Brother         Mellitus    Asthma Brother     Hypertension Brother     Breast cancer Cousin 52    Ovarian cancer Neg Hx        Meds/Allergies       Current Outpatient Medications:     albuterol (Ventolin HFA) 90 mcg/act inhaler    budesonide-formoterol (SYMBICORT) 160-4.5 mcg/act inhaler    buPROPion (WELLBUTRIN XL) 300 mg 24 hr tablet    famotidine (PEPCID) 40 MG tablet    pantoprazole (PROTONIX) 40 mg tablet    clindamycin (CLINDAGEL) 1 % gel    cyanocobalamin (VITAMIN B-12) 1000 MCG tablet    cyclobenzaprine (FLEXERIL) 5 mg tablet    dicyclomine (BENTYL) 20 mg tablet    fexofenadine (ALLEGRA) 180 MG tablet    fluticasone (FLONASE) 50 mcg/act nasal spray    hydrOXYzine HCL (ATARAX) 10 mg tablet    predniSONE 10 mg tablet    Allergies   Allergen Reactions    Domperidone Other (See Comments)     Reaction unknown to patient.    Morphine Chest Pain and Hives     Other reaction(s): chest pain    Ibuprofen GI Intolerance     Due to gastritis    Percocet [Oxycodone-Acetaminophen] Hives           Objective     Wt Readings from Last 3 Encounters:   24 108 kg (238 lb)   24 106 kg (233 lb)   24 108 kg (237 lb)     Temp Readings from Last 3 Encounters:   24 98.5 °F (36.9 °C)  (Tympanic)   03/20/24 97.9 °F (36.6 °C) (Temporal)   02/14/24 98.4 °F (36.9 °C) (Tympanic)     BP Readings from Last 3 Encounters:   04/09/24 118/70   03/20/24 110/74   03/05/24 118/72     Pulse Readings from Last 3 Encounters:   03/20/24 76   02/14/24 67   12/27/23 66          PHYSICAL EXAM:      Physical Exam  Vitals reviewed.   Constitutional:       General: She is not in acute distress.     Appearance: She is not toxic-appearing.   HENT:      Head: Normocephalic and atraumatic.   Eyes:      Extraocular Movements: Extraocular movements intact.      Conjunctiva/sclera: Conjunctivae normal.   Cardiovascular:      Rate and Rhythm: Normal rate and regular rhythm.   Pulmonary:      Effort: Pulmonary effort is normal. No respiratory distress.      Breath sounds: Normal breath sounds.   Abdominal:      General: Bowel sounds are normal.      Palpations: Abdomen is soft.      Tenderness: There is no abdominal tenderness.   Musculoskeletal:         General: No swelling or tenderness.      Cervical back: Normal range of motion and neck supple.   Skin:     General: Skin is warm and dry.      Coloration: Skin is not jaundiced.   Neurological:      General: No focal deficit present.      Mental Status: She is alert and oriented to person, place, and time. Mental status is at baseline.   Psychiatric:         Mood and Affect: Mood normal.         Behavior: Behavior normal.         Thought Content: Thought content normal.        Lab Results:   Lab Results   Component Value Date    WBC 6.38 01/25/2024    HGB 11.7 01/25/2024    HCT 36.4 01/25/2024    MCV 94 01/25/2024     01/25/2024       Lab Results   Component Value Date    SODIUM 141 01/25/2024    K 4.0 01/25/2024     01/25/2024    CO2 27 01/25/2024    AGAP 10 01/25/2024    BUN 11 01/25/2024    CREATININE 0.77 01/25/2024    GLUC 89 03/28/2023    GLUF 90 01/25/2024    CALCIUM 9.3 01/25/2024    AST 18 01/25/2024    ALT 21 01/25/2024    ALKPHOS 62 01/25/2024    TP 7.0  01/25/2024    TBILI 0.34 01/25/2024    EGFR 92 01/25/2024     Lab Results   Component Value Date    CRP 3.2 (H) 01/25/2024     Lab Results   Component Value Date    [id number] 2.235 04/07/2023    TSH 1.29 08/14/2021       Radiology Results:   Patient s/p nuclear medicine gastric emptying scan in October 2022 which did show delayed rate of gastric emptying, T half-time 154 minutes.    Patient did undergo a barium swallow study in April 2022, this was removed and grossly unremarkable.    Prior Procedure Results/Reports   Last EGD reviewed done in September 2020 for nausea and showed food bolus in the body of the stomach, large quantity of fluid was seen in the stomach which made the exam difficult.  The esophagus and duodenum was otherwise normal.  Stomach and duodenal biopsies were normal at this time.    Last Colonoscopy reviewed done in August 2019 for abnormal CT scan, colonic thickening and showed large internal and external hemorrhoids, 1 polyp in the ascending colon which was removed, otherwise completely normal colonoscopy.  Repeat colonoscopy was recommended in 5 years due to personal history of colon polyps    Abby Hull PA-C   Available on [employer]

## 2024-04-09 NOTE — PATIENT INSTRUCTIONS
Scheduled date of colonoscopy (as of today): 08/09/2024  Physician performing colonoscopy: Dr. Block  Location of colonoscopy:   Bowel prep reviewed with patient: Golytely   Instructions reviewed with patient by: Samantha CONDE  Clearances:  N/A

## 2024-04-12 ENCOUNTER — HOSPITAL ENCOUNTER (OUTPATIENT)
Dept: INFUSION CENTER | Facility: CLINIC | Age: 44
End: 2024-04-12
Payer: COMMERCIAL

## 2024-04-12 DIAGNOSIS — E53.8 LOW SERUM VITAMIN B12: Primary | ICD-10-CM

## 2024-04-12 PROCEDURE — 96372 THER/PROPH/DIAG INJ SC/IM: CPT

## 2024-04-12 RX ORDER — CYANOCOBALAMIN 1000 UG/ML
1000 INJECTION, SOLUTION INTRAMUSCULAR; SUBCUTANEOUS ONCE
Status: COMPLETED | OUTPATIENT
Start: 2024-04-12 | End: 2024-04-12

## 2024-04-12 RX ORDER — CYANOCOBALAMIN 1000 UG/ML
1000 INJECTION, SOLUTION INTRAMUSCULAR; SUBCUTANEOUS ONCE
OUTPATIENT
Start: 2024-04-19

## 2024-04-12 RX ADMIN — CYANOCOBALAMIN 1000 MCG: 1000 INJECTION, SOLUTION INTRAMUSCULAR at 14:54

## 2024-04-12 NOTE — PROGRESS NOTES
Patient tolerated her B12 injection in her left arm well without adverse reaction. She is aware of her next injection on 4/19/24 [id number]

## 2024-04-19 ENCOUNTER — HOSPITAL ENCOUNTER (OUTPATIENT)
Dept: INFUSION CENTER | Facility: CLINIC | Age: 44
End: 2024-04-19

## 2024-05-01 ENCOUNTER — HOSPITAL ENCOUNTER (OUTPATIENT)
Dept: INFUSION CENTER | Facility: CLINIC | Age: 44
Discharge: HOME | End: 2024-05-01
Payer: COMMERCIAL

## 2024-05-01 DIAGNOSIS — E53.8 LOW SERUM VITAMIN B12: Primary | ICD-10-CM

## 2024-05-01 PROCEDURE — 96372 THER/PROPH/DIAG INJ SC/IM: CPT

## 2024-05-01 RX ORDER — CYANOCOBALAMIN 1000 UG/ML
1000 INJECTION, SOLUTION INTRAMUSCULAR; SUBCUTANEOUS ONCE
Status: CANCELLED | OUTPATIENT
Start: 2024-05-03

## 2024-05-01 RX ORDER — CYANOCOBALAMIN 1000 UG/ML
1000 INJECTION, SOLUTION INTRAMUSCULAR; SUBCUTANEOUS ONCE
Status: COMPLETED | OUTPATIENT
Start: 2024-05-01 | End: 2024-05-01

## 2024-05-01 RX ADMIN — CYANOCOBALAMIN 1000 MCG: 1000 INJECTION, SOLUTION INTRAMUSCULAR at 15:26

## 2024-05-01 NOTE — PLAN OF CARE
Problem: INFECTION - ADULT  Goal: Absence or prevention of progression during hospitalization  Description: INTERVENTIONS:  - Assess and monitor for signs and symptoms of infection  - Monitor lab/diagnostic results  - Monitor all insertion sites, i.e. indwelling lines, tubes, and drains  - Monitor endotracheal if appropriate and nasal secretions for changes in amount and color  - [place] appropriate cooling/warming therapies per order  - Administer medications as ordered  - Instruct and encourage patient and family to use good hand hygiene technique  - Identify and instruct in appropriate isolation precautions for identified infection/condition  Outcome: Progressing

## 2024-05-01 NOTE — PROGRESS NOTES
Patient tolerated her B12 injection in her left arm well without adverse reaction. She is aware of her follow up with her MD.

## 2024-05-06 ENCOUNTER — OFFICE VISIT (OUTPATIENT)
Dept: FAMILY MEDICINE CLINIC | Facility: CLINIC | Age: 44
End: 2024-05-06
Payer: COMMERCIAL

## 2024-05-06 ENCOUNTER — TELEPHONE (OUTPATIENT)
Age: 44
End: 2024-05-06

## 2024-05-06 VITALS
WEIGHT: 238 LBS | RESPIRATION RATE: 16 BRPM | DIASTOLIC BLOOD PRESSURE: 76 MMHG | HEART RATE: 60 BPM | BODY MASS INDEX: 39.65 KG/M2 | OXYGEN SATURATION: 99 % | TEMPERATURE: 97.3 F | SYSTOLIC BLOOD PRESSURE: 116 MMHG | HEIGHT: 65 IN

## 2024-05-06 DIAGNOSIS — Z12.83 SKIN CANCER SCREENING: ICD-10-CM

## 2024-05-06 DIAGNOSIS — Z00.00 ANNUAL PHYSICAL EXAM: Primary | ICD-10-CM

## 2024-05-06 DIAGNOSIS — Z13.29 THYROID DISORDER SCREENING: ICD-10-CM

## 2024-05-06 DIAGNOSIS — E78.5 HYPERLIPIDEMIA, UNSPECIFIED HYPERLIPIDEMIA TYPE: ICD-10-CM

## 2024-05-06 DIAGNOSIS — F33.0 MAJOR DEPRESSIVE DISORDER, RECURRENT EPISODE, MILD (HCC): ICD-10-CM

## 2024-05-06 DIAGNOSIS — Z13.1 SCREENING FOR DIABETES MELLITUS: ICD-10-CM

## 2024-05-06 DIAGNOSIS — R35.0 URINARY FREQUENCY: ICD-10-CM

## 2024-05-06 DIAGNOSIS — Z80.8 FAMILY HISTORY OF THYROID CANCER: ICD-10-CM

## 2024-05-06 DIAGNOSIS — R73.09 ELEVATED HEMOGLOBIN A1C: ICD-10-CM

## 2024-05-06 PROCEDURE — 99396 PREV VISIT EST AGE 40-64: CPT | Performed by: INTERNAL MEDICINE

## 2024-05-06 NOTE — TELEPHONE ENCOUNTER
PA for Wegovy 0.25mg    Submitted via    []CMM-KEY   [x][employer]-Case ID # [id number]   []Faxed to plan   []Other website   []Phone call Case ID #     Office notes sent, clinical questions answered. Awaiting determination    Turnaround time for your insurance to make a decision on your Prior Authorization can take 7-21 business days.

## 2024-05-06 NOTE — PROGRESS NOTES
ADULT ANNUAL PHYSICAL  [facility] - [address] PRIMARY CARE [facility]    NAME: Lianne Tay  AGE: 44 y.o. SEX: female  : 1980     DATE: 2024     Assessment and Plan:     Problem List Items Addressed This Visit       Hyperlipidemia    Relevant Orders    Lipid panel    Hemoglobin A1C    Major depressive disorder, recurrent episode, mild (HCC)    Relevant Medications    Semaglutide-Weight Management (WEGOVY) 0.25 MG/0.5ML    Urinary frequency    Relevant Orders    Urinalysis with microscopic     Other Visit Diagnoses       Annual physical exam    -  Primary    BMI 39.0-39.9,adult        Relevant Medications    Semaglutide-Weight Management (WEGOVY) 0.25 MG/0.5ML    Family history of thyroid cancer        Relevant Orders    US thyroid    Thyroid disorder screening        Relevant Orders    TSH, 3rd generation with Free T4 reflex    Screening for diabetes mellitus        Relevant Orders    Comprehensive metabolic panel    Elevated hemoglobin A1c        Relevant Orders    Hemoglobin A1C    Skin cancer screening        Relevant Orders    Ambulatory Referral to Dermatology            Immunizations and preventive care screenings were discussed with patient today. Appropriate education was printed on patient's after visit summary.    Counseling:  Patient is here for annual physical.  She is concerned about ongoing spasm-like feeling underneath her breast upper part of the abdomens for the last several weeks.  She has not been doing any new activities.  Denies any nausea vomiting dyspepsia or GI symptoms.  She is up-to-date with mammogram.  On exam she demonstrates no tenderness.  I suspect this is secondary to her large breasts.  Patient appears size F.  She does have upper back pain.  We discussed breast reduction surgery and patient will think about it.  We also discussed Wegovy.  Patient has family history of thyroid cancer in her grandmother.  She does not know the type.   Will do a thyroid ultrasound.  Otherwise negative family history of MEN2.  She would also like to see a dermatologist that she is getting small dark-colored spot throughout the body.  She is quite fair and was encouraged to use sun protection.         No follow-ups on file.     Chief Complaint:     Chief Complaint   Patient presents with    Physical Exam     Patients states she is having pain under her breat's. Patient states the pain have been going on for 3 months now. Patients also states that she been having this black spots all over her body. Also wants to check her cholesterol.      History of Present Illness:     Adult Annual Physical   Patient here for a comprehensive physical exam. The patient reports problems - see above .    Diet and Physical Activity  Diet/Nutrition: low carb diet.   Exercise: walking.      Depression Screening  PHQ-2/9 Depression Screening    Little interest or pleasure in doing things: 0 - not at all  Feeling down, depressed, or hopeless: 0 - not at all  Trouble falling or staying asleep, or sleeping too much: 2 - more than half the days  Feeling tired or having little energy: 2 - more than half the days  Poor appetite or overeatin - not at all  Feeling bad about yourself - or that you are a failure or have let yourself or your family down: 0 - not at all  Trouble concentrating on things, such as reading the newspaper or watching television: 0 - not at all  Moving or speaking so slowly that other people could have noticed. Or the opposite - being so fidgety or restless that you have been moving around a lot more than usual: 2 - more than half the days  Thoughts that you would be better off dead, or of hurting yourself in some way: 0 - not at all       General Health  Sleep:  Patient is sleep apnea .   Hearing: normal - bilateral.  Vision: no vision problems.   Dental: brushes teeth twice daily.       /GYN Health  Follows with gynecology? yes she is going to make an next  appointment  Patient is: perimenopausal  Last menstrual period:   Contraceptive method:  As above .    Advanced Care Planning  Do you have an advanced directive? no  Do you have a durable medical power of ? yes  ACP document given to the patient?  Patient is sleep apnea     Review of Systems:     Review of Systems   Gastrointestinal:  Positive for abdominal pain.   Musculoskeletal:  Positive for back pain (Upper back pain) and neck pain.      Past Medical History:     Past Medical History:   Diagnosis Date    Acne 2015    Acquired hallux valgus 2016    Acute non-recurrent maxillary sinusitis 2020    Anemia     Anxiety 10/16/2019    Arthritis     Benign paroxysmal positional vertigo 2017    Candida vaginitis 2019    Colon polyp 2019    COVID-19 vaccine series completed     Moderna: 2nd dose 2021    Depression     Discoloration of skin of hand 2019    Duodenitis without bleeding     Dysphagia     Fibromyalgia     Gastritis     GERD (gastroesophageal reflux disease)     Hiatal hernia     Hx of colonoscopy     Insomnia     Iron deficiency anemia secondary to inadequate dietary iron intake 10/18/2019    Memory loss     Menorrhagia with regular cycle 2014    Moderate episode of recurrent major depressive disorder (HCC) 10/16/2019    Obesity     Oral herpes     Ovarian cyst 2013    Small dermoid on right ovary    Pap smear for cervical cancer screening     2016-wnl; 2020-wnl, HRHPV neg    Sodium (Na) deficiency 10/19/2020    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 myelopathy or radiculopathy     Streptococcal pharyngitis 10/19/2020    Vaginal discharge 2018    Vitamin D deficiency       Past Surgical History:     Past Surgical History:   Procedure Laterality Date    APPENDECTOMY      BREAST BIOPSY Right     pt unsure when or where- ? 6 yrs ago- benign    BREAST SURGERY Right 2015    lump removed from breast      SECTION       CHOLECYSTECTOMY      COLONOSCOPY      9/26/2012; 2019-benign polyp, repeat 5 years    DILATION AND CURETTAGE OF UTERUS      Resolved:1/29/2009    ENDOMETRIAL ABLATION N/A 08/13/2020    Procedure: ABLATION ENDOMETRIAL NOVASURE;  Surgeon: Brenna Peguero MD;  Location: [facility];  Service: Gynecology    ESOPHAGOGASTRODUODENOSCOPY      Diagnostic ; 9/26/2012    HYSTEROSCOPY      Resolved:1/30/2009    OVARIAN CYST REMOVAL Right 2005    MS HYSTEROSCOPY BX ENDOMETRIUM&/POLYPC W/WO D&C N/A 08/13/2020    Procedure: DILATATION AND CURETTAGE (D&C) WITH HYSTEROSCOPY;  Surgeon: Brenna Peguero MD;  Location: [facility];  Service: Gynecology    WISDOM TOOTH EXTRACTION        Social History:     Social History     Socioeconomic History    Marital status: /Civil Union     Spouse name: Orion    Number of children: 4    Years of education: high school    Highest education level: None   Occupational History    Occupation: cash    Tobacco Use    Smoking status: Never     Passive exposure: Never    Smokeless tobacco: Never    Tobacco comments:     No passive smoke exposure   Vaping Use    Vaping status: Never Used   Substance and Sexual Activity    Alcohol use: No    Drug use: Never    Sexual activity: Yes     Partners: Male     Birth control/protection: Male Sterilization     Comment: life time partners:1   Other Topics Concern    None   Social History Narrative    Buddhism Baptism    Accepts blood products            Exercise: 0x/week due to fibormyalgia    Calcium: 1 c almond milk daily; 1 cow milk daily,  1 cheese daily, 1 yogurt 4x/week         caffeine occasionally        Do you have pets? dog Is pet allowed in bedroom?No    Are you a smoker? Never    Does anyone smoke in your home? No       Do you live with smokers? No    [place] frequently? No   How many times a year? N/A      Social Determinants of Health     Financial Resource Strain: Low Risk  (11/3/2020)    Overall Financial Resource Strain (CARDIA)      Difficulty of Paying Living Expenses: Not very hard   Food Insecurity: No Food Insecurity (11/3/2020)    Hunger Vital Sign     Worried About Running Out of Food in the Last Year: Never true     Ran Out of Food in the Last Year: Never true   Transportation Needs: No Transportation Needs (11/3/2020)    PRAPARE - Transportation     Lack of Transportation (Medical): No     Lack of Transportation (Non-Medical): No   Physical Activity: Inactive (10/19/2020)    Exercise Vital Sign     Days of Exercise per Week: 0 days     Minutes of Exercise per Session: 0 min   Stress: Stress Concern Present (10/19/2020)    [place] [place] [employer] - Occupational Stress Questionnaire     Feeling of Stress : To some extent   Social Connections: Socially Integrated (10/19/2020)    Social Connection and Isolation Panel [NHANES]     Frequency of Communication with Friends and Family: More than three times a week     Frequency of Social Gatherings with Friends and Family: More than three times a week     Attends Latter day Services: More than 4 times per year     Active Member of Clubs or Organizations: Yes     Attends Club or Organization Meetings: More than 4 times per year     Marital Status:    Intimate Partner Violence: Not At Risk (10/19/2020)    Humiliation, Afraid, Rape, and Kick questionnaire     Fear of Current or Ex-Partner: No     Emotionally Abused: No     Physically Abused: No     Sexually Abused: No   Housing Stability: Not on file      Family History:     Family History   Problem Relation Age of Onset    Other Mother         uterine leiomyoma    Diabetes type II Mother         Mellitus    Hypothyroidism Mother     Colon cancer Father 55        Stage IV    Diabetes Father         Type II Mellitus    Hypertension Father     Squamous cell carcinoma Father         Located waist down around kidneys, lowe back, tail bone, prostate and bladder.    Multiple sclerosis Sister     Asthma Sister     Hypothyroidism  Sister     Hypertension Maternal Grandmother     Diabetes Maternal Grandmother     Other Maternal Grandmother         Vulvar cancer    Schizophrenia Maternal Grandfather     Hypertension Maternal Grandfather     Thyroid cancer Paternal Grandmother 82    Diabetes Paternal Grandfather 42         due to complications of DM    Anxiety disorder Brother     Depression Brother     Diabetes Brother         Mellitus    Asthma Brother     Hypertension Brother     Breast cancer Cousin 52    Ovarian cancer Neg Hx       Current Medications:     Current Outpatient Medications   Medication Sig Dispense Refill    albuterol (Ventolin HFA) 90 mcg/act inhaler Inhale 2 puffs every 6 (six) hours as needed for wheezing 18 g 0    budesonide-formoterol (SYMBICORT) 160-4.5 mcg/act inhaler Inhale 2 puffs 2 (two) times a day Rinse mouth after use. 10.2 g 0    buPROPion (WELLBUTRIN XL) 300 mg 24 hr tablet Take 1 tablet (300 mg total) by mouth daily 90 tablet 1    famotidine (PEPCID) 40 MG tablet Take 1 tablet (40 mg total) by mouth daily at bedtime 30 tablet 3    pantoprazole (PROTONIX) 40 mg tablet Take 1 tablet (40 mg total) by mouth 2 (two) times a day before meals 180 tablet 1    Semaglutide-Weight Management (WEGOVY) 0.25 MG/0.5ML Inject 0.5 mL (0.25 mg total) under the skin once a week for 28 days 2 mL 0    clindamycin (CLINDAGEL) 1 % gel Apply topically 2 (two) times a day (Patient not taking: Reported on 3/5/2024) 30 g 2    cyanocobalamin (VITAMIN B-12) 1000 MCG tablet Take 1 tablet (1,000 mcg total) by mouth daily (Patient not taking: Reported on 2023) 90 tablet 3    cyclobenzaprine (FLEXERIL) 5 mg tablet Take 1 tablet (5 mg total) by mouth 3 (three) times a day as needed for muscle spasms (Patient not taking: Reported on 2023) 30 tablet 1    dicyclomine (BENTYL) 20 mg tablet Take 1 tablet (20 mg total) by mouth 2 (two) times a day (Patient not taking: Reported on 2023) 20 tablet 0    fexofenadine (ALLEGRA)  "180 MG tablet Take 1 tablet (180 mg total) by mouth daily (Patient not taking: Reported on 11/21/2023)      fluticasone (FLONASE) 50 mcg/act nasal spray SPRAY 2 SPRAYS INTO EACH NOSTRIL EVERY DAY (Patient not taking: Reported on 11/21/2023) 48 mL 2    hydrOXYzine HCL (ATARAX) 10 mg tablet TAKE 1 TABLET (10 MG TOTAL) BY MOUTH 2 (TWO) TIMES A DAY AS NEEDED FOR ANXIETY (Patient not taking: Reported on 11/21/2023) 180 tablet 1    polyethylene glycol (GOLYTELY) 4000 mL solution Take 4,000 mL by mouth once for 1 dose Take as directed by office instructions prior to colonoscopy. 4000 mL 0    predniSONE 10 mg tablet 30 mg p.o. day x3 days then 20 mg 1 p.o. Q.day x3 days then 10 mg p.o.day x3 days then 5 mg p.o day x 3 days (Patient not taking: Reported on 3/5/2024) 25 tablet 0     No current facility-administered medications for this visit.      Allergies:     Allergies   Allergen Reactions    Domperidone Other (See Comments)     Reaction unknown to patient.    Morphine Chest Pain and Hives     Other reaction(s): chest pain    Ibuprofen GI Intolerance     Due to gastritis    Percocet [Oxycodone-Acetaminophen] Hives      Physical Exam:     /76 (BP Location: Left arm, Patient Position: Sitting, Cuff Size: Large)   Pulse 60   Temp (!) 97.3 °F (36.3 °C) (Tympanic)   Resp 16   Ht 5' 5\" (1.651 m)   Wt 108 kg (238 lb)   SpO2 99%   BMI 39.61 kg/m²     Physical Exam  Vitals and nursing note reviewed.   Constitutional:       General: She is not in acute distress.     Appearance: She is well-developed.   HENT:      Head: Normocephalic and atraumatic.   Eyes:      Conjunctiva/sclera: Conjunctivae normal.   Neck:      Vascular: No carotid bruit.      Comments: Increased trapezius muscle spasm  Cardiovascular:      Rate and Rhythm: Normal rate and regular rhythm.      Heart sounds: Normal heart sounds. No murmur heard.  Pulmonary:      Effort: Pulmonary effort is normal. No respiratory distress.      Breath sounds: Normal " breath sounds.   Abdominal:      General: There is no distension.      Palpations: Abdomen is soft. There is no mass.      Tenderness: There is no abdominal tenderness (Suprapubic tenderness). There is no guarding.      Hernia: No hernia is present.   Musculoskeletal:         General: No swelling.      Cervical back: Neck supple.      Right lower leg: No edema.      Left lower leg: No edema.   Lymphadenopathy:      Cervical: No cervical adenopathy.   Skin:     General: Skin is warm and dry.      Capillary Refill: Capillary refill takes less than 2 seconds.   Neurological:      Mental Status: She is alert.   Psychiatric:         Mood and Affect: Mood normal.          MD MIGUEL A Ortiz [facility]

## 2024-05-07 ENCOUNTER — LAB (OUTPATIENT)
Dept: LAB | Facility: MEDICAL CENTER | Age: 44
End: 2024-05-07
Payer: COMMERCIAL

## 2024-05-07 DIAGNOSIS — R35.0 URINARY FREQUENCY: ICD-10-CM

## 2024-05-07 LAB
BACTERIA UR QL AUTO: ABNORMAL /HPF
BILIRUB UR QL STRIP: NEGATIVE
CLARITY UR: CLEAR
COLOR UR: ABNORMAL
GLUCOSE UR STRIP-MCNC: NEGATIVE MG/DL
HGB UR QL STRIP.AUTO: NEGATIVE
KETONES UR STRIP-MCNC: NEGATIVE MG/DL
LEUKOCYTE ESTERASE UR QL STRIP: ABNORMAL
MUCOUS THREADS UR QL AUTO: ABNORMAL
NITRITE UR QL STRIP: NEGATIVE
NON-SQ EPI CELLS URNS QL MICRO: ABNORMAL /HPF
PH UR STRIP.AUTO: 5.5 [PH]
PROT UR STRIP-MCNC: ABNORMAL MG/DL
RBC #/AREA URNS AUTO: ABNORMAL /HPF
SP GR UR STRIP.AUTO: 1.02 (ref 1–1.03)
TSH SERPL DL<=0.05 MIU/L-ACNC: 1.64 UIU/ML (ref 0.45–4.5)
UROBILINOGEN UR STRIP-ACNC: <2 MG/DL
WBC #/AREA URNS AUTO: ABNORMAL /HPF

## 2024-05-07 PROCEDURE — 84443 ASSAY THYROID STIM HORMONE: CPT | Performed by: INTERNAL MEDICINE

## 2024-05-07 PROCEDURE — 36415 COLL VENOUS BLD VENIPUNCTURE: CPT | Performed by: INTERNAL MEDICINE

## 2024-05-07 PROCEDURE — 81001 URINALYSIS AUTO W/SCOPE: CPT

## 2024-05-09 ENCOUNTER — APPOINTMENT (OUTPATIENT)
Dept: LAB | Facility: CLINIC | Age: 44
End: 2024-05-09
Payer: COMMERCIAL

## 2024-05-09 ENCOUNTER — HOSPITAL ENCOUNTER (OUTPATIENT)
Dept: ULTRASOUND IMAGING | Facility: HOSPITAL | Age: 44
End: 2024-05-09
Payer: COMMERCIAL

## 2024-05-09 ENCOUNTER — OFFICE VISIT (OUTPATIENT)
Dept: OBGYN CLINIC | Facility: CLINIC | Age: 44
End: 2024-05-09
Payer: COMMERCIAL

## 2024-05-09 VITALS
SYSTOLIC BLOOD PRESSURE: 122 MMHG | BODY MASS INDEX: 39.45 KG/M2 | HEIGHT: 65 IN | WEIGHT: 236.8 LBS | DIASTOLIC BLOOD PRESSURE: 74 MMHG

## 2024-05-09 DIAGNOSIS — N85.2 BULKY OR ENLARGED UTERUS: ICD-10-CM

## 2024-05-09 DIAGNOSIS — R10.2 PELVIC PAIN IN FEMALE: Primary | ICD-10-CM

## 2024-05-09 DIAGNOSIS — Z80.8 FAMILY HISTORY OF THYROID CANCER: ICD-10-CM

## 2024-05-09 DIAGNOSIS — N94.10 DYSPAREUNIA IN FEMALE: ICD-10-CM

## 2024-05-09 LAB
ALBUMIN SERPL BCP-MCNC: 4.2 G/DL (ref 3.5–5)
ALP SERPL-CCNC: 62 U/L (ref 34–104)
ALT SERPL W P-5'-P-CCNC: 18 U/L (ref 7–52)
ANION GAP SERPL CALCULATED.3IONS-SCNC: 7 MMOL/L (ref 4–13)
AST SERPL W P-5'-P-CCNC: 15 U/L (ref 13–39)
BILIRUB SERPL-MCNC: 0.6 MG/DL (ref 0.2–1)
BUN SERPL-MCNC: 10 MG/DL (ref 5–25)
CALCIUM SERPL-MCNC: 9.3 MG/DL (ref 8.4–10.2)
CHLORIDE SERPL-SCNC: 103 MMOL/L (ref 96–108)
CHOLEST SERPL-MCNC: 182 MG/DL
CO2 SERPL-SCNC: 28 MMOL/L (ref 21–32)
CREAT SERPL-MCNC: 0.74 MG/DL (ref 0.6–1.3)
EST. AVERAGE GLUCOSE BLD GHB EST-MCNC: 108 MG/DL
GFR SERPL CREATININE-BSD FRML MDRD: 97 ML/MIN/1.73SQ M
GLUCOSE P FAST SERPL-MCNC: 97 MG/DL (ref 65–99)
HBA1C MFR BLD: 5.4 %
HDLC SERPL-MCNC: 64 MG/DL
LDLC SERPL CALC-MCNC: 105 MG/DL (ref 0–100)
NONHDLC SERPL-MCNC: 118 MG/DL
POTASSIUM SERPL-SCNC: 4.1 MMOL/L (ref 3.5–5.3)
PROT SERPL-MCNC: 7.3 G/DL (ref 6.4–8.4)
SODIUM SERPL-SCNC: 138 MMOL/L (ref 135–147)
TRIGL SERPL-MCNC: 66 MG/DL

## 2024-05-09 PROCEDURE — 80061 LIPID PANEL: CPT | Performed by: INTERNAL MEDICINE

## 2024-05-09 PROCEDURE — 83036 HEMOGLOBIN GLYCOSYLATED A1C: CPT | Performed by: INTERNAL MEDICINE

## 2024-05-09 PROCEDURE — 99214 OFFICE O/P EST MOD 30 MIN: CPT | Performed by: NURSE PRACTITIONER

## 2024-05-09 PROCEDURE — 76536 US EXAM OF HEAD AND NECK: CPT

## 2024-05-09 PROCEDURE — 36415 COLL VENOUS BLD VENIPUNCTURE: CPT | Performed by: INTERNAL MEDICINE

## 2024-05-09 PROCEDURE — 80053 COMPREHEN METABOLIC PANEL: CPT | Performed by: INTERNAL MEDICINE

## 2024-05-09 NOTE — PROGRESS NOTES
Assessment/Plan:    1. Pelvic pain in female  For 6 months in duration.  Reproduced on exam.  Check pelvic US.  Depending upon findings will likely have patient RTO to see physician to discuss management.    - US pelvis complete non OB; Future    2. Dyspareunia in female  On deep penetration, x 3 months.  As above    - US pelvis complete non OB; Future    3. Bulky or enlarged uterus  ~14 week size.  As above    - US pelvis complete non OB; Future      Subjective:      Patient ID: Lianne Tay is a 44 y.o. female.    HPI  PROBLEM VISIT  CC: pelvic pain.    43 yo   Patient of Dr. Peguero's  Last seen 2022 for yearly.    Saw her PCP today who advised her to see us as soon as possible.  She has been having pelvic pain for 6 months-- comes and goes, diffuse but leaning towards her left, duration of each episode is about 1/2 hour, rates the severity at 7 out of 10; only aggravating factor is having intercourse; no alleviating factors.  Has taken tylenol (cannot take NSAIDs due to gastritis) w/ little effect.  Bowels have been regular, easy to pass; denies diarrhea.  Urinary symptoms:  frequent urination (urinalysis was checked at her PCP); denies dysuria.  Denies vaginal discharge, odor, itching or burning.    Since her D&C of 2020 she reports period intervals of about  Q 21 days; flow varies, starts off brown, on and off, total 7d.  Recently flow has been bright red but still light in amount.      History of uterine fibroids.  Last pelvic US, 2020, uterus 10.1 x 5.3 x 6.9 cm;1.2 cm intramural uterine fibroid; 1.5 cm right ovarian dermoid cyst.    Surgeries: appy, C/S, cholecystectomy, EMA, 2020, hysteroscopy w/ D&C for polyp, 2020      The following portions of the patient's history were reviewed and updated as appropriate: allergies, current medications, past family history, past medical history, past social history, past surgical history, and problem list.    Review of Systems   Constitutional:   "Negative for appetite change, chills and fever.   Gastrointestinal:  Negative for constipation and diarrhea.   Genitourinary:  Positive for dyspareunia, frequency and pelvic pain. Negative for difficulty urinating, dysuria, genital sores, menstrual problem, urgency, vaginal bleeding and vaginal discharge.         Objective:      /74 (BP Location: Right arm, Patient Position: Sitting, Cuff Size: Large)   Ht 5' 5\" (1.651 m)   Wt 107 kg (236 lb 12.8 oz)   LMP 04/26/2024 (Exact Date)   BMI 39.41 kg/m²      Physical Exam  Constitutional:       General: She is not in acute distress.     Appearance: Normal appearance. She is well-developed. She is not ill-appearing or diaphoretic.      Comments: Bmi 39.4   HENT:      Head: Normocephalic and atraumatic.   Eyes:      Pupils: Pupils are equal, round, and reactive to light.   Pulmonary:      Effort: Pulmonary effort is normal.   Abdominal:      General: Abdomen is flat.      Palpations: Abdomen is soft.   Genitourinary:     General: Normal vulva.      Exam position: Lithotomy position.      Labia:         Right: No rash, tenderness, lesion or injury.         Left: No rash, tenderness, lesion or injury.       Vagina: No signs of injury and foreign body. No vaginal discharge, erythema, tenderness or bleeding.      Cervix: No cervical motion tenderness, discharge or friability.      Uterus: Enlarged (~14 weeks) and tender.       Adnexa:         Right: No mass or tenderness.          Left: No mass or tenderness.        Comments: Cervix bulky;  Uterus enlarged, bulky and tender  Skin:     General: Skin is warm and dry.   Neurological:      General: No focal deficit present.      Mental Status: She is alert and oriented to person, place, and time.   Psychiatric:         Mood and Affect: Mood normal.         Behavior: Behavior normal.         Thought Content: Thought content normal.         Judgment: Judgment normal.           "

## 2024-05-09 NOTE — TELEPHONE ENCOUNTER
PA for Wegovy 0.25mg Approved     Date(s) approved April 7, 2024 to December 3, 2024     Case #    Patient advised by          [x] [employer] Message  [x] Phone call   []LMOM  []L/M to call office as no active Communication consent on file  []Unable to leave detailed message as VM not approved on Communication consent       Pharmacy advised by    [x]Fax  []Phone call    Approval letter scanned into Media Yes

## 2024-05-13 ENCOUNTER — HOSPITAL ENCOUNTER (OUTPATIENT)
Dept: ULTRASOUND IMAGING | Facility: HOSPITAL | Age: 44
Discharge: HOME | End: 2024-05-13
Payer: COMMERCIAL

## 2024-05-13 DIAGNOSIS — N94.10 DYSPAREUNIA IN FEMALE: ICD-10-CM

## 2024-05-13 DIAGNOSIS — R10.2 PELVIC PAIN IN FEMALE: ICD-10-CM

## 2024-05-13 DIAGNOSIS — N85.2 BULKY OR ENLARGED UTERUS: ICD-10-CM

## 2024-05-13 PROCEDURE — 76830 TRANSVAGINAL US NON-OB: CPT

## 2024-05-13 PROCEDURE — 76856 US EXAM PELVIC COMPLETE: CPT

## 2024-05-14 ENCOUNTER — TELEPHONE (OUTPATIENT)
Dept: FAMILY MEDICINE CLINIC | Facility: CLINIC | Age: 44
End: 2024-05-14

## 2024-05-14 ENCOUNTER — TELEPHONE (OUTPATIENT)
Age: 44
End: 2024-05-14

## 2024-05-14 NOTE — TELEPHONE ENCOUNTER
Patient returned call regarding test results. [facility]

## 2024-05-14 NOTE — TELEPHONE ENCOUNTER
Patient called to get her test results. Patient will be picking up her prescription. Patient was concerned because her urinalysis di show some abnormal results. No symptoms of UTI. Instructed patient to increase her fluids and to call back if she does have s/sx of UTI. Patient verbalizes understanding.

## 2024-05-14 NOTE — TELEPHONE ENCOUNTER
----- Message from Karen Rodriguez MD sent at 5/14/2024 12:47 PM EDT -----  Thyroid exam unremarkable.  Patient can start Wegovy and let me know how she feels

## 2024-05-16 ENCOUNTER — TELEPHONE (OUTPATIENT)
Dept: OBGYN CLINIC | Facility: CLINIC | Age: 44
End: 2024-05-16

## 2024-05-16 NOTE — TELEPHONE ENCOUNTER
43 yo w/ 6 mo of pelvic pain and recent dyspareunia.  Pelvic US:  Uterus with bulbous contour, 9.5 x 5.2 x 6.2 cm.  Myometrial echotexture with changes suggestive of adenomyosis.  1.5 cm and 9 mm uterine fibroids.  Endometrial lining 5 mm.  Right ovarian 1.6 cm echogenic nodule (stable as compared to 2018).  1.8 cm corpus luteum.  Left ovary normal.    Plan: may offer suppression of menstrual cycles with medical management or visit with physician to discuss whether there are alternative management options.    Discussed above with patient.  She will think about it and schedule accordingly.

## 2024-05-16 NOTE — RESULT ENCOUNTER NOTE
43 yo with c/o pelvic pain x 6 months and dyspareunia.  Uterus with bulbous contour, 9.5 x 5.2 x 6.2 cm.  Myometrial echo-texture with changes suggestive of adenomyosis.  1.5 cm and 9 mm uterine fibroids.  Endometrial lining 5 mm.  Right ovarian 1.6 cm echogenic nodule (stable as compared to 2018).  1.8 cm corpus luteum.  Left ovary normal.    Plan:   May offer suppression of menstrual cycles with medical management or visit with physician to discuss whether there are alternative management options.

## 2024-05-30 ENCOUNTER — OFFICE VISIT (OUTPATIENT)
Dept: PSYCHIATRY | Facility: CLINIC | Age: 44
End: 2024-05-30

## 2024-05-30 DIAGNOSIS — F41.1 GENERALIZED ANXIETY DISORDER: ICD-10-CM

## 2024-05-30 DIAGNOSIS — F33.0 MAJOR DEPRESSIVE DISORDER, RECURRENT EPISODE, MILD (HCC): Primary | ICD-10-CM

## 2024-05-30 NOTE — PSYCH
MEDICATION MANAGEMENT NOTE        [facility]      Name and Date of Birth:  Lianne Tay 44 y.o. 1980 MRN: [id number]    Date of Visit: May 30, 2024    Reason for Visit: Follow-up for medication management    Subjective: The patient reports she has been under a lot of stress lately.  Reports  she finally told the tree cut down which was in front of her home but it will cost her $5200.  She reports she is going to pay every month installment every month.  She also reports his son and his wife are back together but it has caused significant stress for the family.  Her other son is going to get  in [facility] in July and she has some concern about financial issues given the travel costs.  She reports her coping with the stress is better.  Denies any depression or hopelessness.  She does reports she feels her concentration has been affected lately.  Feels it could be due to the stresses but denies any significant anxiety or depression.  Denies any SI or HI.  Reports sleep, appetite, energy level has been okay.  She has been started Wegovy by her primary care physician recently for weight loss but the patient has not started yet.  She plans to start soon.  Denies any other concern.  Denies any other medical issues.      Review Of Systems: Negative other than mentioned above.      Constitutional negative   ENT negative   Cardiovascular negative   Respiratory negative   Gastrointestinal negative   Genitourinary negative   Musculoskeletal negative   Integumentary negative   Neurological negative   Endocrine negative   Other Symptoms none         Suicide/Homicide Risk Assessment:    Risk of Harm to Self:  The following ratings are based on assessment at the time of the interview and observation over the last 12 months  Demographic risk factors include:   Historical Risk Factors include: history of depression, history of anxiety  Recent Specific Risk Factors  include: diagnosis of depression  Protective Factors: no current suicidal ideation, access to mental health treatment, being a parent, being , compliant with medications, compliant with mental health treatment, stable living environment, stable job, supportive family, supportive friends  Weapons: none and no firearms. The following steps have been taken to ensure weapons are properly secured: not applicable  Based on today's assessment, Lianne presents the following risk of harm to self: minimal    Risk of Harm to Others:  The following ratings are based on assessment at the time of the interview  Based on today's assessment, Lianne presents the following risk of harm to others: none    The following interventions are recommended: contracts for safety at present - agrees to go to ED if feeling unsafe, contracts for safety at present - agrees to call Crisis Intervention Service if feeling unsafe    Alcohol/Substance Abuse:        Past Medical History:    Past Medical History:   Diagnosis Date    Abnormal Pap smear of cervix     years ago    Acne 04/23/2015    Acquired hallux valgus 03/07/2016    Acute non-recurrent maxillary sinusitis 01/27/2020    Anemia     Anxiety 10/16/2019    Arthritis     Benign paroxysmal positional vertigo 03/08/2017    Candida vaginitis 04/25/2019    Colon polyp 2019    COVID-19 vaccine series completed     Moderna: 2nd dose 5/21/2021    Depression     Discoloration of skin of hand 03/20/2019    Duodenitis without bleeding     Dysphagia     Fibroid 4 years    Fibromyalgia     Gastritis     GERD (gastroesophageal reflux disease)     Hiatal hernia     Hx of colonoscopy     Insomnia     Iron deficiency anemia secondary to inadequate dietary iron intake 10/18/2019    Memory loss     Menorrhagia with regular cycle 11/20/2014    Moderate episode of recurrent major depressive disorder (HCC) 10/16/2019    Obesity     Oral herpes     Ovarian cyst 06/11/2013    Small dermoid on right ovary     Pap smear for cervical cancer screening     2016-wnl; 2020-wnl, HRHPV neg    Sodium (Na) deficiency 10/19/2020    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 myelopathy or radiculopathy     Streptococcal pharyngitis 10/19/2020    Vaginal discharge 2018    Vitamin D deficiency         Past Surgical History:   Procedure Laterality Date    APPENDECTOMY      BREAST BIOPSY Right     pt unsure when or where- ? 6 yrs ago- benign    BREAST SURGERY Right 2015    lump removed from breast      SECTION  1998    CHOLECYSTECTOMY      COLONOSCOPY      2012; 2019-benign polyp, repeat 5 years    DILATION AND CURETTAGE OF UTERUS      Resolved:2009    ENDOMETRIAL ABLATION N/A 2020    Procedure: ABLATION ENDOMETRIAL NOVASURE;  Surgeon: Brenna Peguero MD;  Location: [facility];  Service: Gynecology    ESOPHAGOGASTRODUODENOSCOPY      Diagnostic ; 2012    HYSTEROSCOPY      Resolved:2009    OVARIAN CYST REMOVAL Right     [place] HYSTEROSCOPY BX ENDOMETRIUM&/POLYPC W/WO D&C N/A 2020    Procedure: DILATATION AND CURETTAGE (D&C) WITH HYSTEROSCOPY;  Surgeon: Brenna Peguero MD;  Location: [facility];  Service: Gynecology    WISDOM TOOTH EXTRACTION       Allergies   Allergen Reactions    Domperidone Other (See Comments)     Reaction unknown to patient.    Morphine Chest Pain and Hives     Other reaction(s): chest pain    Ibuprofen GI Intolerance     Due to gastritis    Percocet [Oxycodone-Acetaminophen] Hives       Current Medications:       Current Outpatient Medications:     albuterol (Ventolin HFA) 90 mcg/act inhaler, Inhale 2 puffs every 6 (six) hours as needed for wheezing, Disp: 18 g, Rfl: 0    budesonide-formoterol (SYMBICORT) 160-4.5 mcg/act inhaler, Inhale 2 puffs 2 (two) times a day Rinse mouth after use., Disp: 10.2 g, Rfl: 0    buPROPion (WELLBUTRIN XL) 300 mg 24 hr tablet, Take 1 tablet (300 mg total) by mouth daily, Disp: 90 tablet, Rfl: 1    clindamycin (CLINDAGEL) 1 % gel, Apply  topically 2 (two) times a day (Patient not taking: Reported on 3/5/2024), Disp: 30 g, Rfl: 2    cyanocobalamin (VITAMIN B-12) 1000 MCG tablet, Take 1 tablet (1,000 mcg total) by mouth daily (Patient not taking: Reported on 7/24/2023), Disp: 90 tablet, Rfl: 3    famotidine (PEPCID) 40 MG tablet, Take 1 tablet (40 mg total) by mouth daily at bedtime (Patient not taking: Reported on 5/9/2024), Disp: 30 tablet, Rfl: 3    fexofenadine (ALLEGRA) 180 MG tablet, Take 1 tablet (180 mg total) by mouth daily (Patient not taking: Reported on 11/21/2023), Disp: , Rfl:     fluticasone (FLONASE) 50 mcg/act nasal spray, SPRAY 2 SPRAYS INTO EACH NOSTRIL EVERY DAY (Patient not taking: Reported on 11/21/2023), Disp: 48 mL, Rfl: 2    hydrOXYzine HCL (ATARAX) 10 mg tablet, TAKE 1 TABLET (10 MG TOTAL) BY MOUTH 2 (TWO) TIMES A DAY AS NEEDED FOR ANXIETY (Patient not taking: Reported on 11/21/2023), Disp: 180 tablet, Rfl: 1    pantoprazole (PROTONIX) 40 mg tablet, Take 1 tablet (40 mg total) by mouth 2 (two) times a day before meals, Disp: 180 tablet, Rfl: 1    polyethylene glycol (GOLYTELY) 4000 mL solution, Take 4,000 mL by mouth once for 1 dose Take as directed by office instructions prior to colonoscopy. (Patient not taking: Reported on 5/9/2024), Disp: 4000 mL, Rfl: 0    Semaglutide-Weight Management (WEGOVY) 0.25 MG/0.5ML, Inject 0.5 mL (0.25 mg total) under the skin once a week for 28 days, Disp: 2 mL, Rfl: 0       History Review: The following portions of the patient's history were reviewed and updated as appropriate: allergies, current medications, past family history, past medical history, past social history, past surgical history, and problem list.         OBJECTIVE:     Vital signs in last 24 hours:    There were no vitals filed for this visit.    Mental Status Evaluation:    Appearance age appropriate, casually dressed   Behavior cooperative, calm   Speech normal rate, normal volume, normal pitch   Mood normal   Affect normal  range and intensity, appropriate   Thought Processes organized, goal directed   Associations intact associations   Thought Content no overt delusions   Perceptual Disturbances: no auditory hallucinations, no visual hallucinations   Abnormal Thoughts  Risk Potential Suicidal ideation - None  Homicidal ideation - None  Potential for aggression - No   Orientation oriented to person, place, time/date, and situation   Memory recent and remote memory grossly intact   Consciousness alert and awake   Attention Span Concentration Span attention span and concentration are age appropriate   Intellect appears to be of average intelligence   Insight intact   Judgement intact   Muscle Strength and  Gait normal gait and normal balance   Motor activity no abnormal movements   Language no difficulty naming common objects, no difficulty repeating a phrase   Fund of Knowledge adequate knowledge of current events  adequate fund of knowledge regarding past history  adequate fund of knowledge regarding vocabulary    Pain none   Pain Scale 0       Laboratory Results: Most Recent Labs:   Lab Results   Component Value Date    WBC 6.38 01/25/2024    RBC 3.87 01/25/2024    HGB 11.7 01/25/2024    HCT 36.4 01/25/2024     01/25/2024    RDW 13.2 01/25/2024    NEUTROABS 3.63 01/25/2024    SODIUM 138 05/09/2024    K 4.1 05/09/2024     05/09/2024    CO2 28 05/09/2024    BUN 10 05/09/2024    CREATININE 0.74 05/09/2024    CALCIUM 9.3 05/09/2024    AST 15 05/09/2024    ALT 18 05/09/2024    ALKPHOS 62 05/09/2024    TP 7.3 05/09/2024    TBILI 0.60 05/09/2024    CHOLESTEROL 182 05/09/2024    TRIG 66 05/09/2024    HDL 64 05/09/2024    LDLCALC 105 (H) 05/09/2024    NONHDLC 118 05/09/2024    IYX3BOCPKDIK 1.642 05/07/2024    HCGQUANT <2 08/27/2018     I have personally reviewed all pertinent laboratory/tests results.    Assessment/Plan: The patient reports overall mood has been okay but concentration has been affected lately.  Has multiple  stressors mentioned above but reports coping with it well.  The plan is to continue the current medication with no changes.  The patient was encouraged to call me and 1 to 2 weeks if she feels he still struggles with concentration as I can go up on her Wellbutrin dose.  At this time the patient prefers not to change anything and see how she feels over the next 1 to 2 weeks.  Patient again educated about her meds in detail and advised to call us if any concern and to call [facility], 911 or visit nearby ER in case of any emergency or having any SI or HI.  Patient verbalized understanding and agrees with the plan.      Diagnoses and all orders for this visit:    Major depressive disorder, recurrent episode, mild (HCC)    Generalized anxiety disorder          Treatment Recommendations/Precautions:      Aware of 24 hour and weekend coverage for urgent situations accessed by calling [facility] main practice number    Risks/Benefits      Risks, Benefits And Possible Side Effects Of Medications:    Risks, benefits, and possible side effects of medications explained to Lianne and she verbalizes understanding and agreement for treatment.    Controlled Medication Discussion:     Not applicable    Psychotherapy Provided:     Individual psychotherapy provided: Medications, treatment progress and treatment plan reviewed with Lianne.  Medication education provided to Lianne.  Reassurance and supportive therapy provided.   Crisis/safety plan discussed with Lianne.     Treatment Plan;    Completed and signed during the session: Not applicable - Treatment Plan not due at this session  Visit Time    Visit Start Time: 4:04 PM  Visit Stop Time: 4:27 PM  Total Visit Duration:  23 minutes      Vera Morales MD 05/30/24

## 2024-06-02 DIAGNOSIS — R11.2 NAUSEA AND VOMITING, UNSPECIFIED VOMITING TYPE: ICD-10-CM

## 2024-06-03 RX ORDER — PANTOPRAZOLE SODIUM 40 MG/1
40 TABLET, DELAYED RELEASE ORAL
Qty: 180 TABLET | Refills: 1 | Status: SHIPPED | OUTPATIENT
Start: 2024-06-03

## 2024-07-03 ENCOUNTER — TELEMEDICINE (OUTPATIENT)
Dept: FAMILY MEDICINE CLINIC | Facility: CLINIC | Age: 44
End: 2024-07-03
Payer: COMMERCIAL

## 2024-07-03 PROCEDURE — 99213 OFFICE O/P EST LOW 20 MIN: CPT | Performed by: INTERNAL MEDICINE

## 2024-07-03 RX ORDER — SEMAGLUTIDE 0.5 MG/.5ML
INJECTION, SOLUTION SUBCUTANEOUS
Qty: 2 ML | Refills: 0 | Status: SHIPPED | OUTPATIENT
Start: 2024-07-03 | End: 2024-07-05

## 2024-07-03 NOTE — PROGRESS NOTES
From: Susana Ledbetter  To: Rnady Herrera MD  Sent: 6/15/2020 12:12 PM CDT  Subject: Other    Hello, I spoke with my work who said faxed my leave of absence paper work last week, just checking to see if you received anything.  It's not FMLA Virtual Regular Visit    Verification of patient location:    Patient is located at Home in the following state in which I hold an active license [place]      Assessment/Plan:    Problem List Items Addressed This Visit    None  Visit Diagnoses       BMI 39.0-39.9,adult    -  Primary    Relevant Medications    Semaglutide-Weight Management (Wegovy) 0.5 MG/0.5ML        Patient will keep me posted.  She will follow-up in a month.         Reason for visit is No chief complaint on file.       Encounter provider Karen Rodriguez MD      Recent Visits  No visits were found meeting these conditions.  Showing recent visits within past 7 days and meeting all other requirements  Future Appointments  No visits were found meeting these conditions.  Showing future appointments within next 150 days and meeting all other requirements       The patient was identified by name and date of birth. Lianne Tay was informed that this is a telemedicine visit and that the visit is being conducted through the [employer] Teams platform. She agrees to proceed..  My office door was closed. No one else was in the room.  She acknowledged consent and understanding of privacy and security of the video platform. The patient has agreed to participate and understands they can discontinue the visit at any time.    Patient is aware this is a billable service.     Subjective  Lianne Tay is a 44 y.o. female  .  Patient is here to follow-up on weight loss medication management.  She has lost 10 pounds on Wegovy 0.25 mg and doing quite well.  Denies any nausea vomiting or abdominal pain.  She reports no other side effects.  She reports better control and appetite.  No depressive symptoms.  Will increase Wegovy dosage.    HPI     Past Medical History:   Diagnosis Date    Abnormal Pap smear of cervix     years ago    Acne 04/23/2015    Acquired hallux valgus 03/07/2016    Acute non-recurrent maxillary sinusitis 01/27/2020    Anemia     Anxiety 10/16/2019    Arthritis      Benign paroxysmal positional vertigo 2017    Candida vaginitis 2019    Colon polyp 2019    COVID-19 vaccine series completed     Moderna: 2nd dose 2021    Depression     Discoloration of skin of hand 2019    Duodenitis without bleeding     Dysphagia     Fibroid 4 years    Fibromyalgia     Gastritis     GERD (gastroesophageal reflux disease)     Hiatal hernia     Hx of colonoscopy     Insomnia     Iron deficiency anemia secondary to inadequate dietary iron intake 10/18/2019    Memory loss     Menorrhagia with regular cycle 2014    Moderate episode of recurrent major depressive disorder (HCC) 10/16/2019    Obesity     Oral herpes     Ovarian cyst 2013    Small dermoid on right ovary    Pap smear for cervical cancer screening     2016-wnl; 2020-wnl, HRHPV neg    Sodium (Na) deficiency 10/19/2020    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 myelopathy or radiculopathy     Streptococcal pharyngitis 10/19/2020    Vaginal discharge 2018    Vitamin D deficiency        Past Surgical History:   Procedure Laterality Date    APPENDECTOMY      BREAST BIOPSY Right     pt unsure when or where- ? 6 yrs ago- benign    BREAST SURGERY Right 2015    lump removed from breast      SECTION  1998    CHOLECYSTECTOMY      COLONOSCOPY      2012; 2019-benign polyp, repeat 5 years    DILATION AND CURETTAGE OF UTERUS      Resolved:2009    ENDOMETRIAL ABLATION N/A 2020    Procedure: ABLATION ENDOMETRIAL NOVASURE;  Surgeon: Brenna Peguero MD;  [facility]: [facility];  Service: Gynecology    ESOPHAGOGASTRODUODENOSCOPY      Diagnostic ; 2012    HYSTEROSCOPY      Resolved:2009    OVARIAN CYST REMOVAL Right     [place] HYSTEROSCOPY BX ENDOMETRIUM&/POLYPC W/WO D&C N/A 2020    Procedure: DILATATION AND CURETTAGE (D&C) WITH HYSTEROSCOPY;  Surgeon: Brenna Peguero MD;  [facility];  Service: Gynecology    WISDOM TOOTH EXTRACTION         Current Outpatient  Medications   Medication Sig Dispense Refill    Semaglutide-Weight Management (Wegovy) 0.5 MG/0.5ML Inject 0.5 mg under the skin weekly 2 mL 0    albuterol (Ventolin HFA) 90 mcg/act inhaler Inhale 2 puffs every 6 (six) hours as needed for wheezing 18 g 0    budesonide-formoterol (SYMBICORT) 160-4.5 mcg/act inhaler Inhale 2 puffs 2 (two) times a day Rinse mouth after use. 10.2 g 0    buPROPion (WELLBUTRIN XL) 300 mg 24 hr tablet Take 1 tablet (300 mg total) by mouth daily 90 tablet 1    clindamycin (CLINDAGEL) 1 % gel Apply topically 2 (two) times a day (Patient not taking: Reported on 3/5/2024) 30 g 2    cyanocobalamin (VITAMIN B-12) 1000 MCG tablet Take 1 tablet (1,000 mcg total) by mouth daily (Patient not taking: Reported on 7/24/2023) 90 tablet 3    famotidine (PEPCID) 40 MG tablet Take 1 tablet (40 mg total) by mouth daily at bedtime (Patient not taking: Reported on 5/9/2024) 30 tablet 3    fexofenadine (ALLEGRA) 180 MG tablet Take 1 tablet (180 mg total) by mouth daily (Patient not taking: Reported on 11/21/2023)      fluticasone (FLONASE) 50 mcg/act nasal spray SPRAY 2 SPRAYS INTO EACH NOSTRIL EVERY DAY (Patient not taking: Reported on 11/21/2023) 48 mL 2    hydrOXYzine HCL (ATARAX) 10 mg tablet TAKE 1 TABLET (10 MG TOTAL) BY MOUTH 2 (TWO) TIMES A DAY AS NEEDED FOR ANXIETY (Patient not taking: Reported on 11/21/2023) 180 tablet 1    pantoprazole (PROTONIX) 40 mg tablet TAKE 1 TABLET BY MOUTH 2 TIMES A DAY BEFORE MEALS. 180 tablet 1    polyethylene glycol (GOLYTELY) 4000 mL solution Take 4,000 mL by mouth once for 1 dose Take as directed by office instructions prior to colonoscopy. (Patient not taking: Reported on 5/9/2024) 4000 mL 0     No current facility-administered medications for this visit.        Allergies   Allergen Reactions    Domperidone Other (See Comments)     Reaction unknown to patient.    Morphine Chest Pain and Hives     Other reaction(s): chest pain    Ibuprofen GI Intolerance     Due to  gastritis    Percocet [Oxycodone-Acetaminophen] Hives       Review of Systems    Video Exam    There were no vitals filed for this visit.    Physical Exam     Visit Time  Total Visit Duration: 15

## 2024-07-05 ENCOUNTER — TELEPHONE (OUTPATIENT)
Dept: FAMILY MEDICINE CLINIC | Facility: CLINIC | Age: 44
End: 2024-07-05

## 2024-07-05 NOTE — TELEPHONE ENCOUNTER
Pt called refill line asking if Dr. Rodriguez can leave the Wegovy at the 0.25 mg instead of going up to the 0.5 mg. New script will need to be called into the [facility]. Please call the pt back with the decision.

## 2024-08-20 ENCOUNTER — PREP FOR PROCEDURE (OUTPATIENT)
Dept: GASTROENTEROLOGY | Facility: MEDICAL CENTER | Age: 44
End: 2024-08-20

## 2024-08-20 ENCOUNTER — NURSE TRIAGE (OUTPATIENT)
Age: 44
End: 2024-08-20

## 2024-08-20 DIAGNOSIS — K21.9 GASTROESOPHAGEAL REFLUX DISEASE WITHOUT ESOPHAGITIS: Primary | ICD-10-CM

## 2024-08-20 NOTE — TELEPHONE ENCOUNTER
"LOV: 4/9/24    HX:GERD w/o esophagitis, family hx of colon cancer (Father), gastroparesis, constipation    Pt transferred to nurse line.  Pt would like to add EGD on to colonoscopy.  Pt reports having worsening GERD symptoms over time.  Pt describes as feeling \"like continuously burning her with a torch\"  in chest, right side and upper abdomen.  Pt denies any other symptoms.  Pt has tried Mylanta, which she states only helped a little bit.  She is also taking Omeprazole 40 mg- she decreased from 40 mg BID to once daily because she felt nauseous on the BID dosing.      Please advise if EGD can be done at the same time as colonoscopy- has not scheduled yet.  Also any other recommendations for reflux.    When did your symptoms start:  Ongoing, but increased in severity within the past 2 weeks    How often is this occurring: daily     What medication are you currently taking: Omperazole 40 mg daily, Mylanta (tried once)    Have you tried any OTC medications: Mylanta    What was the outcome of taking the medication for this symptom: worsened    Any recent changes in your bowel habits: Denies     Any new life stressors or diet changes: Denies     Anything that makes this better: Mylanta helped a little bit    Have you had any recent blood work, imaging, or procedures: no  "

## 2024-08-20 NOTE — TELEPHONE ENCOUNTER
Reason for Disposition  • The patient has moderate epigastric abdominal pain without a history of GERD lasting >3 months    Answer Questions - Initial Assessment - Gerd New  When did your symptoms start:  Ongoing, but increased in severity within the past 2 weeks    How often is this occurring: daily     What medication are you currently taking: Omperazole 40 mg daily, Mylanta (tried once)    Have you tried any OTC medications: Mylanta    What was the outcome of taking the medication for this symptom: worsened    Any recent changes in your bowel habits: Denies     Any new life stressors or diet changes: Denies     Anything that makes this better: Mylanta helped a little bit    Have you had any recent blood work, imaging, or procedures: no    Protocols used: GERD

## 2024-08-20 NOTE — TELEPHONE ENCOUNTER
Patients GI provider:  Dr. Hull    Number to return call: [phone]    Reason for call: Pt calling because she states she is having pain in her chest to the point of nausea. She would like to know if an EGD can be added to her colonoscopy. She needs to reschedule the colonoscopy, but wanted to request the EGD before rescheduling    Scheduled procedure/appointment date if applicable: Appt 10/9/24

## 2024-08-21 DIAGNOSIS — Z86.010 PERSONAL HISTORY OF COLONIC POLYPS: ICD-10-CM

## 2024-08-23 ENCOUNTER — APPOINTMENT (OUTPATIENT)
Dept: LAB | Facility: HOSPITAL | Age: 44
End: 2024-08-23
Payer: COMMERCIAL

## 2024-08-23 DIAGNOSIS — E53.8 LOW SERUM VITAMIN B12: ICD-10-CM

## 2024-08-23 LAB
BASOPHILS # BLD AUTO: 0.03 THOUSANDS/ÂΜL (ref 0–0.1)
BASOPHILS NFR BLD AUTO: 0 % (ref 0–1)
EOSINOPHIL # BLD AUTO: 0.16 THOUSAND/ÂΜL (ref 0–0.61)
EOSINOPHIL NFR BLD AUTO: 2 % (ref 0–6)
ERYTHROCYTE [DISTWIDTH] IN BLOOD BY AUTOMATED COUNT: 12.6 % (ref 11.6–15.1)
FERRITIN SERPL-MCNC: 66 NG/ML (ref 11–307)
HCT VFR BLD AUTO: 36.8 % (ref 34.8–46.1)
HGB BLD-MCNC: 12 G/DL (ref 11.5–15.4)
IMM GRANULOCYTES # BLD AUTO: 0.07 THOUSAND/UL (ref 0–0.2)
IMM GRANULOCYTES NFR BLD AUTO: 1 % (ref 0–2)
IRON SATN MFR SERPL: 21 % (ref 15–50)
IRON SERPL-MCNC: 73 UG/DL (ref 50–212)
LYMPHOCYTES # BLD AUTO: 2.4 THOUSANDS/ÂΜL (ref 0.6–4.47)
LYMPHOCYTES NFR BLD AUTO: 30 % (ref 14–44)
MCH RBC QN AUTO: 28.8 PG (ref 26.8–34.3)
MCHC RBC AUTO-ENTMCNC: 32.6 G/DL (ref 31.4–37.4)
MCV RBC AUTO: 88 FL (ref 82–98)
MONOCYTES # BLD AUTO: 0.84 THOUSAND/ÂΜL (ref 0.17–1.22)
MONOCYTES NFR BLD AUTO: 11 % (ref 4–12)
NEUTROPHILS # BLD AUTO: 4.43 THOUSANDS/ÂΜL (ref 1.85–7.62)
NEUTS SEG NFR BLD AUTO: 56 % (ref 43–75)
NRBC BLD AUTO-RTO: 0 /100 WBCS
PLATELET # BLD AUTO: 335 THOUSANDS/UL (ref 149–390)
PMV BLD AUTO: 9.9 FL (ref 8.9–12.7)
RBC # BLD AUTO: 4.17 MILLION/UL (ref 3.81–5.12)
TIBC SERPL-MCNC: 346 UG/DL (ref 250–450)
UIBC SERPL-MCNC: 273 UG/DL (ref 155–355)
VIT B12 SERPL-MCNC: 329 PG/ML (ref 180–914)
WBC # BLD AUTO: 7.93 THOUSAND/UL (ref 4.31–10.16)

## 2024-08-23 PROCEDURE — 82607 VITAMIN B-12: CPT

## 2024-08-23 PROCEDURE — 82728 ASSAY OF FERRITIN: CPT

## 2024-08-23 PROCEDURE — 83540 ASSAY OF IRON: CPT

## 2024-08-23 PROCEDURE — 83550 IRON BINDING TEST: CPT

## 2024-08-23 PROCEDURE — 36415 COLL VENOUS BLD VENIPUNCTURE: CPT

## 2024-08-23 PROCEDURE — 85025 COMPLETE CBC W/AUTO DIFF WBC: CPT

## 2024-08-29 ENCOUNTER — TELEMEDICINE (OUTPATIENT)
Dept: PSYCHIATRY | Facility: CLINIC | Age: 44
End: 2024-08-29
Payer: COMMERCIAL

## 2024-08-29 ENCOUNTER — TELEPHONE (OUTPATIENT)
Age: 44
End: 2024-08-29

## 2024-08-29 DIAGNOSIS — F33.0 MDD (MAJOR DEPRESSIVE DISORDER), RECURRENT EPISODE, MILD (HCC): ICD-10-CM

## 2024-08-29 DIAGNOSIS — F41.1 GENERALIZED ANXIETY DISORDER: ICD-10-CM

## 2024-08-29 DIAGNOSIS — R45.86 MOOD SWINGS: Primary | ICD-10-CM

## 2024-08-29 PROCEDURE — 99214 OFFICE O/P EST MOD 30 MIN: CPT | Performed by: PSYCHIATRY & NEUROLOGY

## 2024-08-29 RX ORDER — HYDROXYZINE HYDROCHLORIDE 10 MG/1
TABLET, FILM COATED ORAL
Qty: 60 TABLET | Refills: 0 | Status: SHIPPED | OUTPATIENT
Start: 2024-08-29

## 2024-08-29 NOTE — TELEPHONE ENCOUNTER
Patient called in to switch their appointment today (8/29/24 @4) to a virtual appointment via [email or web address]     Writer switched appointment type and checked it in. Writer made provider and office aware.

## 2024-08-29 NOTE — BH TREATMENT PLAN
TREATMENT PLAN (Medication Management Only)        [facility]    Name and Date of Birth:  Lianne Tay 44 y.o. 1980  Date of Treatment Plan: August 29, 2024  Diagnosis/Diagnoses:    1. Mood swings    2. Generalized anxiety disorder    3. MDD (major depressive disorder), recurrent episode, mild (HCC)      Strengths/Personal Resources for Self-Care: supportive family, supportive friends, taking medications as prescribed, ability to adapt to life changes, ability to communicate needs, ability to communicate well, ability to listen, ability to reason, ability to understand psychiatric illness, average or above intelligence, family ties.  Area/Areas of need (in own words): anxiety, depression, mood swings  1. Long Term Goal: continue improvement in mood.  Target Date:6 months - 2/28/2025  Person/Persons responsible for completion of goal: Lianne  2.  Short Term Objective (s) - How will we reach this goal?:   A. Provider new recommended medication/dosage changes and/or continue medication(s): continue current medications as prescribed.  B. N/A.  C. N/A.  Target Date:6 months - 2/28/2025  Person/Persons Responsible for Completion of Goal: Lianne  Progress Towards Goals: continuing treatment  Treatment Modality: medication management every 3 months  Review due 180 days from date of this plan: 6 months - 2/28/2025  Expected length of service: ongoing treatment  My Physician/PA/NP and I have developed this plan together and I agree to work on the goals and objectives. I understand the treatment goals that were developed for my treatment.

## 2024-08-29 NOTE — PSYCH
Virtual Regular Visit    Verification of patient location:    Patient is located at Home in the following state in which I hold an active license [place]      Assessment/Plan:    Problem List Items Addressed This Visit          Behavioral Health    Generalized anxiety disorder    Relevant Medications    hydrOXYzine HCL (ATARAX) 10 mg tablet     Other Visit Diagnoses       Mood swings    -  Primary    MDD (major depressive disorder), recurrent episode, mild (HCC)        Relevant Medications    hydrOXYzine HCL (ATARAX) 10 mg tablet            Goals addressed in session: Goal 1 and Goal 2          Reason for visit is   Chief Complaint   Patient presents with    Virtual Regular Visit          Encounter provider Vera Morales MD      Recent Visits  No visits were found meeting these conditions.  Showing recent visits within past 7 days and meeting all other requirements  Today's Visits  Date Type Provider Dept   08/29/24 Telemedicine Vera Morales MD [facility]   Showing today's visits and meeting all other requirements  Future Appointments  No visits were found meeting these conditions.  Showing future appointments within next 150 days and meeting all other requirements       The patient was identified by name and date of birth. Lianne Tay was informed that this is a telemedicine visit and that the visit is being conducted throughthe Epic Embedded platform. She agrees to proceed..  My office door was closed. No one else was in the room.  She acknowledged consent and understanding of privacy and security of the video platform. The patient has agreed to participate and understands they can discontinue the visit at any time.    Patient is aware this is a billable service.     Subjective  See below      HPI     Past Medical History:   Diagnosis Date    Abnormal Pap smear of cervix     years ago    Acne 04/23/2015    Acquired hallux valgus 03/07/2016    Acute non-recurrent maxillary sinusitis  2020    Anemia     Anxiety 10/16/2019    Arthritis     Benign paroxysmal positional vertigo 2017    Candida vaginitis 2019    Colon polyp 2019    COVID-19 vaccine series completed     Moderna: 2nd dose 2021    Depression     Discoloration of skin of hand 2019    Duodenitis without bleeding     Dysphagia     Fibroid 4 years    Fibromyalgia     Gastritis     GERD (gastroesophageal reflux disease)     Hiatal hernia     Hx of colonoscopy     Insomnia     Iron deficiency anemia secondary to inadequate dietary iron intake 10/18/2019    Memory loss     Menorrhagia with regular cycle 2014    Moderate episode of recurrent major depressive disorder (HCC) 10/16/2019    Obesity     Oral herpes     Ovarian cyst 2013    Small dermoid on right ovary    Pap smear for cervical cancer screening     2016-wnl; 2020-wnl, HRHPV neg    Sodium (Na) deficiency 10/19/2020    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 myelopathy or radiculopathy     Streptococcal pharyngitis 10/19/2020    Vaginal discharge 2018    Vitamin D deficiency        Past Surgical History:   Procedure Laterality Date    APPENDECTOMY      BREAST BIOPSY Right     pt unsure when or where- ? 6 yrs ago- benign    BREAST SURGERY Right 2015    lump removed from breast      SECTION  1998    CHOLECYSTECTOMY      COLONOSCOPY      2012; 2019-benign polyp, repeat 5 years    DILATION AND CURETTAGE OF UTERUS      Resolved:2009    ENDOMETRIAL ABLATION N/A 2020    Procedure: ABLATION ENDOMETRIAL NOVASURE;  Surgeon: Brenna Peguero MD;  Location: [facility];  Service: Gynecology    ESOPHAGOGASTRODUODENOSCOPY      Diagnostic ; 2012    HYSTEROSCOPY      Resolved:2009    OVARIAN CYST REMOVAL Right     [place] HYSTEROSCOPY BX ENDOMETRIUM&/POLYPC W/WO D&C N/A 2020    Procedure: DILATATION AND CURETTAGE (D&C) WITH HYSTEROSCOPY;  Surgeon: Brenna Peguero MD;  Location: [facility];  Service: Gynecology     WISDOM TOOTH EXTRACTION         Current Outpatient Medications   Medication Sig Dispense Refill    hydrOXYzine HCL (ATARAX) 10 mg tablet Take 1/2 to 1 tablet by mouth two to three times a day as needed for anxiety or irritability. 60 tablet 0    albuterol (Ventolin HFA) 90 mcg/act inhaler Inhale 2 puffs every 6 (six) hours as needed for wheezing 18 g 0    budesonide-formoterol (SYMBICORT) 160-4.5 mcg/act inhaler Inhale 2 puffs 2 (two) times a day Rinse mouth after use. 10.2 g 0    buPROPion (WELLBUTRIN XL) 300 mg 24 hr tablet Take 1 tablet (300 mg total) by mouth daily 90 tablet 1    clindamycin (CLINDAGEL) 1 % gel Apply topically 2 (two) times a day (Patient not taking: Reported on 3/5/2024) 30 g 2    cyanocobalamin (VITAMIN B-12) 1000 MCG tablet Take 1 tablet (1,000 mcg total) by mouth daily (Patient not taking: Reported on 7/24/2023) 90 tablet 3    famotidine (PEPCID) 40 MG tablet Take 1 tablet (40 mg total) by mouth daily at bedtime (Patient not taking: Reported on 5/9/2024) 30 tablet 3    fexofenadine (ALLEGRA) 180 MG tablet Take 1 tablet (180 mg total) by mouth daily (Patient not taking: Reported on 11/21/2023)      fluticasone (FLONASE) 50 mcg/act nasal spray SPRAY 2 SPRAYS INTO EACH NOSTRIL EVERY DAY (Patient not taking: Reported on 11/21/2023) 48 mL 2    pantoprazole (PROTONIX) 40 mg tablet TAKE 1 TABLET BY MOUTH 2 TIMES A DAY BEFORE MEALS. 180 tablet 1    polyethylene glycol (GOLYTELY) 4000 mL solution Take 4,000 mL by mouth once for 1 dose Take as directed by office instructions prior to colonoscopy. 4000 mL 0     No current facility-administered medications for this visit.        Allergies   Allergen Reactions    Domperidone Other (See Comments)     Reaction unknown to patient.    Morphine Chest Pain and Hives     Other reaction(s): chest pain    Ibuprofen GI Intolerance     Due to gastritis    Percocet [Oxycodone-Acetaminophen] Hives       Review of Systems    Video Exam    There were no vitals filed  for this visit.    Physical Exam     Visit Time    Visit Start Time: 4:06 PM  Visit Stop Time: 4:28 PM  Total Visit Duration:  22 minutes      MEDICATION MANAGEMENT NOTE        [facility]      Name and Date of Birth:  Lianne Tay 44 y.o. 1980 MRN: [id number]    Date of Visit: August 29, 2024    Reason for Visit: Follow-up for medication management    Subjective: Patient reports she struggles with mood swings especially the week before her menstrual period.  She reports she will get irritable or angry very easily.  Reports her major stressor has been not getting any help especially in keeping her house clean.  She lives with her  and 2 of her adult children.  She feels they have not been very helpful in taking care of  home and all the responsibility falls on her.  She reports due to which she gets irritated very easily.  She does feels it is more amplified a week before she expects her menstrual period.  The patient reports anxiety has been overall well-controlled.  Does feel low at times but denies any major depressive episode.  Reports over the last 3 months she has been extremely busy.  She denies any hopelessness or having any suicidal thoughts.  Reports sleep, appetite and energy level has been fine.  The patient reports she still struggles with focus issues.  She also reports having concern about her short-term memory.  Reports she has been having difficulty recalling things which was just told to her.  She  was advised to have in person visit for next appointment and I can do MOCA or SLUMS for cognitive assessment.  The patient agreed.  She denies any other concern.  Reports compliant with Wellbutrin  mg daily.  The patient was concerned if that might be affecting her or causing mood swings specially around her menstrual period.  The patient was advised that it does have a risk of irritability or agitation but the patient has been on this  medication for a long time and it is only happening around a certain time of the month and most likely it might not be the 1 causing it.  She was advised about considering mood stabilizer for her mood stability and management of irritability.  The patient also was prescribed hydroxyzine 10 mg if needed for anxiety in the past and was advised she can consider taking that as it can also help her to feel calmer.  The patient was concerned about feeling very sedated on it especially when she first had taken it.  The patient was advised she can take 10 mg half a tablet up to 2-3 times a day as needed and if she still continues struggle with sedation then I can consider mood stabilizer especially if she still has significant mood swings or irritability.  The patient agreed.  Denies any other concerns today.      Review Of Systems: Negative other than mentioned above      Constitutional negative   ENT negative   Cardiovascular negative   Respiratory negative   Gastrointestinal negative   Genitourinary negative   Musculoskeletal negative   Integumentary negative   Neurological negative   Endocrine negative   Other Symptoms none         Suicide/Homicide Risk Assessment:     Risk of Harm to Self:  The following ratings are based on assessment at the time of the interview and observation over the last 12 months  Demographic risk factors include:   Historical Risk Factors include: history of depression, history of anxiety  Recent Specific Risk Factors include: diagnosis of depression  Protective Factors: no current suicidal ideation, access to mental health treatment, being a parent, being , compliant with medications, compliant with mental health treatment, stable living environment, stable job, supportive family, supportive friends  Weapons: none and no firearms. The following steps have been taken to ensure weapons are properly secured: not applicable  Based on today's assessment, Lianne presents the  following risk of harm to self: minimal     Risk of Harm to Others:  The following ratings are based on assessment at the time of the interview  Based on today's assessment, Lianne presents the following risk of harm to others: none     The following interventions are recommended: contracts for safety at present - agrees to go to ED if feeling unsafe, contracts for safety at present - agrees to call Crisis Intervention Service if feeling unsafe       Alcohol/Substance Abuse: Denies        Past Medical History:    Past Medical History:   Diagnosis Date    Abnormal Pap smear of cervix     years ago    Acne 04/23/2015    Acquired hallux valgus 03/07/2016    Acute non-recurrent maxillary sinusitis 01/27/2020    Anemia     Anxiety 10/16/2019    Arthritis     Benign paroxysmal positional vertigo 03/08/2017    Candida vaginitis 04/25/2019    Colon polyp 2019    COVID-19 vaccine series completed     Moderna: 2nd dose 5/21/2021    Depression     Discoloration of skin of hand 03/20/2019    Duodenitis without bleeding     Dysphagia     Fibroid 4 years    Fibromyalgia     Gastritis     GERD (gastroesophageal reflux disease)     Hiatal hernia     Hx of colonoscopy     Insomnia     Iron deficiency anemia secondary to inadequate dietary iron intake 10/18/2019    Memory loss     Menorrhagia with regular cycle 11/20/2014    Moderate episode of recurrent major depressive disorder (HCC) 10/16/2019    Obesity     Oral herpes     Ovarian cyst 06/11/2013    Small dermoid on right ovary    Pap smear for cervical cancer screening     2/2016-wnl; 11/2020-wnl, HRHPV neg    Sodium (Na) deficiency 10/19/2020    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 myelopathy or radiculopathy     Streptococcal pharyngitis 10/19/2020    Vaginal discharge 09/01/2018    Vitamin D deficiency         Past Surgical History:   Procedure Laterality Date    APPENDECTOMY      BREAST BIOPSY Right     pt unsure when or where- ? 6 yrs ago- benign    BREAST SURGERY Right  2015    lump removed from breast      SECTION  1998    CHOLECYSTECTOMY      COLONOSCOPY      2012; 2019-benign polyp, repeat 5 years    DILATION AND CURETTAGE OF UTERUS      Resolved:2009    ENDOMETRIAL ABLATION N/A 2020    Procedure: ABLATION ENDOMETRIAL NOVASURE;  Surgeon: Brenna Peguero MD;  Location: [facility];  Service: Gynecology    ESOPHAGOGASTRODUODENOSCOPY      Diagnostic ; 2012    HYSTEROSCOPY      Resolved:2009    OVARIAN CYST REMOVAL Right 2005    [place] HYSTEROSCOPY BX ENDOMETRIUM&/POLYPC W/WO D&C N/A 2020    Procedure: DILATATION AND CURETTAGE (D&C) WITH HYSTEROSCOPY;  Surgeon: Brenna Peguero MD;  Location: [facility];  Service: Gynecology    WISDOM TOOTH EXTRACTION       Allergies   Allergen Reactions    Domperidone Other (See Comments)     Reaction unknown to patient.    Morphine Chest Pain and Hives     Other reaction(s): chest pain    Ibuprofen GI Intolerance     Due to gastritis    Percocet [Oxycodone-Acetaminophen] Hives       Current Medications:       Current Outpatient Medications:     hydrOXYzine HCL (ATARAX) 10 mg tablet, Take 1/2 to 1 tablet by mouth two to three times a day as needed for anxiety or irritability., Disp: 60 tablet, Rfl: 0    albuterol (Ventolin HFA) 90 mcg/act inhaler, Inhale 2 puffs every 6 (six) hours as needed for wheezing, Disp: 18 g, Rfl: 0    budesonide-formoterol (SYMBICORT) 160-4.5 mcg/act inhaler, Inhale 2 puffs 2 (two) times a day Rinse mouth after use., Disp: 10.2 g, Rfl: 0    buPROPion (WELLBUTRIN XL) 300 mg 24 hr tablet, Take 1 tablet (300 mg total) by mouth daily, Disp: 90 tablet, Rfl: 1    clindamycin (CLINDAGEL) 1 % gel, Apply topically 2 (two) times a day (Patient not taking: Reported on 3/5/2024), Disp: 30 g, Rfl: 2    cyanocobalamin (VITAMIN B-12) 1000 MCG tablet, Take 1 tablet (1,000 mcg total) by mouth daily (Patient not taking: Reported on 2023), Disp: 90 tablet, Rfl: 3    famotidine (PEPCID) 40 MG tablet,  Take 1 tablet (40 mg total) by mouth daily at bedtime (Patient not taking: Reported on 5/9/2024), Disp: 30 tablet, Rfl: 3    fexofenadine (ALLEGRA) 180 MG tablet, Take 1 tablet (180 mg total) by mouth daily (Patient not taking: Reported on 11/21/2023), Disp: , Rfl:     fluticasone (FLONASE) 50 mcg/act nasal spray, SPRAY 2 SPRAYS INTO EACH NOSTRIL EVERY DAY (Patient not taking: Reported on 11/21/2023), Disp: 48 mL, Rfl: 2    pantoprazole (PROTONIX) 40 mg tablet, TAKE 1 TABLET BY MOUTH 2 TIMES A DAY BEFORE MEALS., Disp: 180 tablet, Rfl: 1    polyethylene glycol (GOLYTELY) 4000 mL solution, Take 4,000 mL by mouth once for 1 dose Take as directed by office instructions prior to colonoscopy., Disp: 4000 mL, Rfl: 0       History Review: The following portions of the patient's history were reviewed and updated as appropriate: allergies, current medications, past family history, past medical history, past social history, past surgical history, and problem list.         OBJECTIVE:     Vital signs in last 24 hours:    There were no vitals filed for this visit.    Mental Status Evaluation:    Appearance age appropriate, casually dressed   Behavior cooperative, calm   Speech normal rate, normal volume, normal pitch   Mood labile, irritable   Affect brighter   Thought Processes organized, goal directed   Associations intact associations   Thought Content no overt delusions   Perceptual Disturbances: no auditory hallucinations, no visual hallucinations   Abnormal Thoughts  Risk Potential Suicidal ideation - None  Homicidal ideation - None  Potential for aggression - No   Orientation oriented to person, place, time/date, and situation   Memory recent and remote memory grossly intact   Consciousness alert and awake   Attention Span Concentration Span attention span and concentration are age appropriate   Intellect appears to be of average intelligence   Insight intact   Judgement intact   Muscle Strength and  Gait unable to assess  today due to virtual visit   Motor activity unable to assess today due to virtual visit   Language no difficulty naming common objects, no difficulty repeating a phrase   Fund of Knowledge adequate knowledge of current events  adequate fund of knowledge regarding past history  adequate fund of knowledge regarding vocabulary    Pain none   Pain Scale 0       Laboratory Results: Most Recent Labs:   Lab Results   Component Value Date    WBC 7.93 08/23/2024    RBC 4.17 08/23/2024    HGB 12.0 08/23/2024    HCT 36.8 08/23/2024     08/23/2024    RDW 12.6 08/23/2024    NEUTROABS 4.43 08/23/2024    SODIUM 138 05/09/2024    K 4.1 05/09/2024     05/09/2024    CO2 28 05/09/2024    BUN 10 05/09/2024    CREATININE 0.74 05/09/2024    CALCIUM 9.3 05/09/2024    AST 15 05/09/2024    ALT 18 05/09/2024    ALKPHOS 62 05/09/2024    TP 7.3 05/09/2024    TBILI 0.60 05/09/2024    CHOLESTEROL 182 05/09/2024    TRIG 66 05/09/2024    HDL 64 05/09/2024    LDLCALC 105 (H) 05/09/2024    NONHDLC 118 05/09/2024    [id number] 1.642 05/07/2024    HCGQUANT <2 08/27/2018     I have personally reviewed all pertinent laboratory/tests results.    Assessment/Plan: Patient overall has been struggling with mood swings and irritability especially around her menstrual period, denies any significant anxiety nowadays.  Does have concern about short-term memory.  Plan at this time is to continue with her current medication Wellbutrin  mg with no change.  I will refill her hydroxyzine 10 mg half a tablet up to 2-3 times a day if needed for anxiety or mild mood swings.  The patient was advised in case she still can to struggle then we should consider mood stabilizer and I can review with her in detail if she is agreeable with it.  At this time the patient was concerned about being on too many psychiatric medications.  Patient again educated about her psych meds in detail and advised to call us if any concern and to call [facility], 911 or visit  nearby ER in case of any emergency.  The patient agrees.      Diagnoses and all orders for this visit:    Mood swings    Generalized anxiety disorder  -     hydrOXYzine HCL (ATARAX) 10 mg tablet; Take 1/2 to 1 tablet by mouth two to three times a day as needed for anxiety or irritability.    MDD (major depressive disorder), recurrent episode, mild (HCC)          Treatment Recommendations/Precautions:      Aware of 24 hour and weekend coverage for urgent situations accessed by calling [facility] main practice number    Risks/Benefits      Risks, Benefits And Possible Side Effects Of Medications:    Risks, benefits, and possible side effects of medications explained to Lianne and she verbalizes understanding and agreement for treatment.    Controlled Medication Discussion:     Not applicable    Psychotherapy Provided:     Individual psychotherapy provided: Counseling was provided during the session today for 10 minutes.  Medications, treatment progress and treatment plan reviewed with Lianne.  Medication education provided to Lianne.  Reassurance and supportive therapy provided.   Crisis/safety plan discussed with Lianne.     Treatment Plan;    Completed and signed during the session: Yes - Treatment Plan done but not signed at time of office visit due to:  Plan reviewed by video and verbal consent given due to virtual visit.        Vera Morales MD 08/29/24

## 2024-09-23 ENCOUNTER — TELEPHONE (OUTPATIENT)
Age: 44
End: 2024-09-23

## 2024-09-25 DIAGNOSIS — F33.0 MDD (MAJOR DEPRESSIVE DISORDER), RECURRENT EPISODE, MILD (HCC): ICD-10-CM

## 2024-09-25 RX ORDER — BUPROPION HYDROCHLORIDE 300 MG/1
300 TABLET ORAL DAILY
Qty: 90 TABLET | Refills: 0 | Status: SHIPPED | OUTPATIENT
Start: 2024-09-25

## 2024-10-04 ENCOUNTER — ANESTHESIA EVENT (OUTPATIENT)
Dept: GASTROENTEROLOGY | Facility: HOSPITAL | Age: 44
End: 2024-10-04
Payer: COMMERCIAL

## 2024-10-04 ENCOUNTER — HOSPITAL ENCOUNTER (OUTPATIENT)
Dept: GASTROENTEROLOGY | Facility: HOSPITAL | Age: 44
Setting detail: OUTPATIENT SURGERY
End: 2024-10-04
Payer: COMMERCIAL

## 2024-10-04 ENCOUNTER — ANESTHESIA (OUTPATIENT)
Dept: GASTROENTEROLOGY | Facility: HOSPITAL | Age: 44
End: 2024-10-04
Payer: COMMERCIAL

## 2024-10-04 VITALS
SYSTOLIC BLOOD PRESSURE: 141 MMHG | TEMPERATURE: 97.6 F | OXYGEN SATURATION: 100 % | RESPIRATION RATE: 16 BRPM | BODY MASS INDEX: 39.32 KG/M2 | DIASTOLIC BLOOD PRESSURE: 95 MMHG | HEIGHT: 65 IN | WEIGHT: 236 LBS | HEART RATE: 65 BPM

## 2024-10-04 DIAGNOSIS — K21.9 GASTROESOPHAGEAL REFLUX DISEASE WITHOUT ESOPHAGITIS: ICD-10-CM

## 2024-10-04 DIAGNOSIS — Z86.0100 HISTORY OF COLONIC POLYPS: ICD-10-CM

## 2024-10-04 DIAGNOSIS — Z80.0 FAMILY HISTORY OF COLON CANCER IN FATHER: ICD-10-CM

## 2024-10-04 PROCEDURE — 88305 TISSUE EXAM BY PATHOLOGIST: CPT | Performed by: STUDENT IN AN ORGANIZED HEALTH CARE EDUCATION/TRAINING PROGRAM

## 2024-10-04 PROCEDURE — G0105 COLORECTAL SCRN; HI RISK IND: HCPCS | Performed by: INTERNAL MEDICINE

## 2024-10-04 PROCEDURE — 43239 EGD BIOPSY SINGLE/MULTIPLE: CPT | Performed by: INTERNAL MEDICINE

## 2024-10-04 RX ORDER — SODIUM CHLORIDE, SODIUM LACTATE, POTASSIUM CHLORIDE, CALCIUM CHLORIDE 600; 310; 30; 20 MG/100ML; MG/100ML; MG/100ML; MG/100ML
125 INJECTION, SOLUTION INTRAVENOUS CONTINUOUS
Status: DISCONTINUED | OUTPATIENT
Start: 2024-10-04 | End: 2024-10-08 | Stop reason: HOSPADM

## 2024-10-04 RX ORDER — PROPOFOL 10 MG/ML
INJECTION, EMULSION INTRAVENOUS AS NEEDED
Status: DISCONTINUED | OUTPATIENT
Start: 2024-10-04 | End: 2024-10-04

## 2024-10-04 RX ORDER — LIDOCAINE HYDROCHLORIDE 20 MG/ML
INJECTION, SOLUTION EPIDURAL; INFILTRATION; INTRACAUDAL; PERINEURAL AS NEEDED
Status: DISCONTINUED | OUTPATIENT
Start: 2024-10-04 | End: 2024-10-04

## 2024-10-04 RX ADMIN — PROPOFOL 20 MG: 10 INJECTION, EMULSION INTRAVENOUS at 11:37

## 2024-10-04 RX ADMIN — PROPOFOL 30 MG: 10 INJECTION, EMULSION INTRAVENOUS at 11:29

## 2024-10-04 RX ADMIN — PROPOFOL 50 MG: 10 INJECTION, EMULSION INTRAVENOUS at 11:33

## 2024-10-04 RX ADMIN — LIDOCAINE HYDROCHLORIDE 100 MG: 20 INJECTION, SOLUTION EPIDURAL; INFILTRATION; INTRACAUDAL at 11:27

## 2024-10-04 RX ADMIN — PROPOFOL 20 MG: 10 INJECTION, EMULSION INTRAVENOUS at 11:45

## 2024-10-04 RX ADMIN — PROPOFOL 30 MG: 10 INJECTION, EMULSION INTRAVENOUS at 11:40

## 2024-10-04 RX ADMIN — SODIUM CHLORIDE, SODIUM LACTATE, POTASSIUM CHLORIDE, AND CALCIUM CHLORIDE 125 ML/HR: .6; .31; .03; .02 INJECTION, SOLUTION INTRAVENOUS at 10:55

## 2024-10-04 RX ADMIN — PROPOFOL 50 MG: 10 INJECTION, EMULSION INTRAVENOUS at 11:39

## 2024-10-04 RX ADMIN — PROPOFOL 30 MG: 10 INJECTION, EMULSION INTRAVENOUS at 11:35

## 2024-10-04 RX ADMIN — Medication 40 MG: at 11:43

## 2024-10-04 RX ADMIN — PROPOFOL 20 MG: 10 INJECTION, EMULSION INTRAVENOUS at 11:42

## 2024-10-04 RX ADMIN — PROPOFOL 50 MG: 10 INJECTION, EMULSION INTRAVENOUS at 11:31

## 2024-10-04 RX ADMIN — PROPOFOL 150 MG: 10 INJECTION, EMULSION INTRAVENOUS at 11:27

## 2024-10-04 RX ADMIN — PROPOFOL 20 MG: 10 INJECTION, EMULSION INTRAVENOUS at 11:30

## 2024-10-04 NOTE — ANESTHESIA PREPROCEDURE EVALUATION
Procedure:  COLONOSCOPY  EGD    Relevant Problems   CARDIO   (+) Hyperlipidemia   (+) Other chest pain      GI/HEPATIC   (+) Gastroesophageal reflux disease without esophagitis   (+) Nonalcoholic fatty liver disease      MUSCULOSKELETAL   (+) Chronic midline low back pain without sciatica   (+) Chronic midline thoracic back pain   (+) Diaphragmatic hernia   (+) Facet degeneration of lumbar region   (+) 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 myelopathy or radiculopathy   (+) Spondylosis without myelopathy or radiculopathy, lumbar region      NEURO/PSYCH   (+) Chronic midline low back pain without sciatica   (+) Chronic midline thoracic back pain   (+) Generalized anxiety disorder   (+) Major depressive disorder, recurrent episode, mild (HCC)   (+) Numbness and tingling in both hands   (+) Other headache syndrome   (+) Worsening headaches      PULMONARY   (+) OLI (obstructive sleep apnea)        Physical Exam    Airway    Mallampati score: II  TM Distance: >3 FB  Neck ROM: full     Dental       Cardiovascular      Pulmonary      Other Findings  post-pubertal.      Anesthesia Plan  ASA Score- 3     Anesthesia Type- IV sedation with anesthesia with ASA Monitors.         Additional Monitors:     Airway Plan:            Plan Factors-Exercise tolerance (METS): >4 METS.    Chart reviewed.        Patient is not a current smoker.  Patient did not smoke on day of surgery.    Obstructive sleep apnea risk education given perioperatively.        Induction- intravenous.    Postoperative Plan-     Perioperative Resuscitation Plan - Level 1 - Full Code.       Informed Consent- Anesthetic plan and risks discussed with patient.  I personally reviewed this patient with the CRNA. Discussed and agreed on the Anesthesia Plan with the CRNA..      NPO appropriate. Discussed benefits/risks of monitored anesthetic care and discussed providing a dynamic level of mild to deep sedation. Risks include awareness, airway obstruction, aspiration  which may necessitate conversion to general anesthesia. All questions answered. Patient understands and wishes to proceed.    Anesthesia plan and consent discussed with Lianne who expressed understanding and agreement. Risks/benefits and alternatives discussed with patient including possible PONV, sore throat, damage to teeth/lips/gums and possibility of rare anesthetic and surgical emergencies.

## 2024-10-04 NOTE — ANESTHESIA POSTPROCEDURE EVALUATION
Post-Op Assessment Note    CV Status:  Stable    Pain management: satisfactory to patient       Mental Status:  Alert and awake   Hydration Status:  Stable   PONV Controlled:  None   Airway Patency:  Patent     Post Op Vitals Reviewed: Yes    No anethesia notable event occurred.    Staff: CRNA               BP   137/79   Temp   97.6   Pulse  72   Resp   14   SpO2   99

## 2024-10-04 NOTE — H&P
History and Physical -  [facility]  Lianne Tay 44 y.o. female MRN: [id number]                  HPI: Lianne Tay is a 44 y.o. year old female who presents for EGD and colonoscopy evaluation      REVIEW OF SYSTEMS: Per the HPI, and otherwise unremarkable.    Historical Information   Past Medical History:   Diagnosis Date    Abnormal Pap smear of cervix     years ago    Acne 2015    Acquired hallux valgus 2016    Acute non-recurrent maxillary sinusitis 2020    Anemia     Anxiety 10/16/2019    Arthritis     Benign paroxysmal positional vertigo 2017    Candida vaginitis 2019    Colon polyp 2019    COVID-19 vaccine series completed     Moderna: 2nd dose 2021    Depression     Discoloration of skin of hand 2019    Duodenitis without bleeding     Dysphagia     Fibroid 4 years    Fibromyalgia     Gastritis     GERD (gastroesophageal reflux disease)     Hiatal hernia     Hx of colonoscopy     Insomnia     Iron deficiency anemia secondary to inadequate dietary iron intake 10/18/2019    Memory loss     Menorrhagia with regular cycle 2014    Moderate episode of recurrent major depressive disorder (HCC) 10/16/2019    Obesity     Oral herpes     Ovarian cyst 2013    Small dermoid on right ovary    Pap smear for cervical cancer screening     2016-wnl; 2020-wnl, HRHPV neg    Sodium (Na) deficiency 10/19/2020    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 myelopathy or radiculopathy     Streptococcal pharyngitis 10/19/2020    Vaginal discharge 2018    Vitamin D deficiency      Past Surgical History:   Procedure Laterality Date    APPENDECTOMY      BREAST BIOPSY Right     pt unsure when or where- ? 6 yrs ago- benign    BREAST SURGERY Right 2015    lump removed from breast      SECTION  1998    CHOLECYSTECTOMY      COLONOSCOPY      2012; 2019-benign polyp, repeat 5 years    DILATION AND CURETTAGE OF UTERUS      Resolved:2009     ENDOMETRIAL ABLATION N/A 2020    Procedure: ABLATION ENDOMETRIAL NOVASURE;  Surgeon: Brenna Peguero MD;  Location: [facility];  Service: Gynecology    ESOPHAGOGASTRODUODENOSCOPY      Diagnostic ; 2012    HYSTEROSCOPY      Resolved:2009    OVARIAN CYST REMOVAL Right 2005    [place] HYSTEROSCOPY BX ENDOMETRIUM&/POLYPC W/WO D&C N/A 2020    Procedure: DILATATION AND CURETTAGE (D&C) WITH HYSTEROSCOPY;  Surgeon: Brenna Peguero MD;  Location: [facility];  Service: Gynecology    WISDOM TOOTH EXTRACTION       Social History   Social History     Substance and Sexual Activity   Alcohol Use No     Social History     Substance and Sexual Activity   Drug Use No     Social History     Tobacco Use   Smoking Status Never    Passive exposure: Never   Smokeless Tobacco Never   Tobacco Comments    No passive smoke exposure     Family History   Problem Relation Age of Onset    Other Mother         uterine leiomyoma    Diabetes type II Mother         Mellitus    Hypothyroidism Mother     Colon cancer Father 55        stage 4 squamous cell carcinomas in final stages    Diabetes Father         Type II Mellitus    Hypertension Father     Squamous cell carcinoma Father         Located waist down around kidneys, lowe back, tail bone, prostate and bladder.    Cancer Father          23  cancer returned  squamous carcinoma  incurable    Multiple sclerosis Sister     Asthma Sister         All her life    Hypothyroidism Sister     Anxiety disorder Brother     Depression Brother     Diabetes Brother         Mellitus    Asthma Brother         All his life    Hypertension Brother     Cancer Maternal Grandmother         vulvar cancer    Hypertension Maternal Grandmother     Diabetes Maternal Grandmother     Other Maternal Grandmother         Vulvar cancer    Schizophrenia Maternal Grandfather     Hypertension Maternal Grandfather     Thyroid cancer Paternal Grandmother 82    Cancer Paternal Grandmother     Diabetes  "Paternal Grandfather 42        Dont remember full name    Breast cancer Cousin 52    Ovarian cancer Neg Hx     Uterine cancer Neg Hx        Meds/Allergies       Current Outpatient Medications:     buPROPion (WELLBUTRIN XL) 300 mg 24 hr tablet    pantoprazole (PROTONIX) 40 mg tablet    albuterol (Ventolin HFA) 90 mcg/act inhaler    budesonide-formoterol (SYMBICORT) 160-4.5 mcg/act inhaler    clindamycin (CLINDAGEL) 1 % gel    cyanocobalamin (VITAMIN B-12) 1000 MCG tablet    famotidine (PEPCID) 40 MG tablet    fexofenadine (ALLEGRA) 180 MG tablet    fluticasone (FLONASE) 50 mcg/act nasal spray    hydrOXYzine HCL (ATARAX) 10 mg tablet    polyethylene glycol (GOLYTELY) 4000 mL solution    Current Facility-Administered Medications:     lactated ringers infusion, 125 mL/hr, Intravenous, Continuous, 125 mL/hr at 10/04/24 1055    Allergies   Allergen Reactions    Domperidone Other (See Comments)     Reaction unknown to patient.    Morphine Chest Pain and Hives     Other reaction(s): chest pain    Ibuprofen GI Intolerance     Due to gastritis    Percocet [Oxycodone-Acetaminophen] Hives       Objective     /72   Pulse 79   Temp (!) 97.2 °F (36.2 °C) (Temporal)   Resp 16   Ht 5' 5\" (1.651 m)   Wt 107 kg (236 lb)   LMP 09/20/2024 (Approximate)   SpO2 100%   BMI 39.27 kg/m²       PHYSICAL EXAM    Gen: NAD  Head: NCAT  CV: RRR  CHEST: Clear  ABD: soft, NT/ND  EXT: no edema      ASSESSMENT/PLAN:  This is a 44 y.o. year old female here for colonoscopy history of colon polyps and EGD for evaluation for reflux, and she is stable and optimized for her procedure.        "

## 2024-10-09 ENCOUNTER — TELEMEDICINE (OUTPATIENT)
Dept: GASTROENTEROLOGY | Facility: CLINIC | Age: 44
End: 2024-10-09
Payer: COMMERCIAL

## 2024-10-09 ENCOUNTER — TELEPHONE (OUTPATIENT)
Age: 44
End: 2024-10-09

## 2024-10-09 VITALS — HEIGHT: 65 IN | BODY MASS INDEX: 37.65 KG/M2 | WEIGHT: 226 LBS

## 2024-10-09 DIAGNOSIS — K21.9 HIATAL HERNIA WITH GERD WITHOUT ESOPHAGITIS: Primary | ICD-10-CM

## 2024-10-09 DIAGNOSIS — K44.9 HIATAL HERNIA WITH GERD WITHOUT ESOPHAGITIS: Primary | ICD-10-CM

## 2024-10-09 DIAGNOSIS — Z80.0 FAMILY HISTORY OF COLON CANCER IN FATHER: ICD-10-CM

## 2024-10-09 PROCEDURE — 88305 TISSUE EXAM BY PATHOLOGIST: CPT | Performed by: STUDENT IN AN ORGANIZED HEALTH CARE EDUCATION/TRAINING PROGRAM

## 2024-10-09 PROCEDURE — 99213 OFFICE O/P EST LOW 20 MIN: CPT | Performed by: PHYSICIAN ASSISTANT

## 2024-10-09 NOTE — TELEPHONE ENCOUNTER
Pt calling stating that she paid her co-pay $50 on [employer] for her virtual appt today and realized she doesn't have the funds. Pt wants to know if she can stop this payment and pay next week. I called the office and spoke w/ Nuvia who stated that they still have to call pt before her appt to finish check in and when they ask her to confirm payment to finish processing pt can say no and they will cancel the payment. I informed pt and she understood and thanked me.

## 2024-10-09 NOTE — PROGRESS NOTES
Virtual Regular Visit  Name: Lianne Tay      : 1980      MRN: [id number]  Encounter Provider: Abby Hull PA-C  Encounter Date: 10/9/2024   Encounter department: [facility] [place]    Verification of patient location:    Patient is located at Home in the following state in which I hold an active license [place]    Assessment & Plan  Hiatal hernia with GERD without esophagitis  We reviewed her recent EGD results and biopsy findings.  Patient expressed understanding to results.  All questions answered.  I recommended she continue to follow-up with her PCP regarding her upper abdominal/chest discomfort and continue with discussions for possible breast reduction surgery.  Otherwise, I recommended patient continue to follow more of a GERD diet/lifestyle and continue with pantoprazole 40 mg once daily before breakfast.  No plans for further workup at this time from a GI standpoint.  All questions were answered.  Patient was appreciative of visit today.       Family history of colon cancer in father  We reviewed her recent colonoscopy results.  Patient expressed understanding to results.  All questions answered.  She will be due for repeat screening colonoscopy in 5 years, 2029.         Patient was instructed to call the office with any questions, concerns, new/ worsening/ persisting GI symptoms. Advised patient go to the ER with any severe or worsening abdominal pain, fevers/ chills, intractable N/V, chest pain, SOB, dizziness, lightheadedness, feeling something stuck in esophagus that will not go down. Patient expressed understanding and is in agreement with treatment plan.     Will plan to follow up in 1 year      Encounter provider Abby Hull PA-C    The patient was identified by name and date of birth. Lianne Tay was informed that this is a telemedicine visit and that the visit is being conducted through the Epic Embedded platform. She agrees to proceed..   My office door was closed. No one else was in the room.  She acknowledged consent and understanding of privacy and security of the video platform. The patient has agreed to participate and understands they can discontinue the visit at any time.    Patient is aware this is a billable service.     History of Present Illness         Patient presents to the office today for follow-up.  Patient was last seen in the office by me 4/9/2024, previous office note was reviewed.  At last office visit I recommended she continue pantoprazole 40 mg once daily before breakfast and continue with famotidine 40 mg once daily at bedtime.  We also discussed the importance of a GERD diet/lifestyle.    Since last office visit patient underwent both EGD and colonoscopy 10/4/2024.  Colonoscopy was completely normal.  It was recommended patient undergo repeat colonoscopy in 5 years given family history of colon cancer.  EGD showed evidence of a small hiatal hernia, otherwise was normal.  Esophageal biopsy was normal.  Gastric biopsy was negative for H. pylori.  Duodenal biopsy was normal and negative for celiac disease.      Patient reports doing overall well today.  No new complaints today.  Patient is taking pantoprazole 40 mg once daily.  She feels as though this is significantly helping her heartburn, acid reflux symptoms.  She is no longer taking famotidine at bedtime.  She tells me she likes to avoid medications when able.  She does continue with intermittent upper abdominal discomfort that can radiate into her chest.  Upon chart review it does look like the patient saw her PCP Dr. Cao for this in May and it was discussed that her symptoms may be secondary to her large breasts and breast reduction surgery was discussed.  She is otherwise eating and drinking well.   Bowel movements are regular, brown, formed.  No blood in the stool or black stools.     History obtained from : patient  Review of Systems   Constitutional:  Negative for  "chills and fever.   HENT:  Negative for ear pain and sore throat.    Eyes:  Negative for pain and visual disturbance.   Respiratory:  Negative for cough and shortness of breath.    Cardiovascular:  Negative for palpitations and leg swelling.   Gastrointestinal:  Negative for constipation, diarrhea and vomiting.   Genitourinary:  Negative for dysuria, frequency and hematuria.   Musculoskeletal:  Negative for arthralgias, back pain and myalgias.   Skin:  Negative for color change and rash.   Neurological:  Positive for headaches. Negative for seizures and syncope.   All other systems reviewed and are negative.      Objective     Ht 5' 5\" (1.651 m)   Wt 103 kg (226 lb)   LMP 09/20/2024 (Approximate)   BMI 37.61 kg/m²   Physical Exam  Vitals reviewed.   Constitutional:       General: She is not in acute distress.     Appearance: She is not ill-appearing, toxic-appearing or diaphoretic.   HENT:      Head: Normocephalic and atraumatic.      Nose: Nose normal.   Eyes:      Extraocular Movements: Extraocular movements intact.      Conjunctiva/sclera: Conjunctivae normal.   Pulmonary:      Effort: Pulmonary effort is normal. No respiratory distress.   Musculoskeletal:      Cervical back: Normal range of motion. No rigidity.   Skin:     Coloration: Skin is not jaundiced or pale.   Neurological:      General: No focal deficit present.      Mental Status: She is alert and oriented to person, place, and time. Mental status is at baseline.   Psychiatric:         Mood and Affect: Mood normal.         Behavior: Behavior normal.         Thought Content: Thought content normal.         Visit Time  Total Visit Duration: 10 minutes        "

## 2024-10-11 ENCOUNTER — OFFICE VISIT (OUTPATIENT)
Age: 44
End: 2024-10-11
Payer: COMMERCIAL

## 2024-10-11 VITALS
OXYGEN SATURATION: 98 % | HEART RATE: 63 BPM | BODY MASS INDEX: 37.99 KG/M2 | TEMPERATURE: 97.3 F | WEIGHT: 228 LBS | HEIGHT: 65 IN | DIASTOLIC BLOOD PRESSURE: 80 MMHG | SYSTOLIC BLOOD PRESSURE: 110 MMHG

## 2024-10-11 DIAGNOSIS — R01.1 SYSTOLIC MURMUR: Primary | ICD-10-CM

## 2024-10-11 DIAGNOSIS — E78.5 HYPERLIPIDEMIA, UNSPECIFIED HYPERLIPIDEMIA TYPE: ICD-10-CM

## 2024-10-11 DIAGNOSIS — R07.89 OTHER CHEST PAIN: ICD-10-CM

## 2024-10-11 DIAGNOSIS — Z23 NEED FOR IMMUNIZATION AGAINST INFLUENZA: ICD-10-CM

## 2024-10-11 DIAGNOSIS — F33.0 MAJOR DEPRESSIVE DISORDER, RECURRENT EPISODE, MILD (HCC): ICD-10-CM

## 2024-10-11 PROCEDURE — 90673 RIV3 VACCINE NO PRESERV IM: CPT

## 2024-10-11 PROCEDURE — 99214 OFFICE O/P EST MOD 30 MIN: CPT | Performed by: INTERNAL MEDICINE

## 2024-10-11 PROCEDURE — 90471 IMMUNIZATION ADMIN: CPT

## 2024-10-11 NOTE — PROGRESS NOTES
"Assessment/Plan:           Problem List Items Addressed This Visit       Hyperlipidemia    Relevant Orders    NM myocardial perfusion spect (rx stress and/or rest)    Other chest pain    Relevant Orders    NM myocardial perfusion spect (rx stress and/or rest)    Major depressive disorder, recurrent episode, mild (HCC)     Other Visit Diagnoses       Systolic murmur    -  Primary    Relevant Orders    Echo complete w/ contrast if indicated              Subjective:      Patient ID: Lianne Tay is a 44 y.o. female.    HPI  Patient is here to follow-up on ongoing chest discomfort.  She recently had EGD that showed small hiatal hernia and no inflammation.  Patient was placed on PPI and was advised to follow-up.  She had gastric empty study couple years ago that showed delayed emptying.  Chest x-ray in the past has been unremarkable. EKG was unremarkable.   Stress test and echocardiogram would be ordered.  She is on Wellbutrin.  The following portions of the patient's history were reviewed and updated as appropriate: allergies, current medications, past family history, past medical history, past social history, past surgical history, and problem list.    Review of Systems      Objective:      /80 (BP Location: Left arm, Patient Position: Sitting, Cuff Size: Standard)   Pulse 63   Temp (!) 97.3 °F (36.3 °C) (Temporal)   Ht 5' 5\" (1.651 m)   Wt 103 kg (228 lb)   LMP 09/20/2024 (Approximate)   SpO2 98%   BMI 37.94 kg/m²       Depression Screening Follow-up Plan: Patient's depression screening was positive with a PHQ-2 score of . Their PHQ-9 score was 0. Patient with underlying depression and was advised to continue current medications as prescribed.    Physical Exam  Cardiovascular:      Rate and Rhythm: Normal rate and regular rhythm.      Heart sounds: Murmur (pansystolic murmur) heard.   Pulmonary:      Effort: Pulmonary effort is normal. No respiratory distress.      Breath sounds: Normal breath sounds. No " wheezing or rales.   Abdominal:      General: There is no distension.      Tenderness: There is no abdominal tenderness. There is no guarding.   Musculoskeletal:      Right lower leg: No edema.      Left lower leg: No edema.   Neurological:      Mental Status: She is alert.   Psychiatric:         Mood and Affect: Mood normal.         Behavior: Behavior normal.               Depression Screening and Follow-up Plan: Patient was screened for depression during today's encounter. They screened negative with a PHQ-9 score of 0.Patient with underlying depression and was advised to continue current medications as prescribed.

## 2024-10-17 ENCOUNTER — HOSPITAL ENCOUNTER (OUTPATIENT)
Dept: RADIOLOGY | Age: 44
Discharge: HOME | End: 2024-10-17
Payer: COMMERCIAL

## 2024-10-17 DIAGNOSIS — Z12.31 VISIT FOR SCREENING MAMMOGRAM: ICD-10-CM

## 2024-10-17 PROCEDURE — 77063 BREAST TOMOSYNTHESIS BI: CPT

## 2024-10-17 PROCEDURE — 77067 SCR MAMMO BI INCL CAD: CPT

## 2024-10-29 ENCOUNTER — HOSPITAL ENCOUNTER (OUTPATIENT)
Dept: NUCLEAR MEDICINE | Facility: HOSPITAL | Age: 44
Discharge: HOME | End: 2024-10-29
Payer: COMMERCIAL

## 2024-10-29 ENCOUNTER — HOSPITAL ENCOUNTER (OUTPATIENT)
Dept: NON INVASIVE DIAGNOSTICS | Facility: HOSPITAL | Age: 44
Discharge: HOME | End: 2024-10-29
Payer: COMMERCIAL

## 2024-10-29 VITALS
DIASTOLIC BLOOD PRESSURE: 80 MMHG | HEART RATE: 63 BPM | HEIGHT: 65 IN | WEIGHT: 227.07 LBS | BODY MASS INDEX: 37.83 KG/M2 | SYSTOLIC BLOOD PRESSURE: 110 MMHG

## 2024-10-29 VITALS — HEIGHT: 65 IN | WEIGHT: 228 LBS | BODY MASS INDEX: 37.99 KG/M2

## 2024-10-29 DIAGNOSIS — R01.1 SYSTOLIC MURMUR: ICD-10-CM

## 2024-10-29 DIAGNOSIS — E78.5 HYPERLIPIDEMIA, UNSPECIFIED HYPERLIPIDEMIA TYPE: ICD-10-CM

## 2024-10-29 DIAGNOSIS — R07.89 OTHER CHEST PAIN: ICD-10-CM

## 2024-10-29 LAB
AORTIC ROOT: 2.5 CM
APICAL FOUR CHAMBER EJECTION FRACTION: 77 %
ASCENDING AORTA: 3.2 CM
CHEST PAIN STATEMENT: NORMAL
DOP CALC LVOT AREA: 3.46 CM2
DOP CALC LVOT DIAMETER: 2.1 CM
E WAVE DECELERATION TIME: 128 MS
E/A RATIO: 0.97
FRACTIONAL SHORTENING: 33 (ref 28–44)
INTERVENTRICULAR SEPTUM IN DIASTOLE (PARASTERNAL SHORT AXIS VIEW): 0.9 CM
INTERVENTRICULAR SEPTUM: 0.9 CM (ref 0.6–1.1)
LAAS-AP2: 18.7 CM2
LAAS-AP4: 16.4 CM2
LEFT ATRIUM SIZE: 3.7 CM
LEFT ATRIUM VOLUME (MOD BIPLANE): 50 ML
LEFT ATRIUM VOLUME INDEX (MOD BIPLANE): 23.9 ML/M2
LEFT INTERNAL DIMENSION IN SYSTOLE: 3.5 CM (ref 2.1–4)
LEFT VENTRICULAR INTERNAL DIMENSION IN DIASTOLE: 5.2 CM (ref 3.5–6)
LEFT VENTRICULAR POSTERIOR WALL IN END DIASTOLE: 0.9 CM
LEFT VENTRICULAR STROKE VOLUME: 80 ML
LVSV (TEICH): 80 ML
MAX DIASTOLIC BP: 80 MMHG
MAX HR PERCENT: 91 %
MAX HR: 160 BPM
MAX PREDICTED HEART RATE: 174 BPM
MV E'TISSUE VEL-LAT: 13 CM/S
MV E'TISSUE VEL-SEP: 10 CM/S
MV PEAK A VEL: 0.67 M/S
MV PEAK E VEL: 65 CM/S
MV STENOSIS PRESSURE HALF TIME: 37 MS
MV VALVE AREA P 1/2 METHOD: 5.95
NUC STRESS EJECTION FRACTION: 64 %
PROTOCOL NAME: NORMAL
RATE PRESSURE PRODUCT: NORMAL
REASON FOR TERMINATION: NORMAL
RIGHT ATRIUM AREA SYSTOLE A4C: 14.3 CM2
RIGHT VENTRICLE ID DIMENSION: 3.7 CM
SINOTUBULAR JUNCTION: 3 CM
SL CV LEFT ATRIUM LENGTH A2C: 5.1 CM
SL CV PED ECHO LEFT VENTRICLE DIASTOLIC VOLUME (MOD BIPLANE) 2D: 132 ML
SL CV PED ECHO LEFT VENTRICLE SYSTOLIC VOLUME (MOD BIPLANE) 2D: 52 ML
SL CV REST NUCLEAR ISOTOPE DOSE: 8.7 MCI
SL CV SINUS OF VALSALVA 2D: 3.4 CM
SL CV STRESS NUCLEAR ISOTOPE DOSE: 23.1 MCI
SL CV STRESS RECOVERY BP: NORMAL MMHG
SL CV STRESS RECOVERY HR: 85 BPM
SL CV STRESS RECOVERY O2 SAT: 99 %
SL CV STRESS STAGE REACHED: 3
STJ: 3 CM
STRESS ANGINA INDEX: 0
STRESS BASELINE BP: NORMAL MMHG
STRESS BASELINE HR: 62 BPM
STRESS O2 SAT REST: 99 %
STRESS PEAK HR: 160 BPM
STRESS POST ESTIMATED WORKLOAD: 8.5 METS
STRESS POST EXERCISE DUR MIN: 7 MIN
STRESS POST EXERCISE DUR MIN: 7 MIN
STRESS POST EXERCISE DUR SEC: 1 SEC
STRESS POST EXERCISE DUR SEC: 1 SEC
STRESS POST O2 SAT PEAK: 99 %
STRESS POST PEAK BP: 174 MMHG
STRESS POST PEAK HR: 160 BPM
STRESS POST PEAK SYSTOLIC BP: 174 MMHG
STRESS/REST PERFUSION RATIO: 1.03
TARGET HR FORMULA: NORMAL
TEST INDICATION: NORMAL
TRICUSPID ANNULAR PLANE SYSTOLIC EXCURSION: 2.5 CM

## 2024-10-29 PROCEDURE — 93306 TTE W/DOPPLER COMPLETE: CPT

## 2024-10-29 PROCEDURE — 93016 CV STRESS TEST SUPVJ ONLY: CPT | Performed by: INTERNAL MEDICINE

## 2024-10-29 PROCEDURE — 78452 HT MUSCLE IMAGE SPECT MULT: CPT

## 2024-10-29 PROCEDURE — A9502 TC99M TETROFOSMIN: HCPCS

## 2024-10-29 PROCEDURE — 93017 CV STRESS TEST TRACING ONLY: CPT

## 2024-10-29 PROCEDURE — 78452 HT MUSCLE IMAGE SPECT MULT: CPT | Performed by: INTERNAL MEDICINE

## 2024-10-29 PROCEDURE — 93018 CV STRESS TEST I&R ONLY: CPT | Performed by: INTERNAL MEDICINE

## 2024-10-29 PROCEDURE — 93306 TTE W/DOPPLER COMPLETE: CPT | Performed by: INTERNAL MEDICINE

## 2024-10-29 NOTE — RESULT ENCOUNTER NOTE
Echocardiogram reassuring with good ejection fraction.  Trace regurgitation in the mitral valve that was audible.  Will discuss this further next appointment

## 2024-10-30 NOTE — RESULT ENCOUNTER NOTE
Spoke with patient and advised of results, patient verbalized understanding and agrees. No further concerns, ng

## 2024-11-03 DIAGNOSIS — F33.0 MDD (MAJOR DEPRESSIVE DISORDER), RECURRENT EPISODE, MILD (HCC): ICD-10-CM

## 2024-11-04 RX ORDER — BUPROPION HYDROCHLORIDE 300 MG/1
300 TABLET ORAL DAILY
Qty: 90 TABLET | Refills: 0 | Status: SHIPPED | OUTPATIENT
Start: 2024-11-04 | End: 2024-11-11 | Stop reason: SDUPTHER

## 2024-11-11 ENCOUNTER — TELEPHONE (OUTPATIENT)
Age: 44
End: 2024-11-11

## 2024-11-11 DIAGNOSIS — R07.89 CHEST WALL TENDERNESS: Primary | ICD-10-CM

## 2024-11-11 DIAGNOSIS — F33.0 MDD (MAJOR DEPRESSIVE DISORDER), RECURRENT EPISODE, MILD (HCC): ICD-10-CM

## 2024-11-11 RX ORDER — BUPROPION HYDROCHLORIDE 300 MG/1
300 TABLET ORAL DAILY
Qty: 90 TABLET | Refills: 0 | Status: SHIPPED | OUTPATIENT
Start: 2024-11-11 | End: 2024-11-21 | Stop reason: SDUPTHER

## 2024-11-11 NOTE — TELEPHONE ENCOUNTER
Patient called, is requesting a return call by the end of today 11/11.    Pt is not sure what to do, is looking for advice from the provider.  Patient continues to have consistent pain in between the breast (chest) area - cardiology testing ruled out any cardio issues. Gastroenterologist, nothing abnormal.     During tests it was discovered that the patient has a hiatal hernia.. could this be causing the pain?    If the pain could be the result of the hiatal hernia, should she follow up with the PCP (OR) with gastro?    If PCP would like to see her, she has availability all day tomorrow 11/12 - please advise.

## 2024-11-11 NOTE — TELEPHONE ENCOUNTER
Spoke with pt, she will go for x-rays and follow up with GI if needed.  She thinks that this is due to her HH.

## 2024-11-11 NOTE — TELEPHONE ENCOUNTER
Medication Refill Request     Name of Medication Bupropion  Dose/Frequency 300 mg/  Take 1 tablet by mouth every day.  Quantity 90  Verified pharmacy   [x]  Verified ordering Provider   [x]  Does patient have enough for the next 3 days? Yes [] No [x]  Does patient have a follow-up appointment scheduled? Yes [x] No []   If so when is appointment: 11/21/2024

## 2024-11-21 ENCOUNTER — OFFICE VISIT (OUTPATIENT)
Dept: PSYCHIATRY | Facility: CLINIC | Age: 44
End: 2024-11-21
Payer: COMMERCIAL

## 2024-11-21 ENCOUNTER — ANNUAL EXAM (OUTPATIENT)
Dept: OBGYN CLINIC | Facility: CLINIC | Age: 44
End: 2024-11-21
Payer: COMMERCIAL

## 2024-11-21 VITALS
WEIGHT: 231.2 LBS | DIASTOLIC BLOOD PRESSURE: 70 MMHG | BODY MASS INDEX: 38.52 KG/M2 | HEIGHT: 65 IN | SYSTOLIC BLOOD PRESSURE: 118 MMHG

## 2024-11-21 DIAGNOSIS — Z11.51 SCREENING FOR HPV (HUMAN PAPILLOMAVIRUS): ICD-10-CM

## 2024-11-21 DIAGNOSIS — N92.1 MENORRHAGIA WITH IRREGULAR CYCLE: ICD-10-CM

## 2024-11-21 DIAGNOSIS — Z12.4 ENCOUNTER FOR PAPANICOLAOU SMEAR FOR CERVICAL CANCER SCREENING: ICD-10-CM

## 2024-11-21 DIAGNOSIS — F33.0 MDD (MAJOR DEPRESSIVE DISORDER), RECURRENT EPISODE, MILD (HCC): Primary | ICD-10-CM

## 2024-11-21 DIAGNOSIS — Z01.411 ENCOUNTER FOR GYNECOLOGICAL EXAMINATION WITH ABNORMAL FINDING: Primary | ICD-10-CM

## 2024-11-21 DIAGNOSIS — R45.86 MOOD SWINGS: ICD-10-CM

## 2024-11-21 PROCEDURE — G0476 HPV COMBO ASSAY CA SCREEN: HCPCS | Performed by: NURSE PRACTITIONER

## 2024-11-21 PROCEDURE — S0612 ANNUAL GYNECOLOGICAL EXAMINA: HCPCS | Performed by: NURSE PRACTITIONER

## 2024-11-21 PROCEDURE — 99214 OFFICE O/P EST MOD 30 MIN: CPT | Performed by: PSYCHIATRY & NEUROLOGY

## 2024-11-21 PROCEDURE — G0145 SCR C/V CYTO,THINLAYER,RESCR: HCPCS | Performed by: NURSE PRACTITIONER

## 2024-11-21 RX ORDER — BUPROPION HYDROCHLORIDE 300 MG/1
300 TABLET ORAL DAILY
Qty: 90 TABLET | Refills: 0 | Status: SHIPPED | OUTPATIENT
Start: 2024-11-21

## 2024-11-21 RX ORDER — LAMOTRIGINE 25 MG/1
TABLET ORAL
Qty: 46 TABLET | Refills: 0 | Status: SHIPPED | OUTPATIENT
Start: 2024-11-21 | End: 2024-12-21

## 2024-11-21 NOTE — PSYCH
MEDICATION MANAGEMENT NOTE        [facility]      Name and Date of Birth:  Lianne Tay 44 y.o. 1980 MRN: [id number]    Date of Visit: November 21, 2024      Assessment & Plan  MDD (major depressive disorder), recurrent episode, mild (HCC)    Mood swings        Reason for Visit: Follow-up for medication management.    Subjective: The patient said that her memory continues to be not very good.  Her  came with the patient who also confirmed it.  He added that the patient is very distracted, would forget anything which she would be told few minutes back.  She also can go from one project to another very quickly without completing the previous 1.  Misplaces things all the time.  I did the slums testing during the meeting and the patient scored 26/30.  The patient last couple of points in clock drawing and couple of points in story recall with executive function.    The patient reports her mood has been okay but her  stated that the patient does get irritable very quickly on any trigger minor or major.  Though denies any physical aggression but can slap the table or any things around.  The patient agreed with it.  She reports depression though has been okay.  Denies any hopelessness or having any SI or HI.  Reports anxiety also is always there but mostly due to situational stressors.  Denies any panic attack.  Reports sleep has been fine.  Compliant with her medication and denies any side effect.  The patient was agreeable to start Lamictal for her mood stability and management of irritability.  I advised the patient if mood continues to be stable and irritability improves and I can increase the dose of Wellbutrin at next visit to help with her attention issue.  The patient was agreeable with it.  She denies any childhood history of ADHD.  She also reports she was very calm child and never got into any trouble.  Denies any significant trouble with  attention issues.  Denies any impulsivity or behavior issues during childhood.  Patient and her  denies any other concern.  Sleep, appetite, energy level has been okay.  Denies any other medical issues lately.      Review Of Systems: Negative other than mentioned above      Constitutional negative   ENT negative   Cardiovascular negative   Respiratory negative   Gastrointestinal negative   Genitourinary negative   Musculoskeletal negative   Integumentary negative   Neurological negative   Endocrine negative   Other Symptoms none       Current Rating Scores:     Current PHQ-9   PHQ-2/9 Depression Screening    Little interest or pleasure in doing things: 0 - not at all  Feeling down, depressed, or hopeless: 0 - not at all  Trouble falling or staying asleep, or sleeping too much: 0 - not at all  Feeling tired or having little energy: 0 - not at all  Poor appetite or overeatin - not at all  Feeling bad about yourself - or that you are a failure or have let yourself or your family down: 0 - not at all  Trouble concentrating on things, such as reading the newspaper or watching television: 2 - more than half the days  Moving or speaking so slowly that other people could have noticed. Or the opposite - being so fidgety or restless that you have been moving around a lot more than usual: 1 - several days       Current EMILY-7 is   EMILY-7 Flowsheet Screening      Flowsheet Row Most Recent Value   Over the last two weeks, how often have you been bothered by the following problems?     Feeling nervous, anxious, or on edge 0    Not being able to stop or control worrying 0    Worrying too much about different things 0    Trouble relaxing  0    Being so restless that it's hard to sit still 0    Becoming easily annoyed or irritable  0    Feeling afraid as if something awful might happen 0    How difficult have these problems made it for you to do your work, take care of things at home, or get along with other people?  Not  difficult at all    EMILY Score  0         .    Suicide/Homicide Risk Assessment:     Risk of Harm to Self:  The following ratings are based on assessment at the time of the interview and observation over the last 12 months  Demographic risk factors include:   Historical Risk Factors include: history of depression, history of anxiety  Recent Specific Risk Factors include: diagnosis of depression  Protective Factors: no current suicidal ideation, access to mental health treatment, being a parent, being , compliant with medications, compliant with mental health treatment, stable living environment, stable job, supportive family, supportive friends  Weapons: none and no firearms. The following steps have been taken to ensure weapons are properly secured: not applicable  Based on today's assessment, Lianne presents the following risk of harm to self: minimal     Risk of Harm to Others:  The following ratings are based on assessment at the time of the interview  Based on today's assessment, Lianne presents the following risk of harm to others: none     The following interventions are recommended: contracts for safety at present - agrees to go to ED if feeling unsafe, contracts for safety at present - agrees to call Crisis Intervention Service if feeling unsafe    Alcohol/Substance Abuse: Denies        Past Medical History:    Past Medical History:   Diagnosis Date    Abnormal Pap smear of cervix     years ago    Acne 04/23/2015    Acquired hallux valgus 03/07/2016    Acute non-recurrent maxillary sinusitis 01/27/2020    Anemia     Anxiety 10/16/2019    Arthritis     Benign paroxysmal positional vertigo 03/08/2017    Candida vaginitis 04/25/2019    Colon polyp 2019    COVID-19 vaccine series completed     Moderna: 2nd dose 5/21/2021    Depression     Discoloration of skin of hand 03/20/2019    Duodenitis without bleeding     Dysphagia     Fibroid 4 years    Fibromyalgia     Gastritis     GERD  (gastroesophageal reflux disease)     Hiatal hernia     Hx of colonoscopy     Insomnia     Iron deficiency anemia secondary to inadequate dietary iron intake 10/18/2019    Memory loss     Menorrhagia with regular cycle 2014    Moderate episode of recurrent major depressive disorder (HCC) 10/16/2019    Obesity     Oral herpes     Ovarian cyst 2013    Small dermoid on right ovary    Pap smear for cervical cancer screening     2016-wnl; 2020-wnl, HRHPV neg    Sodium (Na) deficiency 10/19/2020    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 myelopathy or radiculopathy     Streptococcal pharyngitis 10/19/2020    Vaginal discharge 2018    Vitamin D deficiency         Past Surgical History:   Procedure Laterality Date    APPENDECTOMY      BREAST BIOPSY Right     pt unsure when or where- ? 6 yrs ago- benign    BREAST SURGERY Right 2015    lump removed from breast      SECTION      CHOLECYSTECTOMY      COLONOSCOPY      2012; 2019-benign polyp, repeat 5 years    DILATION AND CURETTAGE OF UTERUS      Resolved:2009    ENDOMETRIAL ABLATION N/A 2020    Procedure: ABLATION ENDOMETRIAL NOVASURE;  Surgeon: Brenna Peguero MD;  Location: [facility];  Service: Gynecology    ESOPHAGOGASTRODUODENOSCOPY      Diagnostic ; 2012    HYSTEROSCOPY      Resolved:2009    OVARIAN CYST REMOVAL Right     WY HYSTEROSCOPY BX ENDOMETRIUM&/POLYPC W/WO D&C N/A 2020    Procedure: DILATATION AND CURETTAGE (D&C) WITH HYSTEROSCOPY;  Surgeon: Brenna Peguero MD;  Location: [facility];  Service: Gynecology    WISDOM TOOTH EXTRACTION       Allergies   Allergen Reactions    Domperidone Other (See Comments)     Reaction unknown to patient.    Morphine Chest Pain and Hives     Other reaction(s): chest pain    Ibuprofen GI Intolerance     Due to gastritis    Percocet [Oxycodone-Acetaminophen] Hives       Current Medications:       Current Outpatient Medications:     buPROPion (WELLBUTRIN XL) 300  mg 24 hr tablet, Take 1 tablet (300 mg total) by mouth daily, Disp: 90 tablet, Rfl: 0    lamoTRIgine (LaMICtal) 25 mg tablet, Take 1 tablet (25 mg total) by mouth daily for 14 days, THEN 2 tablets (50 mg total) daily for 16 days., Disp: 46 tablet, Rfl: 0    albuterol (Ventolin HFA) 90 mcg/act inhaler, Inhale 2 puffs every 6 (six) hours as needed for wheezing, Disp: 18 g, Rfl: 0    budesonide-formoterol (SYMBICORT) 160-4.5 mcg/act inhaler, Inhale 2 puffs 2 (two) times a day Rinse mouth after use., Disp: 10.2 g, Rfl: 0    clindamycin (CLINDAGEL) 1 % gel, Apply topically 2 (two) times a day, Disp: 30 g, Rfl: 2    cyanocobalamin (VITAMIN B-12) 1000 MCG tablet, Take 1 tablet (1,000 mcg total) by mouth daily (Patient not taking: Reported on 7/24/2023), Disp: 90 tablet, Rfl: 3    fexofenadine (ALLEGRA) 180 MG tablet, Take 1 tablet (180 mg total) by mouth daily (Patient not taking: Reported on 11/21/2023), Disp: , Rfl:     fluticasone (FLONASE) 50 mcg/act nasal spray, SPRAY 2 SPRAYS INTO EACH NOSTRIL EVERY DAY (Patient not taking: No sig reported), Disp: 48 mL, Rfl: 2    hydrOXYzine HCL (ATARAX) 10 mg tablet, Take 1/2 to 1 tablet by mouth two to three times a day as needed for anxiety or irritability. (Patient not taking: Reported on 11/21/2024), Disp: 60 tablet, Rfl: 0    pantoprazole (PROTONIX) 40 mg tablet, TAKE 1 TABLET BY MOUTH 2 TIMES A DAY BEFORE MEALS., Disp: 180 tablet, Rfl: 1       History Review: The following portions of the patient's history were reviewed and updated as appropriate: allergies, current medications, past family history, past medical history, past social history, past surgical history, and problem list.         OBJECTIVE:     Vital signs in last 24 hours:    There were no vitals filed for this visit.    Mental Status Evaluation:    Appearance age appropriate, casually dressed   Behavior cooperative, calm   Speech normal rate, normal volume, normal pitch   Mood irritable   Affect normal range and  intensity, appropriate   Thought Processes organized, goal directed   Associations intact associations   Thought Content no overt delusions   Perceptual Disturbances: no auditory hallucinations, no visual hallucinations   Abnormal Thoughts  Risk Potential Suicidal ideation - None  Homicidal ideation - None  Potential for aggression - No   Orientation oriented to person, place, time/date, and situation   Memory recent and remote memory grossly intact   Consciousness alert and awake   Attention Span Concentration Span attention span and concentration are age appropriate   Intellect appears to be of average intelligence   Insight intact   Judgement intact   Muscle Strength and  Gait normal gait and normal balance   Motor activity no abnormal movements   Language no difficulty naming common objects, no difficulty repeating a phrase   Fund of Knowledge adequate knowledge of current events  adequate fund of knowledge regarding past history  adequate fund of knowledge regarding vocabulary    Pain none   Pain Scale 0       Laboratory Results: Most Recent Labs:   Lab Results   Component Value Date    WBC 7.93 08/23/2024    RBC 4.17 08/23/2024    HGB 12.0 08/23/2024    HCT 36.8 08/23/2024     08/23/2024    RDW 12.6 08/23/2024    NEUTROABS 4.43 08/23/2024    SODIUM 138 05/09/2024    K 4.1 05/09/2024     05/09/2024    CO2 28 05/09/2024    BUN 10 05/09/2024    CREATININE 0.74 05/09/2024    CALCIUM 9.3 05/09/2024    AST 15 05/09/2024    ALT 18 05/09/2024    ALKPHOS 62 05/09/2024    TP 7.3 05/09/2024    TBILI 0.60 05/09/2024    CHOLESTEROL 182 05/09/2024    TRIG 66 05/09/2024    HDL 64 05/09/2024    LDLCALC 105 (H) 05/09/2024    NONHDLC 118 05/09/2024    JLM2KPQSURHM 1.642 05/07/2024    HCGQUANT <2 08/27/2018     I have personally reviewed all pertinent laboratory/tests results.    Assessment/Plan: The patient continues struggle with memory issue which possibly could be due to her attention issues.  Her  also  reported patient had significant difficulty with it.  Her slums testing scored 26/30.  Does not seem to be major issue with memory.  Patient also continues struggle with mood swings especially irritability.  Anxiety has been present though mostly situational.  Denies any significant depression or SI.  I reviewed with the patient in detail about starting with stabilizer to help her with mood stability and irritability.  The patient was agreeable to start the Lamictal at the dose and schedule mentioned below.  The patient will be continued on Wellbutrin  mg daily with no changes but once her mood is stable and less irritable then can consider increasing the dose of Wellbutrin to help her with attention.  She was educated in detail about her current medication including Lamictal  about the benefit, risk, side effect, alternative, about medication, dosage and frequency.  She was specially educated about risk of rash on Lamictal and to call me or to call crisis or 911 in case of any rash, emergency or having any SI or HI.  Patient agrees.  Follow-up in 4 weeks or sooner if needed.      Diagnoses and all orders for this visit:    MDD (major depressive disorder), recurrent episode, mild (HCC)  -     buPROPion (WELLBUTRIN XL) 300 mg 24 hr tablet; Take 1 tablet (300 mg total) by mouth daily  -     lamoTRIgine (LaMICtal) 25 mg tablet; Take 1 tablet (25 mg total) by mouth daily for 14 days, THEN 2 tablets (50 mg total) daily for 16 days.    Mood swings  -     lamoTRIgine (LaMICtal) 25 mg tablet; Take 1 tablet (25 mg total) by mouth daily for 14 days, THEN 2 tablets (50 mg total) daily for 16 days.          Treatment Recommendations/Precautions:      Aware of 24 hour and weekend coverage for urgent situations accessed by calling [facility] main practice number    Risks/Benefits      Risks, Benefits And Possible Side Effects Of Medications:    Risks, benefits, and possible side effects of medications  explained to Lianne and she verbalizes understanding and agreement for treatment.    Controlled Medication Discussion:     Not applicable    Psychotherapy Provided:     Individual psychotherapy provided: Medications, treatment progress and treatment plan reviewed with Lianne.  Medication changes discussed with Lianne.  Medication education provided to Lianne.  Reassurance and supportive therapy provided.   Crisis/safety plan discussed with Lianne.     Treatment Plan;    Completed and signed during the session: Not applicable - Treatment Plan not due at this session  Visit Time    Visit Start Time: 4:03 PM  Visit Stop Time: 4:30 PM  Total Visit Duration:  27 minutes        Vera Morales MD 11/21/24

## 2024-11-21 NOTE — PROGRESS NOTES
Assessment / Plan    Assessment & Plan  Encounter for gynecological examination with abnormal finding  Well woman exam.  Pap with hpv updated.  Up to date on colonoscopy       Encounter for Papanicolaou smear for cervical cancer screening    Orders:    Liquid-based pap, screening    Screening for HPV (human papillomavirus)    Orders:    Liquid-based pap, screening    Menorrhagia with irregular cycle  Hx of EMA,   Discussed nature of her bleeding/ perimenopause and the need to rule out endometrial hyperplasia, atypia or malignancy.  Will check pelvic US with RV for endometrial sampling.    Orders:    US pelvis complete non OB; Future      Yearly/ E&M 20 min      Subjective      Lianne Tay is a 44 y.o. female who presents for her annual gynecologic exam.    45 yo     Seen by me 2024 for c/o pelvic pain/dyspareunia/bulky uterus.    Pelvic US: Uterus with bulbous contour, 9.5 x 5.2 x 6.2 cm.  Myometrial echo-texture with changes suggestive of adenomyosis.  1.5 cm and 9 mm uterine fibroids.  Endometrial lining 5 mm.  Right ovarian 1.6 cm echogenic nodule (stable as compared to 2018).  1.8 cm corpus luteum.  Left ovary normal.    She notes shortening menstrual intervals of every 17-21d, lasting 3-7d. Very light.    Hx of EMA .    Last pap: 2020 pap/hpv negative  Pap hx: remote hx of abnormal  STD hx: none  Last mammogram: 10/2024 negative; scheduled 10/27/25  10/2024 Colonoscopy normal, 5 yr  Sexually active: yes,   Condom use: --  Nutrition: Body mass index is 38.47 kg/m².  Calcium intake: given guidelines  Exercise: no; given guidelines  Safety: feels safe    Periods are irregular  Current contraception: vasectomy  History of abnormal Pap smear: yes -   Family history of breast,uterine, ovarian or colon cancer: yes - father colon ca, mat cousin breast    The following portions of the patient's history were reviewed and updated as appropriate: allergies, current medications, past family  "history, past medical history, past social history, past surgical history, and problem list.    Review of Systems      Review of Systems   Constitutional:  Negative for chills and fever.   Respiratory:  Negative for cough and shortness of breath.    Gastrointestinal:  Negative for abdominal distention, abdominal pain, blood in stool, constipation, diarrhea, nausea and vomiting.   Genitourinary:  Positive for menstrual problem (short intervals). Negative for difficulty urinating, dysuria, frequency, genital sores, hematuria, pelvic pain, urgency, vaginal bleeding and vaginal discharge.   Musculoskeletal:  Negative for arthralgias and myalgias.     Breasts:  Negative for skin changes, dimpling, asymmetry, nipple discharge, redness, tenderness or palpable masses    Objective     Chaperone: Shefali Gonzales MA     /70 (BP Location: Right arm, Patient Position: Sitting, Cuff Size: Large)   Ht 5' 5\" (1.651 m)   Wt 105 kg (231 lb 3.2 oz)   LMP 11/12/2024 (Exact Date)   BMI 38.47 kg/m²   Physical Exam  Constitutional:       General: She is not in acute distress.     Appearance: Normal appearance. She is well-developed. She is not ill-appearing or diaphoretic.      Comments: Body mass index is 38.47 kg/m².     HENT:      Head: Normocephalic and atraumatic.   Eyes:      Pupils: Pupils are equal, round, and reactive to light.   Neck:      Thyroid: No thyromegaly.   Pulmonary:      Effort: Pulmonary effort is normal.   Chest:   Breasts:     Breasts are symmetrical.      Right: No inverted nipple, mass, nipple discharge, skin change or tenderness.      Left: No inverted nipple, mass, nipple discharge, skin change or tenderness.   Abdominal:      General: There is no distension.      Palpations: Abdomen is soft. There is no mass.      Tenderness: There is no abdominal tenderness. There is no guarding or rebound.   Genitourinary:     General: Normal vulva.      Exam position: Lithotomy position.      Labia:         Right: " No rash, tenderness, lesion or injury.         Left: No rash, tenderness, lesion or injury.       Vagina: No signs of injury and foreign body. No vaginal discharge, erythema, tenderness or bleeding.      Cervix: No cervical motion tenderness, discharge or friability.      Uterus: Not enlarged and not tender.       Adnexa:         Right: No mass or tenderness.          Left: No mass or tenderness.     Musculoskeletal:      Cervical back: Neck supple.   Lymphadenopathy:      Cervical: No cervical adenopathy.      Upper Body:      Right upper body: No supraclavicular adenopathy.      Left upper body: No supraclavicular adenopathy.   Skin:     General: Skin is warm and dry.   Neurological:      General: No focal deficit present.      Mental Status: She is alert and oriented to person, place, and time.   Psychiatric:         Mood and Affect: Mood normal.         Behavior: Behavior normal.         Thought Content: Thought content normal.         Judgment: Judgment normal.

## 2024-11-21 NOTE — PATIENT INSTRUCTIONS
"Patient Education     Diet and health   The Basics   Written by the doctors and editors at [facility]   Why is it important to eat a healthy diet? -- It's important to eat a healthy diet because eating the right foods can keep you healthy now and later in life. It can lower the risk of problems like heart disease, diabetes, high blood pressure, and some types of cancer. It can also help you live longer and improve your quality of life.  What kind of diet is best? -- There is no 1 specific diet that experts recommend for everyone. People choose what foods to eat for many different reasons. These include personal preference, culture, Hindu, allergies or intolerances, and nutritional goals. People also need to consider the cost and availability of different foods.  In general, experts recommend a diet that:   Includes lots of vegetables, fruits, beans, nuts, and whole grains   Limits red and processed meats, unhealthy fats, sugar, salt, and alcohol  What are dietary patterns? -- A dietary \"pattern\" means generally eating certain types of foods while limiting others. Some people need to follow a specific dietary pattern because of their health needs. For example, if you have high blood pressure, your doctor might recommend a diet low in salt.  If you are trying to improve your health in general, choosing a healthy dietary pattern can help. This does not have to mean being very strict about what you eat or avoid. The goal is to think about getting plenty of healthy foods while limiting less healthy foods.  Examples of dietary patterns include:   Mediterranean diet - This involves eating a lot of fruits, vegetables, nuts, and whole grains, and uses olive oil instead of other fats. It also includes some fish, poultry, and dairy products, but not a lot of red meat. Following this diet can help your overall health, and might even lower your risk of having a stroke.   Plant-based diets - These patterns focus on vegetables, " "fruits, grains, beans, and nuts. They limit or avoid food that comes from animals, such as meat and dairy. There are different types of plant-based diets, including vegetarian and vegan.   Low-fat diet - A low-fat diet involves limiting calories from fat. This might help some people keep weight off if that is their goal, but it does not have many other health benefits. If you choose to follow a low-fat diet, it is also important to focus on getting lots of whole grains, legumes, fruits, and vegetables. Limit refined grains and sugar.   Low-cholesterol diet - Cholesterol is found in foods with a lot of saturated fat, like red meat, butter, and cheese. A low-cholesterol diet focuses on limiting the amount of cholesterol that you eat. Limiting the cholesterol in your diet can also help lower the amount of unhealthy fats that you eat.  Which foods are especially healthy? -- Foods that are especially healthy include:   Fruits and vegetables - Eating a diet with lots of fruits and vegetables can help prevent heart disease and stroke. It might also help prevent certain types of cancer. Try to eat fruits and vegetables at each meal and also for snacks. If you don't have fresh fruits and vegetables available, you can eat frozen or canned ones instead. Doctors recommend eating at least 5 servings of fruits or vegetables each day.   Whole grains - Whole-grain foods include 100 percent whole-wheat bread, steel cut oats, and whole-grain pasta. These are healthier than foods made with \"refined\" grains, like white bread and white rice. Eating lots of whole grains instead of refined grains has been shown to help with weight control. It can also lower the risk of several health problems, including colon cancer, heart disease, and diabetes. Doctors recommend that most people try to eat 5 to 8 servings of whole-grain, high-fiber foods each day.   Foods with fiber - Eating foods with a lot of fiber can help prevent heart disease and " "stroke. If you have type 2 diabetes, it can also help control your blood sugar. Foods that have a lot of fiber include vegetables, fruits, beans, nuts, oatmeal, and whole-grain breads and cereals. You can tell how much fiber is in a food by reading the nutrition label (figure 1). Doctors recommend that most people eat about 25 to 34 grams of fiber each day.   Foods with calcium and vitamin D - Babies, children, and adults need calcium and vitamin D to help keep their bones strong. Adults also need calcium and vitamin D to help prevent osteoporosis. Osteoporosis is a condition that causes bones to get \"thin\" and break more easily than usual. Different foods and drinks have calcium and vitamin D in them (figure 2). People who don't get enough calcium and vitamin D in their diet might need to take a supplement. Doctors recommend that most people have 2 to 3 servings of foods with calcium and vitamin D each day.   Foods with protein - Protein helps your muscles and bones stay strong. Healthy foods with a lot of protein include chicken, fish, eggs, beans, nuts, and soy products. Red meat also has a lot of protein, but it also contains fats, which can be unhealthy. Doctors recommend that most people try to eat about 5 servings of protein each day.   Healthy fats - There are different types of fats. Some types of fats are better for your body than others. Healthy fats are \"monounsaturated\" or \"polyunsaturated\" fats. These are found in fatty fish, nuts and nut butters, and avocados. Use plant-based oils when cooking. Examples of these oils include olive, canola, safflower, sunflower, and corn oil. Eating foods with healthy fats, while avoiding or limiting foods with unhealthy fats, might lower the risk of heart disease.   Foods with folate - Folate is a vitamin that is important for pregnant people, since it helps prevent certain birth defects. It is also called \"folic acid.\" Anyone who could get pregnant should get at " "least 400 micrograms of folic acid daily, whether or not they are actively trying to get pregnant. Folate is found in many breakfast cereals, oranges, orange juice, and green leafy vegetables.  What foods should I avoid or limit? -- To eat a healthy diet, there are some things that you should avoid or limit. They include:   Unhealthy fats - \"Trans\" fats are especially unhealthy. They are found in margarines, many fast foods, and some store-bought baked goods. \"Saturated\" fats are found in animal products like meats, egg yolks, butter, cheese, and full-fat milk products. Unhealthy fats can raise your cholesterol level and increase your chance of getting heart disease.   Sugar - To have a healthy diet, it's important to limit or avoid added sugar, sweets, and refined grains. Refined grains are found in white bread, white rice, most pastas, and most packaged \"snack\" foods.  Avoiding sugar-sweetened beverages, like soda and sports drinks, can also help improve your health.  Avoid canned fruits in \"heavy\" syrup.   Red and processed meats - Studies have shown that eating a lot of red meat can increase your risk of certain health problems, including heart disease and cancer. You should limit the amount of red meat that you eat. This is also true for processed meats like sausage, hot dogs, and garcia.  Can I drink alcohol as part of a healthy diet? -- Not drinking alcohol at all is the healthiest choice. Regular drinking can raise a person's chances of getting liver disease and certain types of cancers. In females, even 1 drink a day can increase the risk of getting breast cancer.  If you do choose to drink, most doctors recommend limiting alcohol to no more than:   1 drink a day for females   2 drinks a day for males  The limits are different because, generally, the female body takes longer to break down alcohol.  How many calories do I need each day? -- Calories give your body energy. The number of calories that you need " "each day depends on your weight, height, age, sex, and how active you are.  Your doctor or nurse can tell you about how many calories you should eat each day. You can also work with a dietitian (nutrition expert) to learn more about your dietary needs and options.  What if I am having trouble improving my diet? -- It can be hard to change the way that you eat. Remember that even small changes can improve your health.  Here are some tips that might help:   Try to make fruits and vegetables part of every meal. If you don't have fresh fruits and vegetables, frozen or canned are good options. Look for products without added salt or sugar.   Keep a bowl of fruit out for snacking.   When you can, choose whole grains instead of refined grains. Choose chicken, fish, and beans instead of red meat and cheese.   Try to eat prepared and processed foods less often.   Try flavored seltzer or water instead of soda or juice.   When eating at fast food restaurants, look for healthier items, like broiled chicken or salad.  If you have questions about which foods you should or should not eat, ask your doctor, nurse, or dietitian. The right diet for you will depend, in part, on your health and any medical conditions you have.  All topics are updated as new evidence becomes available and our peer review process is complete.  This topic retrieved from [employer] on: Feb 28, 2024.  Topic 47293 Version 28.0  Release: 32.2.4 - C32.58  © 2024 UpToDate, Inc. and/or its affiliates. All rights reserved.  figure 1: Nutrition label - Fiber     This is an example of a nutrition label. To figure out how much fiber is in a food, look for the line that says \"Dietary Fiber.\" It's also important to look at the serving size. This food has 7 grams of fiber in each serving, and each serving is 1 cup.  Graphic 52155 Version 8.0  figure 2: Foods and drinks with calcium and vitamin D     Foods rich in calcium include ice cream, soy milk, breads, kale, " "broccoli, milk, cheese, cottage cheese, almonds, yogurt, ready-to-eat cereals, beans, and tofu. Foods rich in vitamin D include milk, fortified plant-based \"milks\" (soy, almond), canned tuna fish, cod liver oil, yogurt, ready-to-eat-cereals, cooked salmon, canned sardines, mackerel, and eggs. Some of these foods are rich in both.  Graphic 27108 Version 4.0  Consumer Information Use and Disclaimer   Disclaimer: This generalized information is a limited summary of diagnosis, treatment, and/or medication information. It is not meant to be comprehensive and should be used as a tool to help the user understand and/or assess potential diagnostic and treatment options. It does NOT include all information about conditions, treatments, medications, side effects, or risks that may apply to a specific patient. It is not intended to be medical advice or a substitute for the medical advice, diagnosis, or treatment of a health care provider based on the health care provider's examination and assessment of a patient's specific and unique circumstances. Patients must speak with a health care provider for complete information about their health, medical questions, and treatment options, including any risks or benefits regarding use of medications. This information does not endorse any treatments or medications as safe, effective, or approved for treating a specific patient. UpToDate, Inc. and its affiliates disclaim any warranty or liability relating to this information or the use thereof.The use of this information is governed by the Terms of Use, available at https://www.wolterskluwer.com/en/know/clinical-effectiveness-terms. 2024© UpToDate, Inc. and its affiliates and/or licensors. All rights reserved.  Copyright   © 2024 UpToDate, Inc. and/or its affiliates. All rights reserved.  Patient Education     Calcium Rich Diet   About this topic   Calcium is one of the most important minerals and is found in almost all parts of your " body. It makes your teeth and bones strong and healthy. Your body does not make calcium. It is important for you to eat calcium rich foods to get enough calcium. It also helps your body:  Make blood clots  Keep your heartbeat normal  Helps blood move throughout the body  Release hormones  Release enzymes  Send and get signals between your nerves and brain  Make muscles work well         What will the results be?   It is important to have a good amount of calcium in your food. Calcium helps your body work well. It is very important during every life stage. Calcium may also keep you from losing bone mass. This is osteopenia. It may help keep you from having weak bones. This is osteoporosis.  What drugs may be needed?   The doctor may order calcium and vitamin D supplements. You need vitamin D to help your body take in the calcium. Your calcium supplement may have vitamin D in it.  Talk to your doctor about:  If it is OK to take your calcium with food.  How often to take your calcium supplement throughout the day.  All the drugs you are taking. Be sure to include all prescription and over-the-counter (OTC) drugs, and herbal supplements. Tell the doctor about any drug allergy. Bring a list of drugs you take with you. Some drugs may cause problems with how your body takes in calcium.  Will physical activity be limited?   No, physical activities will not be limited. It is good for you to do light exercises and to stay active.  What changes to diet are needed?   You will need to watch how much calcium is in the foods you eat. Your doctor will talk to you about the right amount of calcium for you. How much calcium you need is based on your age and life stage.  When is this diet used?   This diet is used when your normal diet is low in calcium. It is needed to raise the amount of calcium in your diet. Our bodies do not take in calcium well as we get older.  Who should not use this diet?   People with too much calcium in  their blood should not use this diet. This is called hypercalcemia.  What foods are good to eat?   Milk, yogurt, and cheese products are naturally high in calcium.  These foods have smaller amounts of calcium. If they are eaten often and in large amounts they can be good sources of calcium:  Oysters; dried fruit like figs and raisins; green leafy vegetables like broccoli, collards, kale, mustard greens, bok evangelina; soy beans; oranges  [place] and sardines. Be sure to eat the soft bones.  Nuts like almonds and [place] nuts, sunflower seeds, tahini, dried beans  Food products with calcium added to them like orange juice, tofu, cereal, bread; almond milk, rice milk, soy milk  What foods should be limited or avoided?   People who eat many kinds of foods do not have to be worried about limiting or avoiding certain foods.   What problems could happen?   Some people may get kidney stones. This may happen after taking high amounts of calcium over a long time. Calcium from food does not seem to cause kidney stones.  Hard stools.  Too much calcium may interfere with your body’s ability to absorb iron and zinc.  When do I need to call the doctor?   Call your doctor if you have concerns about your diet. Tell your doctor if you are allergic to any of the foods in your diet.  Helpful tips   If you have problems digesting milk, try lactose-free milk. You may also use a product to help you take in lactose.  Talk with your doctor if you are a vegetarian and do not eat dairy products. Your doctor can help you make sure you get the amount of calcium your body needs.  Last Reviewed Date   2021-03-12  Consumer Information Use and Disclaimer   This generalized information is a limited summary of diagnosis, treatment, and/or medication information. It is not meant to be comprehensive and should be used as a tool to help the user understand and/or assess potential diagnostic and treatment options. It does NOT include all information about  conditions, treatments, medications, side effects, or risks that may apply to a specific patient. It is not intended to be medical advice or a substitute for the medical advice, diagnosis, or treatment of a health care provider based on the health care provider's examination and assessment of a patient’s specific and unique circumstances. Patients must speak with a health care provider for complete information about their health, medical questions, and treatment options, including any risks or benefits regarding use of medications. This information does not endorse any treatments or medications as safe, effective, or approved for treating a specific patient. UpToDate, Inc. and its affiliates disclaim any warranty or liability relating to this information or the use thereof. The use of this information is governed by the Terms of Use, available at https://www.[employer].com/en/know/clinical-effectiveness-terms   Copyright   Copyright © 2024 UpToDate, Inc. and its affiliates and/or licensors. All rights reserved.  Patient Education     Exercise and movement   The Basics   Written by the doctors and editors at [facility]   What are the benefits of movement? -- Moving your body has many benefits. It can:   Burn calories, which helps people manage their weight   Help control blood sugar levels in people with diabetes   Lower blood pressure, especially in people with high blood pressure   Lower stress, and help with depression and anxiety   Keep bones strong, so they don't get thin and break easily   Lower the chance of dying from heart disease  Adding even small amounts of physical activity to your daily routine can improve your health.  What are the main types of exercise? -- There are 3 main types of exercise:   Aerobic exercise - This raises your heart rate. Examples include walking, running, dancing, riding a bike, and swimming.   Muscle strengthening - This helps make your muscles stronger. You can do it using  weights, exercise bands, or weight machines. You can also use your own body weight, as with push-ups, or by lifting items in your home, like jugs of water.   Stretching - These help your muscles and joints move more easily.  It's important to have all 3 types in your exercise program. That way, your body, muscles, and joints can be as healthy as possible.  Should I talk to my doctor or nurse before I start exercising? -- If you have not exercised before or have not exercised in a long time, talk with your doctor or nurse before you start a very active exercise program.  If you have heart disease or risk factors for heart disease (like high blood pressure or diabetes), your doctor or nurse might recommend that you have an exercise test before starting an exercise program.  When you begin an exercise program, start slowly. For example, do the exercise at a slow pace or for a few minutes only. Over time, you can exercise faster and for longer periods of time.  What should I do when I exercise? -- Each time you exercise, you should:   Warm up - This can help keep you from hurting your muscles when you exercise. To warm up, do a light aerobic exercise (such as walking slowly) or stretch for 5 to 10 minutes.   Work out - Try to get a mix of aerobic exercise, muscle strengthening, and stretching. During an aerobic workout, you can walk fast, swim, run, or use an exercise machine, for example. Other activities, like dancing or playing tennis, are also forms of aerobic exercise. You should also take time to stretch all of your joints, including your neck, shoulders, back, hips, and knees. At least 2 times a week, you can do muscle strengthening exercises as part of your workout.   Cool down - This helps keep you from feeling dizzy after you exercise and helps prevent muscle cramps. To cool down, you can stretch or do a light aerobic exercise for 5 minutes.  Some people go to a gym or do group exercise classes. But you can  exercise even without these things. Some exercises can be done even in a small space. You can also try online videos or smartphone apps to get ideas for different types of exercise.  How often should I exercise? -- Doctors recommend that people exercise at least 30 minutes a day, on 5 or more days of the week.  If you can't exercise for 30 minutes straight, try to exercise for 10 minutes at a time, 3 or 4 times a day. Even exercising for shorter amounts of time is good for you, especially if it means spending less time sitting.  When should I call my doctor or nurse? -- If you have any of the following symptoms when you exercise, stop exercising and call your doctor or nurse right away:   Pain or pressure in your chest, arms, throat, jaw, or back   Nausea or vomiting   Feeling like your heart is fluttering or racing very fast   Feeling dizzy or faint  What if I don't have time to exercise? -- Many people have very busy lives and might not think that they have time to exercise. But it's important to try to find time, even if you are tired or work a lot. Exercise can increase your energy level, which can make you feel better and might even help you get more work done.  Even if it's hard to set aside a lot of time to exercise, you can still improve your health by moving your body more. There are many ways that you can be more active. For example, you can:   Take the stairs instead of the elevator.   Park in a parking space that is farther away from the door.   Take a longer route when you walk from one place to another.  Spending a lot of time sitting still (for example, watching TV or working on the computer) is bad for your health. Try to get up and move around whenever you can. Even small amounts of movement, like taking short walks, doing household chores, or gardening, can improve your health. Finding activities that you enjoy, or doing them with other people, can help you add more movement into your daily  life.  What else should I do when I exercise? -- To exercise safely and avoid problems, it's important to:   Drink fluids during and after exercising (but avoid drinks with a lot of caffeine or sugar).   Avoid exercising outside if it is too hot or cold.   Wear layers of clothes, so that you can take them off if you get too hot.   Wear shoes that fit well and support your feet.   Be aware of your surroundings if you exercise outside.  All topics are updated as new evidence becomes available and our peer review process is complete.  This topic retrieved from [employer] on: May 18, 2024.  Topic 50907 Version 31.0  Release: 32.4.3 - C32.137  © 2024 UpToDate, Inc. and/or its affiliates. All rights reserved.  Consumer Information Use and Disclaimer   Disclaimer: This generalized information is a limited summary of diagnosis, treatment, and/or medication information. It is not meant to be comprehensive and should be used as a tool to help the user understand and/or assess potential diagnostic and treatment options. It does NOT include all information about conditions, treatments, medications, side effects, or risks that may apply to a specific patient. It is not intended to be medical advice or a substitute for the medical advice, diagnosis, or treatment of a health care provider based on the health care provider's examination and assessment of a patient's specific and unique circumstances. Patients must speak with a health care provider for complete information about their health, medical questions, and treatment options, including any risks or benefits regarding use of medications. This information does not endorse any treatments or medications as safe, effective, or approved for treating a specific patient. UpToDate, Inc. and its affiliates disclaim any warranty or liability relating to this information or the use thereof.The use of this information is governed by the Terms of Use, available at  https://www.woltersRadioFrameuwer.com/en/know/clinical-effectiveness-terms. 2024© [employer] and its affiliates and/or licensors. All rights reserved.  Copyright   © 2024 [employer] and/or its affiliates. All rights reserved.

## 2024-11-26 ENCOUNTER — RESULTS FOLLOW-UP (OUTPATIENT)
Dept: OBGYN CLINIC | Facility: CLINIC | Age: 44
End: 2024-11-26

## 2024-11-26 LAB
LAB AP GYN PRIMARY INTERPRETATION: NORMAL
Lab: NORMAL

## 2024-12-23 DIAGNOSIS — R45.86 MOOD SWINGS: ICD-10-CM

## 2024-12-23 DIAGNOSIS — F33.0 MDD (MAJOR DEPRESSIVE DISORDER), RECURRENT EPISODE, MILD (HCC): ICD-10-CM

## 2024-12-23 RX ORDER — LAMOTRIGINE 25 MG/1
TABLET ORAL
Qty: 60 TABLET | Refills: 0 | Status: SHIPPED | OUTPATIENT
Start: 2024-12-23

## 2025-01-06 DIAGNOSIS — R45.86 MOOD SWINGS: ICD-10-CM

## 2025-01-06 DIAGNOSIS — F33.0 MDD (MAJOR DEPRESSIVE DISORDER), RECURRENT EPISODE, MILD (HCC): ICD-10-CM

## 2025-01-07 RX ORDER — LAMOTRIGINE 25 MG/1
50 TABLET ORAL DAILY
Qty: 60 TABLET | Refills: 1 | Status: SHIPPED | OUTPATIENT
Start: 2025-01-07

## 2025-02-25 NOTE — Clinical Note
DL form completed per Teresa MCKEON. Portal message sent to parent.   Trudy Leach was seen and treated in our emergency department on 3/13/2022  Diagnosis:     Clint Rock    She may return on this date: 03/15/2022         If you have any questions or concerns, please don't hesitate to call        Rasheeda Soriano DO    ______________________________           _______________          _______________  Hospital Representative                              Date                                Time

## 2025-03-10 DIAGNOSIS — Z00.6 ENCOUNTER FOR EXAMINATION FOR NORMAL COMPARISON OR CONTROL IN CLINICAL RESEARCH PROGRAM: ICD-10-CM

## 2025-06-17 ENCOUNTER — HOSPITAL ENCOUNTER (EMERGENCY)
Facility: HOSPITAL | Age: 45
Discharge: HOME | End: 2025-06-17
Attending: EMERGENCY MEDICINE | Admitting: EMERGENCY MEDICINE
Payer: COMMERCIAL

## 2025-06-17 ENCOUNTER — APPOINTMENT (EMERGENCY)
Dept: CT IMAGING | Facility: HOSPITAL | Age: 45
End: 2025-06-17
Payer: COMMERCIAL

## 2025-06-17 VITALS
HEART RATE: 68 BPM | WEIGHT: 229.28 LBS | DIASTOLIC BLOOD PRESSURE: 79 MMHG | OXYGEN SATURATION: 99 % | BODY MASS INDEX: 38.15 KG/M2 | TEMPERATURE: 98.1 F | RESPIRATION RATE: 18 BRPM | SYSTOLIC BLOOD PRESSURE: 135 MMHG

## 2025-06-17 DIAGNOSIS — M54.50 LOW BACK PAIN: Primary | ICD-10-CM

## 2025-06-17 LAB
ALBUMIN SERPL BCG-MCNC: 4.3 G/DL (ref 3.5–5)
ALP SERPL-CCNC: 55 U/L (ref 34–104)
ALT SERPL W P-5'-P-CCNC: 22 U/L (ref 7–52)
ANION GAP SERPL CALCULATED.3IONS-SCNC: 6 MMOL/L (ref 4–13)
AST SERPL W P-5'-P-CCNC: 18 U/L (ref 13–39)
BASOPHILS # BLD AUTO: 0.04 THOUSANDS/ÂΜL (ref 0–0.1)
BASOPHILS NFR BLD AUTO: 1 % (ref 0–1)
BILIRUB SERPL-MCNC: 0.34 MG/DL (ref 0.2–1)
BILIRUB UR QL STRIP: NEGATIVE
BUN SERPL-MCNC: 11 MG/DL (ref 5–25)
CALCIUM SERPL-MCNC: 9.8 MG/DL (ref 8.4–10.2)
CHLORIDE SERPL-SCNC: 103 MMOL/L (ref 96–108)
CLARITY UR: CLEAR
CO2 SERPL-SCNC: 28 MMOL/L (ref 21–32)
COLOR UR: YELLOW
CREAT SERPL-MCNC: 0.92 MG/DL (ref 0.6–1.3)
EOSINOPHIL # BLD AUTO: 0.1 THOUSAND/ÂΜL (ref 0–0.61)
EOSINOPHIL NFR BLD AUTO: 2 % (ref 0–6)
ERYTHROCYTE [DISTWIDTH] IN BLOOD BY AUTOMATED COUNT: 12.3 % (ref 11.6–15.1)
EXT PREGNANCY TEST URINE: NEGATIVE
EXT. CONTROL: NORMAL
GFR SERPL CREATININE-BSD FRML MDRD: 74 ML/MIN/1.73SQ M
GLUCOSE SERPL-MCNC: 89 MG/DL (ref 65–140)
GLUCOSE UR STRIP-MCNC: NEGATIVE MG/DL
HCT VFR BLD AUTO: 34.9 % (ref 34.8–46.1)
HGB BLD-MCNC: 11.8 G/DL (ref 11.5–15.4)
HGB UR QL STRIP.AUTO: NEGATIVE
IMM GRANULOCYTES # BLD AUTO: 0.03 THOUSAND/UL (ref 0–0.2)
IMM GRANULOCYTES NFR BLD AUTO: 1 % (ref 0–2)
KETONES UR STRIP-MCNC: NEGATIVE MG/DL
LEUKOCYTE ESTERASE UR QL STRIP: NEGATIVE
LYMPHOCYTES # BLD AUTO: 2.15 THOUSANDS/ÂΜL (ref 0.6–4.47)
LYMPHOCYTES NFR BLD AUTO: 33 % (ref 14–44)
MCH RBC QN AUTO: 30.3 PG (ref 26.8–34.3)
MCHC RBC AUTO-ENTMCNC: 33.8 G/DL (ref 31.4–37.4)
MCV RBC AUTO: 90 FL (ref 82–98)
MONOCYTES # BLD AUTO: 0.58 THOUSAND/ÂΜL (ref 0.17–1.22)
MONOCYTES NFR BLD AUTO: 9 % (ref 4–12)
NEUTROPHILS # BLD AUTO: 3.67 THOUSANDS/ÂΜL (ref 1.85–7.62)
NEUTS SEG NFR BLD AUTO: 54 % (ref 43–75)
NITRITE UR QL STRIP: NEGATIVE
NRBC BLD AUTO-RTO: 0 /100 WBCS
PH UR STRIP.AUTO: 7 [PH] (ref 4.5–8)
PLATELET # BLD AUTO: 297 THOUSANDS/UL (ref 149–390)
PMV BLD AUTO: 9.8 FL (ref 8.9–12.7)
POTASSIUM SERPL-SCNC: 3.7 MMOL/L (ref 3.5–5.3)
PROT SERPL-MCNC: 7.3 G/DL (ref 6.4–8.4)
PROT UR STRIP-MCNC: NEGATIVE MG/DL
RBC # BLD AUTO: 3.89 MILLION/UL (ref 3.81–5.12)
SODIUM SERPL-SCNC: 137 MMOL/L (ref 135–147)
SP GR UR STRIP.AUTO: 1.02 (ref 1–1.03)
UROBILINOGEN UR QL STRIP.AUTO: 0.2 E.U./DL
WBC # BLD AUTO: 6.57 THOUSAND/UL (ref 4.31–10.16)

## 2025-06-17 PROCEDURE — 85025 COMPLETE CBC W/AUTO DIFF WBC: CPT | Performed by: PHYSICIAN ASSISTANT

## 2025-06-17 PROCEDURE — 96374 THER/PROPH/DIAG INJ IV PUSH: CPT

## 2025-06-17 PROCEDURE — 99284 EMERGENCY DEPT VISIT MOD MDM: CPT

## 2025-06-17 PROCEDURE — 99285 EMERGENCY DEPT VISIT HI MDM: CPT | Performed by: PHYSICIAN ASSISTANT

## 2025-06-17 PROCEDURE — 36415 COLL VENOUS BLD VENIPUNCTURE: CPT | Performed by: PHYSICIAN ASSISTANT

## 2025-06-17 PROCEDURE — 74177 CT ABD & PELVIS W/CONTRAST: CPT

## 2025-06-17 PROCEDURE — 81025 URINE PREGNANCY TEST: CPT

## 2025-06-17 PROCEDURE — 80053 COMPREHEN METABOLIC PANEL: CPT | Performed by: PHYSICIAN ASSISTANT

## 2025-06-17 PROCEDURE — 81003 URINALYSIS AUTO W/O SCOPE: CPT

## 2025-06-17 RX ORDER — CYCLOBENZAPRINE HCL 10 MG
10 TABLET ORAL 2 TIMES DAILY PRN
Qty: 20 TABLET | Refills: 0 | Status: SHIPPED | OUTPATIENT
Start: 2025-06-17

## 2025-06-17 RX ORDER — KETOROLAC TROMETHAMINE 30 MG/ML
15 INJECTION, SOLUTION INTRAMUSCULAR; INTRAVENOUS ONCE
Status: COMPLETED | OUTPATIENT
Start: 2025-06-17 | End: 2025-06-17

## 2025-06-17 RX ADMIN — KETOROLAC TROMETHAMINE 15 MG: 30 INJECTION, SOLUTION INTRAMUSCULAR; INTRAVENOUS at 15:36

## 2025-06-17 RX ADMIN — IOHEXOL 100 ML: 350 INJECTION, SOLUTION INTRAVENOUS at 16:22

## 2025-06-18 ENCOUNTER — TELEPHONE (OUTPATIENT)
Dept: PHYSICAL THERAPY | Facility: OTHER | Age: 45
End: 2025-06-18

## 2025-06-18 NOTE — TELEPHONE ENCOUNTER
Call placed to the patient per [facility] referral.    Spoke with patient, explained program and reason for the call.    Patient declined services. Patient states she doesn't have health insurance at the moment and will call back when she is ready.    Phone number and hours of business provided.     Referral closed per protocol.

## 2025-06-19 NOTE — ED PROVIDER NOTES
Time reflects when diagnosis was documented in both MDM as applicable and the Disposition within this note       Time User Action Codes Description Comment    6/17/2025  5:59 PM Simon Alexander Add [M54.50] Low back pain           ED Disposition       ED Disposition   Discharge    Condition   Stable    Date/Time   Tue Jun 17, 2025  5:59 PM    Comment   Lianne Tay discharge to home/self care.                   Assessment & Plan       Medical Decision Making  Chronic pain to left lower back/flank, does appear to have a positional component.  This pain is largely nonradiating.  Her abdominal exam is benign.  No alarm symptoms.  Given no previous workup for this flank pain, workup obtained including CBC, CMP, CT abdomen/pelvis for inflammatory intra-abdominal pathology versus obstructive uropathy -several chronic findings noted, no acute abnormality.  Discussed chronic findings with patient including dermoid, myomatous uterus, and adrenal adenoma.  Patient notes some improvement in pain following Toradol.  Will discharge patient with Flexeril as needed, continue Tylenol as needed for suspected MSK pain.  Will refer for follow-up with spine program.  Return indications reviewed.    Amount and/or Complexity of Data Reviewed  Labs: ordered.  Radiology: ordered.    Risk  Prescription drug management.             Medications   ketorolac (TORADOL) injection 15 mg (15 mg Intravenous Given 6/17/25 1536)   iohexol (OMNIPAQUE) 350 MG/ML injection (SINGLE-DOSE) 100 mL (100 mL Intravenous Given 6/17/25 1622)       ED Risk Strat Scores                    No data recorded                            History of Present Illness       Chief Complaint   Patient presents with    Flank Pain     Pt reports left sided flank pain with urinary frequency        Past Medical History[1]   Past Surgical History[2]   Family History[3]   Social History[4]   E-Cigarette/Vaping    E-Cigarette Use Never User       E-Cigarette/Vaping Substances     Nicotine No     THC No     CBD No     Flavoring No     Other No     Unknown No       I have reviewed and agree with the history as documented.     Lianne is a 46 yo F presenting with left lower back/flank pain which has been present for several months.  This pain has been fairly constant, does seem to change with certain positions including laying down/sitting upright.  It is nonradiating.  No preceding injury or trauma.  No urinary symptoms.  No GI symptoms.  No fevers or chills.  She takes Tylenol at times for pain without significant improvement. No loss of control of bowel or bladder function. No saddle anesthesia. No fevers/chills/IVDA.           History provided by:  Patient      Review of Systems   Constitutional:  Negative for chills and fever.   HENT:  Negative for congestion, rhinorrhea and sore throat.    Eyes:  Negative for pain and visual disturbance.   Respiratory:  Negative for cough, shortness of breath and wheezing.    Cardiovascular:  Negative for chest pain and palpitations.   Gastrointestinal:  Negative for abdominal pain, nausea and vomiting.   Genitourinary:  Negative for dysuria, frequency and urgency.   Musculoskeletal:  Positive for back pain. Negative for neck pain and neck stiffness.   Skin:  Negative for rash and wound.   Neurological:  Negative for dizziness, weakness, light-headedness and numbness.           Objective       ED Triage Vitals   Temperature Pulse Blood Pressure Respirations SpO2 Patient Position - Orthostatic VS   06/17/25 1413 06/17/25 1413 06/17/25 1413 06/17/25 1413 06/17/25 1413 --   98.1 °F (36.7 °C) 71 164/79 18 98 %       Temp src Heart Rate Source BP Location FiO2 (%) Pain Score    -- 06/17/25 1413 -- -- 06/17/25 1536     Monitor   9      Vitals      Date and Time Temp Pulse SpO2 Resp BP Pain Score FACES Pain Rating User   06/17/25 1615 -- 68 99 % 18 135/79 -- --    06/17/25 1536 -- -- -- -- -- 9 --    06/17/25 1413 98.1 °F (36.7 °C) 71 98 % 18 164/79 -- -- APOORVA             Physical Exam  Constitutional:       General: She is not in acute distress.     Appearance: She is well-developed. She is not diaphoretic.   HENT:      Head: Normocephalic and atraumatic.      Right Ear: External ear normal.      Left Ear: External ear normal.     Eyes:      Extraocular Movements:      Right eye: Normal extraocular motion.      Left eye: Normal extraocular motion.      Conjunctiva/sclera: Conjunctivae normal.      Pupils: Pupils are equal, round, and reactive to light.       Cardiovascular:      Rate and Rhythm: Normal rate and regular rhythm.   Pulmonary:      Effort: Pulmonary effort is normal. No accessory muscle usage or respiratory distress.   Abdominal:      General: Abdomen is flat. There is no distension.      Tenderness: There is no abdominal tenderness.     Musculoskeletal:         General: Tenderness present.      Cervical back: Normal range of motion. No rigidity.      Comments: Tenderness along left lumbar paraspinal region.  No rashes or lesions noted to this area.  No midline spinal tenderness.     Skin:     General: Skin is warm and dry.      Capillary Refill: Capillary refill takes less than 2 seconds.      Findings: No erythema or rash.     Neurological:      Mental Status: She is alert and oriented to person, place, and time.      Motor: No abnormal muscle tone.      Coordination: Coordination normal.     Psychiatric:         Behavior: Behavior normal.         Thought Content: Thought content normal.         Judgment: Judgment normal.         Results Reviewed       Procedure Component Value Units Date/Time    Comprehensive metabolic panel [[id number]] Collected: 06/17/25 1536    Lab Status: Final result Specimen: Blood from Arm, Right Updated: 06/17/25 1602     Sodium 137 mmol/L      Potassium 3.7 mmol/L      Chloride 103 mmol/L      CO2 28 mmol/L      ANION GAP 6 mmol/L      BUN 11 mg/dL      Creatinine 0.92 mg/dL      Glucose 89 mg/dL      Calcium 9.8 mg/dL      AST 18  U/L      ALT 22 U/L      Alkaline Phosphatase 55 U/L      Total Protein 7.3 g/dL      Albumin 4.3 g/dL      Total Bilirubin 0.34 mg/dL      eGFR 74 ml/min/1.73sq m     Narrative:      National Kid[employer] Foundation guidelines for Chronic Kidney Disease (CKD):     Stage 1 with normal or high GFR (GFR > 90 mL/min/1.73 square meters)    Stage 2 Mild CKD (GFR = 60-89 mL/min/1.73 square meters)    Stage 3A Moderate CKD (GFR = 45-59 mL/min/1.73 square meters)    Stage 3B Moderate CKD (GFR = 30-44 mL/min/1.73 square meters)    Stage 4 Severe CKD (GFR = 15-29 mL/min/1.73 square meters)    Stage 5 End Stage CKD (GFR <15 mL/min/1.73 square meters)  Note: GFR calculation is accurate only with a steady state creatinine    CBC and differential [[id number]] Collected: 06/17/25 1536    Lab Status: Final result Specimen: Blood from Arm, Right Updated: 06/17/25 1544     WBC 6.57 Thousand/uL      RBC 3.89 Million/uL      Hemoglobin 11.8 g/dL      Hematocrit 34.9 %      MCV 90 fL      MCH 30.3 pg      MCHC 33.8 g/dL      RDW 12.3 %      MPV 9.8 fL      Platelets 297 Thousands/uL      nRBC 0 /100 WBCs      Segmented % 54 %      Immature Grans % 1 %      Lymphocytes % 33 %      Monocytes % 9 %      Eosinophils Relative 2 %      Basophils Relative 1 %      Absolute Neutrophils 3.67 Thousands/µL      Absolute Immature Grans 0.03 Thousand/uL      Absolute Lymphocytes 2.15 Thousands/µL      Absolute Monocytes 0.58 Thousand/µL      Eosinophils Absolute 0.10 Thousand/µL      Basophils Absolute 0.04 Thousands/µL     POCT pregnancy, urine [[id number]]  (Normal) Collected: 06/17/25 1503    Lab Status: Final result Updated: 06/17/25 1503     EXT Preg Test, Ur Negative     Control Valid    Urine Macroscopic, POC [[id number]] Collected: 06/17/25 1454    Lab Status: Final result Specimen: Urine Updated: 06/17/25 1455     Color, UA Yellow     Clarity, UA Clear     pH, UA 7.0     Leukocytes, UA Negative     Nitrite, UA Negative     Protein, UA  Negative mg/dl      Glucose, UA Negative mg/dl      Ketones, UA Negative mg/dl      Urobilinogen, UA 0.2 E.U./dl      Bilirubin, UA Negative     Occult Blood, UA Negative     Specific Gravity, UA 1.025    Narrative:      [facility] RESULT            CT abdomen pelvis with contrast   Final Interpretation by Edwar Da Silva MD (06/17 1659)      No acute inflammatory process identified.      Stable right adrenal gland adenoma.      Stable myomatous uterus.      Stable 1 cm right ovarian dermoid.         Workstation performed: [id number]             Procedures    ED Medication and Procedure Management   Prior to Admission Medications   Prescriptions Last Dose Informant Patient Reported? Taking?   albuterol (Ventolin HFA) 90 mcg/act inhaler  Self No No   Sig: Inhale 2 puffs every 6 (six) hours as needed for wheezing   buPROPion (WELLBUTRIN XL) 300 mg 24 hr tablet   No No   Sig: Take 1 tablet (300 mg total) by mouth daily   budesonide-formoterol (SYMBICORT) 160-4.5 mcg/act inhaler  Self No No   Sig: Inhale 2 puffs 2 (two) times a day Rinse mouth after use.   clindamycin (CLINDAGEL) 1 % gel  Self No No   Sig: Apply topically 2 (two) times a day   cyanocobalamin (VITAMIN B-12) 1000 MCG tablet  Self No No   Sig: Take 1 tablet (1,000 mcg total) by mouth daily   Patient not taking: Reported on 7/24/2023   fexofenadine (ALLEGRA) 180 MG tablet  Self No No   Sig: Take 1 tablet (180 mg total) by mouth daily   Patient not taking: Reported on 11/21/2023   fluticasone (FLONASE) 50 mcg/act nasal spray  Self No No   Sig: SPRAY 2 SPRAYS INTO EACH NOSTRIL EVERY DAY   Patient not taking: No sig reported   hydrOXYzine HCL (ATARAX) 10 mg tablet   No No   Sig: Take 1/2 to 1 tablet by mouth two to three times a day as needed for anxiety or irritability.   Patient not taking: Reported on 11/21/2024   lamoTRIgine (LaMICtal) 25 mg tablet   No No   Sig: TAKE 2 TABLETS BY MOUTH EVERY DAY   pantoprazole (PROTONIX) 40 mg tablet   No No   Sig: TAKE 1  TABLET BY MOUTH 2 TIMES A DAY BEFORE MEALS.      Facility-Administered Medications: None     Discharge Medication List as of 6/17/2025  6:00 PM        START taking these medications    Details   cyclobenzaprine (FLEXERIL) 10 mg tablet Take 1 tablet (10 mg total) by mouth 2 (two) times a day as needed for muscle spasms, Starting Tue 6/17/2025, Normal           CONTINUE these medications which have NOT CHANGED    Details   albuterol (Ventolin HFA) 90 mcg/act inhaler Inhale 2 puffs every 6 (six) hours as needed for wheezing, Starting Wed 12/20/2023, Normal      budesonide-formoterol (SYMBICORT) 160-4.5 mcg/act inhaler Inhale 2 puffs 2 (two) times a day Rinse mouth after use., Starting Wed 12/20/2023, Normal      buPROPion (WELLBUTRIN XL) 300 mg 24 hr tablet Take 1 tablet (300 mg total) by mouth daily, Starting Thu 11/21/2024, Normal      clindamycin (CLINDAGEL) 1 % gel Apply topically 2 (two) times a day, Starting Thu 6/4/2020, Normal      cyanocobalamin (VITAMIN B-12) 1000 MCG tablet Take 1 tablet (1,000 mcg total) by mouth daily, Starting Fri 12/23/2022, Normal      fexofenadine (ALLEGRA) 180 MG tablet Take 1 tablet (180 mg total) by mouth daily, Starting Tue 5/2/2023, No Print      fluticasone (FLONASE) 50 mcg/act nasal spray SPRAY 2 SPRAYS INTO EACH NOSTRIL EVERY DAY, Normal      hydrOXYzine HCL (ATARAX) 10 mg tablet Take 1/2 to 1 tablet by mouth two to three times a day as needed for anxiety or irritability., Normal      lamoTRIgine (LaMICtal) 25 mg tablet TAKE 2 TABLETS BY MOUTH EVERY DAY, Starting Tue 1/7/2025, Normal      pantoprazole (PROTONIX) 40 mg tablet TAKE 1 TABLET BY MOUTH 2 TIMES A DAY BEFORE MEALS., Starting Mon 6/3/2024, Normal             ED SEPSIS DOCUMENTATION   Time reflects when diagnosis was documented in both MDM as applicable and the Disposition within this note       Time User Action Codes Description Comment    6/17/2025  5:59 PM Simon Alexander Add [M54.50] Low back pain                     [1]   Past Medical History:  Diagnosis Date    Abnormal Pap smear of cervix     years ago    Acne 2015    Acquired hallux valgus 2016    Acute non-recurrent maxillary sinusitis 2020    Anemia     Anxiety 10/16/2019    Arthritis     Benign paroxysmal positional vertigo 2017    Candida vaginitis 2019    Colon polyp 2019    COVID-19 vaccine series completed     Moderna: 2nd dose 2021    Depression     Discoloration of skin of hand 2019    Duodenitis without bleeding     Dysphagia     Fibroid 4 years    Fibromyalgia     Gastritis     GERD (gastroesophageal reflux disease)     Hiatal hernia     Hx of colonoscopy     Insomnia     Iron deficiency anemia secondary to inadequate dietary iron intake 10/18/2019    Memory loss     Menorrhagia with regular cycle 2014    Moderate episode of recurrent major depressive disorder (HCC) 10/16/2019    Obesity     Oral herpes     Ovarian cyst 2013    Small dermoid on right ovary    Pap smear for cervical cancer screening     2016-wnl; 2020-wnl, HRHPV neg    Sodium (Na) deficiency 10/19/2020    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 myelopathy or radiculopathy     Streptococcal pharyngitis 10/19/2020    Vaginal discharge 2018    Vitamin D deficiency    [2]   Past Surgical History:  Procedure Laterality Date    APPENDECTOMY      BREAST BIOPSY Right     pt unsure when or where- ? 6 yrs ago- benign    BREAST SURGERY Right 2015    lump removed from breast      SECTION  1998    CHOLECYSTECTOMY      COLONOSCOPY      2012; 2019-benign polyp, repeat 5 years    DILATION AND CURETTAGE OF UTERUS      Resolved:2009    ENDOMETRIAL ABLATION N/A 2020    Procedure: ABLATION ENDOMETRIAL NOVASURE;  Surgeon: Brenna Peguero MD;  Location: [facility];  Service: Gynecology    ESOPHAGOGASTRODUODENOSCOPY      Diagnostic ; 2012    HYSTEROSCOPY      Resolved:2009    OVARIAN CYST REMOVAL Right     NM  HYSTEROSCOPY BX ENDOMETRIUM&/POLYPC W/WO D&C N/A 2020    Procedure: DILATATION AND CURETTAGE (D&C) WITH HYSTEROSCOPY;  Surgeon: Brenna Peguero MD;  Location: [facility];  Service: Gynecology    WISDOM TOOTH EXTRACTION     [3]   Family History  Problem Relation Name Age of Onset    Other Mother          uterine leiomyoma    Diabetes type II Mother          Mellitus    Hypothyroidism Mother      Colon cancer Father Jarrett Rios 55        stage 4 squamous cell carcinomas in final stages    Diabetes Father Jarrett Rios         Type II Mellitus    Hypertension Father Jarrett Rios     Squamous cell carcinoma Father Jarrett Rios         Located waist down around kidneys, lowe back, tail bone, prostate and bladder.    Cancer Father Jarrett Rios          23  cancer returned  squamous carcinoma  incurable    Multiple sclerosis Sister Neha Rios     Asthma Sister Neha Rios         All her life    Hypothyroidism Sister Neha Rios     Anxiety disorder Brother Lakhwinder rios     Depression Brother Lakhwinder rios     Diabetes Brother Lakhwinder gardunogo         Mellitus    Asthma Brother Lakhwinder rios         All his life    Hypertension Brother Lakhwinder rios     Cancer Maternal Grandmother Bonifacia Warner         vulvar cancer    Hypertension Maternal Grandmother Bonifacia Warner     Diabetes Maternal Grandmother Bonifacia Warner     Other Maternal Grandmother Bonifacia Warner         Vulvar cancer    Schizophrenia Maternal Grandfather René Brokos     Hypertension Maternal Grandfather René Brooks     Thyroid cancer Paternal Grandmother Jackie Rios 82    Cancer Paternal Grandmother Jackie Gardunogo     Diabetes Paternal Grandfather Jarrett 42        Dont remember full name    Breast cancer Cousin canales cousin 52    Ovarian cancer Neg Hx      Uterine cancer Neg Hx     [4]   Social History  Tobacco Use    Smoking status: Never     Passive exposure: Never    Smokeless  tobacco: Never    Tobacco comments:     No passive smoke exposure   Vaping Use    Vaping status: Never Used   Substance Use Topics    Alcohol use: No    Drug use: No        Simon Alexander PA-C  06/19/25 0716

## (undated) DEVICE — SCD SEQUENTIAL COMPRESSION COMFORT SLEEVE MEDIUM KNEE LENGTH: Brand: KENDALL SCD

## (undated) DEVICE — GLOVE SRG BIOGEL 6.5

## (undated) DEVICE — DRAPE EQUIPMENT RF WAND

## (undated) DEVICE — STERILE 8 INCH PROCTO SWAB: Brand: CARDINAL HEALTH

## (undated) DEVICE — GLOVE INDICATOR PI UNDERGLOVE SZ 7.5 BLUE

## (undated) DEVICE — GLOVE SRG BIOGEL 7

## (undated) DEVICE — GLOVE PI ULTRA TOUCH SZ.7.0

## (undated) DEVICE — CYSTO TUBING SINGLE IRRIGATION

## (undated) DEVICE — STRL ALLENTOWN HYSTEROSCOPY PK: Brand: CARDINAL HEALTH

## (undated) DEVICE — 2000CC GUARDIAN II: Brand: GUARDIAN

## (undated) DEVICE — GLOVE INDICATOR PI UNDERGLOVE SZ 6.5 BLUE

## (undated) DEVICE — PREMIUM DRY TRAY LF: Brand: MEDLINE INDUSTRIES, INC.

## (undated) DEVICE — UNDER BUTTOCKS DRAPE W/FLUID CONTROL POUCH: Brand: CONVERTORS

## (undated) DEVICE — NOVASURE ADVANCED DEVICE

## (undated) DEVICE — PVC URETHRAL CATHETER: Brand: DOVER

## (undated) DEVICE — GLOVE INDICATOR PI UNDERGLOVE SZ 7 BLUE

## (undated) DEVICE — IV EXTENSION TUBING 33 IN